# Patient Record
Sex: FEMALE | Race: WHITE | NOT HISPANIC OR LATINO | Employment: OTHER | URBAN - METROPOLITAN AREA
[De-identification: names, ages, dates, MRNs, and addresses within clinical notes are randomized per-mention and may not be internally consistent; named-entity substitution may affect disease eponyms.]

---

## 2017-01-03 RX ORDER — LISINOPRIL 10 MG/1
TABLET ORAL
Qty: 90 TAB | Refills: 0 | Status: SHIPPED | OUTPATIENT
Start: 2017-01-03 | End: 2017-04-03 | Stop reason: SDUPTHER

## 2017-01-03 NOTE — TELEPHONE ENCOUNTER
Was the patient seen in the last year in this department? Yes     Does patient have an active prescription for medications requested? No     Received Request Via: Pharmacy     Last visit: 06/13/2016  Last labs:05/15/2015

## 2017-01-10 DIAGNOSIS — F41.1 GAD (GENERALIZED ANXIETY DISORDER): ICD-10-CM

## 2017-01-10 RX ORDER — ALPRAZOLAM 0.5 MG/1
0.5 TABLET ORAL 2 TIMES DAILY
Qty: 50 TAB | Refills: 0 | OUTPATIENT
Start: 2017-01-10

## 2017-01-10 NOTE — TELEPHONE ENCOUNTER
Was the patient seen in the last year in this department? Yes     Does patient have an active prescription for medications requested? No     Received Request Via: Pharmacy    Last visit: 06/13/2016  Last labs:CMP 01/27/2016

## 2017-01-11 NOTE — TELEPHONE ENCOUNTER
Phone Number Called: [phone] (home)       Message: Spoke with patient and let them know Zahra Rodríguez M.D.'s message.  Pt scheduled O/V on 01/12/2017.    Left Message for patient to call back: N\A

## 2017-02-06 ENCOUNTER — OFFICE VISIT (OUTPATIENT)
Dept: MEDICAL GROUP | Facility: PHYSICIAN GROUP | Age: 62
End: 2017-02-06

## 2017-02-06 VITALS
WEIGHT: 129 LBS | DIASTOLIC BLOOD PRESSURE: 72 MMHG | HEART RATE: 78 BPM | RESPIRATION RATE: 16 BRPM | OXYGEN SATURATION: 97 % | SYSTOLIC BLOOD PRESSURE: 118 MMHG | BODY MASS INDEX: 21.49 KG/M2 | TEMPERATURE: 97.9 F | HEIGHT: 65 IN

## 2017-02-06 DIAGNOSIS — N95.1 HOT FLASH, MENOPAUSAL: ICD-10-CM

## 2017-02-06 DIAGNOSIS — E78.5 DYSLIPIDEMIA: ICD-10-CM

## 2017-02-06 DIAGNOSIS — R94.4 DECREASED GLOMERULAR FILTRATION RATE (GFR): ICD-10-CM

## 2017-02-06 DIAGNOSIS — I10 ESSENTIAL HYPERTENSION: ICD-10-CM

## 2017-02-06 DIAGNOSIS — Z13.1 SCREENING FOR DIABETES MELLITUS: ICD-10-CM

## 2017-02-06 DIAGNOSIS — Z85.3 HISTORY OF BREAST CANCER: ICD-10-CM

## 2017-02-06 DIAGNOSIS — F41.8 OTHER SPECIFIED ANXIETY DISORDERS: ICD-10-CM

## 2017-02-06 PROCEDURE — 99214 OFFICE O/P EST MOD 30 MIN: CPT | Performed by: FAMILY MEDICINE

## 2017-02-06 RX ORDER — PRAVASTATIN SODIUM 40 MG
40 TABLET ORAL DAILY
Qty: 90 TAB | Refills: 3 | Status: SHIPPED | OUTPATIENT
Start: 2017-02-06 | End: 2018-02-07 | Stop reason: SDUPTHER

## 2017-02-06 NOTE — MR AVS SNAPSHOT
"        Sylvie Lipscomblivan   2017 1:00 PM   Office Visit   MRN: [id number]    Department:  [facility]   Dept Phone:  [phone]    Description:  Female : 1955   Provider:  Cecilia Florian M.D.           Reason for Visit     Establish Care refills      Allergies as of 2017     Allergen Noted Reactions    Codeine 2016   Unspecified    Memory issues      You were diagnosed with     Dyslipidemia   [496278]       Essential hypertension   [0257845]       Hot flash, menopausal   [636307]       Other specified anxiety disorders   [8348590]       History of breast cancer   [644647]       Decreased glomerular filtration rate (GFR)   [5349468]       Screening for diabetes mellitus   [V77.1.ICD-9-CM]         Vital Signs     Blood Pressure Pulse Temperature Respirations Height Weight    118/72 mmHg 78 36.6 °C (97.9 °F) 16 1.638 m (5' 4.5\") 58.514 kg (129 lb)    Body Mass Index Oxygen Saturation Smoking Status             21.81 kg/m2 97% Current Every Day Smoker         Basic Information     Date Of Birth Sex Race Ethnicity Preferred Language    1955 Female White Non- English      Your appointments     Aug 07, 2017  1:20 PM   Established Patient with Cecilia Florian M.D.   [facility] (--)    [address]  [address]  [facility] [address] [phone]           You will be receiving a confirmation call a few days before your appointment from our automated call confirmation system.              Problem List              ICD-10-CM Priority Class Noted - Resolved    Dyslipidemia E78.5 Medium  2013 - Present    Anxiety disorder F41.9 Low Chronic 2014 - Present    Status post mastectomy Z90.10 Medium  2014 - Present    Hot flash, menopausal N95.1   2015 - Present    Essential hypertension I10   2015 - Present    History of breast cancer Z85.3   2017 - Present    Decreased glomerular filtration rate (GFR) R94.4   2017 - Present      Health Maintenance       " Date Due Completion Dates    IMM INFLUENZA (1) 10/1/2017 (Originally 9/1/2016) ---    COLONOSCOPY 2/6/2018 (Originally 9/1/2016) 9/1/2006 (Prv Comp)    Override on 9/1/2006: Previously completed (every 10 years)    IMM ZOSTER VACCINE 2/6/2018 (Originally 8/1/2015) ---    MAMMOGRAM 4/27/2017 4/27/2016, 5/18/2015, 4/28/2014, 5/7/2012, 5/4/2011, 3/29/2010, 2/24/2010, 2/24/2010, 8/7/2008, 8/7/2008, 10/31/2006, 9/30/2005, 9/29/2004    PAP SMEAR 7/1/2017 7/1/2014 (Done)    Override on 7/1/2014: Done    IMM DTaP/Tdap/Td Vaccine (2 - Td) 4/29/2024 4/29/2014            Current Immunizations     Tdap Vaccine 4/29/2014  2:30 PM      Below and/or attached are the medications your provider expects you to take. Review all of your home medications and newly ordered medications with your provider and/or pharmacist. Follow medication instructions as directed by your provider and/or pharmacist. Please keep your medication list with you and share with your provider. Update the information when medications are discontinued, doses are changed, or new medications (including over-the-counter products) are added; and carry medication information at all times in the event of emergency situations     Allergies:  CODEINE - Unspecified               Medications  Valid as of: February 06, 2017 -  1:07 PM    Generic Name Brand Name Tablet Size Instructions for use    Fenofibrate Micronized (Cap) LOFIBRA 134 MG TAKE ONE CAPSULE BY MOUTH ONE TIME DAILY        Lisinopril (Tab) PRINIVIL 10 MG TAKE ONE TABLET BY MOUTH ONE TIME DAILY        Metoprolol Succinate (TABLET SR 24 HR) TOPROL XL 25 MG TAKE ONE TABLET BY MOUTH ONE TIME DAILY        Pravastatin Sodium (Tab) PRAVACHOL 40 MG Take 1 Tab by mouth every day.        Venlafaxine HCl (Tab) EFFEXOR 75 MG Take 1 Tab by mouth every day.        .                 Medicines prescribed today were sent to:     [facility] - [place], [place] - [address] [address]    Phone: [phone]  Fax: [phone]    Open 24 Hours?: No      Medication refill instructions:       If your prescription bottle indicates you have medication refills left, it is not necessary to call your provider’s office. Please contact your pharmacy and they will refill your medication.    If your prescription bottle indicates you do not have any refills left, you may request refills at any time through one of the following ways: The online MTPV system (except Urgent Care), by calling your provider’s office, or by asking your pharmacy to contact your provider’s office with a refill request. Medication refills are processed only during regular business hours and may not be available until the next business day. Your provider may request additional information or to have a follow-up visit with you prior to refilling your medication.   *Please Note: Medication refills are assigned a new Rx number when refilled electronically. Your pharmacy may indicate that no refills were authorized even though a new prescription for the same medication is available at the pharmacy. Please request the medicine by name with the pharmacy before contacting your provider for a refill.        Your To Do List     Future Labs/Procedures Complete By Expires    BASIC METABOLIC PANEL  As directed 2/7/2018         MTPV Access Code: Activation code not generated  Current MTPV Status: Active

## 2017-02-06 NOTE — ASSESSMENT & PLAN NOTE
Improved since her last office visit. She has weaned off alprazolam. She denies any suicidal or homicidal ideation.

## 2017-02-06 NOTE — ASSESSMENT & PLAN NOTE
Diagnosed with left breast cancer in 1995. She had a mastectomy, chemotherapy and radiation. She has annual mammograms on her right breast.

## 2017-02-06 NOTE — PROGRESS NOTES
Subjective:   Sylvie Andersen is a 61 y.o. female here today to establish and discuss hyperlipidemia.    Dyslipidemia  Patient has a long history of high cholesterol. She requests a refill of pravastatin. Her cholesterol was within normal limits in 2015.    History of breast cancer  Diagnosed with left breast cancer in 1995. She had a mastectomy, chemotherapy and radiation. She has annual mammograms on her right breast.    Essential hypertension  Stable. Monitoring BP at home. Currently taking lisinopril and metoprolol as directed. Also taking baby aspirin. Denies lightheadedness, vision changes, headache, palpitations or leg swelling.    Hot flash, menopausal  Hot flashes are well controlled with venlafaxine. Patient wishes to continue.    Anxiety disorder  Improved since her last office visit. She has weaned off alprazolam. She denies any suicidal or homicidal ideation.    Decreased glomerular filtration rate (GFR)  GFR noted to be 48 on lab work in 2015. Patient denies any difficulties with urination or back pain. She does have a history of high blood pressure. No history of diabetes.     Current medicines (including changes today)  Current Outpatient Prescriptions   Medication Sig Dispense Refill   • pravastatin (PRAVACHOL) 40 MG tablet Take 1 Tab by mouth every day. 90 Tab 3   • lisinopril (PRINIVIL) 10 MG Tab TAKE ONE TABLET BY MOUTH ONE TIME DAILY 90 Tab 0   • venlafaxine (EFFEXOR) 75 MG Tab Take 1 Tab by mouth every day. 90 Tab 3   • metoprolol SR (TOPROL XL) 25 MG TABLET SR 24 HR TAKE ONE TABLET BY MOUTH ONE TIME DAILY 90 Tab 1   • fenofibrate micronized (LOFIBRA) 134 MG capsule TAKE ONE CAPSULE BY MOUTH ONE TIME DAILY 90 Cap 3     No current facility-administered medications for this visit.     She  has a past medical history of Hyperlipidemia; Breast cancer (CMS-HCC) (1995); Allergy, unspecified not elsewhere classified; and Hypertension.    ROS   See above. No chest pain, no shortness of breath, no  "abdominal pain.     Objective:     Physical Exam:  Blood pressure 118/72, pulse 78, temperature 36.6 °C (97.9 °F), resp. rate 16, height 1.638 m (5' 4.5\"), weight 58.514 kg (129 lb), SpO2 97 %. Body mass index is 21.81 kg/(m^2).   Constitutional: Alert, no distress.  Skin: Warm, dry, good turgor, no rashes in visible areas.  Eye: Equal, round and reactive, conjunctiva clear, lids normal.  ENMT: TM's clear bilaterally, lips without lesions, good dentition, oropharynx clear.  Neck: Trachea midline, no masses, no thyromegaly. No cervical or supraclavicular lymphadenopathy.  Respiratory: Unlabored respiratory effort, lungs clear to auscultation, no wheezes, no ronchi.  Cardiovascular: Normal S1, S2, no murmur, no edema.  Abdomen: Soft, non-tender, no masses, no hepatosplenomegaly.  Psych: Alert and oriented x3, normal affect and mood.    Assessment and Plan:     1. Dyslipidemia  Refilled statin. Lipid panel and liver enzymes normal in 2015. Continue current treatment.  - pravastatin (PRAVACHOL) 40 MG tablet; Take 1 Tab by mouth every day.  Dispense: 90 Tab; Refill: 3    2. Essential hypertension  Well-controlled. Labs as indicated. Continue antihypertensive medications. Discussed decreasing salt intake. Emphasized benefits of exercise and diet. Continue to monitor.    3. Hot flash, menopausal  This chronic and stable problem. Continue venlafaxine for vasomotor symptoms.     4. Other specified anxiety disorders  Improved. Discontinue benzodiazepine.    5. History of breast cancer  Per patient history. Continue annual mammogram of right breast.    6. Decreased glomerular filtration rate (GFR)  Possible CKD secondary to hypertension. Recheck BMP.  - BASIC METABOLIC PANEL; Future    7. Screening for diabetes mellitus  - BASIC METABOLIC PANEL; Future    Followup: Return in about 6 months (around 8/6/2017) for f/u kidneys and possible ckd, short.         PLEASE NOTE: This dictation was created using voice recognition " software. I have made every reasonable attempt to correct obvious errors, but I expect that there are errors of grammar and possibly content that I did not discover before finalizing the note.

## 2017-02-06 NOTE — ASSESSMENT & PLAN NOTE
Stable. Monitoring BP at home. Currently taking lisinopril and metoprolol as directed. Also taking baby aspirin. Denies lightheadedness, vision changes, headache, palpitations or leg swelling.

## 2017-02-06 NOTE — ASSESSMENT & PLAN NOTE
GFR noted to be 48 on lab work in 2015. Patient denies any difficulties with urination or back pain. She does have a history of high blood pressure. No history of diabetes.

## 2017-02-06 NOTE — ASSESSMENT & PLAN NOTE
Patient has a long history of high cholesterol. She requests a refill of pravastatin. Her cholesterol was within normal limits in 2015.

## 2017-02-10 ENCOUNTER — APPOINTMENT (OUTPATIENT)
Dept: MEDICAL GROUP | Facility: PHYSICIAN GROUP | Age: 62
End: 2017-02-10

## 2017-02-21 DIAGNOSIS — Z76.0 MEDICATION REFILL: ICD-10-CM

## 2017-02-21 DIAGNOSIS — E78.5 DYSLIPIDEMIA: ICD-10-CM

## 2017-02-21 RX ORDER — FENOFIBRATE 134 MG/1
134 CAPSULE ORAL
Qty: 90 CAP | Refills: 3 | Status: SHIPPED | OUTPATIENT
Start: 2017-02-21 | End: 2018-02-22 | Stop reason: SDUPTHER

## 2017-02-21 RX ORDER — METOPROLOL SUCCINATE 25 MG/1
25 TABLET, EXTENDED RELEASE ORAL DAILY
Qty: 90 TAB | Refills: 3 | Status: SHIPPED | OUTPATIENT
Start: 2017-02-21 | End: 2018-02-22 | Stop reason: SDUPTHER

## 2017-02-21 NOTE — TELEPHONE ENCOUNTER
----- Message from Your Healthcare Team sent at 2/20/2017  4:25 PM PST -----  Regarding: Prescription Question  Contact: [phone]  Hi Dr. Florian ~  It's me, Sylvie Andersen :)  I was wondering if you could refill these two RX's because I have no refills.  My pharmacy is [facility] [address] [facility] [phone]  The RX's I need refilled are:  1.  Fenofibrate Micronized 134 Mg Cap       impa    2.  Metoprolol Succinate Er 24hr 254 Mg       Tab    Thanks Bunches!!

## 2017-04-03 RX ORDER — LISINOPRIL 10 MG/1
TABLET ORAL
Qty: 90 TAB | Refills: 3 | Status: SHIPPED | OUTPATIENT
Start: 2017-04-03 | End: 2018-03-27 | Stop reason: SDUPTHER

## 2017-04-19 RX ORDER — VENLAFAXINE 75 MG/1
75 TABLET ORAL DAILY
Qty: 90 TAB | Refills: 3 | Status: SHIPPED | OUTPATIENT
Start: 2017-04-19 | End: 2018-03-08 | Stop reason: SDUPTHER

## 2017-04-19 NOTE — TELEPHONE ENCOUNTER
----- Message from Your Healthcare Team sent at 4/18/2017  7:46 PM PDT -----  Regarding: Prescription Question  Contact: [phone]  Hi Dr. Florian ~  It's Sylvie Andersen :)  I was wondering if you could please refill this RX for me ~ ~    Venlafaxine Hcl 75 Mg Tab [facility]  [phone]    Thanks Bunches :) :)

## 2017-08-07 ENCOUNTER — OFFICE VISIT (OUTPATIENT)
Dept: MEDICAL GROUP | Facility: PHYSICIAN GROUP | Age: 62
End: 2017-08-07

## 2017-08-07 VITALS
DIASTOLIC BLOOD PRESSURE: 64 MMHG | HEART RATE: 80 BPM | HEIGHT: 65 IN | RESPIRATION RATE: 16 BRPM | OXYGEN SATURATION: 96 % | WEIGHT: 127.87 LBS | SYSTOLIC BLOOD PRESSURE: 104 MMHG | BODY MASS INDEX: 21.3 KG/M2

## 2017-08-07 DIAGNOSIS — R94.4 DECREASED GLOMERULAR FILTRATION RATE (GFR): ICD-10-CM

## 2017-08-07 DIAGNOSIS — R68.89 HEAT INTOLERANCE: ICD-10-CM

## 2017-08-07 PROCEDURE — 99213 OFFICE O/P EST LOW 20 MIN: CPT | Performed by: FAMILY MEDICINE

## 2017-08-07 ASSESSMENT — PATIENT HEALTH QUESTIONNAIRE - PHQ9: CLINICAL INTERPRETATION OF PHQ2 SCORE: 0

## 2017-08-07 NOTE — ASSESSMENT & PLAN NOTE
Reviewed patient's most recent blood work that showed improvement in her kidney function. As she does not currently have insurance, we had ordered a basic metabolic panel to recheck her kidney function. She denies any difficulties with urination. No flank pain.

## 2017-08-07 NOTE — ASSESSMENT & PLAN NOTE
Patient has known hot flashes that are well-controlled with venlafaxine. She tells me that in the hot weather, she has been sweating more. She also sweats and she feels nervous. She wonders if maybe she has a thyroid problem.

## 2017-08-07 NOTE — PROGRESS NOTES
"Subjective:   Sylvie Andersen is a 62 y.o. female here today for follow-up lab work and sweating.    Decreased glomerular filtration rate (GFR)  Reviewed patient's most recent blood work that showed improvement in her kidney function. As she does not currently have insurance, we had ordered a basic metabolic panel to recheck her kidney function. She denies any difficulties with urination. No flank pain.    Heat intolerance  Patient has known hot flashes that are well-controlled with venlafaxine. She tells me that in the hot weather, she has been sweating more. She also sweats and she feels nervous. She wonders if maybe she has a thyroid problem.     Current medicines (including changes today)  Current Outpatient Prescriptions   Medication Sig Dispense Refill   • venlafaxine (EFFEXOR) 75 MG Tab Take 1 Tab by mouth every day. (Patient taking differently: Take 75 mg by mouth. 1 1/2 tabs QD) 90 Tab 3   • lisinopril (PRINIVIL) 10 MG Tab TAKE ONE TABLET BY MOUTH ONE TIME DAILY 90 Tab 3   • fenofibrate micronized (LOFIBRA) 134 MG capsule Take 1 Cap by mouth every morning before breakfast. 90 Cap 3   • metoprolol SR (TOPROL XL) 25 MG TABLET SR 24 HR Take 1 Tab by mouth every day. 90 Tab 3   • pravastatin (PRAVACHOL) 40 MG tablet Take 1 Tab by mouth every day. 90 Tab 3     No current facility-administered medications for this visit.     She  has a past medical history of Hyperlipidemia; Breast cancer (CMS-[facility]) (1995); Allergy, unspecified not elsewhere classified; and Hypertension.    ROS   See HPI. No chest pain, no shortness of breath, no abdominal pain.     Objective:     Physical Exam:  Blood pressure 104/64, pulse 80, resp. rate 16, height 1.638 m (5' 4.5\"), weight 58 kg (127 lb 13.9 oz), SpO2 96 %, not currently breastfeeding. Body mass index is 21.62 kg/(m^2).   Constitutional: Alert, no distress.  Skin: Warm, dry, good turgor, no rashes in visible areas. Hair is damp.  Eye: Equal, round and reactive, conjunctiva " clear, lids normal.  ENMT: Lips without lesions, good dentition, oropharynx clear.  Neck: Trachea midline, no masses, no thyromegaly. No cervical or supraclavicular lymphadenopathy.  Respiratory: Unlabored respiratory effort, lungs clear to auscultation, no wheezes, no ronchi.  Cardiovascular: Normal S1, S2, no murmur, no edema.  Abdomen: Soft, non-tender, no masses, no hepatosplenomegaly.  Psych: Alert and oriented x3, normal affect and mood.    Assessment and Plan:     1. Decreased glomerular filtration rate (GFR)  Unclear cause. Workup is limited by lack of insurance and financial constraints. We'll recheck basic metabolic panel to ensure that patient's kidney function is continuing to improve.  - BASIC METABOLIC PANEL; Future    2. Heat intolerance  Most likely secondary to hot flashes. Patient's previous history of breast cancer status post chemotherapy may also be a cause. We'll check TSH level.  - TSH; Future    Patient also brought in a form from [employer] for us to fill out so she can get the shingles vaccine at a lower cost. We informed patient that we will be sending this form out and we'll notify her when we get a decision from the pharmaceutical company.    Followup: Return in about 6 months (around 2/7/2018) for f/u lab results, heat intolerance, kidneys, short.         PLEASE NOTE: This dictation was created using voice recognition software. I have made every reasonable attempt to correct obvious errors, but I expect that there are errors of grammar and possibly content that I did not discover before finalizing the note.

## 2017-08-07 NOTE — MR AVS SNAPSHOT
"        Sylvie Ganesh   2017 1:20 PM   Office Visit   MRN: [id number]    Department:  [facility]   Dept Phone:  [phone]    Description:  Female : 1955   Provider:  Cecilia Florian M.D.           Reason for Visit     Results labs       Allergies as of 2017     Allergen Noted Reactions    Codeine 2016   Unspecified    Memory issues      You were diagnosed with     Decreased glomerular filtration rate (GFR)   [6047637]       Heat intolerance   [515140]         Vital Signs     Blood Pressure Pulse Respirations Height Weight Body Mass Index    104/64 mmHg 80 16 1.638 m (5' 4.5\") 58 kg (127 lb 13.9 oz) 21.62 kg/m2    Oxygen Saturation Breastfeeding? Smoking Status             96% No Current Every Day Smoker         Basic Information     Date Of Birth Sex Race Ethnicity Preferred Language    1955 Female White Non- English      Your appointments     2018  2:00 PM   Established Patient with Cecilia Florian M.D.   [facility] (--)    [address]  [address]  [facility] [address] [phone]           You will be receiving a confirmation call a few days before your appointment from our automated call confirmation system.              Problem List              ICD-10-CM Priority Class Noted - Resolved    Dyslipidemia E78.5 Medium  2013 - Present    Anxiety disorder F41.9 Low Chronic 2014 - Present    Status post mastectomy Z90.10 Medium  2014 - Present    Heat intolerance R68.89   2015 - Present    Essential hypertension I10   2015 - Present    History of breast cancer Z85.3   2017 - Present    Decreased glomerular filtration rate (GFR) R94.4   2017 - Present      Health Maintenance        Date Due Completion Dates    MAMMOGRAM 2017, 2015, 2014, 2012, 2011, 3/29/2010, 2010, 2010, 2008, 2008, 10/31/2006, 2005, 2004    PAP SMEAR 2017 (Done)    Override on " 7/1/2014: Done    IMM INFLUENZA (1) 10/1/2017 (Originally 9/1/2017) ---    COLONOSCOPY 2/6/2018 (Originally 9/1/2016) 9/1/2006 (Prv Comp)    Override on 9/1/2006: Previously completed (every 10 years)    IMM ZOSTER VACCINE 2/6/2018 (Originally 8/1/2015) ---    IMM DTaP/Tdap/Td Vaccine (2 - Td) 4/29/2024 4/29/2014            Current Immunizations     Tdap Vaccine 4/29/2014  2:30 PM      Below and/or attached are the medications your provider expects you to take. Review all of your home medications and newly ordered medications with your provider and/or pharmacist. Follow medication instructions as directed by your provider and/or pharmacist. Please keep your medication list with you and share with your provider. Update the information when medications are discontinued, doses are changed, or new medications (including over-the-counter products) are added; and carry medication information at all times in the event of emergency situations     Allergies:  CODEINE - Unspecified               Medications  Valid as of: August 07, 2017 -  1:38 PM    Generic Name Brand Name Tablet Size Instructions for use    Fenofibrate Micronized (Cap) LOFIBRA 134 MG Take 1 Cap by mouth every morning before breakfast.        Lisinopril (Tab) PRINIVIL 10 MG TAKE ONE TABLET BY MOUTH ONE TIME DAILY        Metoprolol Succinate (TABLET SR 24 HR) TOPROL XL 25 MG Take 1 Tab by mouth every day.        Pravastatin Sodium (Tab) PRAVACHOL 40 MG Take 1 Tab by mouth every day.        Venlafaxine HCl (Tab) EFFEXOR 75 MG Take 1 Tab by mouth every day.        .                 Medicines prescribed today were sent to:     [facility] - [place] - [address] [address]    Phone: [phone] Fax: [phone]    Open 24 Hours?: No      Medication refill instructions:       If your prescription bottle indicates you have medication refills left, it is not necessary to call your provider’s office. Please contact your pharmacy and  they will refill your medication.    If your prescription bottle indicates you do not have any refills left, you may request refills at any time through one of the following ways: The online D[facility]Approved system (except Urgent Care), by calling your provider’s office, or by asking your pharmacy to contact your provider’s office with a refill request. Medication refills are processed only during regular business hours and may not be available until the next business day. Your provider may request additional information or to have a follow-up visit with you prior to refilling your medication.   *Please Note: Medication refills are assigned a new Rx number when refilled electronically. Your pharmacy may indicate that no refills were authorized even though a new prescription for the same medication is available at the pharmacy. Please request the medicine by name with the pharmacy before contacting your provider for a refill.        Your To Do List     Future Labs/Procedures Complete By [email or web address]    BASIC METABOLIC PANEL  As directed 8/8/2018    TSH  As directed 8/8/2018         DermApproved Access Code: Activation code not generated  Current DermApproved Status: Active          Quit Tobacco Information     Do you want to quit using tobacco?    Quitting tobacco decreases risks of cancer, heart and lung disease, increases life expectancy, improves sense of taste and smell, and increases spending money, among other benefits.    If you are thinking about quitting, we can help.  • [facility] Quit Tobacco Program: 616.153.2416  o Program occurs weekly for four weeks and includes pharmacist consultation on products to support quitting smoking or chewing tobacco. A provider referral is needed for pharmacist consultation.  • Tobacco Users Help Hotline: 4-972-QUIT-NOW (729-5126) or https://[place].quitlogix.org/  o Free, confidential telephone and online coaching for [place] residents. Sessions are designed on a schedule that is convenient for you.  Eligible clients receive free nicotine replacement therapy.  • Nationally: www.smokefree.gov  o Information and professional assistance to support both immediate and long-term needs as you become, and remain, a non-smoker. Smokefree.gov allows you to choose the help that best fits your needs.

## 2017-08-21 ENCOUNTER — TELEPHONE (OUTPATIENT)
Dept: MEDICAL GROUP | Facility: PHYSICIAN GROUP | Age: 62
End: 2017-08-21

## 2017-08-21 NOTE — TELEPHONE ENCOUNTER
Patient dropped of an application for the [employer] Vaccine Assistance Program.  We completed and faxed this form on 8/7/17 however we have not heard back.  I spoke to the Assistance Program and they informed me we are to administer vaccine here in our office and then they will send us a new vaccine within a month.  I confirmed with our supervisor that this can not be done as we do not accept vaccines from outside companies.  Patient informed.  She will contact the company and see if they can recommend another route.

## 2018-02-07 DIAGNOSIS — E78.5 DYSLIPIDEMIA: ICD-10-CM

## 2018-02-07 RX ORDER — PRAVASTATIN SODIUM 40 MG
40 TABLET ORAL DAILY
Qty: 90 TAB | Refills: 1 | Status: SHIPPED | OUTPATIENT
Start: 2018-02-07 | End: 2018-07-31 | Stop reason: SDUPTHER

## 2018-02-22 DIAGNOSIS — Z76.0 MEDICATION REFILL: ICD-10-CM

## 2018-02-22 DIAGNOSIS — E78.5 DYSLIPIDEMIA: ICD-10-CM

## 2018-02-22 RX ORDER — FENOFIBRATE 134 MG/1
134 CAPSULE ORAL
Qty: 90 CAP | Refills: 0 | Status: SHIPPED | OUTPATIENT
Start: 2018-02-22 | End: 2018-05-26 | Stop reason: SDUPTHER

## 2018-02-22 RX ORDER — METOPROLOL SUCCINATE 25 MG/1
25 TABLET, EXTENDED RELEASE ORAL DAILY
Qty: 90 TAB | Refills: 0 | Status: SHIPPED | OUTPATIENT
Start: 2018-02-22 | End: 2018-05-25 | Stop reason: SDUPTHER

## 2018-03-08 RX ORDER — VENLAFAXINE 75 MG/1
TABLET ORAL
Qty: 135 TAB | Refills: 3 | Status: SHIPPED | OUTPATIENT
Start: 2018-03-08 | End: 2019-04-18 | Stop reason: SDUPTHER

## 2018-03-08 RX ORDER — VENLAFAXINE 75 MG/1
TABLET ORAL
Qty: 90 TAB | Refills: 0 | OUTPATIENT
Start: 2018-03-08

## 2018-03-08 NOTE — TELEPHONE ENCOUNTER
----- Message from Sylvie Andersen sent at 3/8/2018  9:39 AM PST -----  Regarding: Non-Urgent Medical Question  Contact: [phone]  Hi ~  This is Sylvie Andersen :)  I was wondering if you could please call in a refill for me?  Venlafaxine Hcl 75 Mg Tab [facility]  [address] [place]  [id number]  [phone]  Thanks Bunches !! :)

## 2018-03-27 RX ORDER — LISINOPRIL 10 MG/1
10 TABLET ORAL DAILY
Qty: 90 TAB | Refills: 3 | Status: SHIPPED | OUTPATIENT
Start: 2018-03-27 | End: 2019-03-28 | Stop reason: SDUPTHER

## 2018-03-30 ENCOUNTER — OFFICE VISIT (OUTPATIENT)
Dept: MEDICAL GROUP | Facility: PHYSICIAN GROUP | Age: 63
End: 2018-03-30

## 2018-03-30 VITALS
TEMPERATURE: 98.6 F | SYSTOLIC BLOOD PRESSURE: 118 MMHG | WEIGHT: 134 LBS | HEART RATE: 73 BPM | RESPIRATION RATE: 18 BRPM | BODY MASS INDEX: 22.33 KG/M2 | OXYGEN SATURATION: 96 % | HEIGHT: 65 IN | DIASTOLIC BLOOD PRESSURE: 62 MMHG

## 2018-03-30 DIAGNOSIS — I10 ESSENTIAL HYPERTENSION: ICD-10-CM

## 2018-03-30 DIAGNOSIS — R68.89 HEAT INTOLERANCE: ICD-10-CM

## 2018-03-30 DIAGNOSIS — Z90.12 STATUS POST LEFT MASTECTOMY: ICD-10-CM

## 2018-03-30 DIAGNOSIS — R94.4 DECREASED GLOMERULAR FILTRATION RATE (GFR): ICD-10-CM

## 2018-03-30 DIAGNOSIS — F41.8 OTHER SPECIFIED ANXIETY DISORDERS: ICD-10-CM

## 2018-03-30 DIAGNOSIS — Z85.3 HISTORY OF BREAST CANCER: ICD-10-CM

## 2018-03-30 DIAGNOSIS — E78.5 DYSLIPIDEMIA: ICD-10-CM

## 2018-03-30 PROCEDURE — 99214 OFFICE O/P EST MOD 30 MIN: CPT | Performed by: FAMILY MEDICINE

## 2018-03-30 ASSESSMENT — PAIN SCALES - GENERAL: PAINLEVEL: NO PAIN

## 2018-03-30 NOTE — ASSESSMENT & PLAN NOTE
Mood improved with current medication and therapy. Taking medications as prescribed without side effects. Patient denies SI/HI.    She was previously on alprazolam, but weaned herself off last year.

## 2018-03-30 NOTE — ASSESSMENT & PLAN NOTE
Doing well. No recent labs due to patient not having insurance. Taking medications as prescribed. No myalgias.

## 2018-03-30 NOTE — ASSESSMENT & PLAN NOTE
Stable. Patient had bloodwork at [facility], but we have not yet received it. Her most recent blood work showed improvement in her kidney function. As she does not currently have insurance, we had ordered a basic metabolic panel to recheck her kidney function. She denies any difficulties with urination. No flank pain.

## 2018-03-30 NOTE — ASSESSMENT & PLAN NOTE
No change. Diagnosed with left breast cancer in 1995. She had a mastectomy, chemotherapy and radiation. She has not had a mammogram of her right breast in a couple of years. Denies any new lumps or bumps.

## 2018-03-30 NOTE — PROGRESS NOTES
Subjective:   Sylvie Andersen is a 62 y.o. female here today for follow-up kidney function, heat intolerance and lab results.    Decreased glomerular filtration rate (GFR)  Stable. Patient had bloodwork at [facility], but we have not yet received it. Her most recent blood work showed improvement in her kidney function. As she does not currently have insurance, we had ordered a basic metabolic panel to recheck her kidney function. She denies any difficulties with urination. No flank pain.    Heat intolerance  Patient reports that her hot flashes have improved. She is on venlafaxine.    History of breast cancer  No change. Diagnosed with left breast cancer in 1995. She had a mastectomy, chemotherapy and radiation. She has not had a mammogram of her right breast in a couple of years. Denies any new lumps or bumps.    Essential hypertension  Stable. Monitoring BP at home. Currently taking lisinopril and metoprolol as directed. Also taking baby aspirin. Denies lightheadedness, vision changes, headache, palpitations or leg swelling.    Dyslipidemia  Doing well. No recent labs due to patient not having insurance. Taking medications as prescribed. No myalgias.    Anxiety disorder  Mood improved with current medication and therapy. Taking medications as prescribed without side effects. Patient denies SI/HI.    She was previously on alprazolam, but weaned herself off last year.     Current medicines (including changes today)  Current Outpatient Prescriptions   Medication Sig Dispense Refill   • lisinopril (PRINIVIL) 10 MG Tab Take 1 Tab by mouth every day. 90 Tab 3   • venlafaxine (EFFEXOR) 75 MG Tab Take 1.5 tablets PO daily 135 Tab 3   • metoprolol SR (TOPROL XL) 25 MG TABLET SR 24 HR Take 1 Tab by mouth every day. 90 Tab 0   • fenofibrate micronized (LOFIBRA) 134 MG capsule Take 1 Cap by mouth every morning before breakfast. 90 Cap 0   • pravastatin (PRAVACHOL) 40 MG tablet Take 1 Tab by mouth every day. 90 Tab 1     No  "current facility-administered medications for this visit.      She  has a past medical history of Allergy, unspecified not elsewhere classified; Breast cancer (CMS-HCC) (1995); Hyperlipidemia; and Hypertension.    ROS   No chest pain, no shortness of breath, no abdominal pain.     Objective:     Physical Exam:  Blood pressure 118/62, pulse 73, temperature 37 °C (98.6 °F), resp. rate 18, height 1.638 m (5' 4.5\"), weight 60.8 kg (134 lb), SpO2 96 %, not currently breastfeeding. Body mass index is 22.65 kg/m².   Constitutional: Alert, no distress.  Skin: Warm, dry, good turgor, no rashes in visible areas.  Eye: Equal, round and reactive, conjunctiva clear, lids normal.  ENMT: Lips without lesions, good dentition, oropharynx clear.  Neck: Trachea midline, no masses, no thyromegaly.  Respiratory: Unlabored respiratory effort, lungs clear to auscultation, no wheezes, no ronchi.  Cardiovascular: Normal S1, S2, no murmur, no edema.  Abdomen: Soft, non-tender, no masses, no hepatosplenomegaly.  Psych: Alert and oriented x3, normal affect and mood.    Assessment and Plan:     1. Decreased glomerular filtration rate (GFR)  Stable. It was improved on previous bloodwork. Will continue to monitor and recheck in 3 months.  - BASIC METABOLIC PANEL; Future    2. Heat intolerance  Resolved. TSH normal on labwork.    3. History of breast cancer  4. Status post left mastectomy  Patient is overdue for right breast screening mammogram. She has declined in the past due to cost concerns as she does not have insurance. I provided her with information for the [facility]. She tells me she will go to their next Women's Clinic.    5. Essential hypertension  Well-controlled. Continue antihypertensive medications. Discussed decreasing salt intake. Emphasized benefits of exercise and diet. Continue to monitor.    6. Dyslipidemia  Well controlled. Continue statin medication. Continue to monitor.    7. Other " specified anxiety disorders  Patient is feeling well on current medications. Will continue. Denies any suicidal or homicidal ideation. Emphasized importance of healthy diet and exercise. Discussed that should the patient have any symptoms they should call suicide prevention hotline or report to the emergency room immediately.    Followup: 1 year or sooner if needed.         PLEASE NOTE: This dictation was created using voice recognition software. I have made every reasonable attempt to correct obvious errors, but I expect that there are errors of grammar and possibly content that I did not discover before finalizing the note.

## 2018-04-09 DIAGNOSIS — R94.4 DECREASED GLOMERULAR FILTRATION RATE (GFR): ICD-10-CM

## 2018-04-09 DIAGNOSIS — R68.89 HEAT INTOLERANCE: ICD-10-CM

## 2018-05-25 DIAGNOSIS — Z76.0 MEDICATION REFILL: ICD-10-CM

## 2018-05-26 DIAGNOSIS — E78.5 DYSLIPIDEMIA: ICD-10-CM

## 2018-05-29 RX ORDER — FENOFIBRATE 134 MG/1
CAPSULE ORAL
Qty: 90 CAP | Refills: 3 | Status: SHIPPED | OUTPATIENT
Start: 2018-05-29 | End: 2019-05-23 | Stop reason: SDUPTHER

## 2018-05-29 RX ORDER — METOPROLOL SUCCINATE 25 MG/1
TABLET, EXTENDED RELEASE ORAL
Qty: 90 TAB | Refills: 3 | Status: SHIPPED | OUTPATIENT
Start: 2018-05-29 | End: 2019-05-23 | Stop reason: SDUPTHER

## 2018-07-31 DIAGNOSIS — E78.5 DYSLIPIDEMIA: ICD-10-CM

## 2018-07-31 RX ORDER — PRAVASTATIN SODIUM 40 MG
TABLET ORAL
Qty: 90 TAB | Refills: 3 | Status: SHIPPED | OUTPATIENT
Start: 2018-07-31 | End: 2019-07-31 | Stop reason: SDUPTHER

## 2019-03-28 RX ORDER — LISINOPRIL 10 MG/1
TABLET ORAL
Qty: 90 TAB | Refills: 0 | Status: SHIPPED | OUTPATIENT
Start: 2019-03-28 | End: 2019-06-24 | Stop reason: SDUPTHER

## 2019-04-18 ENCOUNTER — PATIENT MESSAGE (OUTPATIENT)
Dept: MEDICAL GROUP | Facility: PHYSICIAN GROUP | Age: 64
End: 2019-04-18

## 2019-04-18 RX ORDER — VENLAFAXINE 75 MG/1
TABLET ORAL
Qty: 135 TAB | Refills: 0 | Status: SHIPPED | OUTPATIENT
Start: 2019-04-18 | End: 2019-12-06 | Stop reason: SDUPTHER

## 2019-04-18 RX ORDER — VENLAFAXINE 75 MG/1
TABLET ORAL
Qty: 135 TAB | Refills: 0 | Status: SHIPPED | OUTPATIENT
Start: 2019-04-18 | End: 2019-04-18 | Stop reason: SDUPTHER

## 2019-05-23 DIAGNOSIS — Z76.0 MEDICATION REFILL: ICD-10-CM

## 2019-05-23 DIAGNOSIS — E78.5 DYSLIPIDEMIA: ICD-10-CM

## 2019-05-23 RX ORDER — METOPROLOL SUCCINATE 25 MG/1
TABLET, EXTENDED RELEASE ORAL
Qty: 90 TAB | Refills: 0 | Status: SHIPPED | OUTPATIENT
Start: 2019-05-23 | End: 2019-08-19 | Stop reason: SDUPTHER

## 2019-05-23 RX ORDER — FENOFIBRATE 134 MG/1
CAPSULE ORAL
Qty: 90 EACH | Refills: 0 | Status: SHIPPED | OUTPATIENT
Start: 2019-05-23 | End: 2019-08-29 | Stop reason: SDUPTHER

## 2019-05-23 NOTE — TELEPHONE ENCOUNTER
90-day supply refilled.  Patient will need an appointment for future refills.  Please call her and schedule an appointment.  -Cecilia Florian M.D.

## 2019-05-24 DIAGNOSIS — Z76.0 MEDICATION REFILL: ICD-10-CM

## 2019-05-24 RX ORDER — METOPROLOL SUCCINATE 25 MG/1
TABLET, EXTENDED RELEASE ORAL
Qty: 90 TAB | Refills: 0 | OUTPATIENT
Start: 2019-05-24

## 2019-05-24 NOTE — TELEPHONE ENCOUNTER
Was the patient seen in the last year in this department? NO 03/30/2018    Does patient have an active prescription for medications requested? No     Received Request Via: Pharmacy

## 2019-06-24 RX ORDER — LISINOPRIL 10 MG/1
TABLET ORAL
Qty: 90 TAB | Refills: 3 | Status: SHIPPED | OUTPATIENT
Start: 2019-06-24 | End: 2020-06-22

## 2019-06-25 ENCOUNTER — APPOINTMENT (OUTPATIENT)
Dept: MEDICAL GROUP | Facility: PHYSICIAN GROUP | Age: 64
End: 2019-06-25

## 2019-07-31 DIAGNOSIS — E78.5 DYSLIPIDEMIA: ICD-10-CM

## 2019-07-31 RX ORDER — PRAVASTATIN SODIUM 40 MG
TABLET ORAL
Qty: 90 TAB | Refills: 0 | Status: SHIPPED | OUTPATIENT
Start: 2019-07-31 | End: 2019-11-01 | Stop reason: SDUPTHER

## 2019-07-31 NOTE — TELEPHONE ENCOUNTER
90 day refill sent to pharmacy, no additional refills will be authorized without OV with pcp. Please contact the patient. Thank you!

## 2019-08-19 DIAGNOSIS — Z76.0 MEDICATION REFILL: ICD-10-CM

## 2019-08-19 RX ORDER — METOPROLOL SUCCINATE 25 MG/1
TABLET, EXTENDED RELEASE ORAL
Qty: 90 TAB | Refills: 0 | Status: SHIPPED | OUTPATIENT
Start: 2019-08-19 | End: 2019-11-19 | Stop reason: SDUPTHER

## 2019-08-29 DIAGNOSIS — E78.5 DYSLIPIDEMIA: ICD-10-CM

## 2019-08-29 RX ORDER — FENOFIBRATE 134 MG/1
CAPSULE ORAL
Qty: 90 EACH | Refills: 0 | Status: SHIPPED | OUTPATIENT
Start: 2019-08-29 | End: 2019-11-19 | Stop reason: SDUPTHER

## 2019-08-29 NOTE — TELEPHONE ENCOUNTER
Was the patient seen in the last year in this department? No     Does patient have an active prescription for medications requested? No     Received Request Via: Patient     Pt states she has an appt 9/13 and is completely out of medication and would like to know if she could get a refill before her appt.

## 2019-09-13 ENCOUNTER — OFFICE VISIT (OUTPATIENT)
Dept: MEDICAL GROUP | Facility: PHYSICIAN GROUP | Age: 64
End: 2019-09-13
Payer: COMMERCIAL

## 2019-09-13 ENCOUNTER — HOSPITAL ENCOUNTER (OUTPATIENT)
Facility: MEDICAL CENTER | Age: 64
End: 2019-09-13
Attending: FAMILY MEDICINE
Payer: COMMERCIAL

## 2019-09-13 VITALS
DIASTOLIC BLOOD PRESSURE: 60 MMHG | TEMPERATURE: 97.1 F | SYSTOLIC BLOOD PRESSURE: 120 MMHG | WEIGHT: 130.4 LBS | OXYGEN SATURATION: 97 % | BODY MASS INDEX: 22.04 KG/M2 | HEART RATE: 87 BPM | RESPIRATION RATE: 18 BRPM

## 2019-09-13 DIAGNOSIS — Z11.51 SCREENING FOR HPV (HUMAN PAPILLOMAVIRUS): ICD-10-CM

## 2019-09-13 DIAGNOSIS — E78.5 DYSLIPIDEMIA: ICD-10-CM

## 2019-09-13 DIAGNOSIS — I10 ESSENTIAL HYPERTENSION: ICD-10-CM

## 2019-09-13 DIAGNOSIS — Z01.419 WELL WOMAN EXAM WITH ROUTINE GYNECOLOGICAL EXAM: ICD-10-CM

## 2019-09-13 DIAGNOSIS — Z11.59 ENCOUNTER FOR HEPATITIS C SCREENING TEST FOR LOW RISK PATIENT: ICD-10-CM

## 2019-09-13 DIAGNOSIS — Z12.4 CERVICAL CANCER SCREENING: ICD-10-CM

## 2019-09-13 DIAGNOSIS — L82.0 SEBORRHEIC KERATOSES, INFLAMED: ICD-10-CM

## 2019-09-13 DIAGNOSIS — Z12.11 SCREENING FOR COLON CANCER: ICD-10-CM

## 2019-09-13 PROBLEM — R94.4 DECREASED GLOMERULAR FILTRATION RATE (GFR): Status: RESOLVED | Noted: 2017-02-06 | Resolved: 2019-09-13

## 2019-09-13 PROCEDURE — 99396 PREV VISIT EST AGE 40-64: CPT | Performed by: FAMILY MEDICINE

## 2019-09-13 PROCEDURE — 87624 HPV HI-RISK TYP POOLED RSLT: CPT

## 2019-09-13 PROCEDURE — 88175 CYTOPATH C/V AUTO FLUID REDO: CPT

## 2019-09-13 NOTE — PROGRESS NOTES
"Subjective:     CC:   Chief Complaint   Patient presents with   • Gynecologic Exam     Pap smear - burn off skin tags if possible      HPI:   Sylvie Andersen is a 64 y.o. female who presents for annual exam. She is feeling well and denies any complaints. She does have some \"barnacles\" that she asks I freeze off today.    Patient has GYN provider: No  Last pap: Unsure, overdue  Last mammo: 6/2019, normal per patient  Last colonoscopy: Overdue  Last bone density test: N/A  Last Tdap: Patient declines  Gardiasil: Aged out  Hx. STD's: No  Birth control: Postmenopausal    No significant bloating/fluid retention, pelvic pain, or dyspareunia. No vaginal discharge or bleeding.  No breast tenderness, mass, nipple discharge, changes in size or contour, or abnormal cyclic discomfort.  She does perform regular self breast exams.  Regular exercise: Yes   Diet: Healthy    She  has a past medical history of Allergy, unspecified not elsewhere classified, Breast cancer (HCC) (1995), Hyperlipidemia, and Hypertension.  She  has a past surgical history that includes mastectomy; pr radiation therapy plan simple; and pr chemotherapy, unspecified procedure.    Family History   Problem Relation Age of Onset   • Cancer Mother         breast cancer/Breast   • Hypertension Maternal Uncle    • Hypertension Maternal Grandmother    • Hyperlipidemia Maternal Grandmother    • Heart Disease Maternal Grandmother    • Cancer Paternal Grandfather         Lung ca, smoker   • Diabetes Neg Hx      Social History     Socioeconomic History   • Marital status:      Spouse name: Not on file   • Number of children: Not on file   • Years of education: Not on file   • Highest education level: Not on file   Occupational History   • Not on file   Social Needs   • Financial resource strain: Not on file   • Food insecurity:     Worry: Not on file     Inability: Not on file   • Transportation needs:     Medical: Not on file     Non-medical: Not on file "   Tobacco Use   • Smoking status: Current Every Day Smoker     Packs/day: 0.25     Years: 45.00     Pack years: 11.25     Types: Cigarettes   • Smokeless tobacco: Never Used   Substance and Sexual Activity   • Alcohol use: No     Alcohol/week: 0.0 oz   • Drug use: No   • Sexual activity: Not on file   Lifestyle   • Physical activity:     Days per week: Not on file     Minutes per session: Not on file   • Stress: Not on file   Relationships   • Social connections:     Talks on phone: Not on file     Gets together: Not on file     Attends Rastafari service: Not on file     Active member of club or organization: Not on file     Attends meetings of clubs or organizations: Not on file     Relationship status: Not on file   • Intimate partner violence:     Fear of current or ex partner: Not on file     Emotionally abused: Not on file     Physically abused: Not on file     Forced sexual activity: Not on file   Other Topics Concern   • Not on file   Social History Narrative   • Not on file     Patient Active Problem List    Diagnosis Date Noted   • Status post mastectomy 07/01/2014     Priority: Medium   • Dyslipidemia 06/07/2013     Priority: Medium   • Anxiety disorder 04/29/2014     Priority: Low     Class: Chronic   • History of breast cancer 02/06/2017   • Essential hypertension 09/02/2015     Current Outpatient Medications   Medication Sig Dispense Refill   • Fenofibrate 134 MG Cap TAKE 1 CAPSULE BY MOUTH ONCE DAILY IN THE MORNING BEFORE  BREAKFAST 90 Each 0   • metoprolol SR (TOPROL XL) 25 MG TABLET SR 24 HR TAKE ONE TABLET BY MOUTH ONE TIME DAILY  90 Tab 0   • pravastatin (PRAVACHOL) 40 MG tablet TAKE ONE TABLET BY MOUTH ONE TIME DAILY  90 Tab 0   • lisinopril (PRINIVIL) 10 MG Tab TAKE ONE TABLET BY MOUTH ONE TIME DAILY 90 Tab 3   • venlafaxine (EFFEXOR) 75 MG Tab Take 1.5 tablets PO daily. 135 Tab 0     No current facility-administered medications for this visit.      Allergies   Allergen Reactions   • Codeine  "Unspecified     Memory issues     Review of Systems   Constitutional: Negative for fever, chills and malaise/fatigue.   HENT: Negative for congestion.    Eyes: Negative for pain.   Respiratory: Negative for cough and shortness of breath.    Cardiovascular: Negative for leg swelling.   Gastrointestinal: Negative for nausea, vomiting, abdominal pain and diarrhea.   Genitourinary: Negative for dysuria and hematuria.   Skin: Negative for rash.   Neurological: Negative for dizziness, focal weakness and headaches.   Endo/Heme/Allergies: Does not bruise/bleed easily.   Psychiatric/Behavioral: Negative for depression.  The patient is not nervous/anxious.      Objective:     /60 (BP Location: Left arm, Patient Position: Sitting, BP Cuff Size: Adult)   Pulse 87   Temp 36.2 °C (97.1 °F) (Temporal)   Resp 18   Wt 59.1 kg (130 lb 6.4 oz)   SpO2 97%   BMI 22.04 kg/m²   Body mass index is 22.04 kg/m².  Wt Readings from Last 4 Encounters:   09/13/19 59.1 kg (130 lb 6.4 oz)   03/30/18 60.8 kg (134 lb)   08/07/17 58 kg (127 lb 13.9 oz)   02/06/17 58.5 kg (129 lb)     Physical Exam:  Constitutional: Well-developed and well-nourished. Not diaphoretic. No distress.   Skin: Skin is warm and dry. No rash noted. On chest, right upper extremity and posterior aspect of left knee there are several hyperpigmented, hyperkeratotic papules with a \"stuck on\" appearance.  Head: Atraumatic without lesions.  Eyes: Conjunctivae and extraocular motions are normal. Pupils are equal, round, and reactive to light. No scleral icterus.   Ears:  External ears unremarkable. Tympanic membranes clear and intact.  Nose: Nares patent. Septum midline. Turbinates without erythema nor edema. No discharge.   Mouth/Throat: Dentition is fair. Tongue normal. Oropharynx is clear and moist. Posterior pharynx without erythema or exudates.  Neck: Supple, trachea midline. Normal range of motion. No thyromegaly present. No lymphadenopathy--cervical or " supraclavicular.  Cardiovascular: Regular rate and rhythm, S1 and S2 without murmur, rubs, or gallops.    Breast: Breasts examined seated and supine. S/P left mastectomy. No skin changes, peau d'orange or nipple retraction. No discharge. No axillary or supraclavicular adenopathy. No masses or nodularity palpable.  Abdomen: Soft, non tender, and without distention. Active bowel sounds in all four quadrants. No rebound, guarding, masses or HSM.  : Perineum and external genitalia normal without rash. Vagina with normal and physiologic discharge. Cervix without visible lesions or discharge. Cervix was slightly friable during exam.  Extremities: No cyanosis, clubbing, erythema, nor edema. Distal pulses intact and symmetric.   Musculoskeletal: All major joints AROM full in all directions without pain.  Neurological: Alert and oriented x 3. DTRs 2+/3 and symmetric. No cranial nerve deficit. 5/5 myotomes. Sensation intact. Negative Rhomberg.  Psychiatric:  Behavior, mood, and affect are appropriate.    A chaperone was offered to the patient during today's exam. Patient declined chaperone.    CRYOTHERAPY:  Discussed risks and benefits of cryotherapy. Patient verbally agreed. 3 applications of cryotherapy were applied to approximately 7 lesions. Patient tolerated procedure well. Aftercare instructions given.     Assessment and Plan:     1. Well woman exam with routine gynecological exam  This is a healthy 64-year-old female here today for a preventative exam.  Previous medical history, healthcare maintenance and immunization status reviewed.  Patient is not up to date.  Annual labwork ordered.  See discussion of anticipatory guidance below.  Patient will return annually for preventative exams.  THINPREP PAP WITH HPV    Comp Metabolic Panel    Lipid Profile    HEP C VIRUS ANTIBODY   2. Cervical cancer screening  THINPREP PAP WITH HPV   3. Screening for HPV (human papillomavirus)  THINPREP PAP WITH HPV   4. Essential  hypertension  Well-controlled on current regimen.  Labs as indicated.  Continue antihypertensive medications.  Discussed decreasing salt intake.  Emphasized benefits of exercise and diet. Continue to monitor.  Comp Metabolic Panel   5. Dyslipidemia  Well controlled.  Labs as indicated.  Continue statin medication and lifestyle modifications.  Continue to monitor.  Lipid Profile   6. Encounter for hepatitis C screening test for low risk patient  HEP C VIRUS ANTIBODY   7. Screening for colon cancer  REFERRAL TO GI FOR COLONOSCOPY   8. Seborrheic keratoses, inflamed  Noted on exam. Cryotherapy performed today.     HCM:  Overdue for colon cancer screening and immunizations   Labs per orders  Immunizations per orders  Patient counseled about skin care, diet, supplements and exercise.    Follow-up: Return in about 1 year (around 9/13/2020) for Annual, Short.     Miko SANFORD (Scribe), am scribing for, and in the presence of, Cecilia Florian MD    Electronically signed by: Miko Garza (Scribe), 9/13/2019    Cecilia SANFORD MD personally performed the services described in this documentation, as scribed by Miko Garza in my presence, and it is both accurate and complete.

## 2019-09-14 DIAGNOSIS — Z12.4 CERVICAL CANCER SCREENING: ICD-10-CM

## 2019-09-14 DIAGNOSIS — Z11.51 SCREENING FOR HPV (HUMAN PAPILLOMAVIRUS): ICD-10-CM

## 2019-09-14 DIAGNOSIS — Z01.419 WELL WOMAN EXAM WITH ROUTINE GYNECOLOGICAL EXAM: ICD-10-CM

## 2019-09-16 LAB
CYTOLOGY REG CYTOL: NORMAL
HPV HR 12 DNA CVX QL NAA+PROBE: NEGATIVE
HPV16 DNA SPEC QL NAA+PROBE: NEGATIVE
HPV18 DNA SPEC QL NAA+PROBE: NEGATIVE
SPECIMEN SOURCE: NORMAL

## 2019-11-01 DIAGNOSIS — E78.5 DYSLIPIDEMIA: ICD-10-CM

## 2019-11-04 RX ORDER — PRAVASTATIN SODIUM 40 MG
TABLET ORAL
Qty: 90 TAB | Refills: 3 | Status: SHIPPED | OUTPATIENT
Start: 2019-11-04 | End: 2020-10-27

## 2019-11-19 DIAGNOSIS — E78.5 DYSLIPIDEMIA: ICD-10-CM

## 2019-11-19 DIAGNOSIS — Z76.0 MEDICATION REFILL: ICD-10-CM

## 2019-11-19 RX ORDER — FENOFIBRATE 134 MG/1
CAPSULE ORAL
Qty: 90 CAP | Refills: 3 | Status: SHIPPED | OUTPATIENT
Start: 2019-11-19 | End: 2020-11-20

## 2019-11-19 RX ORDER — METOPROLOL SUCCINATE 25 MG/1
TABLET, EXTENDED RELEASE ORAL
Qty: 90 TAB | Refills: 3 | Status: SHIPPED | OUTPATIENT
Start: 2019-11-19 | End: 2020-11-20

## 2019-11-20 ENCOUNTER — HOSPITAL ENCOUNTER (OUTPATIENT)
Dept: LAB | Facility: MEDICAL CENTER | Age: 64
End: 2019-11-20
Attending: FAMILY MEDICINE
Payer: COMMERCIAL

## 2019-11-20 ENCOUNTER — NON-PROVIDER VISIT (OUTPATIENT)
Dept: MEDICAL GROUP | Facility: PHYSICIAN GROUP | Age: 64
End: 2019-11-20
Payer: COMMERCIAL

## 2019-11-20 ENCOUNTER — TELEPHONE (OUTPATIENT)
Dept: MEDICAL GROUP | Facility: PHYSICIAN GROUP | Age: 64
End: 2019-11-20

## 2019-11-20 ENCOUNTER — APPOINTMENT (OUTPATIENT)
Dept: MEDICAL GROUP | Facility: PHYSICIAN GROUP | Age: 64
End: 2019-11-20
Payer: COMMERCIAL

## 2019-11-20 DIAGNOSIS — E78.5 DYSLIPIDEMIA: ICD-10-CM

## 2019-11-20 DIAGNOSIS — I10 ESSENTIAL HYPERTENSION: ICD-10-CM

## 2019-11-20 DIAGNOSIS — Z23 NEED FOR VACCINATION: ICD-10-CM

## 2019-11-20 DIAGNOSIS — Z01.419 WELL WOMAN EXAM WITH ROUTINE GYNECOLOGICAL EXAM: ICD-10-CM

## 2019-11-20 DIAGNOSIS — R79.89 ELEVATED SERUM CREATININE: ICD-10-CM

## 2019-11-20 DIAGNOSIS — Z11.59 ENCOUNTER FOR HEPATITIS C SCREENING TEST FOR LOW RISK PATIENT: ICD-10-CM

## 2019-11-20 LAB
ALBUMIN SERPL BCP-MCNC: 3.8 G/DL (ref 3.2–4.9)
ALBUMIN/GLOB SERPL: 1.5 G/DL
ALP SERPL-CCNC: 32 U/L (ref 30–99)
ALT SERPL-CCNC: 18 U/L (ref 2–50)
ANION GAP SERPL CALC-SCNC: 4 MMOL/L (ref 0–11.9)
AST SERPL-CCNC: 21 U/L (ref 12–45)
BILIRUB SERPL-MCNC: 0.3 MG/DL (ref 0.1–1.5)
BUN SERPL-MCNC: 31 MG/DL (ref 8–22)
CALCIUM SERPL-MCNC: 9.1 MG/DL (ref 8.5–10.5)
CHLORIDE SERPL-SCNC: 109 MMOL/L (ref 96–112)
CHOLEST SERPL-MCNC: 127 MG/DL (ref 100–199)
CO2 SERPL-SCNC: 27 MMOL/L (ref 20–33)
CREAT SERPL-MCNC: 1.51 MG/DL (ref 0.5–1.4)
FASTING STATUS PATIENT QL REPORTED: NORMAL
GLOBULIN SER CALC-MCNC: 2.6 G/DL (ref 1.9–3.5)
GLUCOSE SERPL-MCNC: 79 MG/DL (ref 65–99)
HCV AB SER QL: NEGATIVE
HDLC SERPL-MCNC: 35 MG/DL
LDLC SERPL CALC-MCNC: 73 MG/DL
POTASSIUM SERPL-SCNC: 4.3 MMOL/L (ref 3.6–5.5)
PROT SERPL-MCNC: 6.4 G/DL (ref 6–8.2)
SODIUM SERPL-SCNC: 140 MMOL/L (ref 135–145)
TRIGL SERPL-MCNC: 94 MG/DL (ref 0–149)

## 2019-11-20 PROCEDURE — 36415 COLL VENOUS BLD VENIPUNCTURE: CPT

## 2019-11-20 PROCEDURE — 90471 IMMUNIZATION ADMIN: CPT | Performed by: FAMILY MEDICINE

## 2019-11-20 PROCEDURE — 80061 LIPID PANEL: CPT

## 2019-11-20 PROCEDURE — 80053 COMPREHEN METABOLIC PANEL: CPT

## 2019-11-20 PROCEDURE — 86803 HEPATITIS C AB TEST: CPT

## 2019-11-20 PROCEDURE — 90715 TDAP VACCINE 7 YRS/> IM: CPT | Performed by: FAMILY MEDICINE

## 2019-11-20 NOTE — PROGRESS NOTES
"Sylvie Andersen is a 64 y.o. female here for a non-provider visit for:   TDAP    Reason for immunization: Overdue/Provider Recommended  Immunization records indicate need for vaccine: Yes, confirmed with Epic  Minimum interval has been met for this vaccine: Yes  ABN completed: No    Order and dose verified by: Cecilia Florian MD   VIS Dated  2/24/15 was given to patient: Yes  All IAC Questionnaire questions were answered \"No.\"    Patient tolerated injection and no adverse effects were observed or reported: No    Pt scheduled for next dose in series: No    Per  ok for patient to receive another TDAP before 10 years as the whooping cough is only good for 5 years and patient has a new grandchild so it is recommended.    "

## 2019-11-21 NOTE — TELEPHONE ENCOUNTER
Phone Number Called: [phone] (home)       Call outcome: left message for patient to call back regarding message below    Message: Lab results. LM

## 2019-11-21 NOTE — TELEPHONE ENCOUNTER
Phone Number Called: [phone] (home)       Call outcome: left message for patient to call back regarding message below    Message: TIP Shah

## 2019-11-21 NOTE — TELEPHONE ENCOUNTER
Please let patient know that her labs look great overall, but her kidney function is in the abnormal range.  This is new for her and I would like to recheck it in 1 month.  I will order a non-fasting blood test for her to do in mid-December.  -Cecilia Florian M.D.

## 2019-11-22 NOTE — TELEPHONE ENCOUNTER
Phone Number Called: [phone] (home)     Call outcome: spoke to patient regarding message below    Message: Spoke with patient and let them know Cecilia Florian M.D.'s message.

## 2019-12-06 RX ORDER — VENLAFAXINE 75 MG/1
TABLET ORAL
Qty: 135 TAB | Refills: 3 | Status: SHIPPED | OUTPATIENT
Start: 2019-12-06 | End: 2021-01-11

## 2020-06-22 RX ORDER — LISINOPRIL 10 MG/1
TABLET ORAL
Qty: 90 TAB | Refills: 3 | Status: SHIPPED | OUTPATIENT
Start: 2020-06-22 | End: 2021-04-21 | Stop reason: SDUPTHER

## 2020-10-27 DIAGNOSIS — E78.5 DYSLIPIDEMIA: ICD-10-CM

## 2020-10-27 RX ORDER — PRAVASTATIN SODIUM 40 MG
TABLET ORAL
Qty: 90 TAB | Refills: 3 | Status: SHIPPED | OUTPATIENT
Start: 2020-10-27 | End: 2021-04-21 | Stop reason: SDUPTHER

## 2020-11-20 DIAGNOSIS — E78.5 DYSLIPIDEMIA: ICD-10-CM

## 2020-11-20 DIAGNOSIS — Z76.0 MEDICATION REFILL: ICD-10-CM

## 2020-11-20 RX ORDER — FENOFIBRATE 134 MG/1
CAPSULE ORAL
Qty: 90 CAP | Refills: 3 | Status: SHIPPED | OUTPATIENT
Start: 2020-11-20 | End: 2021-04-21 | Stop reason: SDUPTHER

## 2020-11-20 RX ORDER — METOPROLOL SUCCINATE 25 MG/1
TABLET, EXTENDED RELEASE ORAL
Qty: 90 TAB | Refills: 3 | Status: SHIPPED | OUTPATIENT
Start: 2020-11-20 | End: 2021-04-21 | Stop reason: SDUPTHER

## 2020-12-09 ENCOUNTER — NON-PROVIDER VISIT (OUTPATIENT)
Dept: MEDICAL GROUP | Facility: PHYSICIAN GROUP | Age: 65
End: 2020-12-09
Payer: MEDICARE

## 2020-12-09 DIAGNOSIS — Z23 NEED FOR IMMUNIZATION AGAINST INFLUENZA: ICD-10-CM

## 2020-12-09 DIAGNOSIS — Z23 IMMUNIZATION DUE: ICD-10-CM

## 2020-12-09 PROCEDURE — 90732 PPSV23 VACC 2 YRS+ SUBQ/IM: CPT | Performed by: FAMILY MEDICINE

## 2020-12-09 PROCEDURE — 90750 HZV VACC RECOMBINANT IM: CPT | Performed by: FAMILY MEDICINE

## 2020-12-09 PROCEDURE — G0008 ADMIN INFLUENZA VIRUS VAC: HCPCS | Performed by: FAMILY MEDICINE

## 2020-12-09 PROCEDURE — G0009 ADMIN PNEUMOCOCCAL VACCINE: HCPCS | Performed by: FAMILY MEDICINE

## 2020-12-09 PROCEDURE — 90662 IIV NO PRSV INCREASED AG IM: CPT | Performed by: FAMILY MEDICINE

## 2020-12-09 PROCEDURE — 90472 IMMUNIZATION ADMIN EACH ADD: CPT | Performed by: FAMILY MEDICINE

## 2020-12-10 ENCOUNTER — TELEPHONE (OUTPATIENT)
Dept: MEDICAL GROUP | Facility: PHYSICIAN GROUP | Age: 65
End: 2020-12-10

## 2020-12-10 NOTE — TELEPHONE ENCOUNTER
Phone Number Called: [phone] (home)       Call outcome: Spoke to patient regarding message below.    Message: Pt notified the symptoms she is having are normal reactions to have after receiving vaccinations. Pt advised to continue monitoring symptoms and to take over the counter medication to help with symptom relief. Pt verbalized understanding.

## 2020-12-10 NOTE — TELEPHONE ENCOUNTER
Pt called and stated that she received her flu, shingles and pnemonia vaccines yesterday 12/09/20    Pt states she has a fever, tops of her feet hurt along with her legs, and she can't raise her arms above her head.     Routing to RN to triage.

## 2021-01-11 RX ORDER — VENLAFAXINE 75 MG/1
TABLET ORAL
Qty: 135 TAB | Refills: 0 | Status: SHIPPED | OUTPATIENT
Start: 2021-01-11 | End: 2021-04-14 | Stop reason: SDUPTHER

## 2021-02-16 ENCOUNTER — TELEPHONE (OUTPATIENT)
Dept: MEDICAL GROUP | Facility: PHYSICIAN GROUP | Age: 66
End: 2021-02-16

## 2021-02-16 NOTE — TELEPHONE ENCOUNTER
Jose D called and wanted to know if she needed to get another shingles shot.  She is due for it and left her a voice message thayt she can schedule and MA visit to get it.

## 2021-02-19 ENCOUNTER — NON-PROVIDER VISIT (OUTPATIENT)
Dept: MEDICAL GROUP | Facility: PHYSICIAN GROUP | Age: 66
End: 2021-02-19
Payer: MEDICARE

## 2021-02-19 DIAGNOSIS — Z23 NEED FOR VACCINATION: ICD-10-CM

## 2021-02-19 PROCEDURE — 90471 IMMUNIZATION ADMIN: CPT | Performed by: FAMILY MEDICINE

## 2021-02-19 PROCEDURE — 90750 HZV VACC RECOMBINANT IM: CPT | Performed by: FAMILY MEDICINE

## 2021-02-19 NOTE — PROGRESS NOTES
"Sylvie Andersen is a 65 y.o. female here for a non-provider visit for:   SHINGRIX (Shingles)    Reason for immunization: continue or complete series started at the office  Immunization records indicate need for vaccine: Yes, confirmed with Epic  Minimum interval has been met for this vaccine: Yes  ABN completed: Yes    Order and dose verified by: Karthik Nino  VIS Dated  10/30/2019 was given to patient: Yes  All IAC Questionnaire questions were answered \"No.\"    Patient tolerated injection and no adverse effects were observed or reported: Yes    Pt scheduled for next dose in series: Not Indicated'  "

## 2021-03-02 ENCOUNTER — HOSPITAL ENCOUNTER (OUTPATIENT)
Dept: RADIOLOGY | Facility: MEDICAL CENTER | Age: 66
End: 2021-03-02
Payer: MEDICARE

## 2021-03-05 ENCOUNTER — HOSPITAL ENCOUNTER (OUTPATIENT)
Dept: RADIOLOGY | Facility: MEDICAL CENTER | Age: 66
End: 2021-03-05
Attending: FAMILY MEDICINE
Payer: MEDICARE

## 2021-03-05 DIAGNOSIS — Z12.31 VISIT FOR SCREENING MAMMOGRAM: ICD-10-CM

## 2021-03-05 PROCEDURE — 77063 BREAST TOMOSYNTHESIS BI: CPT | Mod: RT

## 2021-04-14 RX ORDER — VENLAFAXINE 75 MG/1
TABLET ORAL
Qty: 135 TABLET | Refills: 1 | Status: SHIPPED | OUTPATIENT
Start: 2021-04-14 | End: 2021-04-21 | Stop reason: SDUPTHER

## 2021-04-21 ENCOUNTER — HOSPITAL ENCOUNTER (OUTPATIENT)
Dept: LAB | Facility: MEDICAL CENTER | Age: 66
End: 2021-04-21
Attending: FAMILY MEDICINE
Payer: MEDICARE

## 2021-04-21 ENCOUNTER — OFFICE VISIT (OUTPATIENT)
Dept: MEDICAL GROUP | Facility: PHYSICIAN GROUP | Age: 66
End: 2021-04-21
Payer: MEDICARE

## 2021-04-21 VITALS
TEMPERATURE: 97.8 F | WEIGHT: 122 LBS | SYSTOLIC BLOOD PRESSURE: 110 MMHG | HEART RATE: 85 BPM | OXYGEN SATURATION: 96 % | HEIGHT: 65 IN | RESPIRATION RATE: 16 BRPM | DIASTOLIC BLOOD PRESSURE: 60 MMHG | BODY MASS INDEX: 20.33 KG/M2

## 2021-04-21 DIAGNOSIS — E78.5 DYSLIPIDEMIA: ICD-10-CM

## 2021-04-21 DIAGNOSIS — N28.9 ABNORMAL KIDNEY FUNCTION: ICD-10-CM

## 2021-04-21 DIAGNOSIS — I10 ESSENTIAL HYPERTENSION: ICD-10-CM

## 2021-04-21 DIAGNOSIS — Z78.0 POSTMENOPAUSAL: ICD-10-CM

## 2021-04-21 DIAGNOSIS — F41.8 OTHER SPECIFIED ANXIETY DISORDERS: ICD-10-CM

## 2021-04-21 LAB
ALBUMIN SERPL BCP-MCNC: 4.2 G/DL (ref 3.2–4.9)
ALBUMIN/GLOB SERPL: 1.5 G/DL
ALP SERPL-CCNC: 44 U/L (ref 30–99)
ALT SERPL-CCNC: 25 U/L (ref 2–50)
ANION GAP SERPL CALC-SCNC: 6 MMOL/L (ref 7–16)
AST SERPL-CCNC: 41 U/L (ref 12–45)
BILIRUB SERPL-MCNC: 0.2 MG/DL (ref 0.1–1.5)
BUN SERPL-MCNC: 25 MG/DL (ref 8–22)
CALCIUM SERPL-MCNC: 10.3 MG/DL (ref 8.5–10.5)
CHLORIDE SERPL-SCNC: 108 MMOL/L (ref 96–112)
CHOLEST SERPL-MCNC: 127 MG/DL (ref 100–199)
CO2 SERPL-SCNC: 25 MMOL/L (ref 20–33)
CREAT SERPL-MCNC: 1.14 MG/DL (ref 0.5–1.4)
FASTING STATUS PATIENT QL REPORTED: NORMAL
GLOBULIN SER CALC-MCNC: 2.8 G/DL (ref 1.9–3.5)
GLUCOSE SERPL-MCNC: 69 MG/DL (ref 65–99)
HDLC SERPL-MCNC: 39 MG/DL
LDLC SERPL CALC-MCNC: 71 MG/DL
POTASSIUM SERPL-SCNC: 4.4 MMOL/L (ref 3.6–5.5)
PROT SERPL-MCNC: 7 G/DL (ref 6–8.2)
SODIUM SERPL-SCNC: 139 MMOL/L (ref 135–145)
TRIGL SERPL-MCNC: 85 MG/DL (ref 0–149)

## 2021-04-21 PROCEDURE — 80061 LIPID PANEL: CPT

## 2021-04-21 PROCEDURE — 99214 OFFICE O/P EST MOD 30 MIN: CPT | Performed by: FAMILY MEDICINE

## 2021-04-21 PROCEDURE — 36415 COLL VENOUS BLD VENIPUNCTURE: CPT

## 2021-04-21 PROCEDURE — 80053 COMPREHEN METABOLIC PANEL: CPT

## 2021-04-21 RX ORDER — LISINOPRIL 10 MG/1
10 TABLET ORAL DAILY
Qty: 90 TABLET | Refills: 1 | Status: SHIPPED | OUTPATIENT
Start: 2021-04-21 | End: 2021-12-13 | Stop reason: SDUPTHER

## 2021-04-21 RX ORDER — FENOFIBRATE 134 MG/1
134 CAPSULE ORAL DAILY
Qty: 90 CAPSULE | Refills: 1 | Status: SHIPPED | OUTPATIENT
Start: 2021-04-21 | End: 2021-10-18 | Stop reason: SDUPTHER

## 2021-04-21 RX ORDER — METOPROLOL SUCCINATE 25 MG/1
25 TABLET, EXTENDED RELEASE ORAL DAILY
Qty: 90 TABLET | Refills: 1 | Status: SHIPPED | OUTPATIENT
Start: 2021-04-21 | End: 2021-10-18 | Stop reason: SDUPTHER

## 2021-04-21 RX ORDER — VENLAFAXINE 75 MG/1
75 TABLET ORAL DAILY
Qty: 90 TABLET | Refills: 1 | Status: SHIPPED | OUTPATIENT
Start: 2021-04-21 | End: 2022-07-19 | Stop reason: SDUPTHER

## 2021-04-21 RX ORDER — PRAVASTATIN SODIUM 40 MG
40 TABLET ORAL DAILY
Qty: 90 TABLET | Refills: 1 | Status: SHIPPED | OUTPATIENT
Start: 2021-04-21 | End: 2021-10-18 | Stop reason: SDUPTHER

## 2021-04-21 ASSESSMENT — PATIENT HEALTH QUESTIONNAIRE - PHQ9: CLINICAL INTERPRETATION OF PHQ2 SCORE: 0

## 2021-04-21 NOTE — PROGRESS NOTES
"Subjective:   Sylvie Andersen is a 65 y.o. female here today for follow-up hypertension.    My last visit with Sylvie was in September 2019.  She is here today to follow-up on her chronic conditions and get medications refilled.  In regards to her blood pressure, she is in goal range.  She is taking her medications as prescribed without side effects.  She has dyslipidemia and continues to take her pravastatin without side effects.  In regards to her anxiety, she tells me that this has improved slightly.  She is taking care of her granddaughter full-time.  She is happy to do this and says that she is a great little girl.  She did lower her venlafaxine dose down to 1 tablet.    On review of her chart, she had abnormal kidney labs in November 2019.  It appears that we had called her and ordered a follow-up test.  I do not have record of this test.     Objective:     Physical Exam:  /60 (BP Location: Right arm, Patient Position: Sitting, BP Cuff Size: Adult)   Pulse 85   Temp 36.6 °C (97.8 °F) (Temporal)   Resp 16   Ht 1.638 m (5' 4.5\")   Wt 55.3 kg (122 lb)   SpO2 96%  Body mass index is 20.62 kg/m².     Gen:    Alert and oriented, No apparent distress.  Neck:   Neck is supple without lymphadenopathy. No bruits.  Lungs: Normal effort, CTA bilaterally, no wheezes, rhonchi, or rales  CV:      Regular rate and rhythm. No murmurs, rubs, or gallops.  Abd: Soft, non-tender, no masses.  Ext:      No clubbing, cyanosis, edema.    Assessment and Plan:     1. Essential hypertension  Patient's blood pressure is in goal range.  This issue is chronic and stable.  We will continue with her current medications.  Lab work has been ordered.  - Comp Metabolic Panel; Future  - lisinopril (PRINIVIL) 10 MG Tab; Take 1 tablet by mouth every day.  Dispense: 90 tablet; Refill: 1  - metoprolol SR (TOPROL XL) 25 MG TABLET SR 24 HR; Take 1 tablet by mouth every day.  Dispense: 90 tablet; Refill: 1    2. Abnormal kidney " function  After patient's last appointment with me in September 2019, she had lab work which showed abnormal kidney function.  Her creatinine was elevated at 1.5.  In 2013, her creatinine was at the 1.1 level.  I have ordered lab work for her to complete today.  She is agreeable with going to the lab now.  She denies any issues with urination or blood in the urine.  - Comp Metabolic Panel; Future    3. Dyslipidemia  Well controlled.  Labs as indicated.  Continue statin medication and lifestyle modifications.  Continue to monitor.  - Lipid Profile; Future  - fenofibrate micronized (LOFIBRA) 134 MG capsule; Take 1 capsule by mouth every day.  Dispense: 90 capsule; Refill: 1  - pravastatin (PRAVACHOL) 40 MG tablet; Take 1 tablet by mouth every day.  Dispense: 90 tablet; Refill: 1    4. Other specified anxiety disorders  Patient is feeling well on current medications.  Will continue.  Denies any suicidal or homicidal ideation.  Emphasized importance of healthy diet and exercise.  Discussed that should the patient have any symptoms they should call suicide prevention hotline or report to the emergency room immediately.  - venlafaxine (EFFEXOR) 75 MG Tab; Take 1 tablet by mouth every day.  Dispense: 90 tablet; Refill: 1    5. Postmenopausal  Due for bone density test.  - DS-BONE DENSITY STUDY (DEXA); Future    Followup: 3 months to establish with new PCP         PLEASE NOTE: This dictation was created using voice recognition software. I have made every reasonable attempt to correct obvious errors, but I expect that there are errors of grammar and possibly content that I did not discover before finalizing the note.

## 2021-04-22 ENCOUNTER — TELEPHONE (OUTPATIENT)
Dept: MEDICAL GROUP | Facility: PHYSICIAN GROUP | Age: 66
End: 2021-04-22

## 2021-04-22 NOTE — TELEPHONE ENCOUNTER
----- Message from Cecilia Florain M.D. sent at 4/22/2021  1:44 PM PDT -----  Please call patient and let her know that her kidney levels look better, but do show some chronic kidney disease.  It will be important for her to establish with a new PCP so they may follow-up on this.  -Cecilia Florian M.D.

## 2021-04-26 NOTE — TELEPHONE ENCOUNTER
Pt notified of results, offered to get her to scheduling to get an appt set up to establish care with a new PCP, pt denied offer and stated that she will try calling in a few days.

## 2021-05-14 ENCOUNTER — HOSPITAL ENCOUNTER (OUTPATIENT)
Dept: RADIOLOGY | Facility: MEDICAL CENTER | Age: 66
End: 2021-05-14
Attending: FAMILY MEDICINE
Payer: MEDICARE

## 2021-05-14 DIAGNOSIS — Z78.0 POSTMENOPAUSAL: ICD-10-CM

## 2021-05-14 PROCEDURE — 77080 DXA BONE DENSITY AXIAL: CPT

## 2021-07-02 ENCOUNTER — OFFICE VISIT (OUTPATIENT)
Dept: MEDICAL GROUP | Facility: PHYSICIAN GROUP | Age: 66
End: 2021-07-02
Payer: MEDICARE

## 2021-07-02 VITALS
BODY MASS INDEX: 21.09 KG/M2 | OXYGEN SATURATION: 94 % | SYSTOLIC BLOOD PRESSURE: 105 MMHG | WEIGHT: 124.8 LBS | RESPIRATION RATE: 15 BRPM | DIASTOLIC BLOOD PRESSURE: 60 MMHG | TEMPERATURE: 97.2 F | HEART RATE: 90 BPM

## 2021-07-02 DIAGNOSIS — I10 ESSENTIAL HYPERTENSION: ICD-10-CM

## 2021-07-02 DIAGNOSIS — N18.31 STAGE 3A CHRONIC KIDNEY DISEASE: ICD-10-CM

## 2021-07-02 PROCEDURE — 99214 OFFICE O/P EST MOD 30 MIN: CPT | Performed by: FAMILY MEDICINE

## 2021-07-02 NOTE — ASSESSMENT & PLAN NOTE
This is a chronic condition.  The patient has CKD stage III.  Last GFR checked at 45 April 2021.  Patient avoiding nephrotoxins.  Gets blood work every 6 mo  Has hx of HTN

## 2021-07-02 NOTE — PROGRESS NOTES
Subjective:     Chief Complaint   Patient presents with   • Establish Care   • Chronic Kidney Disease     questions        HPI:   Sylvie presents today to discuss the following.  Prior PCP: Dr Florian    Stage 3a chronic kidney disease (HCC)  This is a chronic condition.  The patient has CKD stage III.  Last GFR checked at 45 April 2021.  Patient avoiding nephrotoxins.  Gets blood work every 6 mo  Has hx of HTN    Essential hypertension  Chronic issue  On lisinopril 10mg daily and metoprolol 25mg daily  Good control with regimen      Past Medical History:   Diagnosis Date   • Allergy, unspecified not elsewhere classified    • Breast cancer (HCC) 1995   • Hyperlipidemia    • Hypertension        Current Outpatient Medications Ordered in Epic   Medication Sig Dispense Refill   • fenofibrate micronized (LOFIBRA) 134 MG capsule Take 1 capsule by mouth every day. 90 capsule 1   • lisinopril (PRINIVIL) 10 MG Tab Take 1 tablet by mouth every day. 90 tablet 1   • metoprolol SR (TOPROL XL) 25 MG TABLET SR 24 HR Take 1 tablet by mouth every day. 90 tablet 1   • pravastatin (PRAVACHOL) 40 MG tablet Take 1 tablet by mouth every day. 90 tablet 1   • venlafaxine (EFFEXOR) 75 MG Tab Take 1 tablet by mouth every day. 90 tablet 1     No current Epic-ordered facility-administered medications on file.       Allergies:  Codeine    Health Maintenance: Completed    ROS:  Gen: no fevers/chills, no changes in weight  Eyes: no changes in vision  Pulm: no sob, no cough  CV: no chest pain, no palpitations  GI: no nausea/vomiting, no diarrhea      Objective:     Exam:  /60 (BP Location: Right arm, Patient Position: Sitting, BP Cuff Size: Adult)   Pulse 90   Temp 36.2 °C (97.2 °F) (Temporal)   Resp 15   Wt 56.6 kg (124 lb 12.8 oz)   SpO2 94%   BMI 21.09 kg/m²  Body mass index is 21.09 kg/m².      Constitutional: Alert, no distress, well-groomed.  Skin: Warm, dry, good turgor, no rashes in visible areas.  Eye: Equal, round and reactive,  conjunctiva clear, lids normal.  ENMT: Lips without lesions, good dentition, moist mucous membranes.  Neck: Trachea midline, no masses, no thyromegaly.  Respiratory: Unlabored respiratory effort, no cough.  MSK: Normal gait, moves all extremities.  Neuro: Grossly non-focal.   Psych: Alert and oriented x3, normal affect and mood.        Assessment & Plan:     65 y.o. female with the following -     1. Stage 3a chronic kidney disease (HCC)  Chronic, stable condition.  Most recent GFR checked 2 months ago at 45.  The etiology and the nature of CKD was explained to the patient.  She would like to be referred to a nephrologist for further surveillance.  At this time I recommend tight blood pressure control and the avoidance of nephrotoxins.  She is agreeable to plan.  - REFERRAL TO NEPHROLOGY    2. Essential hypertension  Chronic, stable condition.  She is on ACE and beta-blocker.  Blood pressure is within goal.  Continue with current regimen.      Return in about 6 months (around 1/2/2022).    Please note that this dictation was created using voice recognition software. I have made every reasonable attempt to correct obvious errors, but I expect that there are errors of grammar and possibly content that I did not discover before finalizing the note.

## 2021-08-24 ENCOUNTER — TELEPHONE (OUTPATIENT)
Dept: NEPHROLOGY | Facility: MEDICAL CENTER | Age: 66
End: 2021-08-24

## 2021-08-24 ENCOUNTER — TELEPHONE (OUTPATIENT)
Dept: MEDICAL GROUP | Facility: PHYSICIAN GROUP | Age: 66
End: 2021-08-24

## 2021-08-24 DIAGNOSIS — N18.31 STAGE 3A CHRONIC KIDNEY DISEASE: ICD-10-CM

## 2021-08-24 NOTE — TELEPHONE ENCOUNTER
Horace Bergman.    If you could please repeat labs for patient...     BMP  CBC  Mircroalb/creat urine efrain

## 2021-08-25 NOTE — TELEPHONE ENCOUNTER
VOICEMAIL  1. Caller Name: Neisha with [facility]               Call Back Number:[phone]    2. Message: Neisha with [facility] called and left VM to please place blood work orders for patient orders requested are   CBC  BMT   MICRO ALBUMIN CREATINE RATIO WITH RANDOM URINE  This needs to be done before appt 08/31/2021     please advise.    3. Patient approves office to leave a detailed voicemail/MyChart message: yes

## 2021-08-30 ENCOUNTER — HOSPITAL ENCOUNTER (OUTPATIENT)
Dept: LAB | Facility: MEDICAL CENTER | Age: 66
End: 2021-08-30
Attending: FAMILY MEDICINE
Payer: MEDICARE

## 2021-08-30 DIAGNOSIS — N18.31 STAGE 3A CHRONIC KIDNEY DISEASE: ICD-10-CM

## 2021-08-30 LAB
ANION GAP SERPL CALC-SCNC: 11 MMOL/L (ref 7–16)
BUN SERPL-MCNC: 34 MG/DL (ref 8–22)
CALCIUM SERPL-MCNC: 11.2 MG/DL (ref 8.5–10.5)
CHLORIDE SERPL-SCNC: 106 MMOL/L (ref 96–112)
CO2 SERPL-SCNC: 23 MMOL/L (ref 20–33)
CREAT SERPL-MCNC: 1.21 MG/DL (ref 0.5–1.4)
ERYTHROCYTE [DISTWIDTH] IN BLOOD BY AUTOMATED COUNT: 47.8 FL (ref 35.9–50)
GLUCOSE SERPL-MCNC: 87 MG/DL (ref 65–99)
HCT VFR BLD AUTO: 41.1 % (ref 37–47)
HGB BLD-MCNC: 13 G/DL (ref 12–16)
MCH RBC QN AUTO: 28.7 PG (ref 27–33)
MCHC RBC AUTO-ENTMCNC: 31.6 G/DL (ref 33.6–35)
MCV RBC AUTO: 90.7 FL (ref 81.4–97.8)
PLATELET # BLD AUTO: 389 K/UL (ref 164–446)
PMV BLD AUTO: 9.8 FL (ref 9–12.9)
POTASSIUM SERPL-SCNC: 4.8 MMOL/L (ref 3.6–5.5)
RBC # BLD AUTO: 4.53 M/UL (ref 4.2–5.4)
SODIUM SERPL-SCNC: 140 MMOL/L (ref 135–145)
WBC # BLD AUTO: 8 K/UL (ref 4.8–10.8)

## 2021-08-30 PROCEDURE — 82043 UR ALBUMIN QUANTITATIVE: CPT

## 2021-08-30 PROCEDURE — 80048 BASIC METABOLIC PNL TOTAL CA: CPT

## 2021-08-30 PROCEDURE — 85027 COMPLETE CBC AUTOMATED: CPT

## 2021-08-30 PROCEDURE — 36415 COLL VENOUS BLD VENIPUNCTURE: CPT

## 2021-08-30 PROCEDURE — 82570 ASSAY OF URINE CREATININE: CPT

## 2021-08-31 ENCOUNTER — OFFICE VISIT (OUTPATIENT)
Dept: NEPHROLOGY | Facility: MEDICAL CENTER | Age: 66
End: 2021-08-31
Payer: MEDICARE

## 2021-08-31 VITALS
SYSTOLIC BLOOD PRESSURE: 110 MMHG | RESPIRATION RATE: 16 BRPM | BODY MASS INDEX: 20.49 KG/M2 | HEIGHT: 65 IN | OXYGEN SATURATION: 99 % | DIASTOLIC BLOOD PRESSURE: 60 MMHG | TEMPERATURE: 97.4 F | HEART RATE: 89 BPM | WEIGHT: 123 LBS

## 2021-08-31 DIAGNOSIS — Z71.6 TOBACCO ABUSE COUNSELING: ICD-10-CM

## 2021-08-31 DIAGNOSIS — Z72.0 TOBACCO ABUSE: ICD-10-CM

## 2021-08-31 DIAGNOSIS — I10 ESSENTIAL HYPERTENSION: ICD-10-CM

## 2021-08-31 DIAGNOSIS — N18.31 STAGE 3A CHRONIC KIDNEY DISEASE: ICD-10-CM

## 2021-08-31 LAB
CREAT UR-MCNC: 93.41 MG/DL
MICROALBUMIN UR-MCNC: <1.2 MG/DL
MICROALBUMIN/CREAT UR: NORMAL MG/G (ref 0–30)

## 2021-08-31 PROCEDURE — 99204 OFFICE O/P NEW MOD 45 MIN: CPT | Performed by: INTERNAL MEDICINE

## 2021-08-31 RX ORDER — IBUPROFEN 200 MG
950 CAPSULE ORAL DAILY
COMMUNITY
End: 2022-11-08

## 2021-08-31 RX ORDER — M-VIT,TX,IRON,MINS/CALC/FOLIC 27MG-0.4MG
1 TABLET ORAL DAILY
COMMUNITY
End: 2023-09-11

## 2021-08-31 ASSESSMENT — ENCOUNTER SYMPTOMS
FEVER: 0
VOMITING: 0
SHORTNESS OF BREATH: 0
CHILLS: 0
NERVOUS/ANXIOUS: 1
HYPERTENSION: 1
NAUSEA: 0
COUGH: 0

## 2021-08-31 ASSESSMENT — FIBROSIS 4 INDEX: FIB4 SCORE: 1.39

## 2021-08-31 NOTE — PROGRESS NOTES
"Gordo Andersen is a 66 y.o. female who presents with Hypertension and Chronic Kidney Disease            Hypertension  This is a chronic problem. The current episode started more than 1 year ago. The problem is unchanged. The problem is controlled. Pertinent negatives include no chest pain, malaise/fatigue, peripheral edema or shortness of breath. Risk factors for coronary artery disease include post-menopausal state and smoking/tobacco exposure. Past treatments include ACE inhibitors. The current treatment provides significant improvement. There are no compliance problems.  Hypertensive end-organ damage includes kidney disease. Identifiable causes of hypertension include chronic renal disease.   Chronic Kidney Disease  This is a chronic problem. The current episode started more than 1 year ago. The problem occurs constantly. The problem has been unchanged. Pertinent negatives include no chest pain, chills, coughing, fever, nausea, urinary symptoms or vomiting.       Review of Systems   Constitutional: Negative for chills, fever and malaise/fatigue.   Respiratory: Negative for cough and shortness of breath.    Cardiovascular: Negative for chest pain and leg swelling.   Gastrointestinal: Negative for nausea and vomiting.   Genitourinary: Negative for dysuria, frequency and urgency.   Psychiatric/Behavioral: The patient is nervous/anxious.    All other systems reviewed and are negative.             Objective     /60 (BP Location: Right arm, Patient Position: Sitting)   Pulse 89   Temp 36.3 °C (97.4 °F) (Temporal)   Resp 16   Ht 1.638 m (5' 4.5\")   Wt 55.8 kg (123 lb)   SpO2 99%   BMI 20.79 kg/m²      Physical Exam  Vitals and nursing note reviewed.   Constitutional:       General: She is awake. She is not in acute distress.     Appearance: She is well-developed. She is not ill-appearing or diaphoretic.   HENT:      Head: Normocephalic and atraumatic.      Right Ear: External ear normal.     "  Left Ear: External ear normal.      Nose: Nose normal. No rhinorrhea.      Mouth/Throat:      Pharynx: No oropharyngeal exudate or posterior oropharyngeal erythema.   Eyes:      General: No scleral icterus.        Right eye: No discharge.         Left eye: No discharge.      Conjunctiva/sclera: Conjunctivae normal.   Neck:      Vascular: No carotid bruit.   Cardiovascular:      Rate and Rhythm: Normal rate and regular rhythm.      Heart sounds: No murmur heard.     Pulmonary:      Effort: Pulmonary effort is normal. No respiratory distress.      Breath sounds: Normal breath sounds.   Abdominal:      General: Abdomen is flat. There is no distension.      Palpations: Abdomen is soft. There is no mass.   Musculoskeletal:         General: No tenderness.      Cervical back: No rigidity. No muscular tenderness.      Right lower leg: No edema.      Left lower leg: No edema.   Skin:     General: Skin is warm and dry.      Coloration: Skin is not jaundiced.   Neurological:      General: No focal deficit present.      Mental Status: She is alert and oriented to person, place, and time. Mental status is at baseline.   Psychiatric:         Mood and Affect: Mood normal.         Behavior: Behavior normal.         Thought Content: Thought content normal.       Past Medical History:   Diagnosis Date   • Allergy, unspecified not elsewhere classified    • Breast cancer (HCC) 1995   • Hyperlipidemia    • Hypertension      Social History     Socioeconomic History   • Marital status:      Spouse name: Not on file   • Number of children: Not on file   • Years of education: Not on file   • Highest education level: Not on file   Occupational History   • Not on file   Tobacco Use   • Smoking status: Current Every Day Smoker     Packs/day: 0.25     Years: 45.00     Pack years: 11.25     Types: Cigarettes   • Smokeless tobacco: Never Used   • Tobacco comment: 5 daily    Vaping Use   • Vaping Use: Never used   Substance and Sexual  Activity   • Alcohol use: No     Alcohol/week: 0.0 oz   • Drug use: No   • Sexual activity: Not Currently   Other Topics Concern   • Not on file   Social History Narrative   • Not on file     Social Determinants of Health     Financial Resource Strain:    • Difficulty of Paying Living Expenses:    Food Insecurity:    • Worried About Running Out of Food in the Last Year:    • Ran Out of Food in the Last Year:    Transportation Needs:    • Lack of Transportation (Medical):    • Lack of Transportation (Non-Medical):    Physical Activity:    • Days of Exercise per Week:    • Minutes of Exercise per Session:    Stress:    • Feeling of Stress :    Social Connections:    • Frequency of Communication with Friends and Family:    • Frequency of Social Gatherings with Friends and Family:    • Attends [employer] Services:    • Active Member of Clubs or Organizations:    • Attends Club or Organization Meetings:    • Marital Status:    Intimate Partner Violence:    • Fear of Current or Ex-Partner:    • Emotionally Abused:    • Physically Abused:    • Sexually Abused:      Family History   Problem Relation Age of Onset   • Cancer Mother         breast cancer/Breast   • Hypertension Maternal Uncle    • Hypertension Maternal Grandmother    • Hyperlipidemia Maternal Grandmother    • Heart Disease Maternal Grandmother    • Cancer Paternal Grandfather         Lung ca, smoker   • Diabetes Neg Hx      Recent Labs     04/21/21  1625 08/30/21  1313   ALBUMIN 4.2  --    HDL 39*  --    TRIGLYCERIDE 85  --    SODIUM 139 140   POTASSIUM 4.4 4.8   CHLORIDE 108 106   CO2 25 23   BUN 25* 34*   CREATININE 1.14 1.21                               Assessment & Plan        1. Essential hypertension  Controlled  Continue same medication regimen  Continue low-sodium diet      2. Stage 3a chronic kidney disease (HCC)  Etiology is most likely hypertensive nephrosclerosis with age-related changes   stable for the last 7 years  No uremic symptoms  Renal dose  of medication  Avoid nephrotoxins  Continue same medication regimen  Recheck labs annually    3. Tobacco abuse    4. Tobacco abuse counseling  I spent 3 minutes discussing the need for smoking/tobacco cessation. We discussed measures for quitting including replacements such as nicotine gum/nicotine patch

## 2021-09-12 ENCOUNTER — HOSPITAL ENCOUNTER (OUTPATIENT)
Facility: MEDICAL CENTER | Age: 66
End: 2021-09-12
Attending: FAMILY MEDICINE
Payer: MEDICARE

## 2021-09-12 ENCOUNTER — OFFICE VISIT (OUTPATIENT)
Dept: URGENT CARE | Facility: CLINIC | Age: 66
End: 2021-09-12
Payer: MEDICARE

## 2021-09-12 VITALS
BODY MASS INDEX: 20.89 KG/M2 | SYSTOLIC BLOOD PRESSURE: 124 MMHG | HEIGHT: 65 IN | WEIGHT: 125.4 LBS | RESPIRATION RATE: 12 BRPM | DIASTOLIC BLOOD PRESSURE: 66 MMHG | TEMPERATURE: 97 F | HEART RATE: 76 BPM | OXYGEN SATURATION: 92 %

## 2021-09-12 DIAGNOSIS — L03.116 CELLULITIS OF LEFT LOWER EXTREMITY: ICD-10-CM

## 2021-09-12 PROCEDURE — 87075 CULTR BACTERIA EXCEPT BLOOD: CPT

## 2021-09-12 PROCEDURE — 99213 OFFICE O/P EST LOW 20 MIN: CPT | Performed by: FAMILY MEDICINE

## 2021-09-12 PROCEDURE — 87070 CULTURE OTHR SPECIMN AEROBIC: CPT

## 2021-09-12 PROCEDURE — 87077 CULTURE AEROBIC IDENTIFY: CPT

## 2021-09-12 PROCEDURE — 87205 SMEAR GRAM STAIN: CPT

## 2021-09-12 RX ORDER — DOXYCYCLINE HYCLATE 100 MG
100 TABLET ORAL 2 TIMES DAILY
Qty: 14 TABLET | Refills: 0 | Status: SHIPPED | OUTPATIENT
Start: 2021-09-12 | End: 2021-09-19

## 2021-09-12 ASSESSMENT — FIBROSIS 4 INDEX: FIB4 SCORE: 1.39

## 2021-09-12 NOTE — PROGRESS NOTES
"SUBJECTIVE      Chief Complaint   Patient presents with   • Wound Infection     1x week, left leg is swollen, red, and hard from a scrape               Rash  This is a new problem.   She scraped her left leg against a rock in her garden 5 d ag.     The problem has been gradually worsening since onset. Pain location: left leg The rash is characterized by redness, swelling and pain.      . Pertinent negatives include no congestion, cough, fatigue, fever or shortness of breath. Past treatments include nothing.     History   Substance Use Topics   • Smoking status: Never Smoker    • Smokeless tobacco: No    • Alcohol Use: No      Past medical history was unremarkable and not pertinent to current issue      Family History   Problem Relation Age of Onset   • Cancer Mother         breast cancer/Breast   • Hypertension Maternal Uncle    • Hypertension Maternal Grandmother    • Hyperlipidemia Maternal Grandmother    • Heart Disease Maternal Grandmother    • Cancer Paternal Grandfather         Lung ca, smoker   • Diabetes Neg Hx          Review of Systems   Constitutional: Negative for fever and fatigue.   HENT: Negative for congestion.    Respiratory: Negative for cough and shortness of breath.    Cardiovascular: Negative for chest pain.   Gastrointestinal: Negative for abdominal pain.   Skin: Positive for rash.   Neurological: Negative for dizziness.   All other systems reviewed and are negative.         Objective:     /66 (BP Location: Left arm, Patient Position: Sitting, BP Cuff Size: Adult)   Pulse 76   Temp 36.1 °C (97 °F) (Temporal)   Resp 12   Ht 1.638 m (5' 4.5\")   Wt 56.9 kg (125 lb 6.4 oz)   SpO2 92%       Physical Exam   Constitutional: pt is oriented to person, place, and time. Pt appears well-developed and well-nourished. No distress.   HENT:   Head: Normocephalic and atraumatic.   Eyes: Conjunctivae are normal. No scleral icterus.   Cardiovascular: Normal rate and regular rhythm.    Pulmonary/Chest: " Effort normal and breath sounds normal. No respiratory distress.        Neurological: pt is alert and oriented to person, place, and time. No cranial nerve deficit.   Skin: Skin is warm. Pt is not diaphoretic.      Area of erythema, warmth, tender to touch over LEFT lateral calf area.  + discharge.    No crepitus, bullae or purpura.       Nursing note and vitals reviewed.              Assessment/Plan:     1. Cellulitis of left lower extremity     - doxycycline (VIBRAMYCIN) 100 MG Tab; Take 1 Tablet by mouth 2 times a day for 7 days.  Dispense: 14 Tablet; Refill: 0  - ANAEROBIC/AEROBIC/GRAM STAIN      Follow up in one week if no improvement, sooner if symptoms worsen.

## 2021-09-14 LAB
GRAM STN SPEC: NORMAL
SIGNIFICANT IND 70042: NORMAL
SITE SITE: NORMAL
SOURCE SOURCE: NORMAL

## 2021-09-15 ENCOUNTER — TELEPHONE (OUTPATIENT)
Dept: MEDICAL GROUP | Facility: CLINIC | Age: 66
End: 2021-09-15

## 2021-09-15 LAB
BACTERIA WND AEROBE CULT: ABNORMAL
BACTERIA WND AEROBE CULT: ABNORMAL
GRAM STN SPEC: ABNORMAL
SIGNIFICANT IND 70042: ABNORMAL
SITE SITE: ABNORMAL
SOURCE SOURCE: ABNORMAL

## 2021-09-16 LAB
BACTERIA SPEC ANAEROBE CULT: NORMAL
SIGNIFICANT IND 70042: NORMAL
SITE SITE: NORMAL
SOURCE SOURCE: NORMAL

## 2021-09-19 ENCOUNTER — TELEPHONE (OUTPATIENT)
Dept: URGENT CARE | Facility: PHYSICIAN GROUP | Age: 66
End: 2021-09-19

## 2021-09-28 ENCOUNTER — TELEPHONE (OUTPATIENT)
Dept: URGENT CARE | Facility: CLINIC | Age: 66
End: 2021-09-28

## 2021-10-06 ENCOUNTER — OFFICE VISIT (OUTPATIENT)
Dept: URGENT CARE | Facility: CLINIC | Age: 66
End: 2021-10-06
Payer: MEDICARE

## 2021-10-06 VITALS
BODY MASS INDEX: 20.96 KG/M2 | WEIGHT: 125.8 LBS | RESPIRATION RATE: 16 BRPM | TEMPERATURE: 98.5 F | HEART RATE: 94 BPM | HEIGHT: 65 IN | OXYGEN SATURATION: 99 % | DIASTOLIC BLOOD PRESSURE: 70 MMHG | SYSTOLIC BLOOD PRESSURE: 116 MMHG

## 2021-10-06 DIAGNOSIS — L03.116 CELLULITIS OF LEFT LOWER EXTREMITY: ICD-10-CM

## 2021-10-06 PROCEDURE — 99213 OFFICE O/P EST LOW 20 MIN: CPT | Performed by: PHYSICIAN ASSISTANT

## 2021-10-06 RX ORDER — CLINDAMYCIN HYDROCHLORIDE 300 MG/1
300 CAPSULE ORAL 3 TIMES DAILY
Qty: 21 CAPSULE | Refills: 0 | Status: SHIPPED | OUTPATIENT
Start: 2021-10-06 | End: 2021-10-13

## 2021-10-06 ASSESSMENT — FIBROSIS 4 INDEX: FIB4 SCORE: 1.39

## 2021-10-12 ASSESSMENT — ENCOUNTER SYMPTOMS: FEVER: 0

## 2021-10-12 NOTE — PROGRESS NOTES
"Subjective     Sylvie Andersen is a 66 y.o. female who presents with Wound Infection (x over 1 month, scrape leg on a rock, redness, swelling, chills and pain.  Patient was seen on 9/12/21 for same problem.)            Patient presents with:  Wound Infection: x over 1 month, scrape leg on a rock, redness, swelling, chills and pain.  Patient was seen on 9/12/21 for same problem.  Antibiotics were helpful but infection is not completely gone.          Wound Infection  This is a new problem. The current episode started 1 to 4 weeks ago. The problem occurs constantly. The problem has been gradually improving. Pertinent negatives include no fever. The symptoms are aggravated by exertion (palpatin of wound). Treatments tried: abx. The treatment provided moderate relief.       Review of Systems   Constitutional: Negative for fever.   All other systems reviewed and are negative.             Objective     /70 (BP Location: Left arm, Patient Position: Sitting, BP Cuff Size: Adult)   Pulse 94   Temp 36.9 °C (98.5 °F) (Temporal)   Resp 16   Ht 1.638 m (5' 4.5\")   Wt 57.1 kg (125 lb 12.8 oz)   SpO2 99%   BMI 21.26 kg/m²      Physical Exam  Vitals and nursing note reviewed.   Constitutional:       General: She is not in acute distress.     Appearance: Normal appearance. She is well-developed and normal weight. She is not diaphoretic.   HENT:      Head: Normocephalic and atraumatic.      Nose: Nose normal.      Mouth/Throat:      Lips: Pink.      Mouth: Mucous membranes are moist.   Eyes:      Extraocular Movements: Extraocular movements intact.      Conjunctiva/sclera: Conjunctivae normal.      Pupils: Pupils are equal, round, and reactive to light.   Cardiovascular:      Rate and Rhythm: Normal rate and regular rhythm.      Pulses: Normal pulses.      Heart sounds: Normal heart sounds.   Pulmonary:      Effort: Pulmonary effort is normal.   Abdominal:      General: Abdomen is flat.   Musculoskeletal:      Cervical " back: Normal range of motion and neck supple.        Legs:    Skin:     General: Skin is warm and dry.      Capillary Refill: Capillary refill takes less than 2 seconds.   Neurological:      General: No focal deficit present.      Mental Status: She is alert and oriented to person, place, and time.   Psychiatric:         Mood and Affect: Mood normal.                             Assessment & Plan        1. Cellulitis of left lower extremity    - clindamycin (CLEOCIN) 300 MG Cap; Take 1 Capsule by mouth 3 times a day for 7 days.  Dispense: 21 Capsule; Refill: 0            Patient was evaluated in clinic today while wearing appropriate personal protective equipment.      PT to begin prescription medications today as discussed for cellulitis.     PT should follow up with PCP in 1-2 days for re-evaluation if symptoms have not improved.      Discussed red flags and reasons to return to UC or ED.      Pt and/or family verbalized understanding of diagnosis and follow up instructions and was offered informational handout on diagnosis.  PT discharged.

## 2021-10-18 DIAGNOSIS — E78.5 DYSLIPIDEMIA: ICD-10-CM

## 2021-10-18 DIAGNOSIS — I10 ESSENTIAL HYPERTENSION: ICD-10-CM

## 2021-10-18 RX ORDER — FENOFIBRATE 134 MG/1
134 CAPSULE ORAL DAILY
Qty: 90 CAPSULE | Refills: 3 | Status: SHIPPED | OUTPATIENT
Start: 2021-10-18 | End: 2022-11-21

## 2021-10-18 RX ORDER — PRAVASTATIN SODIUM 40 MG
40 TABLET ORAL DAILY
Qty: 90 TABLET | Refills: 3 | Status: SHIPPED | OUTPATIENT
Start: 2021-10-18 | End: 2022-08-07 | Stop reason: SDUPTHER

## 2021-10-18 RX ORDER — METOPROLOL SUCCINATE 25 MG/1
25 TABLET, EXTENDED RELEASE ORAL DAILY
Qty: 90 TABLET | Refills: 3 | Status: SHIPPED | OUTPATIENT
Start: 2021-10-18 | End: 2023-04-14

## 2021-12-07 ENCOUNTER — TELEPHONE (OUTPATIENT)
Dept: HEALTH INFORMATION MANAGEMENT | Facility: OTHER | Age: 66
End: 2021-12-07

## 2021-12-13 DIAGNOSIS — I10 ESSENTIAL HYPERTENSION: ICD-10-CM

## 2021-12-13 RX ORDER — LISINOPRIL 10 MG/1
10 TABLET ORAL DAILY
Qty: 90 TABLET | Refills: 3 | Status: SHIPPED | OUTPATIENT
Start: 2021-12-13 | End: 2022-09-26 | Stop reason: SDUPTHER

## 2022-01-03 ENCOUNTER — OFFICE VISIT (OUTPATIENT)
Dept: MEDICAL GROUP | Facility: PHYSICIAN GROUP | Age: 67
End: 2022-01-03
Payer: MEDICARE

## 2022-01-03 VITALS
WEIGHT: 123.4 LBS | DIASTOLIC BLOOD PRESSURE: 62 MMHG | BODY MASS INDEX: 20.56 KG/M2 | HEIGHT: 65 IN | SYSTOLIC BLOOD PRESSURE: 120 MMHG | OXYGEN SATURATION: 95 % | TEMPERATURE: 96.8 F | HEART RATE: 88 BPM

## 2022-01-03 DIAGNOSIS — I10 ESSENTIAL HYPERTENSION: ICD-10-CM

## 2022-01-03 DIAGNOSIS — E78.5 DYSLIPIDEMIA: ICD-10-CM

## 2022-01-03 DIAGNOSIS — N18.31 STAGE 3A CHRONIC KIDNEY DISEASE: ICD-10-CM

## 2022-01-03 DIAGNOSIS — Z23 NEED FOR VACCINATION: ICD-10-CM

## 2022-01-03 PROCEDURE — G0008 ADMIN INFLUENZA VIRUS VAC: HCPCS | Performed by: FAMILY MEDICINE

## 2022-01-03 PROCEDURE — 99214 OFFICE O/P EST MOD 30 MIN: CPT | Mod: 25 | Performed by: FAMILY MEDICINE

## 2022-01-03 PROCEDURE — 90662 IIV NO PRSV INCREASED AG IM: CPT | Performed by: FAMILY MEDICINE

## 2022-01-03 ASSESSMENT — PATIENT HEALTH QUESTIONNAIRE - PHQ9: CLINICAL INTERPRETATION OF PHQ2 SCORE: 0

## 2022-01-03 ASSESSMENT — FIBROSIS 4 INDEX: FIB4 SCORE: 1.39

## 2022-01-03 NOTE — PROGRESS NOTES
"Subjective:     Chief Complaint   Patient presents with   • Follow-Up     Routine       HPI:   Sylvie presents today to discuss the following.    Stage 3a chronic kidney disease (HCC)  Chronic issue  Stable renal function  Now following up with nephrology     Essential hypertension  Chronic issue  Metoprolol, lisinopril and BP is stable    Dyslipidemia  Chronic issue  On statin      Past Medical History:   Diagnosis Date   • Allergy, unspecified not elsewhere classified    • Breast cancer (HCC) 1995   • Hyperlipidemia    • Hypertension        Current Outpatient Medications Ordered in Epic   Medication Sig Dispense Refill   • lisinopril (PRINIVIL) 10 MG Tab Take 1 Tablet by mouth every day. 90 Tablet 3   • pravastatin (PRAVACHOL) 40 MG tablet Take 1 Tablet by mouth every day. 90 Tablet 3   • fenofibrate micronized (LOFIBRA) 134 MG capsule Take 1 Capsule by mouth every day. 90 Capsule 3   • metoprolol SR (TOPROL XL) 25 MG TABLET SR 24 HR Take 1 Tablet by mouth every day. 90 Tablet 3   • calcium citrate (CALCITRATE) 950 (200 Ca) MG Tab Take 950 mg by mouth every day.     • therapeutic multivitamin-minerals (THERAGRAN-M) Tab Take 1 Tablet by mouth every day.     • venlafaxine (EFFEXOR) 75 MG Tab Take 1 tablet by mouth every day. 90 tablet 1     No current Epic-ordered facility-administered medications on file.       Allergies:  Codeine    Health Maintenance: Completed    ROS:  Gen: no fevers/chills, no changes in weight  Eyes: no changes in vision  Pulm: no sob, no cough  CV: no chest pain, no palpitations  GI: no nausea/vomiting, no diarrhea      Objective:     Exam:  /62 (BP Location: Right arm, Patient Position: Sitting, BP Cuff Size: Adult)   Pulse 88   Temp 36 °C (96.8 °F) (Temporal)   Ht 1.638 m (5' 4.5\")   Wt 56 kg (123 lb 6.4 oz)   SpO2 95%   BMI 20.85 kg/m²  Body mass index is 20.85 kg/m².        Constitutional: Alert, no distress, well-groomed.  Skin: Warm, dry, good turgor, no rashes in visible " areas.  Eye: Equal, round and reactive, conjunctiva clear, lids normal.  ENMT: Lips without lesions, good dentition, moist mucous membranes.  Neck: Trachea midline, no masses, no thyromegaly.  Respiratory: Unlabored respiratory effort, no cough.  MSK: Normal gait, moves all extremities.  Neuro: Grossly non-focal.   Psych: Alert and oriented x3, normal affect and mood.        Assessment & Plan:     66 y.o. female with the following -     1. Stage 3a chronic kidney disease (HCC)  Chronic, stable condition.  Avoiding nephrotoxins and also achieving good blood pressure control.  Continue to follow-up with nephrology.  Kindly appreciate recommendations.  - Lipid Profile; Future  - Comp Metabolic Panel; Future    2. Essential hypertension  Chronic, stable condition.  Continue with metoprolol and lisinopril.    3. Dyslipidemia  Chronic, stable condition.  Continue with fenofibrate and pravastatin.  Due for repeat labs April 2022.    4. Need for vaccination  - INFLUENZA VACCINE, HIGH DOSE (65+ ONLY)      Return in about 6 months (around 7/3/2022).    Please note that this dictation was created using voice recognition software. I have made every reasonable attempt to correct obvious errors, but I expect that there are errors of grammar and possibly content that I did not discover before finalizing the note.

## 2022-04-27 ENCOUNTER — HOSPITAL ENCOUNTER (OUTPATIENT)
Dept: RADIOLOGY | Facility: MEDICAL CENTER | Age: 67
End: 2022-04-27
Attending: FAMILY MEDICINE
Payer: MEDICARE

## 2022-04-27 DIAGNOSIS — Z12.31 VISIT FOR SCREENING MAMMOGRAM: ICD-10-CM

## 2022-04-27 PROCEDURE — 77063 BREAST TOMOSYNTHESIS BI: CPT

## 2022-07-05 ENCOUNTER — OFFICE VISIT (OUTPATIENT)
Dept: MEDICAL GROUP | Facility: PHYSICIAN GROUP | Age: 67
End: 2022-07-05
Payer: MEDICARE

## 2022-07-05 ENCOUNTER — NURSE TRIAGE (OUTPATIENT)
Dept: MEDICAL GROUP | Facility: PHYSICIAN GROUP | Age: 67
End: 2022-07-05

## 2022-07-05 ENCOUNTER — HOSPITAL ENCOUNTER (EMERGENCY)
Facility: MEDICAL CENTER | Age: 67
End: 2022-07-05
Attending: EMERGENCY MEDICINE
Payer: MEDICARE

## 2022-07-05 ENCOUNTER — HOSPITAL ENCOUNTER (OUTPATIENT)
Facility: MEDICAL CENTER | Age: 67
End: 2022-07-05
Attending: NURSE PRACTITIONER
Payer: MEDICARE

## 2022-07-05 VITALS
BODY MASS INDEX: 18.99 KG/M2 | DIASTOLIC BLOOD PRESSURE: 60 MMHG | SYSTOLIC BLOOD PRESSURE: 100 MMHG | HEIGHT: 65 IN | HEART RATE: 115 BPM | RESPIRATION RATE: 22 BRPM | OXYGEN SATURATION: 96 % | WEIGHT: 114 LBS | TEMPERATURE: 97.1 F

## 2022-07-05 VITALS
RESPIRATION RATE: 18 BRPM | OXYGEN SATURATION: 100 % | SYSTOLIC BLOOD PRESSURE: 102 MMHG | TEMPERATURE: 97.5 F | HEART RATE: 79 BPM | WEIGHT: 113.98 LBS | BODY MASS INDEX: 19.46 KG/M2 | DIASTOLIC BLOOD PRESSURE: 50 MMHG | HEIGHT: 64 IN

## 2022-07-05 DIAGNOSIS — R30.0 DYSURIA: ICD-10-CM

## 2022-07-05 DIAGNOSIS — R53.81 MALAISE: ICD-10-CM

## 2022-07-05 DIAGNOSIS — N39.0 ACUTE UTI: ICD-10-CM

## 2022-07-05 PROBLEM — R53.83 OTHER FATIGUE: Status: ACTIVE | Noted: 2022-07-05

## 2022-07-05 LAB
ALBUMIN SERPL BCP-MCNC: 3.2 G/DL (ref 3.2–4.9)
ALBUMIN/GLOB SERPL: 0.7 G/DL
ALP SERPL-CCNC: 205 U/L (ref 30–99)
ALT SERPL-CCNC: 41 U/L (ref 2–50)
AMORPH CRY #/AREA URNS HPF: PRESENT /HPF
ANION GAP SERPL CALC-SCNC: 14 MMOL/L (ref 7–16)
APPEARANCE UR: ABNORMAL
AST SERPL-CCNC: 32 U/L (ref 12–45)
BACTERIA #/AREA URNS HPF: ABNORMAL /HPF
BASOPHILS # BLD AUTO: 0.5 % (ref 0–1.8)
BASOPHILS # BLD: 0.07 K/UL (ref 0–0.12)
BILIRUB SERPL-MCNC: 0.4 MG/DL (ref 0.1–1.5)
BILIRUB UR QL STRIP.AUTO: NEGATIVE
BUN SERPL-MCNC: 27 MG/DL (ref 8–22)
CALCIUM SERPL-MCNC: 9.9 MG/DL (ref 8.5–10.5)
CHLORIDE SERPL-SCNC: 97 MMOL/L (ref 96–112)
CO2 SERPL-SCNC: 22 MMOL/L (ref 20–33)
COLOR UR: ABNORMAL
CREAT SERPL-MCNC: 1.28 MG/DL (ref 0.5–1.4)
EOSINOPHIL # BLD AUTO: 0.05 K/UL (ref 0–0.51)
EOSINOPHIL NFR BLD: 0.3 % (ref 0–6.9)
EPI CELLS #/AREA URNS HPF: ABNORMAL /HPF
ERYTHROCYTE [DISTWIDTH] IN BLOOD BY AUTOMATED COUNT: 43.2 FL (ref 35.9–50)
FLUAV RNA SPEC QL NAA+PROBE: NEGATIVE
FLUBV RNA SPEC QL NAA+PROBE: NEGATIVE
GFR SERPLBLD CREATININE-BSD FMLA CKD-EPI: 46 ML/MIN/1.73 M 2
GLOBULIN SER CALC-MCNC: 4.6 G/DL (ref 1.9–3.5)
GLUCOSE SERPL-MCNC: 107 MG/DL (ref 65–99)
GLUCOSE UR STRIP.AUTO-MCNC: NEGATIVE MG/DL
HCT VFR BLD AUTO: 31 % (ref 37–47)
HGB BLD-MCNC: 10 G/DL (ref 12–16)
HYALINE CASTS #/AREA URNS LPF: ABNORMAL /LPF
IMM GRANULOCYTES # BLD AUTO: 0.15 K/UL (ref 0–0.11)
IMM GRANULOCYTES NFR BLD AUTO: 1 % (ref 0–0.9)
KETONES UR STRIP.AUTO-MCNC: NEGATIVE MG/DL
LACTATE SERPL-SCNC: 1.5 MMOL/L (ref 0.5–2)
LEUKOCYTE ESTERASE UR QL STRIP.AUTO: ABNORMAL
LYMPHOCYTES # BLD AUTO: 2.18 K/UL (ref 1–4.8)
LYMPHOCYTES NFR BLD: 14.3 % (ref 22–41)
MCH RBC QN AUTO: 27.7 PG (ref 27–33)
MCHC RBC AUTO-ENTMCNC: 32.3 G/DL (ref 33.6–35)
MCV RBC AUTO: 85.9 FL (ref 81.4–97.8)
MICRO URNS: ABNORMAL
MONOCYTES # BLD AUTO: 1.41 K/UL (ref 0–0.85)
MONOCYTES NFR BLD AUTO: 9.3 % (ref 0–13.4)
NEUTROPHILS # BLD AUTO: 11.35 K/UL (ref 2–7.15)
NEUTROPHILS NFR BLD: 74.6 % (ref 44–72)
NITRITE UR QL STRIP.AUTO: NEGATIVE
NRBC # BLD AUTO: 0 K/UL
NRBC BLD-RTO: 0 /100 WBC
PH UR STRIP.AUTO: 5.5 [PH] (ref 5–8)
PLATELET # BLD AUTO: 509 K/UL (ref 164–446)
PMV BLD AUTO: 9 FL (ref 9–12.9)
POTASSIUM SERPL-SCNC: 4.6 MMOL/L (ref 3.6–5.5)
PROT SERPL-MCNC: 7.8 G/DL (ref 6–8.2)
PROT UR QL STRIP: 30 MG/DL
RBC # BLD AUTO: 3.61 M/UL (ref 4.2–5.4)
RBC # URNS HPF: ABNORMAL /HPF
RBC UR QL AUTO: ABNORMAL
RENAL EPI CELLS #/AREA URNS HPF: ABNORMAL /HPF
RSV RNA SPEC QL NAA+PROBE: NEGATIVE
SARS-COV-2 RNA RESP QL NAA+PROBE: NOTDETECTED
SODIUM SERPL-SCNC: 133 MMOL/L (ref 135–145)
SP GR UR STRIP.AUTO: 1.02
SPECIMEN SOURCE: NORMAL
UROBILINOGEN UR STRIP.AUTO-MCNC: 0.2 MG/DL
WBC # BLD AUTO: 15.2 K/UL (ref 4.8–10.8)
WBC #/AREA URNS HPF: ABNORMAL /HPF

## 2022-07-05 PROCEDURE — 85025 COMPLETE CBC W/AUTO DIFF WBC: CPT

## 2022-07-05 PROCEDURE — 87086 URINE CULTURE/COLONY COUNT: CPT | Mod: 91

## 2022-07-05 PROCEDURE — 87086 URINE CULTURE/COLONY COUNT: CPT

## 2022-07-05 PROCEDURE — 36415 COLL VENOUS BLD VENIPUNCTURE: CPT

## 2022-07-05 PROCEDURE — 96375 TX/PRO/DX INJ NEW DRUG ADDON: CPT

## 2022-07-05 PROCEDURE — 96374 THER/PROPH/DIAG INJ IV PUSH: CPT

## 2022-07-05 PROCEDURE — 87077 CULTURE AEROBIC IDENTIFY: CPT | Mod: 91

## 2022-07-05 PROCEDURE — 99284 EMERGENCY DEPT VISIT MOD MDM: CPT

## 2022-07-05 PROCEDURE — 81001 URINALYSIS AUTO W/SCOPE: CPT

## 2022-07-05 PROCEDURE — 99213 OFFICE O/P EST LOW 20 MIN: CPT | Performed by: NURSE PRACTITIONER

## 2022-07-05 PROCEDURE — 87077 CULTURE AEROBIC IDENTIFY: CPT

## 2022-07-05 PROCEDURE — 80053 COMPREHEN METABOLIC PANEL: CPT

## 2022-07-05 PROCEDURE — C9803 HOPD COVID-19 SPEC COLLECT: HCPCS | Performed by: EMERGENCY MEDICINE

## 2022-07-05 PROCEDURE — 87186 SC STD MICRODIL/AGAR DIL: CPT | Mod: 91

## 2022-07-05 PROCEDURE — 0241U HCHG SARS-COV-2 COVID-19 NFCT DS RESP RNA 4 TRGT MIC: CPT

## 2022-07-05 PROCEDURE — 83605 ASSAY OF LACTIC ACID: CPT

## 2022-07-05 PROCEDURE — 700111 HCHG RX REV CODE 636 W/ 250 OVERRIDE (IP): Performed by: EMERGENCY MEDICINE

## 2022-07-05 PROCEDURE — 700105 HCHG RX REV CODE 258: Performed by: EMERGENCY MEDICINE

## 2022-07-05 PROCEDURE — 87186 SC STD MICRODIL/AGAR DIL: CPT

## 2022-07-05 RX ORDER — CEFDINIR 300 MG/1
300 CAPSULE ORAL 2 TIMES DAILY
Qty: 14 CAPSULE | Refills: 0 | Status: SHIPPED | OUTPATIENT
Start: 2022-07-05 | End: 2022-07-12

## 2022-07-05 RX ORDER — ONDANSETRON 2 MG/ML
4 INJECTION INTRAMUSCULAR; INTRAVENOUS ONCE
Status: COMPLETED | OUTPATIENT
Start: 2022-07-05 | End: 2022-07-05

## 2022-07-05 RX ORDER — CEFTRIAXONE 1 G/1
1 INJECTION, POWDER, FOR SOLUTION INTRAMUSCULAR; INTRAVENOUS ONCE
Status: COMPLETED | OUTPATIENT
Start: 2022-07-05 | End: 2022-07-05

## 2022-07-05 RX ORDER — SODIUM CHLORIDE 9 MG/ML
1000 INJECTION, SOLUTION INTRAVENOUS ONCE
Status: COMPLETED | OUTPATIENT
Start: 2022-07-05 | End: 2022-07-05

## 2022-07-05 RX ADMIN — CEFTRIAXONE SODIUM 1 G: 1 INJECTION, POWDER, FOR SOLUTION INTRAMUSCULAR; INTRAVENOUS at 17:11

## 2022-07-05 RX ADMIN — SODIUM CHLORIDE 1000 ML: 9 INJECTION, SOLUTION INTRAVENOUS at 17:11

## 2022-07-05 RX ADMIN — ONDANSETRON 4 MG: 2 INJECTION INTRAMUSCULAR; INTRAVENOUS at 17:11

## 2022-07-05 ASSESSMENT — FIBROSIS 4 INDEX
FIB4 SCORE: 1.39
FIB4 SCORE: 1.39

## 2022-07-05 NOTE — TELEPHONE ENCOUNTER
Can we please call the patient and get more information about her symptoms?  I am concerned with her report of feeling weak and being unable to eat or drink that she may need to be seen in the emergency room.

## 2022-07-05 NOTE — LETTER
7/7/2022               Sylvie Andersen  [address] [address]        Dear Sylvie (MR#[id number])    This letter is sent in regards to your recent visit to the [facility] Emergency Department on 7/5/2022. During the visit, tests were performed to assist the physician in your medical diagnosis. A review of your tests requires that we notify you of the following:    Your urine culture was POSITIVE for a bacteria called Escherichia coli. The antibiotic prescribed for you (cefdinir) should be active to treat this bacteria. It is important that you continue taking your antibiotic until it is finished.     Please feel free to contact me at the number below if you have any questions or concerns. Thank you for your cooperation in the matter.    Sincerely,  ED Culture Follow-Up Staff  Shaylee Brunson, PharmD    Formerly [facility], Emergency Department  [address] [place] [id number] [phone] (ED Culture Line)

## 2022-07-05 NOTE — ED TRIAGE NOTES
"Chief Complaint   Patient presents with   • Sent by MD     Sent from primary care providers office for UTI and dehydration.    • UTI     X2 weeks, sent from primary care for abx. +dysuria     Pt ambulatory to triage for above complaint.  Protocols ordered. Pt educated on triage process and informed to alert staff of any changes or concerns.     /64   Pulse (!) 106   Temp 36.5 °C (97.7 °F) (Temporal)   Resp 16   Ht 1.626 m (5' 4\")   Wt 51.7 kg (113 lb 15.7 oz)   SpO2 96%   BMI 19.56 kg/m²     "

## 2022-07-06 NOTE — ED PROVIDER NOTES
ED Provider Note    CHIEF COMPLAINT  Chief Complaint   Patient presents with   • Sent by MD     Sent from primary care providers office for UTI and dehydration.    • UTI     X2 weeks, sent from primary care for abx. +dysuria       HPI  Sylvie Andersen is a 66 y.o. female who presents to the emergency department through triage with significant other after being referred by primary care for further work-up.  Patient describes 2 weeks of fatigue, decreased appetite.  Urinary frequency, dysuria without hematuria.  No abdominal pain or cramping, no flank pain.  No nausea or vomiting.  No diarrhea.  Tactile fever and chills.  No cough or congestion.  No shortness of breath, chest pain, palpitations or syncope.  No rash.  No extremity pain or swelling.    Patient states symptoms began a few days after a COVID booster.   denies  travel or sick contacts.    REVIEW OF SYSTEMS  See HPI for further details. All other systems are negative.     PAST MEDICAL HISTORY   has a past medical history of Allergy, unspecified not elsewhere classified, Breast cancer (HCC) (), Hyperlipidemia, and Hypertension.    SOCIAL HISTORY  Social History     Tobacco Use   • Smoking status: Former Smoker     Packs/day: 0.25     Years: 45.00     Pack years: 11.25     Types: Cigarettes     Quit date: 2022     Years since quittin.0   • Smokeless tobacco: Former User     Quit date: 2022   • Tobacco comment: 5 daily    Vaping Use   • Vaping Use: Never used   Substance and Sexual Activity   • Alcohol use: No     Alcohol/week: 0.0 oz   • Drug use: No   • Sexual activity: Not Currently       SURGICAL HISTORY   has a past surgical history that includes mastectomy; radiation therapy plan simple; and chemotherapy, unspecified procedure.    CURRENT MEDICATIONS  Home Medications     Reviewed by Jenn Hamilton R.N. (Registered Nurse) on 22 at 1518  Med List Status: Not Addressed   Medication Last Dose Status   calcium citrate (CALCITRATE)  "950 (200 Ca) MG Tab  Active   fenofibrate micronized (LOFIBRA) 134 MG capsule  Active   lisinopril (PRINIVIL) 10 MG Tab  Active   metoprolol SR (TOPROL XL) 25 MG TABLET SR 24 HR  Active   pravastatin (PRAVACHOL) 40 MG tablet  Active   therapeutic multivitamin-minerals (THERAGRAN-M) Tab  Active   venlafaxine (EFFEXOR) 75 MG Tab  Active                ALLERGIES  Allergies   Allergen Reactions   • Codeine Unspecified     Memory issues       PHYSICAL EXAM  VITAL SIGNS: /50   Pulse 79   Temp 36.4 °C (97.5 °F) (Temporal)   Resp 16   Ht 1.626 m (5' 4\")   Wt 51.7 kg (113 lb 15.7 oz)   SpO2 100%   BMI 19.56 kg/m²   Pulse ox interpretation: I interpret this pulse ox as normal.  Constitutional: Alert in no apparent distress.  HENT: Normocephalic, atraumatic. Bilateral external ears normal, Nose normal.  Dry mucous membranes.    Eyes: Pupils are equal and reactive, Conjunctiva normal.   Neck: Normal range of motion, Supple.  No meningeal irritation.   Lymphatic: No lymphadenopathy noted.   Cardiovascular: Regular rate and rhythm, no murmurs. Distal pulses intact.  No peripheral edema.  Thorax & Lungs: Normal breath sounds.  No wheezing/rales/ronchi. No increased work of breathing, clipped speech or retractions.   Abdomen: Soft, non-distended, non-tender to palpation. No palpable or pulsatile masses. No peritoneal signs. No CVA tenderness.  Skin: Warm, Dry, No erythema, No rash.   Musculoskeletal: Good range of motion in all major joints.   Neurologic: Alert and orient x4.  Speech clear and cohesive.  Moves 4 extremity spontaneously.  Psychiatric: Flat affect otherwise judgment normal, Mood normal.       DIAGNOSTIC STUDIES / PROCEDURES    LABS  Results for orders placed or performed during the hospital encounter of 07/05/22   URINALYSIS CULTURE, IF INDICATED    Specimen: Urine   Result Value Ref Range    Color DK Yellow     Character Cloudy (A)     Specific Gravity 1.016 <1.035    Ph 5.5 5.0 - 8.0    Glucose " Negative Negative mg/dL    Ketones Negative Negative mg/dL    Protein 30 (A) Negative mg/dL    Bilirubin Negative Negative    Urobilinogen, Urine 0.2 Negative    Nitrite Negative Negative    Leukocyte Esterase Large (A) Negative    Occult Blood Moderate (A) Negative    Micro Urine Req Microscopic    URINE MICROSCOPIC (W/UA)   Result Value Ref Range    WBC  (A) /hpf    RBC 0-2 /hpf    Bacteria Moderate (A) None /hpf    Epithelial Cells Few /hpf    Epithelial Cells Renal Rare /hpf    Amorphous Crystal Present /hpf    Hyaline Cast 0-2 /lpf   URINE CULTURE(NEW)    Specimen: Urine   Result Value Ref Range    Significant Indicator NEG     Source UR     Site -     Culture Result -    CBC With Differential   Result Value Ref Range    WBC 15.2 (H) 4.8 - 10.8 K/uL    RBC 3.61 (L) 4.20 - 5.40 M/uL    Hemoglobin 10.0 (L) 12.0 - 16.0 g/dL    Hematocrit 31.0 (L) 37.0 - 47.0 %    MCV 85.9 81.4 - 97.8 fL    MCH 27.7 27.0 - 33.0 pg    MCHC 32.3 (L) 33.6 - 35.0 g/dL    RDW 43.2 35.9 - 50.0 fL    Platelet Count 509 (H) 164 - 446 K/uL    MPV 9.0 9.0 - 12.9 fL    Neutrophils-Polys 74.60 (H) 44.00 - 72.00 %    Lymphocytes 14.30 (L) 22.00 - 41.00 %    Monocytes 9.30 0.00 - 13.40 %    Eosinophils 0.30 0.00 - 6.90 %    Basophils 0.50 0.00 - 1.80 %    Immature Granulocytes 1.00 (H) 0.00 - 0.90 %    Nucleated RBC 0.00 /100 WBC    Neutrophils (Absolute) 11.35 (H) 2.00 - 7.15 K/uL    Lymphs (Absolute) 2.18 1.00 - 4.80 K/uL    Monos (Absolute) 1.41 (H) 0.00 - 0.85 K/uL    Eos (Absolute) 0.05 0.00 - 0.51 K/uL    Baso (Absolute) 0.07 0.00 - 0.12 K/uL    Immature Granulocytes (abs) 0.15 (H) 0.00 - 0.11 K/uL    NRBC (Absolute) 0.00 K/uL   Comp Metabolic Panel   Result Value Ref Range    Sodium 133 (L) 135 - 145 mmol/L    Potassium 4.6 3.6 - 5.5 mmol/L    Chloride 97 96 - 112 mmol/L    Co2 22 20 - 33 mmol/L    Anion Gap 14.0 7.0 - 16.0    Glucose 107 (H) 65 - 99 mg/dL    Bun 27 (H) 8 - 22 mg/dL    Creatinine 1.28 0.50 - 1.40 mg/dL    Calcium  9.9 8.5 - 10.5 mg/dL    AST(SGOT) 32 12 - 45 U/L    ALT(SGPT) 41 2 - 50 U/L    Alkaline Phosphatase 205 (H) 30 - 99 U/L    Total Bilirubin 0.4 0.1 - 1.5 mg/dL    Albumin 3.2 3.2 - 4.9 g/dL    Total Protein 7.8 6.0 - 8.2 g/dL    Globulin 4.6 (H) 1.9 - 3.5 g/dL    A-G Ratio 0.7 g/dL   Lactic Acid   Result Value Ref Range    Lactic Acid 1.5 0.5 - 2.0 mmol/L   CoV-2, FLU A/B, and RSV by PCR (2-4 Hours CEPHEID) : Collect NP swab in VTM    Specimen: Respirate   Result Value Ref Range    SARS-CoV-2 Source NP Swab    ESTIMATED GFR   Result Value Ref Range    GFR (CKD-EPI) 46 (A) >60 mL/min/1.73 m 2     RADIOLOGY  No orders to display     COURSE & MEDICAL DECISION MAKING  Nursing notes and vital signs were reviewed. (See chart for details)  The patients records were reviewed, history was obtained from the patient;    ED evaluation most consistent with UTI.  No clinical evidence for pyelonephritis or sepsis.  Leukocytosis, WBC 15.2, leftward shift and mild bandemia with normal lactate.  Clinical dehydration with normal chemistry, no electrolyte derangement.  Urinalysis with moderate blood, large leukocyte Estrace,  WBC and moderate bacteria.  Culture pending.  She received Rocephin in the emergency department.  IV fluid bolus for clinical dehydration.  Tolerated oral fluids before discharge.  Nontoxic in appearance.  We will continue Omnicef for 7 days.  Viral studies are pending, given duration, no indication for treatment otherwise, patient will follow through MyChart and review with primary care.    Patient is stable for discharge at this time, anticipatory guidance provided, Omnicef for 7 days, close follow-up is encouraged, and strict ED return instructions have been detailed. Patient is agreeable to the disposition and plan.    FINAL IMPRESSION  (N39.0) Acute UTI      Electronically signed by: Yu Lyles D.O., 7/5/2022 6:09 PM      This dictation was created using voice recognition software. The accuracy of  the dictation is limited to the abilities of the software. I expect there may be some errors of grammar and possibly content. The nursing notes were reviewed and certain aspects of this information were incorporated into this note.

## 2022-07-06 NOTE — ED NOTES
Patient discharged home with SO. IV removed. AVS reviewed and understood, all questions answered.

## 2022-07-06 NOTE — DISCHARGE INSTRUCTIONS
Follow-up with primary care 1 to 2 days for reevaluation, medication management.    Omnicef twice daily for 7 days for urinary tract infection.  Tylenol or ibuprofen as needed for any fever or discomfort.    Encourage oral fluid hydration.  Advance diet as tolerated.    Return to the emergency department for altered mental status, worsening weakness, abdominal pain or flank pain, vomiting, fever or other new concerns.

## 2022-07-06 NOTE — PROGRESS NOTES
Chief Complaint   Patient presents with   • Fatigue   • Dizziness   • Dysuria       HPI:  Symptom onset: 14 days ago   Current symptoms: Painful, urgent, frequent voids. blood noted in urine.  Patient is noting inability to eat and drink, feeling of weakness, chills and inability to warm up.  Concern for dehydration.  Since onset symptoms are: Worsened  Treatments tried: None  Associated symptoms: Negative for fever, flank pain, nausea and vomiting, vaginal discharge, pelvic pain.  History is negative for frequent UTI.     Problem   Other Fatigue    This is a new issue. States that she got her COVID-19 booster 6/21.  States that since she got the vaccination she has been feeling very unwell.  Initially her O2 saturation was low in the 70s but with slow deep breaths it did come up to 96%.  Dates that after the vaccination she had severe arm pain and started feeling very sick.  States that she has been dizzy when she stands up states that she describes this as spinning but has felt like she might pass out.  States that she has felt weak, tired all the time and states that she could sleep all day.  States that she has been unable to eat or drink recently and states that she has been taking small sips of water intermittently.  Denies vomiting, back pain.  States that she has been having chills, denies any cough, congestion or other upper respiratory symptoms.  States that she has lost 10 pounds because she cannot eat or drink related to the food tasting bad.   Feels hungry, is tearful during our discussion.  Mentions that she has been having burning with urination, a foul odor to her urine which has been present for approximately 2 weeks.  Denies any episodes of diarrhea.  Reports an odor to her urine.    Point-of-care urinalysis indicates negative glucose negative bilirubin negative ketones specific gravity 1.020, large blood pH 5.5 protein 30 mg/dL, urobilinogen 0.2, nitrates are negative, leukocytes are small.  Urine  "is dark, turbid, amount is decreased.      Dysuria       ROS:  Denies fever, chills, vomiting or abdominal pain.     OBJECTIVE:  /60 (BP Location: Left arm, Patient Position: Sitting, BP Cuff Size: Adult)   Pulse (!) 115   Temp 36.2 °C (97.1 °F) (Temporal)   Resp (!) 22   Ht 1.638 m (5' 4.5\")   Wt 51.7 kg (114 lb)   SpO2 96%   Gen: Alert, NAD.  Mucous membranes are noted to be dry, patient is pale  Chest: Breathing labored. Lungs clear to auscultation, CV RRR.  Abdomen: Soft, tender in suprapubic region. No CVAT. Normal bowel sounds.     No results found for: POCCOLOR, POCAPPEAR, POCLEUKEST, POCNITRITE, POCUROBILIGE, POCPROTEIN, POCURPH, POCBLOOD, POCSPGRV, POCKETONES, POCBILIRUBIN, POCGLUCUA       ASSESSMENT/PLAN:    Problem List Items Addressed This Visit     Dysuria    Relevant Orders    POCT Urinalysis    URINE CULTURE    Other fatigue        1. Abnormal urine dipstick in office. Urine sent for culture.  I recommend that she start antibiotics, but considering dehydration I recommended patient be seen in the emergency room for further evaluation.   2. Provided education to drink plenty of fluids, wipe front to back every void and bowel movement.   3.  Patient will go to the emergency room for further evaluation and fluids.  4. Return to clinic if symptoms not improving within 3-4 days or in case of vomiting, fever, increasing pain.  "

## 2022-07-07 LAB
BACTERIA UR CULT: ABNORMAL
BACTERIA UR CULT: ABNORMAL
SIGNIFICANT IND 70042: ABNORMAL
SITE SITE: ABNORMAL
SOURCE SOURCE: ABNORMAL

## 2022-07-08 ENCOUNTER — TELEPHONE (OUTPATIENT)
Dept: MEDICAL GROUP | Facility: PHYSICIAN GROUP | Age: 67
End: 2022-07-08

## 2022-07-08 ENCOUNTER — OFFICE VISIT (OUTPATIENT)
Dept: MEDICAL GROUP | Facility: PHYSICIAN GROUP | Age: 67
End: 2022-07-08
Payer: MEDICARE

## 2022-07-08 VITALS
OXYGEN SATURATION: 98 % | BODY MASS INDEX: 19.33 KG/M2 | HEIGHT: 65 IN | SYSTOLIC BLOOD PRESSURE: 102 MMHG | HEART RATE: 100 BPM | WEIGHT: 116 LBS | TEMPERATURE: 97 F | DIASTOLIC BLOOD PRESSURE: 50 MMHG

## 2022-07-08 DIAGNOSIS — I10 ESSENTIAL HYPERTENSION: ICD-10-CM

## 2022-07-08 DIAGNOSIS — D50.9 IRON DEFICIENCY ANEMIA, UNSPECIFIED IRON DEFICIENCY ANEMIA TYPE: ICD-10-CM

## 2022-07-08 LAB
BACTERIA UR CULT: ABNORMAL
SIGNIFICANT IND 70042: ABNORMAL
SITE SITE: ABNORMAL
SOURCE SOURCE: ABNORMAL

## 2022-07-08 PROCEDURE — 99214 OFFICE O/P EST MOD 30 MIN: CPT | Performed by: FAMILY MEDICINE

## 2022-07-08 ASSESSMENT — FIBROSIS 4 INDEX: FIB4 SCORE: 0.65

## 2022-07-08 NOTE — TELEPHONE ENCOUNTER
Phone Number Called: [phone]    Call outcome: Left detailed message for patient. Informed to call back with any additional questions.    Message: Left vm for pt to call back

## 2022-07-08 NOTE — TELEPHONE ENCOUNTER
----- Message from ISIAH Santana sent at 7/7/2022  5:10 PM PDT -----  Please call pt and give lab results: Culture was positive for E. coli and Streptococcus agalactiae.  I am waiting for the sensitivity.  I will let her know once these return we can make sure that she is on an antibiotic that will cover both bacteria. please ask patient if she is feeling better since starting antibiotics.

## 2022-07-08 NOTE — PROGRESS NOTES
"Subjective:     Chief Complaint   Patient presents with   • Follow-Up       HPI:   Sylvie presents today to discuss the following.    Essential hypertension  Chronic issue  BP today is low  Taking metoprolol 25mg and lisinopril 10mg daily       Past Medical History:   Diagnosis Date   • Allergy, unspecified not elsewhere classified    • Breast cancer (HCC) 1995   • Hyperlipidemia    • Hypertension        Current Outpatient Medications Ordered in Epic   Medication Sig Dispense Refill   • cefdinir (OMNICEF) 300 MG Cap Take 1 Capsule by mouth 2 times a day for 7 days. 14 Capsule 0   • lisinopril (PRINIVIL) 10 MG Tab Take 1 Tablet by mouth every day. 90 Tablet 3   • pravastatin (PRAVACHOL) 40 MG tablet Take 1 Tablet by mouth every day. 90 Tablet 3   • fenofibrate micronized (LOFIBRA) 134 MG capsule Take 1 Capsule by mouth every day. 90 Capsule 3   • metoprolol SR (TOPROL XL) 25 MG TABLET SR 24 HR Take 1 Tablet by mouth every day. 90 Tablet 3   • calcium citrate (CALCITRATE) 950 (200 Ca) MG Tab Take 950 mg by mouth every day.     • therapeutic multivitamin-minerals (THERAGRAN-M) Tab Take 1 Tablet by mouth every day.     • venlafaxine (EFFEXOR) 75 MG Tab Take 1 tablet by mouth every day. 90 tablet 1     No current Epic-ordered facility-administered medications on file.       Allergies:  Codeine    Health Maintenance: Completed    ROS:  Gen: no fevers/chills, no changes in weight  Eyes: no changes in vision  Pulm: no sob, no cough  CV: no chest pain, no palpitations  GI: no nausea/vomiting, no diarrhea      Objective:     Exam:  /50 (BP Location: Left arm, Patient Position: Sitting, BP Cuff Size: Small adult)   Pulse 100 Comment: manual  Temp 36.1 °C (97 °F) (Temporal)   Ht 1.638 m (5' 4.5\")   Wt 52.6 kg (116 lb)   SpO2 98%   BMI 19.60 kg/m²  Body mass index is 19.6 kg/m².      Constitutional: Alert, no distress, well-groomed.  Skin: Warm, dry, good turgor, no rashes in visible areas.  Eye: Equal, round and " reactive, conjunctiva clear, lids normal.  ENMT: Lips without lesions, good dentition, moist mucous membranes.  Neck: Trachea midline, no masses, no thyromegaly.  Respiratory: Unlabored respiratory effort, no cough.  MSK: Normal gait, moves all extremities.  Neuro: Grossly non-focal.   Psych: Alert and oriented x3, normal affect and mood.        Assessment & Plan:     66 y.o. female with the following -     1. Essential hypertension  Chronic issue.   Tightly controlled.  We will reduce dosage of metoprolol and continue lisinopril.  Log blood pressure at home.  Return in 4 weeks.    2. Iron deficiency anemia, unspecified iron deficiency anemia type  New problem.  Unknown etiology prognosis.  We will repeat labs and return in 4 weeks.  - CBC WITHOUT DIFFERENTIAL; Future      Return in about 4 weeks (around 8/5/2022).    Please note that this dictation was created using voice recognition software. I have made every reasonable attempt to correct obvious errors, but I expect that there are errors of grammar and possibly content that I did not discover before finalizing the note.

## 2022-07-08 NOTE — ED NOTES
"ED Positive Culture Follow-up/Notification Note:    Date: 7/7/2022     Patient seen in the ED on 7/5/2022 for 2 weeks of fatigue, urinary frequency, and dysuria. Patient received ceftriaxone 1 g IV x1 in the ED.  1. Acute UTI       Discharge Medication List as of 7/5/2022  6:11 PM      START taking these medications    Details   cefdinir (OMNICEF) 300 MG Cap Take 1 Capsule by mouth 2 times a day for 7 days., Disp-14 Capsule, R-0, Normal             Allergies: Codeine     Vitals:    07/05/22 1448 07/05/22 1514 07/05/22 1603 07/05/22 1802   BP: 106/64  106/57 102/50   Pulse: (!) 106  89 79   Resp: 16   18   Temp: 36.5 °C (97.7 °F)   36.4 °C (97.5 °F)   TempSrc: Temporal   Temporal   SpO2: 96%  98% 100%   Weight:  51.7 kg (113 lb 15.7 oz)     Height: 1.626 m (5' 4\") 1.626 m (5' 4\")         Final cultures:   Results     Procedure Component Value Units Date/Time    URINE CULTURE(NEW) [[id number]]  (Abnormal)  (Susceptibility) Collected: 07/05/22 1536    Order Status: Completed Specimen: Urine Updated: 07/07/22 0907     Significant Indicator POS     Source UR     Site -     Culture Result Usual urogenital ulises 10-50,000 cfu/mL      Escherichia coli  ,000 cfu/mL      Narrative:      Indication for culture:->Patient WITHOUT an indwelling Sherman  catheter in place with new onset of Dysuria, Frequency,  Urgency, and/or Suprapubic pain    Susceptibility     Escherichia coli (1)     Antibiotic Interpretation Microscan   Method Status    Amikacin  [*]  Sensitive <=16 mcg/mL ROSAMARIA Final    Ampicillin Resistant >16 mcg/mL ROSAMARIA Final    Amoxicillin/CA  [*]  Sensitive <=8/4 mcg/mL ROSAMARIA Final    Aztreonam  [*]  Sensitive <=4 mcg/mL ROSAMARIA Final    Ceftolozane+Tazobactam  [*]  Sensitive <=2 mcg/mL ROSAMARIA Final    Ceftriaxone Sensitive <=1 mcg/mL ROSAMARIA Final    Ceftazidime  [*]  Sensitive <=1 mcg/mL ROSAMARIA Final    Cefazolin Sensitive <=2 mcg/mL ROSAMARIA Final     Breakpoints when Cefazolin is used for therapy of infections  other than uncomplicated " "UTIs due to Enterobacterales are as  follows:  ROSAMARIA and Interpretation:  <=2 S  4 I  >=8 R         Ciprofloxacin Sensitive <=0.25 mcg/mL ROSAMARIA Final    Cefepime Sensitive <=2 mcg/mL ROSAMARIA Final    Cefuroxime Sensitive <=4 mcg/mL ROSAMARIA Final    Ceftazidime+Avibactam  [*]  Sensitive <=4 mcg/mL ROSAMARIA Final    Ampicillin/sulbactam Intermediate 16/8 mcg/mL ROSAMARIA Final    Ertapenem  [*]  Sensitive <=0.5 mcg/mL ROSAMARIA Final    Tobramycin Sensitive <=2 mcg/mL ROSAMARIA Final    Nitrofurantoin Sensitive <=32 mcg/mL ROSAMARIA Final    Gentamicin Sensitive <=2 mcg/mL ROSAMARIA Final    Imipenem  [*]  Sensitive <=1 mcg/mL ROSAMARIA Final    Levofloxacin Sensitive <=0.5 mcg/mL ROSAMARIA Final    Meropenem  [*]  Sensitive <=1 mcg/mL ROSAMARIA Final    Meropenem/Vaborbactam  [*]  Sensitive <=2 mcg/mL ROSAMARIA Final    Minocycline Sensitive <=4 mcg/mL ROSAMARIA Final    Pip/Tazobactam Sensitive <=8 mcg/mL ROSAMARIA Final    Trimeth/Sulfa Resistant >2/38 mcg/mL ROSAMARIA Final    Tetracycline  [*]  Sensitive <=4 mcg/mL ROSAMARIA Final    Tigecycline Sensitive <=2 mcg/mL ROSAMARIA Final           [*]  Suppressed Antibiotic                 CoV-2, FLU A/B, and RSV by PCR (2-4 Hours CEPHEID) : Collect NP swab in VTM [[id number]] Collected: 07/05/22 1714    Order Status: Completed Specimen: Respirate Updated: 07/05/22 1826     Influenza virus A RNA Negative     Influenza virus B, PCR Negative     RSV, PCR Negative     SARS-CoV-2 by PCR NotDetected     Comment: PATIENTS: Important information regarding your results and instructions can  be found at [email or web address]   \"After your  Covid-19 Test\"    [facility] providers: PLEASE REFER TO DE-ESCALATION AND RETESTING PROTOCOL  on [email or web address]    **The [employer] GeneXpert Xpress SARS-CoV-2 RT-PCR Test has been made  available for use under the Emergency Use Authorization (EUA) only.          SARS-CoV-2 Source NP Swab    URINALYSIS CULTURE, IF INDICATED [[id number]]  (Abnormal) Collected: 07/05/22 1536    Order Status: Completed Specimen: Urine " Updated: 07/05/22 1639     Color DK Yellow     Character Cloudy     Specific Gravity 1.016     Ph 5.5     Glucose Negative mg/dL      Ketones Negative mg/dL      Protein 30 mg/dL      Bilirubin Negative     Urobilinogen, Urine 0.2     Nitrite Negative     Leukocyte Esterase Large     Occult Blood Moderate     Micro Urine Req Microscopic    Narrative:      Indication for culture:->Patient WITHOUT an indwelling Sherman  catheter in place with new onset of Dysuria, Frequency,  Urgency, and/or Suprapubic pain          Plan:   Appropriate antibiotic therapy prescribed. No changes required based upon culture result. GBS in other urine culture from same date reflective of normal vaginal ulises, no specific treatment needed for it. Sent letter to patient to notify of positive culture result and encourage compliance with prescribed antibiotics.     Shaylee Brunson, PharmD  PGY2 Infectious Diseases Pharmacy Resident

## 2022-07-09 NOTE — TELEPHONE ENCOUNTER
Phone Number Called: [phone]    Call outcome: Spoke to patient regarding message below.    Message: Spoke to pt and gave results. Pt states she does report she feels much better since starting the anti-biotics.

## 2022-07-12 ENCOUNTER — PATIENT MESSAGE (OUTPATIENT)
Dept: HEALTH INFORMATION MANAGEMENT | Facility: OTHER | Age: 67
End: 2022-07-12

## 2022-07-14 ENCOUNTER — PATIENT MESSAGE (OUTPATIENT)
Dept: MEDICAL GROUP | Facility: PHYSICIAN GROUP | Age: 67
End: 2022-07-14
Payer: MEDICARE

## 2022-07-14 DIAGNOSIS — F41.8 OTHER SPECIFIED ANXIETY DISORDERS: ICD-10-CM

## 2022-07-19 RX ORDER — VENLAFAXINE 75 MG/1
75 TABLET ORAL DAILY
Qty: 90 TABLET | Refills: 1 | Status: SHIPPED | OUTPATIENT
Start: 2022-07-19 | End: 2022-08-07 | Stop reason: SDUPTHER

## 2022-08-01 ENCOUNTER — HOSPITAL ENCOUNTER (OUTPATIENT)
Dept: LAB | Facility: MEDICAL CENTER | Age: 67
End: 2022-08-01
Attending: FAMILY MEDICINE
Payer: MEDICARE

## 2022-08-01 DIAGNOSIS — D50.9 IRON DEFICIENCY ANEMIA, UNSPECIFIED IRON DEFICIENCY ANEMIA TYPE: ICD-10-CM

## 2022-08-01 LAB
ERYTHROCYTE [DISTWIDTH] IN BLOOD BY AUTOMATED COUNT: 59.5 FL (ref 35.9–50)
HCT VFR BLD AUTO: 33.7 % (ref 37–47)
HGB BLD-MCNC: 10.2 G/DL (ref 12–16)
MCH RBC QN AUTO: 27.9 PG (ref 27–33)
MCHC RBC AUTO-ENTMCNC: 30.3 G/DL (ref 33.6–35)
MCV RBC AUTO: 92.3 FL (ref 81.4–97.8)
PLATELET # BLD AUTO: 471 K/UL (ref 164–446)
PMV BLD AUTO: 8.6 FL (ref 9–12.9)
RBC # BLD AUTO: 3.65 M/UL (ref 4.2–5.4)
WBC # BLD AUTO: 7.4 K/UL (ref 4.8–10.8)

## 2022-08-01 PROCEDURE — 36415 COLL VENOUS BLD VENIPUNCTURE: CPT

## 2022-08-01 PROCEDURE — 85027 COMPLETE CBC AUTOMATED: CPT

## 2022-08-05 ENCOUNTER — OFFICE VISIT (OUTPATIENT)
Dept: MEDICAL GROUP | Facility: PHYSICIAN GROUP | Age: 67
End: 2022-08-05
Payer: MEDICARE

## 2022-08-05 VITALS
HEIGHT: 65 IN | WEIGHT: 119 LBS | HEART RATE: 87 BPM | BODY MASS INDEX: 19.83 KG/M2 | SYSTOLIC BLOOD PRESSURE: 100 MMHG | TEMPERATURE: 98.7 F | RESPIRATION RATE: 16 BRPM | OXYGEN SATURATION: 96 % | DIASTOLIC BLOOD PRESSURE: 50 MMHG

## 2022-08-05 DIAGNOSIS — R71.8 OTHER ABNORMALITY OF RED BLOOD CELLS: ICD-10-CM

## 2022-08-05 DIAGNOSIS — D64.89 ANEMIA DUE TO OTHER CAUSE, NOT CLASSIFIED: ICD-10-CM

## 2022-08-05 PROCEDURE — 99214 OFFICE O/P EST MOD 30 MIN: CPT | Performed by: FAMILY MEDICINE

## 2022-08-05 ASSESSMENT — FIBROSIS 4 INDEX: FIB4 SCORE: 0.71

## 2022-08-05 NOTE — ASSESSMENT & PLAN NOTE
New issue  Noted over the past month  Hb down 10  No acute blood loss    Last colonoscopy was last October - results were positive for polyps.     Does not get her periods

## 2022-08-05 NOTE — PROGRESS NOTES
"Subjective:     Chief Complaint   Patient presents with   • Follow-Up     Test results about anemia- 4 week follow up        HPI:   Sylvie presents today to discuss the following.    Other specified anemias  New issue  Noted over the past month  Hb down 10  No acute blood loss    Last colonoscopy was last October - results were positive for polyps.     Does not get her periods      Past Medical History:   Diagnosis Date   • Allergy, unspecified not elsewhere classified    • Breast cancer (HCC) 1995   • Hyperlipidemia    • Hypertension        Current Outpatient Medications Ordered in Epic   Medication Sig Dispense Refill   • venlafaxine (EFFEXOR) 75 MG Tab Take 1 Tablet by mouth every day. 90 Tablet 1   • lisinopril (PRINIVIL) 10 MG Tab Take 1 Tablet by mouth every day. 90 Tablet 3   • pravastatin (PRAVACHOL) 40 MG tablet Take 1 Tablet by mouth every day. 90 Tablet 3   • fenofibrate micronized (LOFIBRA) 134 MG capsule Take 1 Capsule by mouth every day. 90 Capsule 3   • metoprolol SR (TOPROL XL) 25 MG TABLET SR 24 HR Take 1 Tablet by mouth every day. 90 Tablet 3   • calcium citrate (CALCITRATE) 950 (200 Ca) MG Tab Take 950 mg by mouth every day.     • therapeutic multivitamin-minerals (THERAGRAN-M) Tab Take 1 Tablet by mouth every day.       No current [facility]-ordered facility-administered medications on file.       Allergies:  Codeine    Health Maintenance: Completed    ROS:  Gen: no fevers/chills, no changes in weight  Eyes: no changes in vision  Pulm: no sob, no cough  CV: no chest pain, no palpitations  GI: no nausea/vomiting, no diarrhea      Objective:     Exam:  /50 (BP Location: Left arm, Patient Position: Sitting, BP Cuff Size: Adult)   Pulse 87   Temp 37.1 °C (98.7 °F) (Temporal)   Resp 16   Ht 1.638 m (5' 4.5\")   Wt 54 kg (119 lb)   SpO2 96%   BMI 20.11 kg/m²  Body mass index is 20.11 kg/m².        Constitutional: Alert, no distress, well-groomed.  Skin: Warm, dry, good turgor, no rashes in " visible areas.  Eye: Equal, round and reactive, conjunctiva clear, lids normal.  ENMT: Lips without lesions, good dentition, moist mucous membranes.  Neck: Trachea midline, no masses, no thyromegaly.  Respiratory: Unlabored respiratory effort, no cough.  MSK: Normal gait, moves all extremities.  Neuro: Grossly non-focal.   Psych: Alert and oriented x3, normal affect and mood.        Assessment & Plan:     67 y.o. female with the following -     1. Anemia due to other cause, not classified  New problem.  Unknown etiology prognosis.  Will order further blood work and order fit test.  She will also discuss this with gastroenterologist and nephrologist.  - OCCULT BLOOD FECES IMMUNOASSAY; Future  - IRON/TOTAL IRON BIND; Future  - TRANSFERRIN; Future  - FERRITIN; Future    2. Other abnormality of red blood cells   - IRON/TOTAL IRON BIND; Future  - TRANSFERRIN; Future  - FERRITIN; Future      Return in about 3 months (around 11/5/2022).    Please note that this dictation was created using voice recognition software. I have made every reasonable attempt to correct obvious errors, but I expect that there are errors of grammar and possibly content that I did not discover before finalizing the note.

## 2022-08-07 ENCOUNTER — PATIENT MESSAGE (OUTPATIENT)
Dept: MEDICAL GROUP | Facility: PHYSICIAN GROUP | Age: 67
End: 2022-08-07
Payer: MEDICARE

## 2022-08-07 DIAGNOSIS — E78.5 DYSLIPIDEMIA: ICD-10-CM

## 2022-08-07 DIAGNOSIS — F41.8 OTHER SPECIFIED ANXIETY DISORDERS: ICD-10-CM

## 2022-08-08 RX ORDER — PRAVASTATIN SODIUM 40 MG
40 TABLET ORAL DAILY
Qty: 90 TABLET | Refills: 0 | Status: SHIPPED | OUTPATIENT
Start: 2022-08-08 | End: 2023-01-03

## 2022-08-08 RX ORDER — NITROFURANTOIN 25; 75 MG/1; MG/1
100 CAPSULE ORAL 2 TIMES DAILY
Qty: 10 CAPSULE | Refills: 0 | Status: SHIPPED | OUTPATIENT
Start: 2022-08-08 | End: 2022-08-13

## 2022-08-08 RX ORDER — VENLAFAXINE 75 MG/1
75 TABLET ORAL DAILY
Qty: 90 TABLET | Refills: 0 | Status: SHIPPED | OUTPATIENT
Start: 2022-08-08 | End: 2023-01-03

## 2022-08-10 ENCOUNTER — TELEPHONE (OUTPATIENT)
Dept: NEPHROLOGY | Facility: MEDICAL CENTER | Age: 67
End: 2022-08-10
Payer: MEDICARE

## 2022-08-10 ENCOUNTER — APPOINTMENT (OUTPATIENT)
Dept: LAB | Facility: MEDICAL CENTER | Age: 67
End: 2022-08-10
Attending: FAMILY MEDICINE
Payer: MEDICARE

## 2022-08-10 DIAGNOSIS — I10 ESSENTIAL HYPERTENSION: ICD-10-CM

## 2022-08-10 DIAGNOSIS — N18.31 STAGE 3A CHRONIC KIDNEY DISEASE: ICD-10-CM

## 2022-08-10 DIAGNOSIS — R71.8 OTHER ABNORMALITY OF RED BLOOD CELLS: ICD-10-CM

## 2022-08-10 DIAGNOSIS — D64.89 ANEMIA DUE TO OTHER CAUSE, NOT CLASSIFIED: ICD-10-CM

## 2022-08-10 LAB
ANION GAP SERPL CALC-SCNC: 8 MMOL/L (ref 7–16)
BUN SERPL-MCNC: 34 MG/DL (ref 8–22)
CALCIUM SERPL-MCNC: 11.1 MG/DL (ref 8.5–10.5)
CHLORIDE SERPL-SCNC: 109 MMOL/L (ref 96–112)
CO2 SERPL-SCNC: 20 MMOL/L (ref 20–33)
CREAT SERPL-MCNC: 1.31 MG/DL (ref 0.5–1.4)
CREAT UR-MCNC: 51.71 MG/DL
FERRITIN SERPL-MCNC: 198 NG/ML (ref 10–291)
GFR SERPLBLD CREATININE-BSD FMLA CKD-EPI: 45 ML/MIN/1.73 M 2
GLUCOSE SERPL-MCNC: 81 MG/DL (ref 65–99)
IRON SATN MFR SERPL: 23 % (ref 15–55)
IRON SERPL-MCNC: 117 UG/DL (ref 40–170)
MICROALBUMIN UR-MCNC: <1.2 MG/DL
MICROALBUMIN/CREAT UR: NORMAL MG/G (ref 0–30)
POTASSIUM SERPL-SCNC: 6.6 MMOL/L (ref 3.6–5.5)
SODIUM SERPL-SCNC: 137 MMOL/L (ref 135–145)
TIBC SERPL-MCNC: 499 UG/DL (ref 250–450)
TRANSFERRIN SERPL-MCNC: 419 MG/DL (ref 200–370)
UIBC SERPL-MCNC: 382 UG/DL (ref 110–370)

## 2022-08-10 PROCEDURE — 82570 ASSAY OF URINE CREATININE: CPT

## 2022-08-10 PROCEDURE — 82043 UR ALBUMIN QUANTITATIVE: CPT

## 2022-08-10 PROCEDURE — 83540 ASSAY OF IRON: CPT

## 2022-08-10 PROCEDURE — 36415 COLL VENOUS BLD VENIPUNCTURE: CPT

## 2022-08-10 PROCEDURE — 84466 ASSAY OF TRANSFERRIN: CPT

## 2022-08-10 PROCEDURE — 80048 BASIC METABOLIC PNL TOTAL CA: CPT

## 2022-08-10 PROCEDURE — 82728 ASSAY OF FERRITIN: CPT

## 2022-08-10 PROCEDURE — 83550 IRON BINDING TEST: CPT

## 2022-08-11 DIAGNOSIS — D64.89 ANEMIA DUE TO OTHER CAUSE, NOT CLASSIFIED: ICD-10-CM

## 2022-08-11 NOTE — TELEPHONE ENCOUNTER
I got a call from the lab abour recent K level at 6.6 from earlier today, I called pt (mobile number), no answer, will try again tomorrow

## 2022-08-12 ENCOUNTER — HOSPITAL ENCOUNTER (OUTPATIENT)
Facility: MEDICAL CENTER | Age: 67
End: 2022-08-12
Attending: FAMILY MEDICINE
Payer: MEDICARE

## 2022-08-12 PROCEDURE — 82274 ASSAY TEST FOR BLOOD FECAL: CPT

## 2022-08-15 DIAGNOSIS — D64.89 ANEMIA DUE TO OTHER CAUSE, NOT CLASSIFIED: ICD-10-CM

## 2022-08-17 ENCOUNTER — OFFICE VISIT (OUTPATIENT)
Dept: NEPHROLOGY | Facility: MEDICAL CENTER | Age: 67
End: 2022-08-17
Payer: MEDICARE

## 2022-08-17 VITALS
HEIGHT: 65 IN | OXYGEN SATURATION: 98 % | TEMPERATURE: 97.4 F | HEART RATE: 90 BPM | DIASTOLIC BLOOD PRESSURE: 72 MMHG | BODY MASS INDEX: 19.83 KG/M2 | WEIGHT: 119 LBS | SYSTOLIC BLOOD PRESSURE: 118 MMHG

## 2022-08-17 DIAGNOSIS — I10 ESSENTIAL HYPERTENSION: ICD-10-CM

## 2022-08-17 DIAGNOSIS — N18.31 STAGE 3A CHRONIC KIDNEY DISEASE: ICD-10-CM

## 2022-08-17 DIAGNOSIS — E87.5 HYPERKALEMIA: ICD-10-CM

## 2022-08-17 DIAGNOSIS — E83.52 HYPERCALCEMIA: ICD-10-CM

## 2022-08-17 LAB — IMM ASSAY OCC BLD FITOB: NEGATIVE

## 2022-08-17 PROCEDURE — 99214 OFFICE O/P EST MOD 30 MIN: CPT | Performed by: INTERNAL MEDICINE

## 2022-08-17 ASSESSMENT — ENCOUNTER SYMPTOMS
VOMITING: 0
NAUSEA: 0
SHORTNESS OF BREATH: 0
HYPERTENSION: 1
COUGH: 0
CHILLS: 0
FEVER: 0

## 2022-08-17 ASSESSMENT — FIBROSIS 4 INDEX: FIB4 SCORE: 0.71

## 2022-08-17 NOTE — PROGRESS NOTES
"Subjective     Sylvie Andersen is a 67 y.o. female who presents with Hypertension and Chronic Kidney Disease            Hypertension  This is a chronic problem. The current episode started more than 1 year ago. The problem is unchanged. The problem is controlled. Pertinent negatives include no chest pain, malaise/fatigue, peripheral edema or shortness of breath. Risk factors for coronary artery disease include post-menopausal state. Past treatments include ACE inhibitors. The current treatment provides significant improvement. There are no compliance problems.  Hypertensive end-organ damage includes kidney disease. Identifiable causes of hypertension include chronic renal disease.   Chronic Kidney Disease  This is a chronic problem. The current episode started more than 1 year ago. The problem occurs constantly. The problem has been unchanged. Pertinent negatives include no chest pain, chills, coughing, fever, nausea, urinary symptoms or vomiting.     Review of Systems   Constitutional:  Negative for chills, fever and malaise/fatigue.   Respiratory:  Negative for cough and shortness of breath.    Cardiovascular:  Negative for chest pain and leg swelling.   Gastrointestinal:  Negative for nausea and vomiting.   Genitourinary:  Negative for dysuria, frequency and urgency.            Objective     /72 (BP Location: Right arm, Patient Position: Sitting, BP Cuff Size: Adult)   Pulse 90   Temp 36.3 °C (97.4 °F) (Temporal)   Ht 1.638 m (5' 4.5\")   Wt 54 kg (119 lb)   SpO2 98%   BMI 20.11 kg/m²      Physical Exam  Vitals and nursing note reviewed.   Constitutional:       General: She is not in acute distress.     Appearance: Normal appearance. She is well-developed. She is not diaphoretic.   HENT:      Head: Normocephalic and atraumatic.      Right Ear: External ear normal.      Left Ear: External ear normal.      Nose: Nose normal.   Eyes:      General: No scleral icterus.        Right eye: No discharge.   "       Left eye: No discharge.      Conjunctiva/sclera: Conjunctivae normal.   Cardiovascular:      Rate and Rhythm: Normal rate and regular rhythm.      Heart sounds: No murmur heard.  Pulmonary:      Effort: Pulmonary effort is normal. No respiratory distress.      Breath sounds: Normal breath sounds.   Musculoskeletal:         General: No tenderness.      Right lower leg: No edema.      Left lower leg: No edema.   Skin:     General: Skin is warm and dry.      Findings: No erythema.   Neurological:      General: No focal deficit present.      Mental Status: She is alert and oriented to person, place, and time.      Cranial Nerves: No cranial nerve deficit.   Psychiatric:         Mood and Affect: Mood normal.         Behavior: Behavior normal.   Past Medical History:   Diagnosis Date    Allergy, unspecified not elsewhere classified     Breast cancer (HCC)     Hyperlipidemia     Hypertension      Social History     Socioeconomic History    Marital status:      Spouse name: Not on file    Number of children: Not on file    Years of education: Not on file    Highest education level: Not on file   Occupational History    Not on file   Tobacco Use    Smoking status: Former     Packs/day: 0.25     Years: 45.00     Pack years: 11.25     Types: Cigarettes     Quit date: 2022     Years since quittin.1    Smokeless tobacco: Former     Quit date: 2022    Tobacco comments:     5 daily    Vaping Use    Vaping Use: Never used   Substance and Sexual Activity    Alcohol use: No     Alcohol/week: 0.0 oz    Drug use: No    Sexual activity: Not Currently   Other Topics Concern    Not on file   Social History Narrative    Not on file     Social Determinants of Health     Financial Resource Strain: Not on file   Food Insecurity: Not on file   Transportation Needs: Not on file   Physical Activity: Not on file   Stress: Not on file   Social Connections: Not on file   Intimate Partner Violence: Not on file    Housing Stability: Not on file     Family History   Problem Relation Age of Onset    Cancer Mother         breast cancer/Breast    Hypertension Maternal Uncle     Hypertension Maternal Grandmother     Hyperlipidemia Maternal Grandmother     Heart Disease Maternal Grandmother     Cancer Paternal Grandfather         Lung ca, smoker    Diabetes Neg Hx      Recent Labs     08/30/21  1313 07/05/22  1703 08/10/22  1303   ALBUMIN  --  3.2  --    SODIUM 140 133* 137   POTASSIUM 4.8 4.6 6.6*   CHLORIDE 106 97 109   CO2 23 22 20   BUN 34* 27* 34*   CREATININE 1.21 1.28 1.31                             Assessment & Plan        1. Essential hypertension  Controlled  Continue same medication regimen  Continue low-sodium diet      2. Stage 3a chronic kidney disease (HCC)  Stable  No uremic symptoms  Renal dose of medication  Avoid nephrotoxins  Continue same medication regimen      3. Hyperkalemia  Secondary to potassium rich diet   patient was advised to avoid potassium rich food  Recheck labs    4. Hypercalcemia  Patient was advised to discontinue calcium supplement  Recheck labs

## 2022-08-26 ENCOUNTER — HOSPITAL ENCOUNTER (OUTPATIENT)
Dept: LAB | Facility: MEDICAL CENTER | Age: 67
End: 2022-08-26
Attending: INTERNAL MEDICINE
Payer: MEDICARE

## 2022-08-26 DIAGNOSIS — E87.5 HYPERKALEMIA: ICD-10-CM

## 2022-08-26 DIAGNOSIS — I10 ESSENTIAL HYPERTENSION: ICD-10-CM

## 2022-08-26 DIAGNOSIS — N18.31 STAGE 3A CHRONIC KIDNEY DISEASE: ICD-10-CM

## 2022-08-26 LAB
ANION GAP SERPL CALC-SCNC: 10 MMOL/L (ref 7–16)
BUN SERPL-MCNC: 28 MG/DL (ref 8–22)
CALCIUM SERPL-MCNC: 10.3 MG/DL (ref 8.5–10.5)
CHLORIDE SERPL-SCNC: 108 MMOL/L (ref 96–112)
CO2 SERPL-SCNC: 22 MMOL/L (ref 20–33)
CREAT SERPL-MCNC: 1.17 MG/DL (ref 0.5–1.4)
GFR SERPLBLD CREATININE-BSD FMLA CKD-EPI: 51 ML/MIN/1.73 M 2
GLUCOSE SERPL-MCNC: 97 MG/DL (ref 65–99)
POTASSIUM SERPL-SCNC: 5 MMOL/L (ref 3.6–5.5)
SODIUM SERPL-SCNC: 140 MMOL/L (ref 135–145)

## 2022-08-26 PROCEDURE — 36415 COLL VENOUS BLD VENIPUNCTURE: CPT

## 2022-08-26 PROCEDURE — 80048 BASIC METABOLIC PNL TOTAL CA: CPT

## 2022-09-26 DIAGNOSIS — I10 ESSENTIAL HYPERTENSION: ICD-10-CM

## 2022-09-26 RX ORDER — LISINOPRIL 10 MG/1
10 TABLET ORAL DAILY
Qty: 100 TABLET | Refills: 3 | Status: SHIPPED | OUTPATIENT
Start: 2022-09-26 | End: 2022-12-12 | Stop reason: SDUPTHER

## 2022-11-04 ENCOUNTER — TELEPHONE (OUTPATIENT)
Dept: HEALTH INFORMATION MANAGEMENT | Facility: OTHER | Age: 67
End: 2022-11-04

## 2022-11-04 NOTE — TELEPHONE ENCOUNTER
Outcome: Left Message    Please transfer to Patient Outreach Team at [phone] when patient returns call.

## 2022-11-08 ENCOUNTER — HOSPITAL ENCOUNTER (OUTPATIENT)
Dept: LAB | Facility: MEDICAL CENTER | Age: 67
End: 2022-11-08
Attending: FAMILY MEDICINE
Payer: MEDICARE

## 2022-11-08 ENCOUNTER — OFFICE VISIT (OUTPATIENT)
Dept: MEDICAL GROUP | Facility: PHYSICIAN GROUP | Age: 67
End: 2022-11-08
Payer: MEDICARE

## 2022-11-08 VITALS
DIASTOLIC BLOOD PRESSURE: 74 MMHG | HEIGHT: 65 IN | WEIGHT: 122 LBS | HEART RATE: 102 BPM | OXYGEN SATURATION: 95 % | TEMPERATURE: 97.2 F | BODY MASS INDEX: 20.33 KG/M2 | SYSTOLIC BLOOD PRESSURE: 138 MMHG

## 2022-11-08 DIAGNOSIS — Z23 NEED FOR VACCINATION: ICD-10-CM

## 2022-11-08 DIAGNOSIS — D64.89 ANEMIA DUE TO OTHER CAUSE, NOT CLASSIFIED: ICD-10-CM

## 2022-11-08 LAB
BASOPHILS # BLD AUTO: 0.7 % (ref 0–1.8)
BASOPHILS # BLD: 0.06 K/UL (ref 0–0.12)
EOSINOPHIL # BLD AUTO: 0.13 K/UL (ref 0–0.51)
EOSINOPHIL NFR BLD: 1.5 % (ref 0–6.9)
ERYTHROCYTE [DISTWIDTH] IN BLOOD BY AUTOMATED COUNT: 46.7 FL (ref 35.9–50)
HCT VFR BLD AUTO: 42.9 % (ref 37–47)
HGB BLD-MCNC: 13.8 G/DL (ref 12–16)
IMM GRANULOCYTES # BLD AUTO: 0.03 K/UL (ref 0–0.11)
IMM GRANULOCYTES NFR BLD AUTO: 0.4 % (ref 0–0.9)
LYMPHOCYTES # BLD AUTO: 3.38 K/UL (ref 1–4.8)
LYMPHOCYTES NFR BLD: 39.7 % (ref 22–41)
MCH RBC QN AUTO: 29.2 PG (ref 27–33)
MCHC RBC AUTO-ENTMCNC: 32.2 G/DL (ref 33.6–35)
MCV RBC AUTO: 90.7 FL (ref 81.4–97.8)
MONOCYTES # BLD AUTO: 0.62 K/UL (ref 0–0.85)
MONOCYTES NFR BLD AUTO: 7.3 % (ref 0–13.4)
NEUTROPHILS # BLD AUTO: 4.3 K/UL (ref 2–7.15)
NEUTROPHILS NFR BLD: 50.4 % (ref 44–72)
NRBC # BLD AUTO: 0 K/UL
NRBC BLD-RTO: 0 /100 WBC
PLATELET # BLD AUTO: 358 K/UL (ref 164–446)
PMV BLD AUTO: 9.5 FL (ref 9–12.9)
RBC # BLD AUTO: 4.73 M/UL (ref 4.2–5.4)
WBC # BLD AUTO: 8.5 K/UL (ref 4.8–10.8)

## 2022-11-08 PROCEDURE — 99214 OFFICE O/P EST MOD 30 MIN: CPT | Mod: 25 | Performed by: FAMILY MEDICINE

## 2022-11-08 PROCEDURE — 90662 IIV NO PRSV INCREASED AG IM: CPT | Performed by: FAMILY MEDICINE

## 2022-11-08 PROCEDURE — 36415 COLL VENOUS BLD VENIPUNCTURE: CPT

## 2022-11-08 PROCEDURE — 85025 COMPLETE CBC W/AUTO DIFF WBC: CPT

## 2022-11-08 PROCEDURE — G0008 ADMIN INFLUENZA VIRUS VAC: HCPCS | Performed by: FAMILY MEDICINE

## 2022-11-08 ASSESSMENT — FIBROSIS 4 INDEX: FIB4 SCORE: 0.71

## 2022-11-08 NOTE — ASSESSMENT & PLAN NOTE
Persistent issue  Has had anemia with Hb down to 10  No acute blood loss  FIT was neg  Seeing GI and will get colonoscopy/EGD in the near future

## 2022-11-08 NOTE — PROGRESS NOTES
"Subjective:     Chief Complaint   Patient presents with    Anemia     Whats causing it       HPI:   Sylvie presents today to discuss the following.    Other specified anemias  Persistent issue  Has had anemia with Hb down to 10  No acute blood loss  FIT was neg  Seeing GI and will get colonoscopy/EGD in the near future    Past Medical History:   Diagnosis Date    Allergy, unspecified not elsewhere classified     Breast cancer (HCC) 1995    Hyperlipidemia     Hypertension        Current Outpatient Medications Ordered in Epic   Medication Sig Dispense Refill    lisinopril (PRINIVIL) 10 MG Tab Take 1 Tablet by mouth every day. 100 Tablet 3    pravastatin (PRAVACHOL) 40 MG tablet Take 1 Tablet by mouth every day. 90 Tablet 0    venlafaxine (EFFEXOR) 75 MG Tab Take 1 Tablet by mouth every day. 90 Tablet 0    fenofibrate micronized (LOFIBRA) 134 MG capsule Take 1 Capsule by mouth every day. 90 Capsule 3    metoprolol SR (TOPROL XL) 25 MG TABLET SR 24 HR Take 1 Tablet by mouth every day. 90 Tablet 3    therapeutic multivitamin-minerals (THERAGRAN-M) Tab Take 1 Tablet by mouth every day.       No current [facility]-ordered facility-administered medications on file.       Allergies:  Codeine    Health Maintenance: Completed    ROS:  Gen: no fevers/chills, no changes in weight  Eyes: no changes in vision  Pulm: no sob, no cough  CV: no chest pain, no palpitations  GI: no nausea/vomiting, no diarrhea      Objective:     Exam:  /74 (BP Location: Left arm, Patient Position: Sitting, BP Cuff Size: Adult)   Pulse (!) 102   Temp 36.2 °C (97.2 °F) (Temporal)   Ht 1.638 m (5' 4.5\")   Wt 55.3 kg (122 lb)   SpO2 95%   BMI 20.62 kg/m²  Body mass index is 20.62 kg/m².      Constitutional: Alert, no distress, well-groomed.  Skin: Warm, dry, good turgor, no rashes in visible areas.  Eye: Equal, round and reactive, conjunctiva clear, lids normal.  ENMT: Lips without lesions, good dentition, moist mucous membranes.  Neck: Trachea " midline, no masses, no thyromegaly.  Respiratory: Unlabored respiratory effort, no cough.  MSK: Normal gait, moves all extremities.  Neuro: Grossly non-focal.   Psych: Alert and oriented x3, normal affect and mood.        Assessment & Plan:     67 y.o. female with the following -     1. Anemia due to other cause, not classified  New problem.  Unknown etiology prognosis.  Over the past 4 months or so the patient has been noted to be anemic with hemoglobin down to 10.  She is closely followed up with gastroenterology and she has to schedule an endoscopy and colonoscopy.  I stressed the importance of trying to expedite this with their office as much as possible.  I will repeat CBC to trend hemoglobin and proceed with chest x-ray.  - DX-CHEST-2 VIEWS; Future  - CBC WITH DIFFERENTIAL; Future    2. Need for vaccination  - INFLUENZA VACCINE, HIGH DOSE (65+ ONLY)      Return in about 3 months (around 2/8/2023).          Please note that this dictation was created using voice recognition software. I have made every reasonable attempt to correct obvious errors, but I expect that there are errors of grammar and possibly content that I did not discover before finalizing the note.

## 2022-11-11 ENCOUNTER — PATIENT MESSAGE (OUTPATIENT)
Dept: HEALTH INFORMATION MANAGEMENT | Facility: OTHER | Age: 67
End: 2022-11-11

## 2022-12-12 DIAGNOSIS — I10 ESSENTIAL HYPERTENSION: ICD-10-CM

## 2022-12-13 RX ORDER — LISINOPRIL 10 MG/1
10 TABLET ORAL DAILY
Qty: 100 TABLET | Refills: 3 | Status: SHIPPED | OUTPATIENT
Start: 2022-12-13 | End: 2024-03-04 | Stop reason: SDUPTHER

## 2022-12-13 NOTE — TELEPHONE ENCOUNTER
Received request via: Pharmacy    Was the patient seen in the last year in this department? Yes    Does the patient have an active prescription (recently filled or refills available) for medication(s) requested? No    Does the patient have [facility] and need 100 day supply (blood pressure, diabetes and cholesterol meds only)? Yes, quantity updated to 100 days

## 2023-01-26 ENCOUNTER — PATIENT MESSAGE (OUTPATIENT)
Dept: HEALTH INFORMATION MANAGEMENT | Facility: OTHER | Age: 68
End: 2023-01-26

## 2023-01-26 ENCOUNTER — DOCUMENTATION (OUTPATIENT)
Dept: HEALTH INFORMATION MANAGEMENT | Facility: OTHER | Age: 68
End: 2023-01-26
Payer: MEDICARE

## 2023-02-07 ENCOUNTER — HOSPITAL ENCOUNTER (OUTPATIENT)
Dept: LAB | Facility: MEDICAL CENTER | Age: 68
End: 2023-02-07
Attending: PHYSICIAN ASSISTANT
Payer: MEDICARE

## 2023-02-07 ENCOUNTER — HOSPITAL ENCOUNTER (OUTPATIENT)
Dept: LAB | Facility: MEDICAL CENTER | Age: 68
End: 2023-02-07
Attending: FAMILY MEDICINE
Payer: MEDICARE

## 2023-02-07 ENCOUNTER — OFFICE VISIT (OUTPATIENT)
Dept: MEDICAL GROUP | Facility: PHYSICIAN GROUP | Age: 68
End: 2023-02-07
Payer: MEDICARE

## 2023-02-07 VITALS
TEMPERATURE: 96.9 F | SYSTOLIC BLOOD PRESSURE: 124 MMHG | BODY MASS INDEX: 20.83 KG/M2 | OXYGEN SATURATION: 99 % | HEART RATE: 92 BPM | WEIGHT: 125 LBS | DIASTOLIC BLOOD PRESSURE: 70 MMHG | HEIGHT: 65 IN

## 2023-02-07 DIAGNOSIS — N18.31 STAGE 3A CHRONIC KIDNEY DISEASE: ICD-10-CM

## 2023-02-07 DIAGNOSIS — D64.89 ANEMIA DUE TO OTHER CAUSE, NOT CLASSIFIED: ICD-10-CM

## 2023-02-07 DIAGNOSIS — Z12.83 SKIN CANCER SCREENING: ICD-10-CM

## 2023-02-07 LAB
BASOPHILS # BLD AUTO: 1.2 % (ref 0–1.8)
BASOPHILS # BLD: 0.1 K/UL (ref 0–0.12)
EOSINOPHIL # BLD AUTO: 0.22 K/UL (ref 0–0.51)
EOSINOPHIL NFR BLD: 2.6 % (ref 0–6.9)
ERYTHROCYTE [DISTWIDTH] IN BLOOD BY AUTOMATED COUNT: 51.3 FL (ref 35.9–50)
ERYTHROCYTE [DISTWIDTH] IN BLOOD BY AUTOMATED COUNT: 51.8 FL (ref 35.9–50)
FERRITIN SERPL-MCNC: 66.2 NG/ML (ref 10–291)
HCT VFR BLD AUTO: 40.3 % (ref 37–47)
HCT VFR BLD AUTO: 40.8 % (ref 37–47)
HGB BLD-MCNC: 12.6 G/DL (ref 12–16)
HGB BLD-MCNC: 12.6 G/DL (ref 12–16)
IMM GRANULOCYTES # BLD AUTO: 0.04 K/UL (ref 0–0.11)
IMM GRANULOCYTES NFR BLD AUTO: 0.5 % (ref 0–0.9)
LYMPHOCYTES # BLD AUTO: 3.03 K/UL (ref 1–4.8)
LYMPHOCYTES NFR BLD: 35.5 % (ref 22–41)
MCH RBC QN AUTO: 28.3 PG (ref 27–33)
MCH RBC QN AUTO: 28.8 PG (ref 27–33)
MCHC RBC AUTO-ENTMCNC: 30.9 G/DL (ref 33.6–35)
MCHC RBC AUTO-ENTMCNC: 31.3 G/DL (ref 33.6–35)
MCV RBC AUTO: 91.7 FL (ref 81.4–97.8)
MCV RBC AUTO: 92.2 FL (ref 81.4–97.8)
MONOCYTES # BLD AUTO: 0.69 K/UL (ref 0–0.85)
MONOCYTES NFR BLD AUTO: 8.1 % (ref 0–13.4)
NEUTROPHILS # BLD AUTO: 4.45 K/UL (ref 2–7.15)
NEUTROPHILS NFR BLD: 52.1 % (ref 44–72)
NRBC # BLD AUTO: 0 K/UL
NRBC BLD-RTO: 0 /100 WBC
PLATELET # BLD AUTO: 396 K/UL (ref 164–446)
PLATELET # BLD AUTO: 403 K/UL (ref 164–446)
PMV BLD AUTO: 9.5 FL (ref 9–12.9)
PMV BLD AUTO: 9.5 FL (ref 9–12.9)
RBC # BLD AUTO: 4.37 M/UL (ref 4.2–5.4)
RBC # BLD AUTO: 4.45 M/UL (ref 4.2–5.4)
VIT B12 SERPL-MCNC: 449 PG/ML (ref 211–911)
WBC # BLD AUTO: 8.4 K/UL (ref 4.8–10.8)
WBC # BLD AUTO: 8.5 K/UL (ref 4.8–10.8)

## 2023-02-07 PROCEDURE — 83540 ASSAY OF IRON: CPT

## 2023-02-07 PROCEDURE — 85025 COMPLETE CBC W/AUTO DIFF WBC: CPT

## 2023-02-07 PROCEDURE — 85027 COMPLETE CBC AUTOMATED: CPT

## 2023-02-07 PROCEDURE — 82746 ASSAY OF FOLIC ACID SERUM: CPT

## 2023-02-07 PROCEDURE — 83550 IRON BINDING TEST: CPT

## 2023-02-07 PROCEDURE — 82728 ASSAY OF FERRITIN: CPT

## 2023-02-07 PROCEDURE — 36415 COLL VENOUS BLD VENIPUNCTURE: CPT

## 2023-02-07 PROCEDURE — 99213 OFFICE O/P EST LOW 20 MIN: CPT | Performed by: FAMILY MEDICINE

## 2023-02-07 PROCEDURE — 82607 VITAMIN B-12: CPT

## 2023-02-07 ASSESSMENT — PATIENT HEALTH QUESTIONNAIRE - PHQ9: CLINICAL INTERPRETATION OF PHQ2 SCORE: 0

## 2023-02-07 ASSESSMENT — FIBROSIS 4 INDEX: FIB4 SCORE: 0.94

## 2023-02-07 NOTE — PROGRESS NOTES
"Subjective:     Chief Complaint   Patient presents with    Follow-Up       HPI:   Sylvie presents today to discuss the following.    Stage 3a chronic kidney disease (HCC)  Chronic issue  Seeing nephrology  Stable GFR at 51    Other specified anemias  Stable at this time  Hb back up  No blood loss    Past Medical History:   Diagnosis Date    Allergy, unspecified not elsewhere classified     Breast cancer (HCC) 1995    Hyperlipidemia     Hypertension        Current Outpatient Medications Ordered in Epic   Medication Sig Dispense Refill    pravastatin (PRAVACHOL) 40 MG tablet Take 1 Tablet by mouth every day. 100 Tablet 1    venlafaxine (EFFEXOR) 75 MG Tab Take 1 Tablet by mouth every day. 90 Tablet 1    lisinopril (PRINIVIL) 10 MG Tab Take 1 Tablet by mouth every day. 100 Tablet 3    fenofibrate micronized (LOFIBRA) 134 MG capsule TAKE 1 CAPSULE BY MOUTH EVERY DAY 90 Capsule 3    metoprolol SR (TOPROL XL) 25 MG TABLET SR 24 HR Take 1 Tablet by mouth every day. 90 Tablet 3    therapeutic multivitamin-minerals (THERAGRAN-M) Tab Take 1 Tablet by mouth every day.       No current [facility]-ordered facility-administered medications on file.       Allergies:  Codeine    Health Maintenance: Completed    ROS:  Gen: no fevers/chills, no changes in weight  Eyes: no changes in vision  Pulm: no sob, no cough  CV: no chest pain, no palpitations  GI: no nausea/vomiting, no diarrhea      Objective:     Exam:  /70 (BP Location: Left arm, Patient Position: Sitting, BP Cuff Size: Adult)   Pulse 92   Temp 36.1 °C (96.9 °F) (Temporal)   Ht 1.638 m (5' 4.5\")   Wt 56.7 kg (125 lb)   SpO2 99%   BMI 21.12 kg/m²  Body mass index is 21.12 kg/m².      Constitutional: Alert, no distress, well-groomed.  Skin: Warm, dry, good turgor, no rashes in visible areas.  Eye: Equal, round and reactive, conjunctiva clear, lids normal.  ENMT: Lips without lesions, good dentition, moist mucous membranes.  Neck: Trachea midline, no masses, no " thyromegaly.  Respiratory: Unlabored respiratory effort, no cough.  MSK: Normal gait, moves all extremities.  Neuro: Grossly non-focal.   Psych: Alert and oriented x3, normal affect and mood.        Assessment & Plan:     67 y.o. female with the following -     1. Stage 3a chronic kidney disease (HCC)  Chronic, stable condition.  Continue to avoid nephrotoxins.  Continue to follow-up with nephrology.    2. Anemia due to other cause, not classified  Stable at this time.  I will repeat CBC in 6 months to ensure stability.  - CBC WITHOUT DIFFERENTIAL; Future    3. Skin cancer screening  - Referral to Dermatology      Return in about 6 months (around 8/7/2023).    HCC Gap Form    Diagnosis to address: N18.31 - Stage 3a chronic kidney disease (HCC)  Assessment and plan: Chronic, stable. Continue with current defined treatment plan: stable. Follow-up at least annually.  Last edited 02/07/23 14:08 PST by Kermit Bergman M.D.           Please note that this dictation was created using voice recognition software. I have made every reasonable attempt to correct obvious errors, but I expect that there are errors of grammar and possibly content that I did not discover before finalizing the note.

## 2023-02-08 LAB
FOLATE SERPL-MCNC: >40 NG/ML
IRON SATN MFR SERPL: 14 % (ref 15–55)
IRON SERPL-MCNC: 68 UG/DL (ref 40–170)
TIBC SERPL-MCNC: 481 UG/DL (ref 250–450)
UIBC SERPL-MCNC: 413 UG/DL (ref 110–370)

## 2023-02-18 ENCOUNTER — HOSPITAL ENCOUNTER (OUTPATIENT)
Dept: RADIOLOGY | Facility: MEDICAL CENTER | Age: 68
End: 2023-02-18
Attending: FAMILY MEDICINE
Payer: MEDICARE

## 2023-02-18 DIAGNOSIS — D64.89 ANEMIA DUE TO OTHER CAUSE, NOT CLASSIFIED: ICD-10-CM

## 2023-02-18 PROCEDURE — 71046 X-RAY EXAM CHEST 2 VIEWS: CPT

## 2023-02-21 DIAGNOSIS — R91.1 PULMONARY NODULE: ICD-10-CM

## 2023-03-07 ENCOUNTER — HOSPITAL ENCOUNTER (OUTPATIENT)
Dept: RADIOLOGY | Facility: MEDICAL CENTER | Age: 68
End: 2023-03-07
Attending: FAMILY MEDICINE
Payer: MEDICARE

## 2023-03-07 DIAGNOSIS — R91.1 PULMONARY NODULE: ICD-10-CM

## 2023-03-07 PROCEDURE — 71250 CT THORAX DX C-: CPT

## 2023-03-08 ENCOUNTER — TELEPHONE (OUTPATIENT)
Dept: MEDICAL GROUP | Facility: PHYSICIAN GROUP | Age: 68
End: 2023-03-08
Payer: MEDICARE

## 2023-03-08 NOTE — TELEPHONE ENCOUNTER
VOICEMAIL  1. Caller Name: Sylvie                      Call Back Number: [phone]     2. Message: Pt left vm requesting to know results of her CT scan     3. Patient approves office to leave a detailed voicemail/MyChart message: N\A

## 2023-04-26 ENCOUNTER — OFFICE VISIT (OUTPATIENT)
Dept: DERMATOLOGY | Facility: IMAGING CENTER | Age: 68
End: 2023-04-26
Payer: MEDICARE

## 2023-04-26 DIAGNOSIS — Z12.83 SKIN CANCER SCREENING: ICD-10-CM

## 2023-04-26 DIAGNOSIS — L85.1 STUCCO KERATOSES: ICD-10-CM

## 2023-04-26 DIAGNOSIS — D18.01 CHERRY ANGIOMA: ICD-10-CM

## 2023-04-26 DIAGNOSIS — L57.0 ACTINIC KERATOSIS: ICD-10-CM

## 2023-04-26 DIAGNOSIS — L82.1 SK (SEBORRHEIC KERATOSIS): ICD-10-CM

## 2023-04-26 DIAGNOSIS — L81.4 LENTIGINES: ICD-10-CM

## 2023-04-26 DIAGNOSIS — D22.9 MULTIPLE NEVI: ICD-10-CM

## 2023-04-26 PROCEDURE — 99213 OFFICE O/P EST LOW 20 MIN: CPT | Mod: 25 | Performed by: NURSE PRACTITIONER

## 2023-04-26 PROCEDURE — 17000 DESTRUCT PREMALG LESION: CPT | Performed by: NURSE PRACTITIONER

## 2023-04-26 NOTE — PROGRESS NOTES
DERMATOLOGY NOTE  NEW VISIT       Chief complaint: Establish Care (ALISON and Skin lesion on chest)     HPI/location: chest  Time present: about 30 yrs   Painful lesion: No  Itching lesion: No  Enlarging lesion: Yes  Anything make it better or worse?    History of skin cancer: No  History of precancers/actinic keratoses: No  History of biopsies:No  History of blistering/severe sunburns:Yes, Details: Teenager   Family history of skin cancer: Yes. Maternal uncle   Family history of atypical moles:No    Allergies   Allergen Reactions    Codeine Unspecified     Memory issues     MEDICATIONS:  Medications relevant to specialty reviewed.     REVIEW OF SYSTEMS:   Positive for none  Negative for none    EXAM:  There were no vitals taken for this visit.  Constitutional: Well-developed, well-nourished, and in no distress.     A total body skin exam was performed excluding the genitals per patient preference and including the following areas: head (including face), neck, chest, abdomen, groin/buttocks, back, bilateral upper extremities, and bilateral lower extremities with the following pertinent findings listed below and/or in assessment/plan.     -AK lt upper forehead  -sun exposed skin of trunk and b/l upper, lower extremities and face with scattered clinically benign light brown reticulated macules all of which were morphologically similar and none of which were suspicious for skin cancer today on exam  -Several scattered 1-3mm bright red macules and thin papules on the trunk and extremities  -Multiple tan medium brown skin-colored macules papules scattered over the trunk >> extremities--All with benign-appearing pigment network patterns on dermoscopy  -Several tan medium brown skin-colored stuck-on waxy papules scattered on the trunk and extremities  -Stucco Keratosis Bilateral Lower Ext.     IMPRESSION / PLAN:    1. Actinic keratosis  - [facility] education/counseling   CRYOTHERAPY:  Risks (including, but not limited to: skin  discoloration, redness, blister, blood blister, recurrence, need for further treatment, infection, scar) and benefits of cryotherapy discussed. Patient verbally agreed to proceed with treatment. 1 cryotherapy freeze thaw cycles of 10 seconds were applied to 1 lesion on L upper forehead with cryac. Patient tolerated procedure well. Aftercare instructions given--no specific care needed unless irritated during healing process, can apply Vaseline with small band-aid if needed.      2. Lentigines  - Benign-appearing nature of lesions discussed during exam.   - Advised to continue to monitor for any return to clinic for new or concerning changes.      3. Cherry angioma  - Benign-appearing nature of lesions discussed during exam.   - Advised to continue to monitor for any return to clinic for new or concerning changes.      4. Multiple nevi  - Benign-appearing nature of lesions discussed during exam.   - Advised to continue to monitor for any return to clinic for new or concerning changes.  - ABCDE's of melanoma discussed/handout given      5. SK (seborrheic keratosis)  - Benign-appearing nature of lesions discussed during exam.   - Courtesy LN2 applied to 2 SKs on L upper chest for cosmesis  - Advised to continue to monitor for any return to clinic for new or concerning changes.      6. Stucco keratoses  - Benign-appearing nature of lesions discussed during exam.   - Advised to continue to monitor for any return to clinic for new or concerning changes.      7. Skin cancer screening  Skin cancer education  discussed importance of sun protective clothing, eyewear in addition to the use of broad spectrum sunscreen with SPF 30 or greater, as well as need for reapplication ~every 2 hours when exposed to UVR  discussed importance following up for any new or changing lesions as noted in handout given, but every 12 months exams in clinic in the setting of dermatologic history  ABCDE's of melanoma discussed/handout  given        Discussed risks, benefits, alternative treatments as well as common side effects associated with  LN2, Patient verbalized understanding and agrees with plan regarding the above          Please note that this dictation was created using voice recognition software. I have made every reasonable attempt to correct obvious errors, but I expect that there are errors of grammar and possibly content that I did not discover before finalizing the note.      Return to clinic in: Return in about 1 year (around 4/26/2024) for ALISON. and as needed for any new or changing skin lesions.

## 2023-05-11 ENCOUNTER — OFFICE VISIT (OUTPATIENT)
Dept: SLEEP MEDICINE | Facility: MEDICAL CENTER | Age: 68
End: 2023-05-11
Attending: FAMILY MEDICINE
Payer: MEDICARE

## 2023-05-11 VITALS
DIASTOLIC BLOOD PRESSURE: 64 MMHG | HEART RATE: 89 BPM | HEIGHT: 65 IN | BODY MASS INDEX: 20.99 KG/M2 | SYSTOLIC BLOOD PRESSURE: 112 MMHG | WEIGHT: 126 LBS | OXYGEN SATURATION: 93 %

## 2023-05-11 DIAGNOSIS — R91.1 LEFT UPPER LOBE PULMONARY NODULE: ICD-10-CM

## 2023-05-11 DIAGNOSIS — Z92.3 HISTORY OF RADIATION THERAPY: ICD-10-CM

## 2023-05-11 DIAGNOSIS — R91.1 LUNG NODULE: ICD-10-CM

## 2023-05-11 DIAGNOSIS — Z87.891 FORMER SMOKER: ICD-10-CM

## 2023-05-11 PROCEDURE — 1126F AMNT PAIN NOTED NONE PRSNT: CPT | Performed by: INTERNAL MEDICINE

## 2023-05-11 PROCEDURE — 3078F DIAST BP <80 MM HG: CPT | Performed by: INTERNAL MEDICINE

## 2023-05-11 PROCEDURE — 99212 OFFICE O/P EST SF 10 MIN: CPT | Performed by: INTERNAL MEDICINE

## 2023-05-11 PROCEDURE — 99205 OFFICE O/P NEW HI 60 MIN: CPT | Performed by: INTERNAL MEDICINE

## 2023-05-11 PROCEDURE — 3074F SYST BP LT 130 MM HG: CPT | Performed by: INTERNAL MEDICINE

## 2023-05-11 ASSESSMENT — ENCOUNTER SYMPTOMS
SHORTNESS OF BREATH: 0
DYSPNEA AT REST: 0
RESPIRATORY SYMPTOMS COMMENTS: NO
WHEEZING: 0
HEMOPTYSIS: 0
CHEST TIGHTNESS: 0

## 2023-05-11 ASSESSMENT — FIBROSIS 4 INDEX: FIB4 SCORE: 0.83

## 2023-05-11 NOTE — PROGRESS NOTES
Pulmonary Consult    Date of Initial Consult: 5/11/23    Reason for consult: 2.7 cm Pulmonary nodule     Chief Complaint:  Chief Complaint   Patient presents with    Establish Care     Referred by Dr Bergman for  Pulmonary nodule    Other     CT-Chest 03/07/23     HPI:   Sylvie Andersen is a very pleasant 67 y.o. female referred to pulmonary for a large pulmonary nodule detected on CT scan in March 2023. She has a history of breast cancer and is a former smoker.     Patient has not been seen by pulmonary at [facility] in the past.  she has no prior PFTs    She states she has stage III breast cancer 1995.  He was treated with a left-sided mastectomy as well as left-sided radiation and then chemotherapy.  She is not on any hormone deprivation therapy afterwards, which she states was due to financial constraints.  She is asymptomatic and interestingly said that this abnormality in her CT scan was discovered because she was anemic and her primary physician initially ordered a chest x-ray with abnormality which led to the CT scan.  She stated that her colonoscopy and EGD were unremarkable and she is no longer anemic.  She denies any recent pneumonia-like symptoms.    Past Medical History:   Diagnosis Date    Allergy, unspecified not elsewhere classified     Breast cancer (HCC) 1995    Former smoker 5/11/2023    History of breast cancer 2/6/2017    History of radiation therapy 5/11/2023    To left chest for breast cancer     Hyperlipidemia     Hypertension     Left upper lobe pulmonary nodule 5/11/2023     Answers submitted by the patient for this visit:  Pulmonary History Questionnaire (Submitted on 5/11/2023)  Do you have a cough on most days? If so, how long have you had this cough?: No  Bring up phlegm (mucus, sputum) in the morning or other times during the day?: No  Do you cough up blood from your chest?: No  Do you experience wheezing?: No  Do you experience any chest tightness?: No  Experience shortness of  breath?: No  Experience shortness of breath at rest?: No  Have you ever been hospitalized?: Yes  Reason, year, and hospital in which you were hospitalized::  Masectomy [facility]  Have you ever needed to be intubated or placed on a ventilator? : No  Have you ever had problems with anesthesia?: No  Have you experienced post-operative delirium?: No  Any complications with surgery?: No  What year did you receive your last Flu shot?:   What year did you receive you last Pneumonia shot?:   Have you had a TB skin test? If so, please list the year and result:: No  Have you had Allergy skin testing? If so, please list the year and result:: No  Birds?: No  Dogs?: Yes  Cats?: No  Mice and/or Deer Mice?: No  Reptiles?: No  Coffee: 1 cup  Past Surgical History:   Procedure Laterality Date    MASTECTOMY      Left    TN CHEMOTHERAPY, UNSPECIFIED PROCEDURE      TN RADIATION THERAPY PLAN SIMPLE         Social History     Socioeconomic History    Marital status:      Spouse name: Not on file    Number of children: Not on file    Years of education: Not on file    Highest education level: Not on file   Occupational History    Not on file   Tobacco Use    Smoking status: Former     Packs/day: 0.25     Years: 45.00     Pack years: 11.25     Types: Cigarettes     Quit date: 2022     Years since quittin.8    Smokeless tobacco: Former     Quit date: 2022    Tobacco comments:     5 daily    Vaping Use    Vaping Use: Never used   Substance and Sexual Activity    Alcohol use: No     Alcohol/week: 0.0 oz    Drug use: No    Sexual activity: Not Currently   Other Topics Concern    Not on file   Social History Narrative    Not on file     Social Determinants of Health     Financial Resource Strain: Not on file   Food Insecurity: Not on file   Transportation Needs: Not on file   Physical Activity: Not on file   Stress: Not on file   Social Connections: Not on file   Intimate Partner Violence: Not on file   Housing  "Stability: Not on file          Family History   Problem Relation Age of Onset    Cancer Mother         breast cancer/Breast    Hypertension Maternal Uncle     Hypertension Maternal Grandmother     Hyperlipidemia Maternal Grandmother     Heart Disease Maternal Grandmother     Cancer Paternal Grandfather         Lung ca, smoker    Diabetes Neg Hx        Current Outpatient Medications on File Prior to Visit   Medication Sig Dispense Refill    metoprolol SR (TOPROL XL) 25 MG TABLET SR 24 HR Take 1 Tablet by mouth every day. 100 Tablet 3    pravastatin (PRAVACHOL) 40 MG tablet Take 1 Tablet by mouth every day. 100 Tablet 3    venlafaxine (EFFEXOR) 75 MG Tab Take 1 Tablet by mouth every day. 90 Tablet 1    lisinopril (PRINIVIL) 10 MG Tab Take 1 Tablet by mouth every day. 100 Tablet 3    fenofibrate micronized (LOFIBRA) 134 MG capsule TAKE 1 CAPSULE BY MOUTH EVERY DAY 90 Capsule 3    therapeutic multivitamin-minerals (THERAGRAN-M) Tab Take 1 Tablet by mouth every day.       No current facility-administered medications on file prior to visit.       Allergies: Codeine      ROS: A 12 point ROS was performed on intake and during my interview. ROS negative unless specifically noted in HPI.     Vitals:  /64 (BP Location: Left arm, Patient Position: Sitting, BP Cuff Size: Adult)   Pulse 89   Ht 1.638 m (5' 4.5\")   Wt 57.2 kg (126 lb)   SpO2 93%     Physical Exam:  Physical Exam  Vitals reviewed.   Constitutional:       General: She is not in acute distress.     Appearance: Normal appearance. She is not ill-appearing, toxic-appearing or diaphoretic.   Eyes:      General: No scleral icterus.        Right eye: No discharge.         Left eye: No discharge.      Conjunctiva/sclera: Conjunctivae normal.   Cardiovascular:      Rate and Rhythm: Normal rate.   Pulmonary:      Effort: Pulmonary effort is normal.      Breath sounds: Normal breath sounds.   Musculoskeletal:      Right lower leg: No edema.      Left lower leg: No " edema.   Skin:     General: Skin is warm.      Coloration: Skin is not jaundiced.   Neurological:      General: No focal deficit present.      Mental Status: She is alert and oriented to person, place, and time.   Psychiatric:         Mood and Affect: Mood normal.         Behavior: Behavior normal.         Laboratory Data: I personally reviewed labs including historical lab trends.       PFTs as reviewed by me personally show: not available     Imaging as reviewed by me personally show:    March 2023      Assessment/Plan:    Pulmonary problem list:  #ANSHUL nodule - possible XRT scar vs. Neoplasm  #History of stage III breast cancer with mastectomy and XRT to left chest +chemo  #former smoker    Discussed with her that the fact that she had radiation to her left chest is encouraging.  It is possible that this is left-sided nodular opacity is a radiation scar.  Does have a linear streaking appearance.  For this reason, I do think that we should start with a PET/CT scan.  There are no evidence size, this is likely a scar.  Of course, if there is PET avidity then a biopsy will need to be discussed.  Personally reviewed the images with her.  Instructed her to follow-up in clinic after the PET CT is performed.    Total consult time was 65. This includes reviewing previous provider notes, personally reviewing previous imaging and spirometry, educating the patient about their disease process, and inhaler education.   __________  Jax Wilkins, DO  Pulmonary and Critical Care Medicine  [facility]    Please note that this dictation was created using voice recognition software. The accuracy of the dictation is limited to the abilities of the software. I have made every reasonable attempt to correct obvious errors, but I expect that there are errors of grammar and possibly content that I did not discover before finalizing the note.

## 2023-05-11 NOTE — PATIENT INSTRUCTIONS
Please schedule your PET/CT as soon as possible.   Please return to clinic after the PET/CT scan to review the plan.

## 2023-05-30 ENCOUNTER — HOSPITAL ENCOUNTER (OUTPATIENT)
Dept: RADIOLOGY | Facility: MEDICAL CENTER | Age: 68
End: 2023-05-30
Attending: INTERNAL MEDICINE
Payer: MEDICARE

## 2023-06-06 ENCOUNTER — HOSPITAL ENCOUNTER (OUTPATIENT)
Dept: RADIOLOGY | Facility: MEDICAL CENTER | Age: 68
End: 2023-06-06
Attending: INTERNAL MEDICINE
Payer: MEDICARE

## 2023-06-06 DIAGNOSIS — R91.1 LUNG NODULE: ICD-10-CM

## 2023-06-06 PROCEDURE — A9552 F18 FDG: HCPCS

## 2023-06-08 ENCOUNTER — APPOINTMENT (OUTPATIENT)
Dept: ADMISSIONS | Facility: MEDICAL CENTER | Age: 68
End: 2023-06-08
Attending: INTERNAL MEDICINE
Payer: MEDICARE

## 2023-06-08 DIAGNOSIS — Z85.3 HISTORY OF BREAST CANCER: ICD-10-CM

## 2023-06-09 ENCOUNTER — OFFICE VISIT (OUTPATIENT)
Dept: SLEEP MEDICINE | Facility: MEDICAL CENTER | Age: 68
End: 2023-06-09
Attending: INTERNAL MEDICINE
Payer: MEDICARE

## 2023-06-09 ENCOUNTER — PRE-ADMISSION TESTING (OUTPATIENT)
Dept: ADMISSIONS | Facility: MEDICAL CENTER | Age: 68
End: 2023-06-09
Attending: INTERNAL MEDICINE
Payer: MEDICARE

## 2023-06-09 VITALS
HEART RATE: 91 BPM | SYSTOLIC BLOOD PRESSURE: 128 MMHG | BODY MASS INDEX: 20.49 KG/M2 | HEIGHT: 65 IN | DIASTOLIC BLOOD PRESSURE: 70 MMHG | WEIGHT: 123 LBS | OXYGEN SATURATION: 94 %

## 2023-06-09 DIAGNOSIS — R91.1 LEFT UPPER LOBE PULMONARY NODULE: ICD-10-CM

## 2023-06-09 PROCEDURE — 99214 OFFICE O/P EST MOD 30 MIN: CPT | Performed by: INTERNAL MEDICINE

## 2023-06-09 PROCEDURE — 3078F DIAST BP <80 MM HG: CPT | Performed by: INTERNAL MEDICINE

## 2023-06-09 PROCEDURE — 99212 OFFICE O/P EST SF 10 MIN: CPT | Performed by: INTERNAL MEDICINE

## 2023-06-09 PROCEDURE — 3074F SYST BP LT 130 MM HG: CPT | Performed by: INTERNAL MEDICINE

## 2023-06-09 ASSESSMENT — ENCOUNTER SYMPTOMS
EYE PAIN: 0
PSYCHIATRIC NEGATIVE: 1
WEIGHT LOSS: 0
NAUSEA: 0
STRIDOR: 0
FEVER: 0
MYALGIAS: 0
SINUS PAIN: 0
ABDOMINAL PAIN: 0
SPUTUM PRODUCTION: 0
SORE THROAT: 0
DIZZINESS: 0
DIARRHEA: 0
ORTHOPNEA: 0
EYE DISCHARGE: 0
LOSS OF CONSCIOUSNESS: 0
FOCAL WEAKNESS: 0
SENSORY CHANGE: 0
COUGH: 0
SHORTNESS OF BREATH: 0
WHEEZING: 0
VOMITING: 0
HEADACHES: 0
CHILLS: 0

## 2023-06-09 ASSESSMENT — FIBROSIS 4 INDEX: FIB4 SCORE: 0.83

## 2023-06-09 NOTE — ASSESSMENT & PLAN NOTE
Incidentally noted on CT chest performed 3/2023  PET avid on repeat PET/CT in 6/2023  Distant history of stage III breast cancer status post left mastectomy/XRT/chemo, no adjuvant HRT  Concern is for recurrence of prior breast CA versus new primary lung CA. Risk/benefits/alternatives discussed extensively with patient in clinic today.  She would like to proceed with bronchoscopic biopsy with lymph node survey.  Scheduled for Monday 6/12 with Dr. Spangler.  All questions answered to patient's satisfaction.

## 2023-06-09 NOTE — PREPROCEDURE INSTRUCTIONS
Pre-admit telephone appointment completed. Pt states all instructions given are understood. Medications the patient will take the morning of surgery per anesthesia protocol:  Metoprolol, Pravastatin, Venlafaxine. Instructions given for other prescribed medications per protocol.     Denies anesthesia complications.  Pt not able to get here to do EKG and lab work this afternoon. Will have to get done day of procedure.

## 2023-06-09 NOTE — PROGRESS NOTES
Pulmonary Clinic Note    Chief Complaint:  Chief Complaint   Patient presents with    Follow-Up     Lung nodule. Last seen 05/11/23    Results     Pet/CT 06/06/23, Bronch scheduled 06/12/23     HPI:   Sylvie Andersen is a very pleasant 67 y.o. female returns to the pulmonary clinic for follow-up after PET scan was performed for further evaluation of the left upper lobe nodule that was incidentally noted on CT scan performed for anemia in March 2023.  She has a past medical history of stage III breast cancer in 1995 treated with left-sided mastectomy/XRT/chemotherapy.  She was seen last in the pulmonary clinic by Dr. Oconnor for evaluation of 2.7 cm subpleural lingular mass on a forementioned CT scan from 3/2023.  A PET scan was ordered which demonstrated intense uptake in the left upper lobe mass concerning for either primary lung cancer or possible recurrence of breast CA.  There is also focal uptake in the left parasternal region concerning for possible internal mammary lymph node metastases.    Sylvie is otherwise asymptomatic.  She denies any recent weight loss, cough, fevers, shortness of breath.    Past Medical History:   Diagnosis Date    Allergy, unspecified not elsewhere classified     Breast cancer (HCC) 1995    Former smoker 5/11/2023    History of breast cancer 2/6/2017    History of radiation therapy 5/11/2023    To left chest for breast cancer     Hyperlipidemia     Hypertension     Left upper lobe pulmonary nodule 5/11/2023       Past Surgical History:   Procedure Laterality Date    MASTECTOMY      Left    AL CHEMOTHERAPY, UNSPECIFIED PROCEDURE      AL RADIATION THERAPY PLAN SIMPLE         Social History     Socioeconomic History    Marital status:      Spouse name: Not on file    Number of children: Not on file    Years of education: Not on file    Highest education level: Not on file   Occupational History    Not on file   Tobacco Use    Smoking status: Former     Packs/day: 0.25     Years:  45.00     Pack years: 11.25     Types: Cigarettes     Quit date: 2022     Years since quittin.9     Passive exposure: Current (Spouse)    Smokeless tobacco: Former     Quit date: 2022    Tobacco comments:     5 daily    Vaping Use    Vaping Use: Never used   Substance and Sexual Activity    Alcohol use: No     Alcohol/week: 0.0 oz    Drug use: No    Sexual activity: Not Currently   Other Topics Concern    Not on file   Social History Narrative    Not on file     Social Determinants of Health     Financial Resource Strain: Not on file   Food Insecurity: Not on file   Transportation Needs: Not on file   Physical Activity: Not on file   Stress: Not on file   Social Connections: Not on file   Intimate Partner Violence: Not on file   Housing Stability: Not on file          Family History   Problem Relation Age of Onset    Cancer Mother         breast cancer/Breast    Hypertension Maternal Uncle     Hypertension Maternal Grandmother     Hyperlipidemia Maternal Grandmother     Heart Disease Maternal Grandmother     Cancer Paternal Grandfather         Lung ca, smoker    Diabetes Neg Hx        Current Outpatient Medications on File Prior to Visit   Medication Sig Dispense Refill    metoprolol SR (TOPROL XL) 25 MG TABLET SR 24 HR Take 1 Tablet by mouth every day. 100 Tablet 3    pravastatin (PRAVACHOL) 40 MG tablet Take 1 Tablet by mouth every day. 100 Tablet 3    venlafaxine (EFFEXOR) 75 MG Tab Take 1 Tablet by mouth every day. 90 Tablet 1    lisinopril (PRINIVIL) 10 MG Tab Take 1 Tablet by mouth every day. 100 Tablet 3    fenofibrate micronized (LOFIBRA) 134 MG capsule TAKE 1 CAPSULE BY MOUTH EVERY DAY 90 Capsule 3    therapeutic multivitamin-minerals (THERAGRAN-M) Tab Take 1 Tablet by mouth every day.       No current facility-administered medications on file prior to visit.       Allergies: Codeine      ROS:   Review of Systems   Constitutional:  Negative for chills, fever and weight loss.   HENT:  Negative  "for congestion, sinus pain and sore throat.    Eyes:  Negative for pain and discharge.   Respiratory:  Negative for cough, sputum production, shortness of breath, wheezing and stridor.    Cardiovascular:  Negative for chest pain, orthopnea and leg swelling.   Gastrointestinal:  Negative for abdominal pain, diarrhea, nausea and vomiting.   Genitourinary:  Negative for dysuria, frequency and urgency.   Musculoskeletal:  Negative for myalgias.   Skin:  Negative for rash.   Neurological:  Negative for dizziness, sensory change, focal weakness, loss of consciousness and headaches.   Psychiatric/Behavioral: Negative.     All other systems reviewed and are negative.      Vitals:  /70 (BP Location: Left arm, Patient Position: Sitting, BP Cuff Size: Adult)   Pulse 91   Ht 1.638 m (5' 4.5\")   Wt 55.8 kg (123 lb)   SpO2 94%     Physical Exam:  Physical Exam  Vitals and nursing note reviewed.   Constitutional:       General: She is not in acute distress.     Appearance: Normal appearance. She is well-developed. She is not diaphoretic.      Comments: Very pleasant   Eyes:      General: No scleral icterus.        Right eye: No discharge.         Left eye: No discharge.      Conjunctiva/sclera: Conjunctivae normal.      Pupils: Pupils are equal, round, and reactive to light.   Neck:      Thyroid: No thyromegaly.      Vascular: No JVD.   Cardiovascular:      Rate and Rhythm: Normal rate and regular rhythm.      Heart sounds: Normal heart sounds. No murmur heard.     No gallop.   Pulmonary:      Effort: Pulmonary effort is normal. No respiratory distress.      Breath sounds: Normal breath sounds. No wheezing or rales.   Abdominal:      General: There is no distension.      Palpations: Abdomen is soft.      Tenderness: There is no abdominal tenderness. There is no guarding.   Musculoskeletal:         General: No tenderness.   Lymphadenopathy:      Cervical: No cervical adenopathy.   Skin:     General: Skin is warm.      " Capillary Refill: Capillary refill takes less than 2 seconds.      Findings: No erythema or rash.   Neurological:      Mental Status: She is alert and oriented to person, place, and time.      Cranial Nerves: No cranial nerve deficit.      Sensory: No sensory deficit.      Motor: No abnormal muscle tone.   Psychiatric:         Behavior: Behavior normal.       Laboratory Data:    PFTs as reviewed by me personally show:  See HPI    Imaging as reviewed by me personally show:    See HPI    Assessment/Plan:    Problem List Items Addressed This Visit       Left upper lobe pulmonary nodule     Incidentally noted on CT chest performed 3/2023  PET avid on repeat PET/CT in 6/2023  Distant history of stage III breast cancer status post left mastectomy/XRT/chemo, no adjuvant HRT  Concern is for recurrence of prior breast CA versus new primary lung CA. Risk/benefits/alternatives discussed extensively with patient in clinic today.  She would like to proceed with bronchoscopic biopsy with lymph node survey.  Scheduled for Monday 6/12 with Dr. Spangler.  All questions answered to patient's satisfaction.         Relevant Orders    CT-CHEST (THORAX) W/O       This note was generated using voice recognition software which has a chance of producing errors of grammar and possibly content.  I have made every reasonable attempt to find and correct any obvious errors, but it should be expected that some may not be found prior to finalization of this note.    Time spent in record review prior to patient arrival, reviewing results, and in face-to-face encounter totaled 35 min, excluding any procedures if performed.  __________  Nickolas Berrios MD  Pulmonary and Critical Care Medicine  [facility]

## 2023-06-12 ENCOUNTER — ANESTHESIA EVENT (OUTPATIENT)
Dept: SURGERY | Facility: MEDICAL CENTER | Age: 68
End: 2023-06-12
Payer: MEDICARE

## 2023-06-12 ENCOUNTER — ANESTHESIA (OUTPATIENT)
Dept: SURGERY | Facility: MEDICAL CENTER | Age: 68
End: 2023-06-12
Payer: MEDICARE

## 2023-06-12 ENCOUNTER — HOSPITAL ENCOUNTER (OUTPATIENT)
Facility: MEDICAL CENTER | Age: 68
End: 2023-06-12
Attending: INTERNAL MEDICINE | Admitting: INTERNAL MEDICINE
Payer: MEDICARE

## 2023-06-12 ENCOUNTER — APPOINTMENT (OUTPATIENT)
Dept: RADIOLOGY | Facility: MEDICAL CENTER | Age: 68
End: 2023-06-12
Attending: INTERNAL MEDICINE
Payer: MEDICARE

## 2023-06-12 VITALS
WEIGHT: 123.02 LBS | BODY MASS INDEX: 21 KG/M2 | HEART RATE: 78 BPM | OXYGEN SATURATION: 91 % | RESPIRATION RATE: 16 BRPM | DIASTOLIC BLOOD PRESSURE: 52 MMHG | TEMPERATURE: 97.9 F | SYSTOLIC BLOOD PRESSURE: 114 MMHG | HEIGHT: 64 IN

## 2023-06-12 LAB
ANION GAP SERPL CALC-SCNC: 11 MMOL/L (ref 7–16)
APPEARANCE FLD: NORMAL
BODY FLD TYPE: NORMAL
BUN BLD-MCNC: 31 MG/DL (ref 8–22)
BUN SERPL-MCNC: 30 MG/DL (ref 8–22)
CA-I BLD ISE-SCNC: 1.36 MMOL/L (ref 1.1–1.3)
CALCIUM SERPL-MCNC: 9.8 MG/DL (ref 8.4–10.2)
CHLORIDE BLD-SCNC: 109 MMOL/L (ref 96–112)
CHLORIDE SERPL-SCNC: 108 MMOL/L (ref 96–112)
CO2 BLD-SCNC: 20 MMOL/L (ref 20–33)
CO2 SERPL-SCNC: 20 MMOL/L (ref 20–33)
COLOR FLD: NORMAL
CREAT BLD-MCNC: 1.1 MG/DL (ref 0.5–1.4)
CREAT SERPL-MCNC: 1.05 MG/DL (ref 0.5–1.4)
EKG IMPRESSION: NORMAL
EKG IMPRESSION: NORMAL
GFR SERPLBLD CREATININE-BSD FMLA CKD-EPI: 58 ML/MIN/1.73 M 2
GLUCOSE BLD-MCNC: 94 MG/DL (ref 65–99)
GLUCOSE SERPL-MCNC: 100 MG/DL (ref 65–99)
LYMPHOCYTES NFR FLD: 16 %
NEUTROPHILS NFR FLD: 84 %
PATHOLOGY CONSULT NOTE: NORMAL
POTASSIUM BLD-SCNC: 4.2 MMOL/L (ref 3.6–5.5)
POTASSIUM SERPL-SCNC: 4.1 MMOL/L (ref 3.6–5.5)
SODIUM BLD-SCNC: 143 MMOL/L (ref 135–145)
SODIUM SERPL-SCNC: 139 MMOL/L (ref 135–145)

## 2023-06-12 PROCEDURE — 700105 HCHG RX REV CODE 258: Performed by: INTERNAL MEDICINE

## 2023-06-12 PROCEDURE — 160042 HCHG SURGERY MINUTES - EA ADDL 1 MIN LEVEL 5: Performed by: INTERNAL MEDICINE

## 2023-06-12 PROCEDURE — 87205 SMEAR GRAM STAIN: CPT | Mod: 91

## 2023-06-12 PROCEDURE — 160009 HCHG ANES TIME/MIN: Performed by: INTERNAL MEDICINE

## 2023-06-12 PROCEDURE — 93005 ELECTROCARDIOGRAM TRACING: CPT | Performed by: INTERNAL MEDICINE

## 2023-06-12 PROCEDURE — 88172 CYTP DX EVAL FNA 1ST EA SITE: CPT

## 2023-06-12 PROCEDURE — 80047 BASIC METABLC PNL IONIZED CA: CPT

## 2023-06-12 PROCEDURE — 80048 BASIC METABOLIC PNL TOTAL CA: CPT

## 2023-06-12 PROCEDURE — 36415 COLL VENOUS BLD VENIPUNCTURE: CPT

## 2023-06-12 PROCEDURE — 71045 X-RAY EXAM CHEST 1 VIEW: CPT

## 2023-06-12 PROCEDURE — 160036 HCHG PACU - EA ADDL 30 MINS PHASE I: Performed by: INTERNAL MEDICINE

## 2023-06-12 PROCEDURE — 88333 PATH CONSLTJ SURG CYTO XM 1: CPT

## 2023-06-12 PROCEDURE — 31627 NAVIGATIONAL BRONCHOSCOPY: CPT | Performed by: INTERNAL MEDICINE

## 2023-06-12 PROCEDURE — 502714 HCHG ROBOTIC SURGERY SERVICES: Performed by: INTERNAL MEDICINE

## 2023-06-12 PROCEDURE — 87116 MYCOBACTERIA CULTURE: CPT

## 2023-06-12 PROCEDURE — 87206 SMEAR FLUORESCENT/ACID STAI: CPT

## 2023-06-12 PROCEDURE — 31629 BRONCHOSCOPY/NEEDLE BX EACH: CPT | Performed by: INTERNAL MEDICINE

## 2023-06-12 PROCEDURE — 160025 RECOVERY II MINUTES (STATS): Performed by: INTERNAL MEDICINE

## 2023-06-12 PROCEDURE — 88173 CYTOPATH EVAL FNA REPORT: CPT

## 2023-06-12 PROCEDURE — 93010 ELECTROCARDIOGRAM REPORT: CPT | Mod: 76 | Performed by: INTERNAL MEDICINE

## 2023-06-12 PROCEDURE — 160002 HCHG RECOVERY MINUTES (STAT): Performed by: INTERNAL MEDICINE

## 2023-06-12 PROCEDURE — 31654 BRONCH EBUS IVNTJ PERPH LES: CPT | Performed by: INTERNAL MEDICINE

## 2023-06-12 PROCEDURE — 31628 BRONCHOSCOPY/LUNG BX EACH: CPT | Performed by: INTERNAL MEDICINE

## 2023-06-12 PROCEDURE — C1887 CATHETER, GUIDING: HCPCS | Performed by: INTERNAL MEDICINE

## 2023-06-12 PROCEDURE — 88342 IMHCHEM/IMCYTCHM 1ST ANTB: CPT

## 2023-06-12 PROCEDURE — 00520 ANES CLOSED CHEST PX NOS: CPT | Performed by: STUDENT IN AN ORGANIZED HEALTH CARE EDUCATION/TRAINING PROGRAM

## 2023-06-12 PROCEDURE — 87102 FUNGUS ISOLATION CULTURE: CPT

## 2023-06-12 PROCEDURE — 88112 CYTOPATH CELL ENHANCE TECH: CPT

## 2023-06-12 PROCEDURE — 87015 SPECIMEN INFECT AGNT CONCNTJ: CPT

## 2023-06-12 PROCEDURE — 160031 HCHG SURGERY MINUTES - 1ST 30 MINS LEVEL 5: Performed by: INTERNAL MEDICINE

## 2023-06-12 PROCEDURE — 71250 CT THORAX DX C-: CPT

## 2023-06-12 PROCEDURE — 88341 IMHCHEM/IMCYTCHM EA ADD ANTB: CPT

## 2023-06-12 PROCEDURE — 89240 UNLISTED MISC PATH TEST: CPT

## 2023-06-12 PROCEDURE — 88305 TISSUE EXAM BY PATHOLOGIST: CPT

## 2023-06-12 PROCEDURE — 160048 HCHG OR STATISTICAL LEVEL 1-5: Performed by: INTERNAL MEDICINE

## 2023-06-12 PROCEDURE — 87070 CULTURE OTHR SPECIMN AEROBIC: CPT

## 2023-06-12 PROCEDURE — 31624 DX BRONCHOSCOPE/LAVAGE: CPT | Performed by: INTERNAL MEDICINE

## 2023-06-12 PROCEDURE — 160035 HCHG PACU - 1ST 60 MINS PHASE I: Performed by: INTERNAL MEDICINE

## 2023-06-12 PROCEDURE — 700101 HCHG RX REV CODE 250: Performed by: STUDENT IN AN ORGANIZED HEALTH CARE EDUCATION/TRAINING PROGRAM

## 2023-06-12 PROCEDURE — 160046 HCHG PACU - 1ST 60 MINS PHASE II: Performed by: INTERNAL MEDICINE

## 2023-06-12 PROCEDURE — 700111 HCHG RX REV CODE 636 W/ 250 OVERRIDE (IP): Performed by: STUDENT IN AN ORGANIZED HEALTH CARE EDUCATION/TRAINING PROGRAM

## 2023-06-12 RX ORDER — LIDOCAINE HYDROCHLORIDE 20 MG/ML
INJECTION, SOLUTION EPIDURAL; INFILTRATION; INTRACAUDAL; PERINEURAL PRN
Status: DISCONTINUED | OUTPATIENT
Start: 2023-06-12 | End: 2023-06-12 | Stop reason: SURG

## 2023-06-12 RX ORDER — SODIUM CHLORIDE, SODIUM LACTATE, POTASSIUM CHLORIDE, CALCIUM CHLORIDE 600; 310; 30; 20 MG/100ML; MG/100ML; MG/100ML; MG/100ML
INJECTION, SOLUTION INTRAVENOUS CONTINUOUS
Status: DISCONTINUED | OUTPATIENT
Start: 2023-06-12 | End: 2023-06-12 | Stop reason: HOSPADM

## 2023-06-12 RX ORDER — ONDANSETRON 2 MG/ML
4 INJECTION INTRAMUSCULAR; INTRAVENOUS
Status: DISCONTINUED | OUTPATIENT
Start: 2023-06-12 | End: 2023-06-12 | Stop reason: HOSPADM

## 2023-06-12 RX ORDER — HALOPERIDOL 5 MG/ML
1 INJECTION INTRAMUSCULAR
Status: DISCONTINUED | OUTPATIENT
Start: 2023-06-12 | End: 2023-06-12 | Stop reason: HOSPADM

## 2023-06-12 RX ORDER — DEXAMETHASONE SODIUM PHOSPHATE 4 MG/ML
INJECTION, SOLUTION INTRA-ARTICULAR; INTRALESIONAL; INTRAMUSCULAR; INTRAVENOUS; SOFT TISSUE PRN
Status: DISCONTINUED | OUTPATIENT
Start: 2023-06-12 | End: 2023-06-12 | Stop reason: SURG

## 2023-06-12 RX ADMIN — ROCURONIUM BROMIDE 10 MG: 10 INJECTION, SOLUTION INTRAVENOUS at 16:02

## 2023-06-12 RX ADMIN — FENTANYL CITRATE 50 MCG: 50 INJECTION, SOLUTION INTRAMUSCULAR; INTRAVENOUS at 15:22

## 2023-06-12 RX ADMIN — FENTANYL CITRATE 50 MCG: 50 INJECTION, SOLUTION INTRAMUSCULAR; INTRAVENOUS at 16:24

## 2023-06-12 RX ADMIN — DEXAMETHASONE SODIUM PHOSPHATE 4 MG: 4 INJECTION INTRA-ARTICULAR; INTRALESIONAL; INTRAMUSCULAR; INTRAVENOUS; SOFT TISSUE at 15:28

## 2023-06-12 RX ADMIN — ROCURONIUM BROMIDE 50 MG: 10 INJECTION, SOLUTION INTRAVENOUS at 15:24

## 2023-06-12 RX ADMIN — SODIUM CHLORIDE, POTASSIUM CHLORIDE, SODIUM LACTATE AND CALCIUM CHLORIDE: 600; 310; 30; 20 INJECTION, SOLUTION INTRAVENOUS at 15:20

## 2023-06-12 RX ADMIN — PROPOFOL 110 MG: 10 INJECTION, EMULSION INTRAVENOUS at 15:24

## 2023-06-12 RX ADMIN — SUGAMMADEX 200 MG: 100 INJECTION, SOLUTION INTRAVENOUS at 16:20

## 2023-06-12 RX ADMIN — LIDOCAINE HYDROCHLORIDE 60 MG: 20 INJECTION, SOLUTION EPIDURAL; INFILTRATION; INTRACAUDAL at 15:24

## 2023-06-12 ASSESSMENT — FIBROSIS 4 INDEX: FIB4 SCORE: 0.83

## 2023-06-12 NOTE — ANESTHESIA TIME REPORT
Anesthesia Start and Stop Event Times     Date Time Event    6/12/2023 1517 Ready for Procedure     1520 Anesthesia Start     1627 Anesthesia Stop        Responsible Staff  06/12/23    Name Role Begin End    Santi Villaseñor M.D. Anesth 1520 1627        Overtime Reason:  no overtime (within assigned shift)    Comments:

## 2023-06-12 NOTE — PROCEDURES
Fiberoptic Bronchoscopy/Endotracheal Ultrasound(EBUS)/Navigational bronchoscopy     Date/Time of Procedure:6/12/2023     Indication(s): brittany mass    Consent: Informed, written consent was obtained prior to the procedure; risks, benefits, & alternatives were discussed.    Universal Protocol/Time Out: A Time Out with checklist was performed prior to the procedure to ensure correct identification of the patient and procedure.    Pre-Procedure Diagnosis: r/o breast vs lung ca    Allergies:ALLER@     Sedation/Analgesia/Anesthesia: Sedation as per anesthesia.    Additional Modalities:    [x] Fluoroscopy  [x] Radial Ultrasound  [x] Linear Endobronchial Ultrasound  [x] Rapid On-Site Evaluation       Route of Entry:  [_] Nasal [_] Oral [X] ET tube [_] Trach [_] LMA      Findings: After the patient is adequately anesthetized (see procedure for anesthesia), the flexible bronchoscope was passed through the endotracheal tube visualizing the distal trachea:  Trachea: normal appearing  Breanna: sharp  Right lung: RUL, RML, and RLL all level one subsegments visualized. No endobronchial lesions  Left lung: BRITTANY, Lingula and LLL , All level one subsegments visualized. No endobronchial lesions    Robotic Navigational Bronchoscopy    Robotic navigation bronchoscopy was performed with Ion platform.  Partial registration was used.    Ion robotic catheter was used to engage the inf segment of left lingula lung.  Target lesion is about 3  cm in diameter.   Under navigational guidance the ion robotic catheter was 0.5 m away from the planned target.     Radial EBUS was performed to confirm that the location of the nodule is  eccentric. Additional features noted intermittently concentric    Transbronchial needle aspiration was performed with 21 gauge needle through the extended working channel catheter.  Total 10 samples were collected.  Samples sent for pathology.    Transbronchial biopsy was performed with forceps through the extended working  channel catheter.  Total 6 samples were collected.  Samples sent for pathology    Bronchial alveolar lavage was performed the extended working channel catheter.  Instilled 10 cc of NS, suction returned with 10 cc of NS.  Samples sent for afb/fungal and pathology.    FCO findings: + regional tissue     ENDOBRONCHIAL US    Using the endobronchial ultrasound, a systematic survey of the accessible mediastinal and hilar lymph nodes was performed, NOT notable for lymphadenopathy at stations 7, 4R, 4 L 10R and 10 L largest LN was station 7 which was 0.5 cm      Specimens: _  [x] Bronchoalveolar Lavage (BAL):    [x] Transbronchial Needle Aspiration (TBNA):    [x] Transbronchial Biopsy (TBBx): _  [x] Endobronchial Ultrasound (EBUS) TBNA:  inspected  not biopsied    Impression and plan  ANSHUL mass with hx of breast cancer   PET positive  Concerning for breast versus lung cancer  CXR pending  Pulmonary will call with results

## 2023-06-12 NOTE — ANESTHESIA PROCEDURE NOTES
Airway    Date/Time: 6/12/2023 3:26 PM    Performed by: Santi Villaseñor M.D.  Authorized by: Santi Villaseñor M.D.    Location:  OR  Urgency:  Elective  Indications for Airway Management:  Anesthesia      Spontaneous Ventilation: absent    Sedation Level:  Deep  Preoxygenated: Yes    Patient Position:  Sniffing  Final Airway Type:  Endotracheal airway  Final Endotracheal Airway:  ETT  Cuffed: Yes    Technique Used for Successful ETT Placement:  Direct laryngoscopy    Insertion Site:  Oral  Blade Type:  Elisa  Laryngoscope Blade/Videolaryngoscope Blade Size:  3  ETT Size (mm):  8.0  Measured from:  Teeth  ETT to Teeth (cm):  21  Placement Verified by: auscultation and capnometry    Cormack-Lehane Classification:  Grade IIa - partial view of glottis  Number of Attempts at Approach:  1

## 2023-06-12 NOTE — OR NURSING
1625 : To PACU from bronchoscopy, report received from anesthesia and RN. Patient asleep, respirations are spontaneous and unlabored. VSS on 6L.     1630: Xray at bedside. Patient arousable, weaned to room air.     1645: Patient denies pain and nausea. Patient spouse updated.     1700: Patient complaining of chest pressure, MD Perrin to bedside for update. Order for repeat chest xray placed, tech notified.     1715: Xray at bedside. Order placed for EKG.     1730: MD Perrin aware of both CXR and EKG. Per MD, once CXR has been resulted, patient may discharge. Patient states chest pressure now resolved.     1745: Patient denies pain and nausea. Meets criteria to transfer to Stage 2. Report to SIS Akbar.

## 2023-06-12 NOTE — FLOWSHEET NOTE
06/12/23 1525   Bronchoscopy Procedure   Bronchoscopy Procedure Yes   Order placed in Epic? Yes   Start Time 1525   End Time 1635   Performing Physician Dr. Spangler   Procedure Monitoring Tech Time Bronchoscopy (60 Min)

## 2023-06-12 NOTE — DISCHARGE INSTRUCTIONS
Discharge Instructions from MD Perrin:  -Call you doctor and go to the ER if you are coughing up more than 2 tablespoons of bright red blood.   -Call your doctor and go to the ER if you experience acute onset of shortness of breath and/or increased chest pain.   -Call your doctor and go to the ER if you develop a fever greater than 101.5 degrees Fahrenheit.    Bronchoscopy Discharge Instructions  Home Care Instructions    ACTIVITY: Rest and take it easy for the first 24 hours.  A responsible adult is recommended to remain with you during that time.  It is normal to feel sleepy.  We encourage you to not do anything that requires balance, judgment or coordination.    The medicine you had during the bronchoscopy will make you sleepy.    FOR 24 HOURS DO NOT:  Drive, operate machinery or run household appliances.  Drink beer or alcoholic beverages.  Make important decisions or sign legal documents.  Engage in activity that requires sharp judgment and reflexes for 24 hours    SPECIAL INSTRUCTIONS:     Bronchoscopy is a procedure to look inside your windpipe and bronchial tubes.  An anesthetic solution is sprayed in your throat to make it numb.    You may experience a mild sore throat, hoarseness, fever up to 101?F, and /or coughing up small amounts of blood immediately following your bronchoscopy, especially if a biopsy was performed.  The discomfort should subside in 24-48 hours.    Do not smoke for 6-8 hours after the procedure to decrease your risk of coughing and /or bleeding.    Do not drink fluids or eat until your gag reflex returns, for two hours after the bronchoscopy.  Otherwise you will not feel the food or fluid in your throat, and it may go down your windpipe and cause you to choke.    Take ice chips or slowly sip cool fluids to make sure your gag reflex has returned.  Avoid hot fluids from the microwave for several hours.    After 2 hours or when you get home you may take throat lozenges or gargle  with salt water if your throat is sore.  Drink liquids to help dryness in your mouth and throat.    Resume your normal activities the following day.    MEDICATIONS: Resume taking daily medication as directed by your doctor.      A follow-up appointment should be arranged with your doctor in 1 week to get the results of the bronchoscopy and any tests done during the procedure; call to schedule.    You should CALL YOUR PHYSICIAN if you develop:  Fever greater than 101?F  You cough up more than a teaspoon of blood other than blood-tinged mucus  You have increasing amounts of bleeding from coughing after the bronchoscopy  You are wheezing  You develop any unusual signs or symptoms or have any questions                You should call 911 if you develop problems with breathing or chest pain.    If you are unable to contact your doctor or surgical center, you should go to the nearest emergency room or urgent care center.  Physician's telephone #: [phone]      If any questions arise, call your doctor.  If your doctor is not available, please feel free to call the Surgical Center at [phone]. The Center is open Monday through Friday from 7AM to 7PM.  You can also call the [facility] open 24 hours/day, 7 days/week and speak to a nurse at [phone], or toll free at [phone].    You may receive a survey in the mail within the next two weeks and we ask that you take a few moments to complete the survey and return it to us.  Our goal is to provide you with very good care and we value your comments.

## 2023-06-12 NOTE — ANESTHESIA POSTPROCEDURE EVALUATION
Patient: Latrice Andersen    Procedure Summary     Date: 06/12/23 Room / Location:  PROCEDURE ROOM / SURGERY [facility]    Anesthesia Start: 1520 Anesthesia Stop: 1627    Procedures:       FIBER OPTIC BRONCHOSCOPY WITH  WASH, BRUSH, BRONCHOALVEOLAR LAVAGE, BIOPSY AND FINE NEEDLE ASPIRATION, ENDOBRONCHIAL ULTRASOUND & NAVIGATION, ROBOTICS      ENDOBRONCHIAL ULTRASOUND (EBUS) Diagnosis:       History of breast cancer      (HISTORY OF BREAST CANCER)    Surgeons: Brooke Perrin M.D. Responsible Provider: Santi Villaseñor M.D.    Anesthesia Type: general ASA Status: 2          Final Anesthesia Type: general  Last vitals  BP   Blood Pressure : 118/45    Temp   36.2 °C (97.2 °F)    Pulse   75   Resp   16    SpO2   93 %      Anesthesia Post Evaluation    Patient location during evaluation: PACU  Patient participation: complete - patient participated  Level of consciousness: awake and alert    Airway patency: patent  Anesthetic complications: no  Cardiovascular status: hemodynamically stable  Respiratory status: acceptable  Hydration status: euvolemic    PONV: none          No notable events documented.     Nurse Pain Score: 0 (NPRS)

## 2023-06-12 NOTE — ANESTHESIA PREPROCEDURE EVALUATION
Case: [id number] Date/Time: 06/12/23 1445    Procedures:       FIBER OPTIC BRONCHOSCOPY WITH POSSIBLE WASH, BRUSH, BRONCHOALVEOLAR LAVAGE, BIOPSY AND FINE NEEDLE ASPIRATION, ENDOBRONCHIAL ULTRASOUND & NAVIGATION, ROBOTICS      ENDOBRONCHIAL ULTRASOUND (EBUS)    Pre-op diagnosis: HISTORY OF BREAST CANCER    Location:  PROCEDURE ROOM / SURGERY [facility]    Surgeons: Brooke Perrin M.D.          Relevant Problems   CARDIAC   (positive) Essential hypertension         (positive) Stage 3a chronic kidney disease (HCC)       Physical Exam    Airway   Mallampati: II  TM distance: >3 FB  Neck ROM: full       Cardiovascular - normal exam  Rhythm: regular  Rate: normal     Dental - normal exam           Pulmonary - normal exam     Abdominal    Neurological - normal exam                 Anesthesia Plan    ASA 2       Plan - general       Airway plan will be ETT          Induction: intravenous    Postoperative Plan: Postoperative administration of opioids is intended.    Pertinent diagnostic labs and testing reviewed    Informed Consent:    Anesthetic plan and risks discussed with patient.    Use of blood products discussed with: patient whom consented to blood products.

## 2023-06-13 LAB
FUNGUS SPEC FUNGUS STN: NORMAL
GRAM STN SPEC: NORMAL
RHODAMINE-AURAMINE STN SPEC: NORMAL
SIGNIFICANT IND 70042: NORMAL
SITE SITE: NORMAL
SOURCE SOURCE: NORMAL

## 2023-06-13 NOTE — OR NURSING
1748: Patient to Phase II from PACU. Patient assisted with dressing with help from CNA.     1804: Discharge education completed and family denies further questions.     1807: Discharged to care of family post uneventful stay in phase II recovery.

## 2023-06-14 DIAGNOSIS — C34.12 MALIGNANT NEOPLASM OF UPPER LOBE OF LEFT LUNG (HCC): ICD-10-CM

## 2023-06-14 LAB
BACTERIA SPEC RESP CULT: NORMAL
GRAM STN SPEC: NORMAL
SIGNIFICANT IND 70042: NORMAL
SITE SITE: NORMAL
SOURCE SOURCE: NORMAL

## 2023-06-15 ENCOUNTER — PATIENT MESSAGE (OUTPATIENT)
Dept: SLEEP MEDICINE | Facility: MEDICAL CENTER | Age: 68
End: 2023-06-15
Payer: MEDICARE

## 2023-06-15 DIAGNOSIS — C34.12 MALIGNANT NEOPLASM OF UPPER LOBE OF LEFT LUNG (HCC): ICD-10-CM

## 2023-06-15 NOTE — PROGRESS NOTES
Called pt to review the results of her lung bx      FINAL DIAGNOSIS:     A. Fine needle aspiration, left upper lung lobe mass:          Positive for malignancy, pulmonary adenocarcinoma, nonmucinous           as sampled   B. Lung, left upper lobe mass, core biopsy:          Pulmonary adenocarcinoma, nonmucinous as sampled   C. Lung, left upper lobe mass, biopsy:          Pulmonary adenocarcinoma, nonmucinous as sampled   D. Bronchoalveolar lavage, left upper lobe:          Rare malignant cell cluster on the Thin Prep slide     Comment: The patient's history of breast cancer is noted and there is   no evidence of recurrent or metastatic breast cancer as sampled. If   ancillary studies are desired, Block C1 is the preferred block.       IMPRESSION:     1.  Intense increased activity corresponding to LEFT upper lobe mass consistent with malignancy.  2.  Focal uptake in the LEFT parasternal region which may indicate pleural versus internal mammary lymph node metastasis.  3.  No other evidence for metastatic disease.  Prior LEFT mastectomy.    Main question is the left parasternal area if a met or not and is it solitary?  Referrals made to onc, radonc, surgery and nursenavigator  Stat PFT ordered

## 2023-06-16 ENCOUNTER — TELEPHONE (OUTPATIENT)
Dept: HEMATOLOGY ONCOLOGY | Facility: MEDICAL CENTER | Age: 68
End: 2023-06-16
Payer: MEDICARE

## 2023-06-16 DIAGNOSIS — Z79.899 ENCOUNTER FOR LONG-TERM (CURRENT) USE OF HIGH-RISK MEDICATION: ICD-10-CM

## 2023-06-16 DIAGNOSIS — G89.3 CANCER RELATED PAIN: ICD-10-CM

## 2023-06-16 DIAGNOSIS — C34.92 ADENOCARCINOMA OF LEFT LUNG (HCC): ICD-10-CM

## 2023-06-16 DIAGNOSIS — C34.32 PRIMARY ADENOCARCINOMA OF LOWER LOBE OF LEFT LUNG (HCC): ICD-10-CM

## 2023-06-16 NOTE — TELEPHONE ENCOUNTER
Reviewed referral to oncology.    Ideally patient would be seen in multidisciplinary clinic to be evaluated by myself and radiation oncology  Prior to this visit patient needs brain MRI and NGS  I called patient to discuss this plan, no answer.    Will order brain MRI and NGS to have schedulers reach out to the patient to schedule her in the next MDC on 6/29 after imaging is obtained.     Nancy Campbell MD

## 2023-06-19 NOTE — PATIENT COMMUNICATION
Faxed Referral with Clinicals to [facility].     Called pt and lm. I will send her a [employer] message with more detail information.     [employer] message sent

## 2023-06-19 NOTE — PATIENT COMMUNICATION
Referral to Dr Ganser at [facility] is still incomplete.  Called and spoke with [facility]. They have not received referral.     Redid Order for referral to [facility]. Pended    For Oncology referral; Ok to schedule per Dr Campbell - MRI first. Dr Campbell would like to see pt in MDC on 6/29.

## 2023-06-20 ENCOUNTER — HOSPITAL ENCOUNTER (OUTPATIENT)
Dept: RADIOLOGY | Facility: MEDICAL CENTER | Age: 68
End: 2023-06-20
Attending: STUDENT IN AN ORGANIZED HEALTH CARE EDUCATION/TRAINING PROGRAM
Payer: MEDICARE

## 2023-06-20 DIAGNOSIS — C34.32 PRIMARY ADENOCARCINOMA OF LOWER LOBE OF LEFT LUNG (HCC): ICD-10-CM

## 2023-06-20 DIAGNOSIS — C34.12 SQUAMOUS CELL CARCINOMA OF BRONCHUS IN LEFT UPPER LOBE (HCC): ICD-10-CM

## 2023-06-20 DIAGNOSIS — C34.92 ADENOCARCINOMA OF LEFT LUNG (HCC): ICD-10-CM

## 2023-06-20 PROCEDURE — A9579 GAD-BASE MR CONTRAST NOS,1ML: HCPCS | Performed by: STUDENT IN AN ORGANIZED HEALTH CARE EDUCATION/TRAINING PROGRAM

## 2023-06-20 PROCEDURE — 70553 MRI BRAIN STEM W/O & W/DYE: CPT

## 2023-06-20 PROCEDURE — 700117 HCHG RX CONTRAST REV CODE 255: Performed by: STUDENT IN AN ORGANIZED HEALTH CARE EDUCATION/TRAINING PROGRAM

## 2023-06-20 RX ADMIN — GADOTERIDOL 10 ML: 279.3 INJECTION, SOLUTION INTRAVENOUS at 14:30

## 2023-06-21 ENCOUNTER — NON-PROVIDER VISIT (OUTPATIENT)
Dept: SLEEP MEDICINE | Facility: MEDICAL CENTER | Age: 68
End: 2023-06-21
Attending: INTERNAL MEDICINE
Payer: MEDICARE

## 2023-06-21 ENCOUNTER — DOCUMENTATION (OUTPATIENT)
Dept: HEALTH INFORMATION MANAGEMENT | Facility: OTHER | Age: 68
End: 2023-06-21
Payer: MEDICARE

## 2023-06-21 VITALS — HEIGHT: 63 IN | BODY MASS INDEX: 21.97 KG/M2 | WEIGHT: 124 LBS

## 2023-06-21 DIAGNOSIS — C34.12 MALIGNANT NEOPLASM OF UPPER LOBE OF LEFT LUNG (HCC): ICD-10-CM

## 2023-06-21 PROCEDURE — 94726 PLETHYSMOGRAPHY LUNG VOLUMES: CPT | Mod: 26 | Performed by: INTERNAL MEDICINE

## 2023-06-21 PROCEDURE — 94729 DIFFUSING CAPACITY: CPT | Mod: 26 | Performed by: INTERNAL MEDICINE

## 2023-06-21 PROCEDURE — 94060 EVALUATION OF WHEEZING: CPT | Mod: 26 | Performed by: INTERNAL MEDICINE

## 2023-06-21 PROCEDURE — 94726 PLETHYSMOGRAPHY LUNG VOLUMES: CPT | Performed by: INTERNAL MEDICINE

## 2023-06-21 PROCEDURE — 94729 DIFFUSING CAPACITY: CPT | Performed by: INTERNAL MEDICINE

## 2023-06-21 PROCEDURE — 94060 EVALUATION OF WHEEZING: CPT | Performed by: INTERNAL MEDICINE

## 2023-06-21 ASSESSMENT — PULMONARY FUNCTION TESTS
FEV1/FVC: 71.14
FVC: 2.98
FEV1_PERCENT_CHANGE: 3
FEV1/FVC_PERCENT_LLN: 66
FEV1: 2.12
FEV1: 2.16
FEV1/FVC_PERCENT_PREDICTED: 90
FEV1_PERCENT_PREDICTED: 95
FEV1/FVC_PERCENT_PREDICTED: 91
FVC_PREDICTED: 284
FEV1_PERCENT_CHANGE: -1
FEV1/FVC_PERCENT_PREDICTED: 96
FVC: 2.88
FEV1/FVC_PERCENT_CHANGE: -33
FEV1/FVC_PERCENT_CHANGE: -5
FVC_LLN: 2.37
FEV1_PREDICTED: 2.22
FEV1/FVC_PREDICTED: 79
FEV1/FVC: 75
FEV1_PERCENT_PREDICTED: 97
FEV1_LLN: 1.85
FEV1/FVC_PERCENT_PREDICTED: 95
FEV1/FVC: 75
FVC_PERCENT_PREDICTED: 104
FEV1/FVC_PERCENT_PREDICTED: 1
FEV1/FVC: 71
FVC_PERCENT_PREDICTED: 101

## 2023-06-21 ASSESSMENT — FIBROSIS 4 INDEX: FIB4 SCORE: 0.83

## 2023-06-21 NOTE — PROCEDURES
Technician: KIEL Montes    Technician Comment:  Good patient effort & cooperation.  The results of this test meet the ATS/ERS standards for acceptability & reproducibility.  Test was performed on the [employer] Body Plethysmograph-Elite DX system.  Predicted values were GLI-2012 for spirometry, GLI-2017 for DLCO, ITS for Lung Volumes.  The DLCO was uncorrected for Hgb.  A bronchodilator of Albuterol HFA -2puffs via spacer administered.  DLCO performed during dilation period.    Interpretation:   Baseline spirometry shows normal airflows.  No significant bronchodilator response.  Total lung capacity is elevated at 6.42 L or 130% predicted.  There is evidence for air trapping with residual volume of 164% predicted.  Diffusion capacity is preserved at 97% predicted.  Pulmonary function testing is normal other than possible hyperinflation with mild air trapping although this could be physiologic normal.  Correlate clinically and with imaging.

## 2023-06-23 ENCOUNTER — PATIENT MESSAGE (OUTPATIENT)
Dept: ONCOLOGY | Facility: MEDICAL CENTER | Age: 68
End: 2023-06-23
Payer: MEDICARE

## 2023-06-23 ENCOUNTER — PATIENT OUTREACH (OUTPATIENT)
Dept: ONCOLOGY | Facility: MEDICAL CENTER | Age: 68
End: 2023-06-23
Payer: MEDICARE

## 2023-06-23 NOTE — LETTER
Nickolas LUNDBERG [facility]    [address]  [place] [address]  Phone: [phone] - Fax: [phone]              Sylvie nAdersen  [place] [address]     Date: 06/23/23    Dear Sylvie,    I am a Cancer Nurse Navigator, a certified oncology nurse. My role is to assess any needs you may have with education, guidance and support. I am available to you and your family through your treatment at [facility].       I am available to address your needs during your journey with the following services:     Care Coordination  I can assist you in facilitating communication between your cancer care treatment team to ensure timely treatment and follow-up.  I can also assist with transition of care back to your primary care provider, or other specialist, as needed.  My goal is to bridge gaps for you throughout the course of your active treatment.       Education Services  Understanding the recommended treatment course by your physician is key. I can provide educational resources personalized to your cancer diagnosis to help you understand your diagnosis and treatment. Please let me know if you would like to receive information about your diagnosis and treatment plan.  I am here to help.     Support Services/Resource Information  [facility] we offer a full scope of support services.  I can assist you with referral information to:  Cancer Clinical Trials & Research  Nutrition counseling  Support groups  Complementary Therapies such as Mind-Body Techniques Meditation  Patient Financial Advocates    [facility], an [employer] affiliate office, our volunteers can assist you with accessing our [employer] library, support services information, head coverings and comfort items  Community and national resources, included eligibility based jose assistance and pharmaceutical access programs, if you are in need of additional information.     [facility]  [facility] offers services that include:  Behavioral Health  Genetic counseling & testing  Acupuncture  Lymphedema prevention/treatment program  Palliative care services.        I hope you have an excellent patient experience.  Please feel free to share with me your comments regarding the care you have received- we value your feedback.    Sincerely,       Angela Ceballos R.N.  Cancer Nurse Navigator    Office: [phone]  Main:  [phone]   Email:  [email or web address]

## 2023-06-27 ASSESSMENT — LIFESTYLE VARIABLES
SMOKING_STATUS: YES
TOBACCO_USE: NO
SMOKING_YEARS: 40

## 2023-06-29 ENCOUNTER — RESEARCH ENCOUNTER (OUTPATIENT)
Dept: HEMATOLOGY ONCOLOGY | Facility: MEDICAL CENTER | Age: 68
End: 2023-06-29

## 2023-06-29 ENCOUNTER — HOSPITAL ENCOUNTER (OUTPATIENT)
Dept: RADIATION ONCOLOGY | Facility: MEDICAL CENTER | Age: 68
End: 2023-06-30
Attending: RADIOLOGY
Payer: MEDICARE

## 2023-06-29 ENCOUNTER — HOSPITAL ENCOUNTER (OUTPATIENT)
Dept: HEMATOLOGY ONCOLOGY | Facility: MEDICAL CENTER | Age: 68
End: 2023-06-29
Attending: STUDENT IN AN ORGANIZED HEALTH CARE EDUCATION/TRAINING PROGRAM
Payer: MEDICARE

## 2023-06-29 VITALS
TEMPERATURE: 97.9 F | SYSTOLIC BLOOD PRESSURE: 138 MMHG | OXYGEN SATURATION: 94 % | BODY MASS INDEX: 21.91 KG/M2 | HEART RATE: 93 BPM | RESPIRATION RATE: 18 BRPM | WEIGHT: 123.68 LBS | HEIGHT: 63 IN | DIASTOLIC BLOOD PRESSURE: 76 MMHG

## 2023-06-29 VITALS
TEMPERATURE: 97.8 F | OXYGEN SATURATION: 94 % | WEIGHT: 123.68 LBS | DIASTOLIC BLOOD PRESSURE: 76 MMHG | RESPIRATION RATE: 18 BRPM | HEIGHT: 63 IN | SYSTOLIC BLOOD PRESSURE: 138 MMHG | HEART RATE: 93 BPM | BODY MASS INDEX: 21.91 KG/M2

## 2023-06-29 DIAGNOSIS — C34.92 NSCLC OF LEFT LUNG (HCC): ICD-10-CM

## 2023-06-29 PROCEDURE — 99212 OFFICE O/P EST SF 10 MIN: CPT | Performed by: STUDENT IN AN ORGANIZED HEALTH CARE EDUCATION/TRAINING PROGRAM

## 2023-06-29 PROCEDURE — 99205 OFFICE O/P NEW HI 60 MIN: CPT | Performed by: STUDENT IN AN ORGANIZED HEALTH CARE EDUCATION/TRAINING PROGRAM

## 2023-06-29 PROCEDURE — 99214 OFFICE O/P EST MOD 30 MIN: CPT | Performed by: RADIOLOGY

## 2023-06-29 PROCEDURE — 99205 OFFICE O/P NEW HI 60 MIN: CPT | Performed by: RADIOLOGY

## 2023-06-29 RX ORDER — 0.9 % SODIUM CHLORIDE 0.9 %
10 VIAL (ML) INJECTION PRN
Status: CANCELLED | OUTPATIENT
Start: 2023-07-19

## 2023-06-29 RX ORDER — CYANOCOBALAMIN 1000 UG/ML
1000 INJECTION, SOLUTION INTRAMUSCULAR; SUBCUTANEOUS
Status: CANCELLED | OUTPATIENT
Start: 2023-07-19

## 2023-06-29 RX ORDER — ONDANSETRON 2 MG/ML
4 INJECTION INTRAMUSCULAR; INTRAVENOUS PRN
Status: CANCELLED | OUTPATIENT
Start: 2023-07-19

## 2023-06-29 RX ORDER — METHYLPREDNISOLONE SODIUM SUCCINATE 125 MG/2ML
125 INJECTION, POWDER, LYOPHILIZED, FOR SOLUTION INTRAMUSCULAR; INTRAVENOUS PRN
Status: CANCELLED | OUTPATIENT
Start: 2023-07-19

## 2023-06-29 RX ORDER — 0.9 % SODIUM CHLORIDE 0.9 %
10 VIAL (ML) INJECTION PRN
Status: CANCELLED | OUTPATIENT
Start: 2023-07-18

## 2023-06-29 RX ORDER — 0.9 % SODIUM CHLORIDE 0.9 %
3 VIAL (ML) INJECTION PRN
Status: CANCELLED | OUTPATIENT
Start: 2023-07-19

## 2023-06-29 RX ORDER — 0.9 % SODIUM CHLORIDE 0.9 %
VIAL (ML) INJECTION PRN
Status: CANCELLED | OUTPATIENT
Start: 2023-07-19

## 2023-06-29 RX ORDER — PROCHLORPERAZINE MALEATE 10 MG
10 TABLET ORAL EVERY 6 HOURS PRN
Status: CANCELLED | OUTPATIENT
Start: 2023-07-19

## 2023-06-29 RX ORDER — 0.9 % SODIUM CHLORIDE 0.9 %
3 VIAL (ML) INJECTION PRN
Status: CANCELLED | OUTPATIENT
Start: 2023-07-18

## 2023-06-29 RX ORDER — SODIUM CHLORIDE 9 MG/ML
INJECTION, SOLUTION INTRAVENOUS CONTINUOUS
Status: CANCELLED | OUTPATIENT
Start: 2023-07-19

## 2023-06-29 RX ORDER — EPINEPHRINE 1 MG/ML(1)
0.5 AMPUL (ML) INJECTION PRN
Status: CANCELLED | OUTPATIENT
Start: 2023-07-19

## 2023-06-29 RX ORDER — DIPHENHYDRAMINE HYDROCHLORIDE 50 MG/ML
50 INJECTION INTRAMUSCULAR; INTRAVENOUS PRN
Status: CANCELLED | OUTPATIENT
Start: 2023-07-19

## 2023-06-29 RX ORDER — 0.9 % SODIUM CHLORIDE 0.9 %
VIAL (ML) INJECTION PRN
Status: CANCELLED | OUTPATIENT
Start: 2023-07-18

## 2023-06-29 RX ORDER — ONDANSETRON 8 MG/1
8 TABLET, ORALLY DISINTEGRATING ORAL PRN
Status: CANCELLED | OUTPATIENT
Start: 2023-07-19

## 2023-06-29 ASSESSMENT — ENCOUNTER SYMPTOMS
BRUISES/BLEEDS EASILY: 0
COUGH: 0
HEARTBURN: 0
CHILLS: 0
NECK PAIN: 0
MEMORY LOSS: 1
ORTHOPNEA: 0
FOCAL WEAKNESS: 0
DIZZINESS: 0
TREMORS: 0
ABDOMINAL PAIN: 0
WHEEZING: 0
FEVER: 0
TINGLING: 0
SPUTUM PRODUCTION: 0
SORE THROAT: 0
DEPRESSION: 0
NAUSEA: 0
SHORTNESS OF BREATH: 0
VOMITING: 0
PALPITATIONS: 0
BLURRED VISION: 0
SENSORY CHANGE: 0
HEADACHES: 0
WEIGHT LOSS: 1

## 2023-06-29 ASSESSMENT — PAIN SCALES - GENERAL: PAINLEVEL: NO PAIN

## 2023-06-29 ASSESSMENT — FIBROSIS 4 INDEX
FIB4 SCORE: 0.83
FIB4 SCORE: 0.83

## 2023-06-29 NOTE — RESEARCH NOTE
I/E criteria/Screening/Consent note:     1) Inclusion criteria:  Age ?30 years within 30 days of enrollment: Yes  Able and willing to provide blood samples per protocol: Yes  Able to comprehend and willing to sign and date the informed consent and HIPAA Authorization documents: Yes  Able and willing to allow their retrospective and prospective data to be utilized for study purposes: Yes  Site/Sponsor has access to subject’s health information including past diagnoses, medications, and procedures and a minimum of 1 encounter in the site’s EHR system in the past 12 months: Yes  Group: Lung  Cancer addendum: 1  Does the subject have a confirmed cancer diagnosis or have a presumptive cancer diagnosis / high clinical suspicion? Confirmed    Lung Group:  Subject must be diagnosed with pathologically-confirmed lung cancer (i.e., adenocarcinoma, squamous cell carcinoma, large cell carcinoma or small cell carcinoma) or have a presumptive diagnosis of or high clinical suspicion for the same by imaging (e.g., CT, MRI or PET) and have not yet received any cancer treatment (including but not limited to surgery, chemotherapy, and/or radiation). -Yes    Exclusion criteria:  Any history of solid organ or bone marrow transplantation: No   Any physical trauma or surgery requiring inpatient overnight hospitalization in the 30 days preceding enrollment: No   Received a blood transfusion in the 30 days preceding enrollment: No   A medical condition that, in the opinion of the Investigator, should preclude enrollment in the study: No    Known to be pregnant: No   Any therapy for cancer, including but not limited to surgery, chemotherapy, biologic therapy, immunotherapy, and/or radiation therapy in the 5 years preceding enrollment: No    Participated or currently participating in a clinical research study in which an experimental medication has been administered during the 30 days preceding enrollment: No    Participated or currently  participating in another [facility]-sponsored clinical study: No     Lung Group: Any previous cancer diagnosis (with the exception of basal cell skin cancer or squamous cell skin cancer) in the 5 years preceding enrollment, apart from the current lung cancer diagnosis  OR recurrence of the same primary cancer within any timeframe;  OR concurrent diagnosis of multiple primary cancers within any timeframe. -No     Does patient qualify? Yes    2) Participation in the [facility] clinical trial was discussed with the patient today. All aspects of the study purpose and procedures were explained.  They were given ample time to review the consent and all questions were answered to thier satisfaction. Patient aware that the clinical trial is voluntary and they may withdraw consent at any time without affecting the level of care they receive.  Subject signed consent without coercion and undue influence and was given a copy of the signed consent. No study-related procedures took place prior to consenting and all assessments were conducted per protocol.  Did patient accept stipend?: Yes  Did the subject consent to the blood samples being used for future research? Yes  Did the subject consent to the medical information being used for future research? Yes  Did the subject consent to being re-contacted in the future regarding other studies or to provide feedback on this study? Yes  Master Informed Consent Version: 3  Protocol Version: 2    3) Study specimen was drawn prior to standard care cancer treatment:    Accession Number: [id number]   Date of collection: [id number]   Start time: 1109    End time: 1113   Phlebotomist initials: MM    Subject has a confirmed cancer diagnosis  Patient meets all eligibility criteria  Subject did not experience any adverse events or serious adverse events during today's study visit or blood draw  Study specimen was drawn prior to standard care cancer treatment    Was the medical history  questionnaire completed by the patient? Yes

## 2023-06-29 NOTE — PROGRESS NOTES
Latrice Andersen is a 67 y.o. female here for a non-provider visit for: Lab Draws  on 6/29/2023 at 11:14 AM    Procedure Performed: Venipuncture     Anatomical site: Right Antecubital Area (AC)    Equipment used: 25 butterfly    Labs drawn: [facility], hematology    Ordering Provider: Dr. Nancy Arias By: Karthik Beckford

## 2023-06-29 NOTE — PROGRESS NOTES
Consult Note: Hematology/Oncology     Primary Care:  Kermit Bergman M.D.    Chief Complaint   Patient presents with    New Patient     NSCLC adenocarcinoma of left lung        Current Treatment: None    Prior Treatment: None    Subjective:   History of Presenting Illness:  Latrice Andersen is a 67 y.o. female with a PMHx of Breast Cancer in 1995 (s/p RT and Chemo) with a new diagnosis of NSCLC Adenocarcinoma of the left lung.     Patient reports that last May 2022, she went to [place] and was feeling very sick, weak and had dysuria.  She came home and went to the ER, and was found to have a UTI.  She was found to be anemic, which prompted her to get a Colonoscopy/EGD.  She was told to get a CXR, but chose not to get it done at that time. She waited to Feb 2023,  which showed 21 mm density in or overlying the left, not visualized on the previous exam. This prompted her to get a CT CAP 3/2023, 2.7 x 2.5 cm subpleural lingular mass as well as  4 mm left lower lobe pulmonary nodule and 3 mm right lower lobe pulmonary nodule.    She had a PET scan 6/6/2023, which showed hypermetabolic mass in the left upper lobe mass consistent with malignancy, as well as an increase uptake in the L parasternal region vs internal mammary LN. No evidence of metastatic disease elsewhere.     6/12/23, biopsy of the ANSHUL mass was preformed which showed malignancy, pulmonary adenocarcinoma.    MRI brain on 6/20/23, shows an incidental meningioma, but a dural met cannot be excluded.      Sylvie is taking care of her 5 yo granddaughter - Bill (her daughter in laws mother has her other grandbaby, Bassam).  She did office work prior.  She has 2 boys (Ace, Mary). She lives in [facility] with Wander her , she has fair support.      She says she feels fine, washes the care, mows the lawn, does all of her housework.      She smoked 5 cig/day, and started when she was 17.  She has not smoked since last Thursday.  No alcohol. No  ELY.      Her grandfather, uncle, and cousin  of lung cancer.    Past Medical History:   Diagnosis Date    Allergy, unspecified not elsewhere classified     Breast cancer (HCC)     Chickenpox     Dental disorder     upper partial    Former smoker 2023    High cholesterol     History of breast cancer 2017    History of radiation therapy 2023    To left chest for breast cancer     Hyperlipidemia     Hypertension     Left upper lobe pulmonary nodule 2023    Tonsillitis         Past Surgical History:   Procedure Laterality Date    OH BRONCHOSCOPY,DIAGNOSTIC N/A 2023    Procedure: FIBER OPTIC BRONCHOSCOPY WITH  WASH, BRUSH, BRONCHOALVEOLAR LAVAGE, BIOPSY AND FINE NEEDLE ASPIRATION, ENDOBRONCHIAL ULTRASOUND & NAVIGATION, ROBOTICS;  Surgeon: Brooke Perrin M.D.;  Location: SURGERY [facility];  Service: Pulmonary Robotic    ENDOBRONCHIAL US ADD-ON N/A 2023    Procedure: ENDOBRONCHIAL ULTRASOUND (EBUS);  Surgeon: Brooke Perrin M.D.;  Location: SURGERY [facility];  Service: Pulmonary Robotic    MASTECTOMY      Left    OH CHEMOTHERAPY, UNSPECIFIED PROCEDURE      OH RADIATION THERAPY PLAN SIMPLE         Social History     Tobacco Use    Smoking status: Every Day     Packs/day: 0.25     Years: 45.00     Pack years: 11.25     Types: Cigarettes     Last attempt to quit: 2022     Years since quittin.0     Passive exposure: Current (Spouse)    Smokeless tobacco: Former     Quit date: 2022    Tobacco comments:     5 daily    Vaping Use    Vaping Use: Never used   Substance Use Topics    Alcohol use: No    Drug use: No        Family History   Problem Relation Age of Onset    Cancer Mother         breast cancer/Breast    Breast Cancer Mother     Hypertension Maternal Uncle     Cancer Maternal Uncle     Hypertension Maternal Grandmother     Hyperlipidemia Maternal Grandmother     Heart Disease Maternal Grandmother     Cancer Paternal Grandfather         Lung  ca, smoker    Lung Cancer Maternal Grandfather     Alzheimer's Disease Maternal Aunt     Kidney Disease Maternal Aunt     Diabetes Neg Hx        Allergies   Allergen Reactions    Codeine Unspecified     Memory issues       Current Outpatient Medications   Medication Sig Dispense Refill    metoprolol SR (TOPROL XL) 25 MG TABLET SR 24 HR Take 1 Tablet by mouth every day. 100 Tablet 3    pravastatin (PRAVACHOL) 40 MG tablet Take 1 Tablet by mouth every day. 100 Tablet 3    venlafaxine (EFFEXOR) 75 MG Tab Take 1 Tablet by mouth every day. 90 Tablet 1    lisinopril (PRINIVIL) 10 MG Tab Take 1 Tablet by mouth every day. 100 Tablet 3    fenofibrate micronized (LOFIBRA) 134 MG capsule TAKE 1 CAPSULE BY MOUTH EVERY DAY 90 Capsule 3    therapeutic multivitamin-minerals (THERAGRAN-M) Tab Take 1 Tablet by mouth every day.       No current facility-administered medications for this encounter.       Review of Systems   Constitutional:  Positive for weight loss. Negative for chills, fever and malaise/fatigue.   HENT:  Negative for congestion, ear pain, nosebleeds and sore throat.         Hoarseness    Eyes:  Negative for blurred vision.   Respiratory:  Negative for cough, sputum production, shortness of breath and wheezing.    Cardiovascular:  Negative for chest pain, palpitations, orthopnea and leg swelling.   Gastrointestinal:  Negative for abdominal pain, heartburn, nausea and vomiting.   Genitourinary:  Negative for dysuria, frequency and urgency.        Nocturia   Musculoskeletal:  Negative for neck pain.   Neurological:  Negative for dizziness, tingling, tremors, sensory change, focal weakness and headaches.   Endo/Heme/Allergies:  Does not bruise/bleed easily.   Psychiatric/Behavioral:  Positive for memory loss. Negative for depression and suicidal ideas.         Anxiety   All other systems reviewed and are negative.      Problem list, medications, and allergies reviewed by myself today in Epic.     Objective:     Vitals:  "   06/29/23 1112   BP: 138/76   Pulse: 93   Resp: 18   Temp: 36.6 °C (97.9 °F)   TempSrc: Temporal   SpO2: 94%   Weight: 56.1 kg (123 lb 10.9 oz)   Height: 1.6 m (5' 2.99\")       DESC; KARNOFSKY SCALE WITH ECOG EQUIVALENT: 100, Fully active, able to carry on all pre-disease performed without restriction (ECOG equivalent 0)    DISTRESS LEVEL: mild distress    Physical Exam  Constitutional:       General: She is not in acute distress.     Appearance: Normal appearance. She is not ill-appearing.      Comments: Tearful throughout exam   HENT:      Head: Normocephalic and atraumatic.      Nose: Nose normal.      Mouth/Throat:      Mouth: Mucous membranes are moist.      Pharynx: No oropharyngeal exudate or posterior oropharyngeal erythema.   Eyes:      General: No scleral icterus.     Conjunctiva/sclera: Conjunctivae normal.      Pupils: Pupils are equal, round, and reactive to light.   Cardiovascular:      Rate and Rhythm: Normal rate and regular rhythm.      Pulses: Normal pulses.      Heart sounds: Normal heart sounds. No murmur heard.     No friction rub. No gallop.   Pulmonary:      Effort: Pulmonary effort is normal. No respiratory distress.      Breath sounds: Normal breath sounds. No stridor. No wheezing, rhonchi or rales.   Chest:      Chest wall: No tenderness.   Abdominal:      General: Abdomen is flat. Bowel sounds are normal. There is no distension.      Palpations: Abdomen is soft. There is no mass.      Tenderness: There is no abdominal tenderness. There is no guarding.   Musculoskeletal:         General: No swelling, tenderness or deformity. Normal range of motion.      Cervical back: Normal range of motion and neck supple. No rigidity or tenderness.      Right lower leg: No edema.      Left lower leg: No edema.   Skin:     General: Skin is warm and dry.      Coloration: Skin is not jaundiced or pale.      Findings: No bruising, erythema or rash.   Neurological:      General: No focal deficit present.     "  Mental Status: She is alert and oriented to person, place, and time. Mental status is at baseline.      Sensory: No sensory deficit.      Motor: No weakness.      Coordination: Coordination normal.      Gait: Gait normal.   Psychiatric:         Mood and Affect: Mood normal.         Behavior: Behavior normal.         Thought Content: Thought content normal.         Judgment: Judgment normal.         Labs:   Most recent labs reviewed.  Cr is high with GRF of 58    Imaging:   Most recent images below have been independently reviewed by me.     6/20/23: Brain MRI   1.  There is an approximately 8 mm sized enhancing extra-axial lesion noted along the roof of the right orbit. This lesion likely represent an incidental meningioma. However the possibility of dural based metastasis cannot be excluded. Follow-up study is   recommended.  2.  Mild chronic microvascular ischemic disease.    06/06/2023 PET SCAN  1.  Intense increased activity corresponding to LEFT upper lobe mass consistent with malignancy.  2.  Focal uptake in the LEFT parasternal region which may indicate pleural versus internal mammary lymph node metastasis.  3.  No other evidence for metastatic disease.  Prior LEFT mastectomy.    Pathology:  FINAL DIAGNOSIS:     A. Fine needle aspiration, left upper lung lobe mass:          Positive for malignancy, pulmonary adenocarcinoma, nonmucinous           as sampled   B. Lung, left upper lobe mass, core biopsy:          Pulmonary adenocarcinoma, nonmucinous as sampled   C. Lung, left upper lobe mass, biopsy:          Pulmonary adenocarcinoma, nonmucinous as sampled   D. Bronchoalveolar lavage, left upper lobe:          Rare malignant cell cluster on the Thin Prep slide     Assessment/Plan:      Cancer Staging   NSCLC of left lung (HCC)  Staging form: Lung, AJCC 8th Edition  - Clinical stage from 6/29/2023: Stage IIIA (cT1c, cN2, cM0) - Signed by Nancy Campbell M.D. on 6/29/2023       Ms. Andersen is a 66 yo F  with a new diagnosis of adenocarcinoma of the ANSHUL.      First, we discussed the stage and type of their cancer.  Based on the pathology results, which confirms adenocaricnoma, and the CT imaging results showing lesions in the ANSHUL and the parasternal LN, patient likely has Stage IIIA (cT1c, N2, M0) disease.   We discussed the results of her brain MRI shows a potential meningioma and this is less likely a metastatic lesion from the known lung cancer; although we will continue to monitor closely.     We discussed the natural history of stage IIIA adenocarcinoma as well as overall survival data in patients who undergo treatment.  We discussed that the goal of treatment in Stage IIIA disease is cure.     Today we discussed that based on Checkmate 816 trial, I recommend going forward with chemotherapy and immunotherapy prior to surgery.     Regiment would consist of Carbo vs Cis, Pemetrexed and Nivolumab.  We discussed that based on her kidney function, carboplatin would be a good option for her and she agrees.    We discussed that the side effects of platinum based chemo can consist of, but are not limited to, blood in stools and urine; burning, numbness and tingling; change in frequency of urination; cough or hoarseness; shortness of breath; dizziness; drowsiness; ringing in the eats; loss of appetite; loss of balance; loss of hearing; back/side pain; NVD; weakness.     We discussed the mechanism of action of immunotherapy.  We discussed that this medication can cause severe, life-threatening immune mediated adverse reactions.  These immune mediated reactions can include but are not limited to pneumonitis, colitis, hepatitis, myocarditis, endocrinopathies, exfoliative dermatological conditions, renal failure, and nephritis and ocular toxicities.  Most of these reactions occur during the treatments that eczema can occur weeks to months after the discontinuation of therapy.  We discussed in the setting of immune mediated  reactions, I would hold the immunotherapy and or the agent would be permanently discontinued.  We discussed that high-dose systemic corticosteroids can be initiated for immune mediated reactions.    We also discussed that this agent may cause life-threatening diarrhea.  Patient may require IV hydration.  This medication can cause life-threatening endocrinopathies.  Systemic peripheral steroids and appropriate hormone replacement can be initiated in symptomatic patients.  We discussed that this drug may cause an autoimmune process similar to that of type 1 diabetes with ketoacidosis.  Blood glucose will be monitored prior to each dose of immunotherapy.  Patients who develop this immune mediated reaction may require insulin replacement.Also discussed that infusion reactions may occur with administration.  Patient will be monitored for fever, chills, flushing, itching, muscle pain.  This agent may also cause pneumonitis.  Explained to the patient that prior to each dose of immunotherapy I will be getting a CBC to monitor for any immune mediated hematological issues, CMP to monitor liver function, renal function, glucose.  I will also be monitoring TSH for hyper or hypothyroidism.  We also will be monitoring closely for signs and symptoms of hypophysitis and adrenal insufficiency.    Following surgery, there may be several adjuvant options for stage IIIA NSCLC. We discussed various options of  adjuvant systemic therapy based on next generation sequencing results.  I explained to the patient that I will send the most recent pathology off for NGS which will inform me if he has any targetable mutations. Currently, EGFR inhibitor osimertinib is approved in the adjuvant setting based on the ADAURA trial.      Ultimately, we will need to wait for the results of her mutational status to start immunotherapy.     Plan  -start NAC with carbo/pemetrexed and Nivo (if EGFR/Alk negative)  -f/u with Dr. Ganser for surgical  planning  -chemo education  -liquid biopsy for mutational assessment  -foundation 1 for PDL1    Regimen  21-day cycle for 3 cycles  Nivolumab 360 mg IV over 30 minutes on day 1 followed by  Pemetrexed 500mg per metered squared IV   Carboplatin 5-6 AUC IV over 30 minutes on day 1    Reference  NCCN Guidelines for NSCLC V.1.2023  Steph FIELDS et al. [facility] 2022; 386(21): 0826-4524      No follow-ups on file.     Any questions and concerns raised by the patient were addressed and answered. Patient denies any further questions.  Patient encouraged to call the office with any concerns or issues.     Nancy Campbell M.D.  Hematology/Oncology      63 min spent on this case

## 2023-06-29 NOTE — CONSULTS
RADIATION ONCOLOGY CONSULT    Patient name:  Latrice Andersen    Primary Physician:  Kermit Bergman M.D. MRN: [id number]  CSN: [id number]   Referring physician:  Brooke Perrin, *  : 1955, 67 y.o.     DATE OF SERVICE: 2023    IDENTIFICATION: A 67 y.o. female with   NSCLC of left lung (HCC)  Staging form: Lung, AJCC 8th Edition  - Clinical stage from 2023: Stage IIIB (cT1c, cN3, cM0) - Signed by Cleveland Melchor M.D. on 2023  Histopathologic type: Adenocarcinoma, NOS  Stage prefix: Initial diagnosis        She is here at the kind request of Brooke Anglin, *        HISTORY OF PRESENT ILLNESS:  Subjective     Ms. Andersen is a 67-year-old lady who presents today to discuss treatment options for her recently diagnosed non-small cell lung cancer.  In brief, her recent history dates back to February of this year when a chest x-ray noted a 2 cm density in her overlying the left chest.  This prompted further work-up with a CT of the chest that identified a 2.7 cm lingular mass with patchy adjacent groundglass opacities, as well as what appears to be either a pleural based parasternal lesion or a internal mammary node.    Because of her prior history of radiation and breast cancer, there was some consideration given to posttreatment effects, and therefore a PET scan was done that confirmed intense FDG avidity in the left upper lobe mass consistent with malignancy, as well as focal uptake in the left IMN node.  This resulted in a follow-up CT chest, followed by a bronchoscopy, endobronchial ultrasound and biopsy.  The left upper lobe mass biopsy was positive for pulmonary adenocarcinoma, no obvious adenopathy was noted.  She had completion brain MRI that noted an 8 mm extra-axial lesion in the roof of the right orbit likely an incidental meningioma, and no other obvious concerning findings.    She had PFTs performed subsequent to that that identified DLCO of 97%, and some  hyperinflation, but otherwise relatively normal PFTs.  She now comes to review all of these findings and neck steps and treatment options.    She is a longtime recent smoker, quarter pack per day x45 years, quit about 1 week ago.  Of note, her first cousin  of lung cancer 2 years ago, maternal breast cancer, and paternal grandfather with lung cancer.  She has a personal history of left-sided breast cancer treated in  with mastectomy followed by chemotherapy and radiation at that time.        PROBLEM LIST:  Patient Active Problem List   Diagnosis    Dyslipidemia    Anxiety disorder    Status post mastectomy    Essential hypertension    History of breast cancer    Stage 3a chronic kidney disease (HCC)    Other fatigue    Dysuria    Other specified anemias    Left upper lobe pulmonary nodule    History of radiation therapy    Former smoker    NSCLC of left lung (HCC)        PAST SURGICAL HISTORY:  Past Surgical History:   Procedure Laterality Date    UT BRONCHOSCOPY,DIAGNOSTIC N/A 2023    Procedure: FIBER OPTIC BRONCHOSCOPY WITH  WASH, BRUSH, BRONCHOALVEOLAR LAVAGE, BIOPSY AND FINE NEEDLE ASPIRATION, ENDOBRONCHIAL ULTRASOUND & NAVIGATION, ROBOTICS;  Surgeon: Brooke Perrin M.D.;  Location: SURGERY [facility];  Service: Pulmonary Robotic    ENDOBRONCHIAL US ADD-ON N/A 2023    Procedure: ENDOBRONCHIAL ULTRASOUND (EBUS);  Surgeon: Brooke Perrin M.D.;  Location: SURGERY [facility];  Service: Pulmonary Robotic    MASTECTOMY      Left    UT CHEMOTHERAPY, UNSPECIFIED PROCEDURE      UT RADIATION THERAPY PLAN SIMPLE         CURRENT MEDICATIONS:  Current Outpatient Medications   Medication Sig Dispense Refill    metoprolol SR (TOPROL XL) 25 MG TABLET SR 24 HR Take 1 Tablet by mouth every day. 100 Tablet 3    pravastatin (PRAVACHOL) 40 MG tablet Take 1 Tablet by mouth every day. 100 Tablet 3    venlafaxine (EFFEXOR) 75 MG Tab Take 1 Tablet by mouth every day. 90 Tablet 1    lisinopril  "(PRINIVIL) 10 MG Tab Take 1 Tablet by mouth every day. 100 Tablet 3    fenofibrate micronized (LOFIBRA) 134 MG capsule TAKE 1 CAPSULE BY MOUTH EVERY DAY 90 Capsule 3    therapeutic multivitamin-minerals (THERAGRAN-M) Tab Take 1 Tablet by mouth every day.       No current facility-administered medications for this encounter.       ALLERGIES:    Codeine    FAMILY HISTORY:    family history includes Alzheimer's Disease in her maternal aunt; Breast Cancer in her mother; Cancer in her maternal uncle, mother, and paternal grandfather; Heart Disease in her maternal grandmother; Hyperlipidemia in her maternal grandmother; Hypertension in her maternal grandmother and maternal uncle; Kidney Disease in her maternal aunt; Lung Cancer in her maternal grandfather.    SOCIAL HISTORY:     reports that she has been smoking cigarettes. She has a 11.25 pack-year smoking history. She has been exposed to tobacco smoke. She quit smokeless tobacco use about a year ago. She reports that she does not drink alcohol and does not use drugs.  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    REVIEW OF SYSTEMS:    A complete review of systems taken. Pertinent items in HPI. All others negative.    PHYSICAL EXAM:    PERFORMANCE STATUS:      6/29/2023     9:54 AM   ECOG Performance Review   ECOG Performance Status Fully active, able to carry on all pre-disease performance without restriction         6/29/2023     9:54 AM   Karnofsky Score   Karnofsky Score 100     /76   Pulse 93   Temp 36.6 °C (97.8 °F)   Resp 18   Ht 1.6 m (5' 3\")   Wt 56.1 kg (123 lb 10.9 oz)   SpO2 94%   BMI 21.91 kg/m²   Physical Exam  Constitutional:       Appearance: Normal appearance.   HENT:      Head: Normocephalic and atraumatic.   Eyes:      Extraocular Movements: Extraocular movements intact.      Conjunctiva/sclera: Conjunctivae normal.   Cardiovascular:      Rate and Rhythm: Normal " rate and regular rhythm.   Pulmonary:      Effort: Pulmonary effort is normal.      Breath sounds: Normal breath sounds.   Abdominal:      General: Abdomen is flat.      Palpations: Abdomen is soft.   Musculoskeletal:         General: Normal range of motion.   Neurological:      General: No focal deficit present.      Mental Status: She is alert and oriented to person, place, and time.          LABORATORY DATA:   Lab Results   Component Value Date/Time    WBC 8.4 02/07/2023 02:36 PM    RBC 4.37 02/07/2023 02:36 PM    HEMOGLOBIN 12.6 02/07/2023 02:36 PM    HEMATOCRIT 40.3 02/07/2023 02:36 PM    MCV 92.2 02/07/2023 02:36 PM    MCH 28.8 02/07/2023 02:36 PM    MCHC 31.3 (L) 02/07/2023 02:36 PM    RDW 51.8 (H) 02/07/2023 02:36 PM    PLATELETCT 403 02/07/2023 02:36 PM    MPV 9.5 02/07/2023 02:36 PM    NEUTSPOLYS 52.10 02/07/2023 02:35 PM    LYMPHOCYTES 35.50 02/07/2023 02:35 PM    MONOCYTES 8.10 02/07/2023 02:35 PM    EOSINOPHILS 2.60 02/07/2023 02:35 PM    BASOPHILS 1.20 02/07/2023 02:35 PM    HYPOCHROMIA 1+ 05/16/2013 11:50 AM      Lab Results   Component Value Date/Time    SODIUM 139 06/12/2023 02:35 PM    POTASSIUM 4.1 06/12/2023 02:35 PM    CHLORIDE 108 06/12/2023 02:35 PM    CO2 20 06/12/2023 02:35 PM    GLUCOSE 100 (H) 06/12/2023 02:35 PM    BUN 30 (H) 06/12/2023 02:35 PM    CREATININE 1.05 06/12/2023 02:35 PM    CREATININE 0.7 08/21/2007 12:37 PM           RADIOLOGY DATA:  CT-CHEST (THORAX) W/O    Result Date: 6/12/2023  1.  2.4 x 3.0 cm mass within the lingula unchanged. 2.  4.4 mm left lower lobe nodule and smaller nodules unchanged. 3.  No new infiltrates or pleural effusions. 4.  Surgical absence left breast. Nodule greater than 8 mm: Fleischner Society pulmonary nodule recommendations: Low and High Risk: Consider CT at 3 months, PET/CT, or tissue sampling. Low Risk - Minimal or absent history of smoking and of other known risk factors. High Risk - History of smoking or of other known risk factors. Note: These  recommendations do not apply to lung cancer screening, patients with immunosuppression, or patients with known primary cancer. Fleischner Society 2017 Guidelines for Management of Incidentally Detected Pulmonary Nodules in Adults     DX-CHEST-LIMITED (1 VIEW)    Result Date: 6/12/2023  1.  No significant change from prior exam obtained earlier same day. 2.  Ill-defined LEFT mid lung opacity is unchanged. 3.  No pneumothorax.    DX-CHEST-LIMITED (1 VIEW)    Result Date: 6/12/2023  1.  No acute cardiac or pulmonary abnormalities are identified. No pneumothorax seen. 2.  Mildly increased size of the left midlung nodule.    MR-BRAIN-WITH & W/O    Result Date: 6/20/2023  1.  There is an approximately 8 mm sized enhancing extra-axial lesion noted along the roof of the right orbit. This lesion likely represent an incidental meningioma. However the possibility of dural based metastasis cannot be excluded. Follow-up study is  recommended. 2.  Mild chronic microvascular ischemic disease.    DX-PORTABLE FLUORO > 1 HOUR    Result Date: 6/13/2023  Portable fluoroscopy utilized for 1 minute 3 seconds. INTERPRETING LOCATION: [address] [place] [address], [id number]    IF-[id number]-[id number] BASE TO MID-THIGH    Result Date: 6/6/2023  1.  Intense increased activity corresponding to LEFT upper lobe mass consistent with malignancy. 2.  Focal uptake in the LEFT parasternal region which may indicate pleural versus internal mammary lymph node metastasis. 3.  No other evidence for metastatic disease.  Prior LEFT mastectomy.      IMPRESSION:    A 67 y.o. with  NSCLC of left lung (HCC)  Staging form: Lung, AJCC 8th Edition  - Clinical stage from 6/29/2023: Stage IIIB (cT1c, cN3, cM0) - Signed by Cleveland Melchor M.D. on 6/29/2023  Histopathologic type: Adenocarcinoma, NOS  Stage prefix: Initial diagnosis        RECOMMENDATIONS:   I reviewed with Ms. Hammond the recent findings from her diagnosis of left-sided non-small cell lung cancer.  She has a very  unusual finding and what appears to be an IMN jeni metastasis.  Technically by AJCC staging, this is an M1 site, though some consideration has to be given to her prior mastectomy and left-sided chest wall radiation that may have altered her lymphatics chronically resulting in an unusual drainage pattern.  I reviewed the scan, and also discussed the case with my colleague Dr. Ganser to assess resectability of both the primary lesion as well as IMN node, and he thinks this may be amenable to resection.  Therefore, particularly given her left-sided prior radiation, the fact that she had relatively cardiotoxic chemotherapy already, she would ideally be better served by avoiding radiation and the definitive management of her current lung cancer, and I favor proceeding with neoadjuvant chemotherapy and immunotherapy followed by definitive resection, assuming this is technically doable.    While in the setting of what is technically stage IV disease this would be relatively aggressive treatment, she essentially has an intrathoracic jeni metastasis only, and does not have any distant metastatic disease, and given her prior surgery and radiation in this area, and the fact that she is doing well from a PFT standpoint as well as an overall performance status standpoint, I think it is reasonable to proceed with relatively aggressive curative intent treatment in this case.  She will plan to see Dr. Ganser in the next few days, and will continue and start systemic treatment for her neoadjuvant course of treatment with Dr. Campbell.    If for some reason after restaging scans after her neoadjuvant treatment or during Intra-op she is felt to be no longer resectable, then we can certainly alter the plan and plan to proceed with definitive chemoradiation, and I reviewed the logistics, and the acute and long-term toxicities of a primary chemoradiation based approach with her briefly today.  We will plan to review this again if it  becomes necessary.  No follow-ups with radiation oncology for now.    Thank you for the opportunity to participate in her care.  If any questions or comments, please do not hesitate in calling.  Approximately 65 minutes were spent on this visit, including face-to-face visit, imaging and records review, coordination with thoracic surgeon medical oncology, and postvisit documentation.    Orders Placed This Encounter    Referral to Oncology Psychosocial Screening for Distress

## 2023-06-29 NOTE — PROGRESS NOTES
"Patient was seen today in clinic with Dr. Melchor for consult.  Vitals signs and weight were obtained and pain assessment was completed.  Allergies and medications were reviewed with the patient.       Vitals/Pain:  Vitals:    06/29/23 0950   BP: 138/76   Pulse: 93   Resp: 18   Temp: 36.6 °C (97.8 °F)   SpO2: 94%   Weight: 56.1 kg (123 lb 10.9 oz)   Height: 1.6 m (5' 3\")   Pain Score: No pain        Allergies:   Codeine    Current Medications:  Current Outpatient Medications   Medication Sig Dispense Refill    metoprolol SR (TOPROL XL) 25 MG TABLET SR 24 HR Take 1 Tablet by mouth every day. 100 Tablet 3    pravastatin (PRAVACHOL) 40 MG tablet Take 1 Tablet by mouth every day. 100 Tablet 3    venlafaxine (EFFEXOR) 75 MG Tab Take 1 Tablet by mouth every day. 90 Tablet 1    lisinopril (PRINIVIL) 10 MG Tab Take 1 Tablet by mouth every day. 100 Tablet 3    fenofibrate micronized (LOFIBRA) 134 MG capsule TAKE 1 CAPSULE BY MOUTH EVERY DAY 90 Capsule 3    therapeutic multivitamin-minerals (THERAGRAN-M) Tab Take 1 Tablet by mouth every day.       No current facility-administered medications for this encounter.         PCP:  Pacheco Vazquez R.N.   "

## 2023-06-30 ENCOUNTER — DOCUMENTATION (OUTPATIENT)
Dept: RADIATION ONCOLOGY | Facility: MEDICAL CENTER | Age: 68
End: 2023-06-30
Payer: MEDICARE

## 2023-06-30 ENCOUNTER — PATIENT OUTREACH (OUTPATIENT)
Dept: ONCOLOGY | Facility: MEDICAL CENTER | Age: 68
End: 2023-06-30
Payer: MEDICARE

## 2023-06-30 NOTE — PROGRESS NOTES
"Follow up call placed to patient for navigation and DST 9/10.  Pt reports feeing a lot better after talking to oncologists, reviewing information and discussing plan of care.  Pt stating before she was crying whenever she tried to talk about her diagnosis, now she is doing much better.  Talked to her about benefit/resource of counseling, she declined at this time.  Reviewed support group, [id number] phone vale and \"living room\" for support as well.  Will mail these resources for her.  Pt asking about if there was a specific \"diet\" for lung cancer.  Provided general information, mailed [employer] \"Eating Hints\" booklet and referral to oncology dietician placed.  Pt states staff has been very kind and helpful to her, she is glad to know there is a team to support her.  Provided information on financial resource advocate as well.  Gave patient contact information and encouraged her to call if barriers or questions come up.  "

## 2023-06-30 NOTE — PROGRESS NOTES
Nutrition Services: [facility] Referral  Latrice Andersen is a 67 y.o. female with diagnosis of NSCLC of left lung. Referral received for nutrition services. Due to high volume of referrals, please allow for up to 14 days for initial RD consultation per policy. RD will attempt to see at next oncology appointment or will contact per policy for nutrition assessment.    Please contact as needed.  [phone]

## 2023-07-10 ENCOUNTER — APPOINTMENT (OUTPATIENT)
Dept: RADIOLOGY | Facility: MEDICAL CENTER | Age: 68
End: 2023-07-10
Attending: FAMILY MEDICINE
Payer: MEDICARE

## 2023-07-11 ENCOUNTER — TELEPHONE (OUTPATIENT)
Dept: ONCOLOGY | Facility: MEDICAL CENTER | Age: 68
End: 2023-07-11
Payer: MEDICARE

## 2023-07-11 LAB
FUNGUS SPEC CULT: NORMAL
FUNGUS SPEC FUNGUS STN: NORMAL
SIGNIFICANT IND 70042: NORMAL
SITE SITE: NORMAL
SOURCE SOURCE: NORMAL

## 2023-07-11 NOTE — TELEPHONE ENCOUNTER
Nutrition Services: Telephone Encounter     RD received referral for nutrition services. RD able to call and introduce self and services to pt. Pt states is not a great time to talk, though would be happy to get RD contact info and call back at another time. RD agreed, provided direct contact information for follow-up.     Please contact [phone]

## 2023-07-16 ENCOUNTER — OFFICE VISIT (OUTPATIENT)
Dept: URGENT CARE | Facility: CLINIC | Age: 68
End: 2023-07-16
Payer: MEDICARE

## 2023-07-16 VITALS
WEIGHT: 123 LBS | DIASTOLIC BLOOD PRESSURE: 76 MMHG | BODY MASS INDEX: 21 KG/M2 | RESPIRATION RATE: 16 BRPM | HEIGHT: 64 IN | TEMPERATURE: 98 F | OXYGEN SATURATION: 97 % | HEART RATE: 89 BPM | SYSTOLIC BLOOD PRESSURE: 138 MMHG

## 2023-07-16 DIAGNOSIS — J32.9 RHINOSINUSITIS: ICD-10-CM

## 2023-07-16 DIAGNOSIS — R05.1 ACUTE COUGH: ICD-10-CM

## 2023-07-16 LAB
FLUAV RNA SPEC QL NAA+PROBE: NEGATIVE
FLUBV RNA SPEC QL NAA+PROBE: NEGATIVE
RSV RNA SPEC QL NAA+PROBE: NEGATIVE
SARS-COV-2 RNA RESP QL NAA+PROBE: NEGATIVE

## 2023-07-16 PROCEDURE — 3078F DIAST BP <80 MM HG: CPT | Performed by: FAMILY MEDICINE

## 2023-07-16 PROCEDURE — 0241U POCT CEPHEID COV-2, FLU A/B, RSV - PCR: CPT | Performed by: FAMILY MEDICINE

## 2023-07-16 PROCEDURE — 3075F SYST BP GE 130 - 139MM HG: CPT | Performed by: FAMILY MEDICINE

## 2023-07-16 PROCEDURE — 99213 OFFICE O/P EST LOW 20 MIN: CPT | Performed by: FAMILY MEDICINE

## 2023-07-16 RX ORDER — BENZONATATE 100 MG/1
100 CAPSULE ORAL 3 TIMES DAILY PRN
Qty: 30 CAPSULE | Refills: 0 | Status: SHIPPED | OUTPATIENT
Start: 2023-07-16 | End: 2023-08-08

## 2023-07-16 RX ORDER — AMOXICILLIN AND CLAVULANATE POTASSIUM 875; 125 MG/1; MG/1
1 TABLET, FILM COATED ORAL 2 TIMES DAILY
Qty: 14 TABLET | Refills: 0 | Status: SHIPPED | OUTPATIENT
Start: 2023-07-16 | End: 2023-07-23

## 2023-07-16 ASSESSMENT — ENCOUNTER SYMPTOMS
EYE REDNESS: 0
MYALGIAS: 0
NAUSEA: 0
VOMITING: 0
EYE DISCHARGE: 0
WEIGHT LOSS: 0

## 2023-07-16 ASSESSMENT — FIBROSIS 4 INDEX: FIB4 SCORE: 0.83

## 2023-07-16 NOTE — PROGRESS NOTES
"Subjective     Sylvie Mady Andersen is a 67 y.o. female who presents with Nasal Congestion (Headaches / body aches / sx yesterday )            Onset yesterday nasal congestion, myalgia, HA, ST. Productive cough without blood in sputum. No SOB/wheeze. No PMH asthma/pneumonia. +PMH lung cancer. She is scheduled to start chemotherapy this week. No PMH C19. No other aggravating or alleviating factors.        Review of Systems   Constitutional:  Negative for malaise/fatigue and weight loss.   Eyes:  Negative for discharge and redness.   Gastrointestinal:  Negative for nausea and vomiting.   Musculoskeletal:  Negative for joint pain and myalgias.   Skin:  Negative for itching and rash.              Objective     /76 (BP Location: Left arm, Patient Position: Sitting, BP Cuff Size: Adult)   Pulse 89   Temp 36.7 °C (98 °F) (Temporal)   Resp 16   Ht 1.626 m (5' 4\")   Wt 55.8 kg (123 lb)   SpO2 97%   BMI 21.11 kg/m²      Physical Exam  Constitutional:       General: She is not in acute distress.     Appearance: She is well-developed.   HENT:      Head: Normocephalic and atraumatic.      Right Ear: Tympanic membrane normal.      Left Ear: Tympanic membrane normal.      Nose: Congestion present.      Mouth/Throat:      Mouth: Mucous membranes are moist.      Pharynx: No posterior oropharyngeal erythema.      Comments: PND  Eyes:      Conjunctiva/sclera: Conjunctivae normal.   Cardiovascular:      Rate and Rhythm: Normal rate and regular rhythm.      Heart sounds: Normal heart sounds. No murmur heard.  Pulmonary:      Effort: Pulmonary effort is normal.      Breath sounds: Normal breath sounds. No wheezing.   Skin:     General: Skin is warm and dry.      Findings: No rash.   Neurological:      Mental Status: She is alert.                             Assessment & Plan   POCT PCR respiratory panel negative     1. Rhinosinusitis  POCT CoV-2, Flu A/B, RSV by PCR    amoxicillin-clavulanate (AUGMENTIN) 875-125 MG Tab    "   2. Acute cough  benzonatate (TESSALON PERLES) 100 MG Cap    POCT CoV-2, Flu A/B, RSV by PCR        Differential diagnosis, natural history, supportive care, and indications for immediate follow-up were discussed.     Nasal saline, decongestant, nasal corticosteroid    Contingent antibiotic prescription given to patient to fill upon meeting criteria of guidelines discussed.

## 2023-07-18 NOTE — PROGRESS NOTES
"Pharmacy Chemotherapy Calculations    Protocol: Nivolumab + Pemetrexed/Carboplatin  Nivolumab 360 mg IV over 30 minutes followed by  Pemetrexed 500 mg/m2 IV over 10 minutes followed by  Carboplatin AUC 6 IV over 30 minutes   21-day cycle for 3 cycles  NCCN Guidelines for Non-Small Cell Lung Cancer V.1.2023.  Steph PM, et al. N Engl J Med. 2022;386(21):8073-5293.    Allergies:  Codeine       /74   Pulse 92   Temp 36.3 °C (97.4 °F) (Temporal)   Resp 16   Ht 1.638 m (5' 4.5\")   Wt 57.1 kg (125 lb 14.1 oz)   SpO2 94%   BMI 21.27 kg/m²  Body surface area is 1.61 meters squared.    Labs 7/19/23:  ANC~ 3070 Plt = 363k   Hgb = 12.7     SCr = 1.31 mg/dL CrCl ~ 37 mL/min*  AST/ALT/AP = 24/23/56 TBili = <0.2  TSH = 3.51 Free T4 = 1.03  *Ok to proceed with treatment today per APRN, to receive extra hydration    Cyanocobalamin due every 9 weeks, last given 7/19/23    Drug Order   (Drug name, dose, route, IV Fluid & volume, frequency, number of doses) Cycle 1  Previous treatment: n/a     Medication = Nivolumab  Base Dose = 360 mg  Fixed dose, no calc required  Final Dose = 360 mg  Route = IV  Fluid & Volume =  mL  Admin Duration = Over 30 mins          Fixed dose   Medication = Pemetrexed  Base Dose = 500 mg/m2  Calc Dose: Base Dose x 1.61 m2 = 805 mg  Final Dose = 800 mg  Route = IV  Fluid & Volume =  mL  Admin Duration = Over 10 mins          <10% difference, OK to treat with final dose   Medication = Carboplatin  Base Dose = AUC 6  Calc Dose:Base Dose x (37 mL/min + 25) = 372 mg  Final Dose = 340 mg  Route = IV  Fluid & Volume =  mL  Admin Duration = Over 30 mins          <10% difference, OK to treat with final dose     By my signature below, I confirm this process was performed independently with the BSA and all final chemotherapy dosing calculations congruent. I have reviewed the above chemotherapy order and that my calculation of the final dose and BSA (when applicable) corroborate those " calculations of the  pharmacist. Discrepancies of 10% or greater in the written dose have been addressed and documented within the EPIC Progress notes.    Zachery Haley, PharmD

## 2023-07-19 ENCOUNTER — DOCUMENTATION (OUTPATIENT)
Dept: ONCOLOGY | Facility: MEDICAL CENTER | Age: 68
End: 2023-07-19
Payer: MEDICARE

## 2023-07-19 ENCOUNTER — TELEPHONE (OUTPATIENT)
Dept: HEMATOLOGY ONCOLOGY | Facility: MEDICAL CENTER | Age: 68
End: 2023-07-19
Payer: MEDICARE

## 2023-07-19 ENCOUNTER — OUTPATIENT INFUSION SERVICES (OUTPATIENT)
Dept: ONCOLOGY | Facility: MEDICAL CENTER | Age: 68
End: 2023-07-19
Attending: PSYCHIATRY & NEUROLOGY
Payer: MEDICARE

## 2023-07-19 ENCOUNTER — HOSPITAL ENCOUNTER (OUTPATIENT)
Dept: HEMATOLOGY ONCOLOGY | Facility: MEDICAL CENTER | Age: 68
End: 2023-07-19
Attending: STUDENT IN AN ORGANIZED HEALTH CARE EDUCATION/TRAINING PROGRAM
Payer: MEDICARE

## 2023-07-19 VITALS
DIASTOLIC BLOOD PRESSURE: 74 MMHG | HEIGHT: 65 IN | RESPIRATION RATE: 16 BRPM | HEART RATE: 92 BPM | WEIGHT: 125.88 LBS | SYSTOLIC BLOOD PRESSURE: 125 MMHG | BODY MASS INDEX: 20.97 KG/M2 | OXYGEN SATURATION: 94 % | TEMPERATURE: 97.4 F

## 2023-07-19 DIAGNOSIS — C34.92 NSCLC OF LEFT LUNG (HCC): ICD-10-CM

## 2023-07-19 DIAGNOSIS — Z79.899 HIGH RISK MEDICATION USE: ICD-10-CM

## 2023-07-19 LAB
ALBUMIN SERPL BCP-MCNC: 4 G/DL (ref 3.2–4.9)
ALBUMIN/GLOB SERPL: 1.3 G/DL
ALP SERPL-CCNC: 56 U/L (ref 30–99)
ALT SERPL-CCNC: 23 U/L (ref 2–50)
ANION GAP SERPL CALC-SCNC: 9 MMOL/L (ref 7–16)
AST SERPL-CCNC: 24 U/L (ref 12–45)
BASOPHILS # BLD AUTO: 1.2 % (ref 0–1.8)
BASOPHILS # BLD: 0.08 K/UL (ref 0–0.12)
BILIRUB SERPL-MCNC: <0.2 MG/DL (ref 0.1–1.5)
BUN SERPL-MCNC: 37 MG/DL (ref 8–22)
CALCIUM ALBUM COR SERPL-MCNC: 10.5 MG/DL (ref 8.5–10.5)
CALCIUM SERPL-MCNC: 10.5 MG/DL (ref 8.5–10.5)
CHLORIDE SERPL-SCNC: 109 MMOL/L (ref 96–112)
CO2 SERPL-SCNC: 22 MMOL/L (ref 20–33)
CREAT SERPL-MCNC: 1.31 MG/DL (ref 0.5–1.4)
EOSINOPHIL # BLD AUTO: 0.22 K/UL (ref 0–0.51)
EOSINOPHIL NFR BLD: 3.3 % (ref 0–6.9)
ERYTHROCYTE [DISTWIDTH] IN BLOOD BY AUTOMATED COUNT: 48.6 FL (ref 35.9–50)
GFR SERPLBLD CREATININE-BSD FMLA CKD-EPI: 44 ML/MIN/1.73 M 2
GLOBULIN SER CALC-MCNC: 3 G/DL (ref 1.9–3.5)
GLUCOSE SERPL-MCNC: 96 MG/DL (ref 65–99)
HCT VFR BLD AUTO: 40 % (ref 37–47)
HGB BLD-MCNC: 12.7 G/DL (ref 12–16)
IMM GRANULOCYTES # BLD AUTO: 0.04 K/UL (ref 0–0.11)
IMM GRANULOCYTES NFR BLD AUTO: 0.6 % (ref 0–0.9)
LYMPHOCYTES # BLD AUTO: 2.65 K/UL (ref 1–4.8)
LYMPHOCYTES NFR BLD: 40.1 % (ref 22–41)
MCH RBC QN AUTO: 28.5 PG (ref 27–33)
MCHC RBC AUTO-ENTMCNC: 31.8 G/DL (ref 32.2–35.5)
MCV RBC AUTO: 89.9 FL (ref 81.4–97.8)
MONOCYTES # BLD AUTO: 0.55 K/UL (ref 0–0.85)
MONOCYTES NFR BLD AUTO: 8.3 % (ref 0–13.4)
NEUTROPHILS # BLD AUTO: 3.07 K/UL (ref 1.82–7.42)
NEUTROPHILS NFR BLD: 46.5 % (ref 44–72)
NRBC # BLD AUTO: 0 K/UL
NRBC BLD-RTO: 0 /100 WBC (ref 0–0.2)
OUTPT INFUS CBC COMMENT OICOM: ABNORMAL
PLATELET # BLD AUTO: 363 K/UL (ref 164–446)
PMV BLD AUTO: 9.1 FL (ref 9–12.9)
POTASSIUM SERPL-SCNC: 4.4 MMOL/L (ref 3.6–5.5)
PROT SERPL-MCNC: 7 G/DL (ref 6–8.2)
RBC # BLD AUTO: 4.45 M/UL (ref 4.2–5.4)
SODIUM SERPL-SCNC: 140 MMOL/L (ref 135–145)
T4 FREE SERPL-MCNC: 1.03 NG/DL (ref 0.93–1.7)
TSH SERPL DL<=0.005 MIU/L-ACNC: 3.51 UIU/ML (ref 0.38–5.33)
WBC # BLD AUTO: 6.6 K/UL (ref 4.8–10.8)

## 2023-07-19 PROCEDURE — 96411 CHEMO IV PUSH ADDL DRUG: CPT

## 2023-07-19 PROCEDURE — 96413 CHEMO IV INFUSION 1 HR: CPT

## 2023-07-19 PROCEDURE — 700105 HCHG RX REV CODE 258: Mod: JZ | Performed by: STUDENT IN AN ORGANIZED HEALTH CARE EDUCATION/TRAINING PROGRAM

## 2023-07-19 PROCEDURE — 84443 ASSAY THYROID STIM HORMONE: CPT

## 2023-07-19 PROCEDURE — 96375 TX/PRO/DX INJ NEW DRUG ADDON: CPT

## 2023-07-19 PROCEDURE — 700111 HCHG RX REV CODE 636 W/ 250 OVERRIDE (IP): Performed by: STUDENT IN AN ORGANIZED HEALTH CARE EDUCATION/TRAINING PROGRAM

## 2023-07-19 PROCEDURE — 96367 TX/PROPH/DG ADDL SEQ IV INF: CPT

## 2023-07-19 PROCEDURE — 84439 ASSAY OF FREE THYROXINE: CPT

## 2023-07-19 PROCEDURE — 700105 HCHG RX REV CODE 258: Performed by: NURSE PRACTITIONER

## 2023-07-19 PROCEDURE — 85025 COMPLETE CBC W/AUTO DIFF WBC: CPT

## 2023-07-19 PROCEDURE — 96361 HYDRATE IV INFUSION ADD-ON: CPT

## 2023-07-19 PROCEDURE — 80053 COMPREHEN METABOLIC PANEL: CPT

## 2023-07-19 PROCEDURE — 96372 THER/PROPH/DIAG INJ SC/IM: CPT

## 2023-07-19 PROCEDURE — 96417 CHEMO IV INFUS EACH ADDL SEQ: CPT

## 2023-07-19 RX ORDER — 0.9 % SODIUM CHLORIDE 0.9 %
10 VIAL (ML) INJECTION PRN
Status: CANCELLED | OUTPATIENT
Start: 2023-07-27

## 2023-07-19 RX ORDER — CYANOCOBALAMIN 1000 UG/ML
1000 INJECTION, SOLUTION INTRAMUSCULAR; SUBCUTANEOUS
Status: DISCONTINUED | OUTPATIENT
Start: 2023-07-19 | End: 2023-07-19 | Stop reason: HOSPADM

## 2023-07-19 RX ORDER — SODIUM CHLORIDE 9 MG/ML
500 INJECTION, SOLUTION INTRAVENOUS ONCE
Status: COMPLETED | OUTPATIENT
Start: 2023-07-19 | End: 2023-07-19

## 2023-07-19 RX ORDER — 0.9 % SODIUM CHLORIDE 0.9 %
3 VIAL (ML) INJECTION PRN
Status: CANCELLED | OUTPATIENT
Start: 2023-07-27

## 2023-07-19 RX ORDER — SODIUM CHLORIDE 9 MG/ML
1000 INJECTION, SOLUTION INTRAVENOUS ONCE
Status: CANCELLED | OUTPATIENT
Start: 2023-07-27 | End: 2023-07-26

## 2023-07-19 RX ORDER — 0.9 % SODIUM CHLORIDE 0.9 %
VIAL (ML) INJECTION PRN
Status: CANCELLED | OUTPATIENT
Start: 2023-07-27

## 2023-07-19 RX ADMIN — ONDANSETRON HYDROCHLORIDE 16 MG: 2 INJECTION, SOLUTION INTRAMUSCULAR; INTRAVENOUS at 13:30

## 2023-07-19 RX ADMIN — CYANOCOBALAMIN 1000 MCG: 1000 INJECTION, SOLUTION INTRAMUSCULAR; SUBCUTANEOUS at 13:41

## 2023-07-19 RX ADMIN — DEXAMETHASONE SODIUM PHOSPHATE 12 MG: 4 INJECTION, SOLUTION INTRAMUSCULAR; INTRAVENOUS at 13:13

## 2023-07-19 RX ADMIN — FOSAPREPITANT 150 MG: 150 INJECTION, POWDER, LYOPHILIZED, FOR SOLUTION INTRAVENOUS at 13:45

## 2023-07-19 RX ADMIN — SODIUM CHLORIDE 500 ML: 9 INJECTION, SOLUTION INTRAVENOUS at 12:28

## 2023-07-19 RX ADMIN — SODIUM CHLORIDE 360 MG: 9 INJECTION, SOLUTION INTRAVENOUS at 14:22

## 2023-07-19 RX ADMIN — CARBOPLATIN 340 MG: 10 INJECTION INTRAVENOUS at 15:28

## 2023-07-19 RX ADMIN — PEMETREXED DISODIUM 800 MG: 500 INJECTION, POWDER, LYOPHILIZED, FOR SOLUTION INTRAVENOUS at 15:01

## 2023-07-19 ASSESSMENT — FIBROSIS 4 INDEX: FIB4 SCORE: 0.83

## 2023-07-19 NOTE — PROGRESS NOTES
Chemotherapy Verification - PRIMARY RN      Height = 1.638 m  Weight = 57.1 kg  BSA = 1.61 m2       Medication: opdivo  Dose: 360 mg  Calculated Dose: 360mg                             (In mg/m2, AUC, mg/kg)     Medication: alimta  Dose: 500mg/m2  Calculated Dose: 805 mg                             (In mg/m2, AUC, mg/kg)    Medication: carboplatin  Dose: AUC 6  Calculated Dose: 372 mg                            (In mg/m2, AUC, mg/kg)        Carboplatin calculation (if applicable):  (6*(37.068+25)) = 372.408      I confirm this process was performed independently with the BSA and all final chemotherapy dosing calculations congruent.  Any discrepancies of 10% or greater have been addressed with the chemotherapy pharmacist. The resolution of the discrepancy has been documented in the EPIC progress notes.

## 2023-07-19 NOTE — PROGRESS NOTES
Ms Andersen is here today for day 1 cycle 1 of chemotherapy for left lung cancer.     IV was placed in her right AC, labs drawn. CrCL today is 37, below parameters. TORB to Tanya CHAMBERS okay to treat today and to please add 500 ml of NS for hydration.      Chemotherapy education provided, chemo care handouts given. Anh ALEGRE from [facility] also came down to provide education.    Q 9 week B 12 injection given today to right deltoid.   Pre medications given per MAR and were tolerated well.  Chemotherapy was given and was tolerated well.    IV removed intact. Gauze and coban applied to the site.

## 2023-07-19 NOTE — TELEPHONE ENCOUNTER
Patient called back and is agreeable to having her chemo education done in infusion services (as discussed with RN).  Patient stated that she went to urgent care on Sunday, July 16, 2023 and was given an antibiotic and some cough medication.  I verified that both medications are listed in her chart.  Patient on her way to infusion services.

## 2023-07-19 NOTE — PROGRESS NOTES
"Pharmacy Chemotherapy calculation:    DX: NSLC    Cycle 1  Previous treatment = xrt/chemo for h/o breast cancer in 1995    Regimen: nivolumab + pemetrexed/carboplatin  Nivolumab 360 mg IV over 30 minutes on day 1 followed by  Pemetrexed 500mg/m2 IV over 10 min on day 1  Carboplatin AUC 5-6 IV over 30 minutes on day 1  Q 21-day cycle for 3 cycles  NCCN Guidelines for NSCLC V.1.2023  Steph FIELDS et al. [facility] 2022; 386(21): 4961-1029     /74   Pulse 92   Temp 36.3 °C (97.4 °F) (Temporal)   Resp 16   Ht 1.638 m (5' 4.5\")   Wt 57.1 kg (125 lb 14.1 oz)   SpO2 94%   BMI 21.27 kg/m²   Body surface area is 1.61 meters squared.    All lab results 7/19/23 within treatment parameters. Except Crcl < 45mL/min  MD aware of all current lab results. Orders received to proceed with treatment Patient will receive additional hydration.  Scr = 1.31 Est crcl ~ 37mL/min  Vit B12 q9 weeks- given 7/19/23      Nivolumab 360 mg fixed dose   No calculation required   ok to treat with final dose = 360mg IV    Pemetrexed 500mg/m2  x 1.61m2 = 805Mg    <10% difference, ok to treat with final dose = 800mg IV    Carboplatin AUC 6 (37 + 25) = 372mg   <10% difference, ok to treat with final dose = 340mg IV      Mehul HarrisD.    "

## 2023-07-19 NOTE — PROGRESS NOTES
Chemotherapy Verification - SECONDARY RN       Height = 1.638m  Weight = 57.1kg  BSA = 1.61m2       Medication: nivolumab  Dose: flat dose  Calculated Dose: 360mg                             (In mg/m2, AUC, mg/kg)     Medication: pemetrexed  Dose: 500mg/m2  Calculated Dose:  805mg                            (In mg/m2, AUC, mg/kg)    Medication: carboplatin  Dose: AUC 6   Calculated Dose: 376.36mg                             (In mg/m2, AUC, mg/kg)      Carboplatin calculation (if applicable):  (6*(37.06+25)) = 372.36    I confirm that this process was performed independently.

## 2023-07-19 NOTE — PROGRESS NOTES
The following appointments, labs, and medications have been provided to the patient:  Line: PIV  ECHO: NA  WBC Support (g-csf): NA  Labs: CBC w/diff, CMP, TSH, Free T4  Follow up appts: Tox check scheduled  Chemo/immunotherapy class: Completed 7/19/2023  Nausea medication: zofran/compazine prescribed   Other treatment specific meds: Folic acid/ dexamethasone  Oral chemo follow up: NA  Pain medication: Per provider discretion  Nurse edis: Referred on 06/19/2023  Dietician:  Referred 06/30/2023  SW: NA  FRA: Referred  on 06/19/2023    Additional info/teaching for immunotherapy patients:  If hospitalized, inform staff of immunotherapy treatment and have them contact oncologist - immunotherapy alert card provided.    Additional info/teaching for chemotherapy patients:  Neutropenia reminder card provided to patient    Additional teaching:  NSC Nivolumab + Pemetrexed + Carboplatin    Patient was provided with drug specific handouts and [facility] side effects sheet. Patient verbalized that questions and concerns regarding treatment have been addressed. Consent to proceed with treatment was reviewed and signed during this visit.    Spent 30 minutes of continuous, non-interrupted, face-to-face patient contact in which greater than 50% of the visit was spent counseling and coordinating of care.

## 2023-07-19 NOTE — TELEPHONE ENCOUNTER
7/17/23-Attempted to contact the patient via cell phone number on file and lvm for patient to call the office in regards to infusion therapy scheduled on 7/19/23.    I called and left another voicemail for patient to call the office in regards to chemotherapy start/appointment that is scheduled for today at 11:00 am.  Patient will need chemo education.

## 2023-07-19 NOTE — PROGRESS NOTES
Creatinine clearance did not meet parameters to proceed with cycle 1 of treatment.  I discussed with Dr. Powers and he agreed that patient needs to start treatment soon as possible.  Therefore we will go ahead and proceed with her cycle 1 of Nivo, carbo, Pem today.  I will proceed with a 500 cc of normal saline today and request 1 L of normal saline next week.

## 2023-07-20 DIAGNOSIS — Z79.899 HIGH RISK MEDICATION USE: ICD-10-CM

## 2023-07-20 DIAGNOSIS — C34.92 NSCLC OF LEFT LUNG (HCC): ICD-10-CM

## 2023-07-20 RX ORDER — DEXAMETHASONE 4 MG/1
4 TABLET ORAL 2 TIMES DAILY
Qty: 6 TABLET | Refills: 3 | Status: SHIPPED | OUTPATIENT
Start: 2023-07-20 | End: 2023-09-11

## 2023-07-20 RX ORDER — PROCHLORPERAZINE MALEATE 10 MG
10 TABLET ORAL EVERY 6 HOURS PRN
Qty: 30 TABLET | Refills: 3 | Status: SHIPPED | OUTPATIENT
Start: 2023-07-20 | End: 2023-09-11

## 2023-07-20 RX ORDER — ONDANSETRON 4 MG/1
4 TABLET, FILM COATED ORAL EVERY 4 HOURS PRN
Qty: 20 TABLET | Refills: 3 | Status: SHIPPED | OUTPATIENT
Start: 2023-07-20 | End: 2023-09-11

## 2023-07-20 RX ORDER — FOLIC ACID 1 MG/1
1 TABLET ORAL DAILY
Qty: 30 TABLET | Refills: 3 | Status: SHIPPED | OUTPATIENT
Start: 2023-07-20 | End: 2023-11-13

## 2023-07-22 DIAGNOSIS — F41.8 OTHER SPECIFIED ANXIETY DISORDERS: ICD-10-CM

## 2023-07-22 NOTE — PROGRESS NOTES
"Sylvie Andersen is a 65 y.o. female here for a non-provider visit for:   FLU  PNEUMOVAX (PPSV23)  SHINGRIX (Shingles)    Reason for immunization: Overdue/Provider Recommended  Immunization records indicate need for vaccine: Yes, confirmed with Epic  Minimum interval has been met for this vaccine: Yes  ABN completed: Not Indicated    Order and dose verified by: Karthik ORANTES Dated  Flu: 8/15/2019 Shingrix and PPSV23: 10/30/2019 was given to patient: Yes  All IAC Questionnaire questions were answered \"No.\"    Patient tolerated injection and no adverse effects were observed or reported: Yes    Pt scheduled for next dose in series: Not Indicated    " hard copy, drawn during this pregnancy

## 2023-07-24 RX ORDER — VENLAFAXINE 75 MG/1
75 TABLET ORAL DAILY
Qty: 90 TABLET | Refills: 0 | Status: SHIPPED | OUTPATIENT
Start: 2023-07-24 | End: 2023-08-10

## 2023-07-25 LAB
MYCOBACTERIUM SPEC CULT: NORMAL
RHODAMINE-AURAMINE STN SPEC: NORMAL
SIGNIFICANT IND 70042: NORMAL
SITE SITE: NORMAL
SOURCE SOURCE: NORMAL

## 2023-07-27 ENCOUNTER — HOSPITAL ENCOUNTER (OUTPATIENT)
Dept: HEMATOLOGY ONCOLOGY | Facility: MEDICAL CENTER | Age: 68
End: 2023-07-27
Attending: STUDENT IN AN ORGANIZED HEALTH CARE EDUCATION/TRAINING PROGRAM
Payer: MEDICARE

## 2023-07-27 VITALS
TEMPERATURE: 97.3 F | WEIGHT: 122.69 LBS | HEART RATE: 97 BPM | OXYGEN SATURATION: 98 % | DIASTOLIC BLOOD PRESSURE: 60 MMHG | BODY MASS INDEX: 20.73 KG/M2 | SYSTOLIC BLOOD PRESSURE: 116 MMHG

## 2023-07-27 VITALS
OXYGEN SATURATION: 98 % | DIASTOLIC BLOOD PRESSURE: 55 MMHG | SYSTOLIC BLOOD PRESSURE: 105 MMHG | HEART RATE: 81 BPM | TEMPERATURE: 98.1 F | RESPIRATION RATE: 18 BRPM

## 2023-07-27 DIAGNOSIS — C34.92 NSCLC OF LEFT LUNG (HCC): ICD-10-CM

## 2023-07-27 DIAGNOSIS — Z79.899 ENCOUNTER FOR LONG-TERM (CURRENT) USE OF HIGH-RISK MEDICATION: ICD-10-CM

## 2023-07-27 PROCEDURE — 700105 HCHG RX REV CODE 258: Performed by: NURSE PRACTITIONER

## 2023-07-27 PROCEDURE — 99214 OFFICE O/P EST MOD 30 MIN: CPT | Performed by: STUDENT IN AN ORGANIZED HEALTH CARE EDUCATION/TRAINING PROGRAM

## 2023-07-27 PROCEDURE — 96360 HYDRATION IV INFUSION INIT: CPT

## 2023-07-27 PROCEDURE — 99212 OFFICE O/P EST SF 10 MIN: CPT | Performed by: STUDENT IN AN ORGANIZED HEALTH CARE EDUCATION/TRAINING PROGRAM

## 2023-07-27 RX ORDER — 0.9 % SODIUM CHLORIDE 0.9 %
VIAL (ML) INJECTION PRN
Status: CANCELLED | OUTPATIENT
Start: 2023-08-09

## 2023-07-27 RX ORDER — ONDANSETRON 8 MG/1
8 TABLET, ORALLY DISINTEGRATING ORAL PRN
Status: CANCELLED | OUTPATIENT
Start: 2023-08-09

## 2023-07-27 RX ORDER — ONDANSETRON 2 MG/ML
4 INJECTION INTRAMUSCULAR; INTRAVENOUS PRN
Status: CANCELLED | OUTPATIENT
Start: 2023-08-09

## 2023-07-27 RX ORDER — 0.9 % SODIUM CHLORIDE 0.9 %
3 VIAL (ML) INJECTION PRN
Status: CANCELLED | OUTPATIENT
Start: 2023-08-08

## 2023-07-27 RX ORDER — DIPHENHYDRAMINE HYDROCHLORIDE 50 MG/ML
50 INJECTION INTRAMUSCULAR; INTRAVENOUS PRN
Status: CANCELLED | OUTPATIENT
Start: 2023-08-09

## 2023-07-27 RX ORDER — SODIUM CHLORIDE 9 MG/ML
1000 INJECTION, SOLUTION INTRAVENOUS ONCE
Status: COMPLETED | OUTPATIENT
Start: 2023-07-27 | End: 2023-07-27

## 2023-07-27 RX ORDER — 0.9 % SODIUM CHLORIDE 0.9 %
10 VIAL (ML) INJECTION PRN
Status: CANCELLED | OUTPATIENT
Start: 2023-08-08

## 2023-07-27 RX ORDER — 0.9 % SODIUM CHLORIDE 0.9 %
10 VIAL (ML) INJECTION PRN
Status: CANCELLED | OUTPATIENT
Start: 2023-08-09

## 2023-07-27 RX ORDER — EPINEPHRINE 1 MG/ML(1)
0.5 AMPUL (ML) INJECTION PRN
Status: CANCELLED | OUTPATIENT
Start: 2023-08-09

## 2023-07-27 RX ORDER — 0.9 % SODIUM CHLORIDE 0.9 %
3 VIAL (ML) INJECTION PRN
Status: CANCELLED | OUTPATIENT
Start: 2023-08-09

## 2023-07-27 RX ORDER — METHYLPREDNISOLONE SODIUM SUCCINATE 125 MG/2ML
125 INJECTION, POWDER, LYOPHILIZED, FOR SOLUTION INTRAMUSCULAR; INTRAVENOUS PRN
Status: CANCELLED | OUTPATIENT
Start: 2023-08-09

## 2023-07-27 RX ORDER — SODIUM CHLORIDE 9 MG/ML
INJECTION, SOLUTION INTRAVENOUS CONTINUOUS
Status: CANCELLED | OUTPATIENT
Start: 2023-08-09

## 2023-07-27 RX ORDER — 0.9 % SODIUM CHLORIDE 0.9 %
VIAL (ML) INJECTION PRN
Status: CANCELLED | OUTPATIENT
Start: 2023-08-08

## 2023-07-27 RX ORDER — CYANOCOBALAMIN 1000 UG/ML
1000 INJECTION, SOLUTION INTRAMUSCULAR; SUBCUTANEOUS
Status: CANCELLED | OUTPATIENT
Start: 2023-08-09

## 2023-07-27 RX ORDER — PROCHLORPERAZINE MALEATE 10 MG
10 TABLET ORAL EVERY 6 HOURS PRN
Status: CANCELLED | OUTPATIENT
Start: 2023-08-09

## 2023-07-27 RX ADMIN — SODIUM CHLORIDE 1000 ML: 9 INJECTION, SOLUTION INTRAVENOUS at 13:35

## 2023-07-27 ASSESSMENT — ENCOUNTER SYMPTOMS
SHORTNESS OF BREATH: 0
SPUTUM PRODUCTION: 0
BRUISES/BLEEDS EASILY: 0
COUGH: 0
TREMORS: 0
FOCAL WEAKNESS: 0
SORE THROAT: 0
NAUSEA: 0
BLURRED VISION: 0
DEPRESSION: 0
TINGLING: 0
FEVER: 0
SENSORY CHANGE: 0
MEMORY LOSS: 1
WEIGHT LOSS: 1
HEADACHES: 0
VOMITING: 0
CHILLS: 0
NECK PAIN: 0
ABDOMINAL PAIN: 0
PALPITATIONS: 0
ORTHOPNEA: 0
HEARTBURN: 0
WHEEZING: 0
DIZZINESS: 0

## 2023-07-27 ASSESSMENT — PAIN SCALES - GENERAL: PAINLEVEL: NO PAIN

## 2023-07-27 ASSESSMENT — FIBROSIS 4 INDEX: FIB4 SCORE: 0.92

## 2023-07-27 NOTE — PROGRESS NOTES
Consult Note: Hematology/Oncology     Primary Care:  Kermit Bergman M.D.    Chief Complaint   Patient presents with    Lung Cancer     Toxicity check        Current Treatment:     7/19/23: C1: Carbo/pemetrexed/Nivo    Prior Treatment: None    Subjective:   History of Presenting Illness:  Latrice Andersen is a 67 y.o. female with a PMHx of Breast Cancer in 1995 (s/p RT and Chemo) with a new diagnosis of NSCLC Adenocarcinoma of the left lung.     Patient reports that last May 2022, she went to [place] and was feeling very sick, weak and had dysuria.  She came home and went to the ER, and was found to have a UTI.  She was found to be anemic, which prompted her to get a Colonoscopy/EGD.  She was told to get a CXR, but chose not to get it done at that time. She waited to Feb 2023,  which showed 21 mm density in or overlying the left, not visualized on the previous exam. This prompted her to get a CT CAP 3/2023, 2.7 x 2.5 cm subpleural lingular mass as well as  4 mm left lower lobe pulmonary nodule and 3 mm right lower lobe pulmonary nodule.    She had a PET scan 6/6/2023, which showed hypermetabolic mass in the left upper lobe mass consistent with malignancy, as well as an increase uptake in the L parasternal region vs internal mammary LN. No evidence of metastatic disease elsewhere.     6/12/23, biopsy of the ANSHUL mass was preformed which showed malignancy, pulmonary adenocarcinoma.    MRI brain on 6/20/23, shows an incidental meningioma, but a dural met cannot be excluded.      Sylvie is taking care of her 3 yo granddaughter - Bill (her daughter in laws mother has her other grandbaby, Bassam).  She did office work prior.  She has 2 boys (Ace, Mary). She lives in [place] [facility] [place] with Wander her , she has fair support.      She says she feels fine, washes the care, mows the lawn, does all of her housework.      She smoked 5 cig/day, and started when she was 17.  She has not smoked since last Thursday.  No  alcohol. No IVDU.      Her grandfather, uncle, and cousin  of lung cancer.      Interval History    Patient reports she did well with chemo.  She had no nausea, vomiting, diarrhea.  She did sleep more than she expected but reports she is sleeping well.  She is staying hydrated, eating a lot of food.  The only thing she cannot do is mow the lawn.   Past Medical History:   Diagnosis Date    Allergy, unspecified not elsewhere classified     Breast cancer (HCC)     Chickenpox     Dental disorder     upper partial    Former smoker 2023    High cholesterol     History of breast cancer 2017    History of radiation therapy 2023    To left chest for breast cancer     Hyperlipidemia     Hypertension     Left upper lobe pulmonary nodule 2023    Tonsillitis         Past Surgical History:   Procedure Laterality Date    VA BRONCHOSCOPY,DIAGNOSTIC N/A 2023    Procedure: FIBER OPTIC BRONCHOSCOPY WITH  WASH, BRUSH, BRONCHOALVEOLAR LAVAGE, BIOPSY AND FINE NEEDLE ASPIRATION, ENDOBRONCHIAL ULTRASOUND & NAVIGATION, ROBOTICS;  Surgeon: Brooke Perrin M.D.;  Location: SURGERY [facility];  Service: Pulmonary Robotic    ENDOBRONCHIAL US ADD-ON N/A 2023    Procedure: ENDOBRONCHIAL ULTRASOUND (EBUS);  Surgeon: Brooke Perrin M.D.;  Location: SURGERY [facility];  Service: Pulmonary Robotic    MASTECTOMY      Left    VA CHEMOTHERAPY, UNSPECIFIED PROCEDURE      VA RADIATION THERAPY PLAN SIMPLE         Social History     Tobacco Use    Smoking status: Every Day     Packs/day: 0.25     Years: 45.00     Pack years: 11.25     Types: Cigarettes     Last attempt to quit: 2022     Years since quittin.0     Passive exposure: Current (Spouse)    Smokeless tobacco: Former     Quit date: 2022    Tobacco comments:     5 daily    Vaping Use    Vaping Use: Never used   Substance Use Topics    Alcohol use: No    Drug use: No        Family History   Problem Relation Age of Onset     Cancer Mother         breast cancer/Breast    Breast Cancer Mother     Hypertension Maternal Uncle     Cancer Maternal Uncle     Hypertension Maternal Grandmother     Hyperlipidemia Maternal Grandmother     Heart Disease Maternal Grandmother     Cancer Paternal Grandfather         Lung ca, smoker    Lung Cancer Maternal Grandfather     Alzheimer's Disease Maternal Aunt     Kidney Disease Maternal Aunt     Diabetes Neg Hx        Allergies   Allergen Reactions    Codeine Unspecified     Memory issues       Current Outpatient Medications   Medication Sig Dispense Refill    venlafaxine (EFFEXOR) 75 MG Tab Take 1 Tablet by mouth every day. 90 Tablet 0    ondansetron (ZOFRAN) 4 MG Tab tablet Take 1 Tablet by mouth every four hours as needed for Nausea/Vomiting. 20 Tablet 3    prochlorperazine (COMPAZINE) 10 MG Tab Take 1 Tablet by mouth every 6 hours as needed for Nausea/Vomiting. 30 Tablet 3    folic acid (FOLVITE) 1 MG Tab Take 1 Tablet by mouth every day. 30 Tablet 3    dexamethasone (DECADRON) 4 MG Tab Take 1 Tablet by mouth 2 times a day. Take for 3 days starting the day prior to pemetrexed on days 0, 1, 2 of each chemotherapy cycle 6 Tablet 3    metoprolol SR (TOPROL XL) 25 MG TABLET SR 24 HR Take 1 Tablet by mouth every day. 100 Tablet 3    pravastatin (PRAVACHOL) 40 MG tablet Take 1 Tablet by mouth every day. 100 Tablet 3    lisinopril (PRINIVIL) 10 MG Tab Take 1 Tablet by mouth every day. 100 Tablet 3    fenofibrate micronized (LOFIBRA) 134 MG capsule TAKE 1 CAPSULE BY MOUTH EVERY DAY 90 Capsule 3    therapeutic multivitamin-minerals (THERAGRAN-M) Tab Take 1 Tablet by mouth every day.      benzonatate (TESSALON PERLES) 100 MG Cap Take 1 Capsule by mouth 3 times a day as needed for Cough. 30 Capsule 0     No current facility-administered medications for this encounter.       Review of Systems   Constitutional:  Positive for weight loss. Negative for chills, fever and malaise/fatigue.   HENT:  Negative for  "congestion, ear pain, nosebleeds and sore throat.         Hoarseness    Eyes:  Negative for blurred vision.   Respiratory:  Negative for cough, sputum production, shortness of breath and wheezing.    Cardiovascular:  Negative for chest pain, palpitations, orthopnea and leg swelling.   Gastrointestinal:  Negative for abdominal pain, heartburn, nausea and vomiting.   Genitourinary:  Negative for dysuria, frequency and urgency.        Nocturia   Musculoskeletal:  Negative for neck pain.   Neurological:  Negative for dizziness, tingling, tremors, sensory change, focal weakness and headaches.   Endo/Heme/Allergies:  Does not bruise/bleed easily.   Psychiatric/Behavioral:  Positive for memory loss. Negative for depression and suicidal ideas.         Anxiety   All other systems reviewed and are negative.      Problem list, medications, and allergies reviewed by myself today in Epic.     Objective:     Vitals:    07/27/23 1246   BP: 116/60   BP Location: Right arm   Patient Position: Sitting   BP Cuff Size: Adult   Pulse: 97   Temp: 36.3 °C (97.3 °F)   TempSrc: Temporal   SpO2: 98%   Weight: 55.7 kg (122 lb 11 oz)   Height: (P) 1.638 m (5' 4.49\")       DESC; KARNOFSKY SCALE WITH ECOG EQUIVALENT: 100, Fully active, able to carry on all pre-disease performed without restriction (ECOG equivalent 0)    DISTRESS LEVEL: mild distress    Physical Exam  Constitutional:       General: She is not in acute distress.     Appearance: Normal appearance. She is not ill-appearing.      Comments: Tearful throughout exam   HENT:      Head: Normocephalic and atraumatic.      Nose: Nose normal.      Mouth/Throat:      Mouth: Mucous membranes are moist.      Pharynx: No oropharyngeal exudate or posterior oropharyngeal erythema.   Eyes:      General: No scleral icterus.     Conjunctiva/sclera: Conjunctivae normal.      Pupils: Pupils are equal, round, and reactive to light.   Cardiovascular:      Rate and Rhythm: Normal rate and regular rhythm. "      Pulses: Normal pulses.      Heart sounds: Normal heart sounds. No murmur heard.     No friction rub. No gallop.   Pulmonary:      Effort: Pulmonary effort is normal. No respiratory distress.      Breath sounds: Normal breath sounds. No stridor. No wheezing, rhonchi or rales.   Chest:      Chest wall: No tenderness.   Abdominal:      General: Abdomen is flat. Bowel sounds are normal. There is no distension.      Palpations: Abdomen is soft. There is no mass.      Tenderness: There is no abdominal tenderness. There is no guarding.   Musculoskeletal:         General: No swelling, tenderness or deformity. Normal range of motion.      Cervical back: Normal range of motion and neck supple. No rigidity or tenderness.      Right lower leg: No edema.      Left lower leg: No edema.   Skin:     General: Skin is warm and dry.      Coloration: Skin is not jaundiced or pale.      Findings: No bruising, erythema or rash.   Neurological:      General: No focal deficit present.      Mental Status: She is alert and oriented to person, place, and time. Mental status is at baseline.      Sensory: No sensory deficit.      Motor: No weakness.      Coordination: Coordination normal.      Gait: Gait normal.   Psychiatric:         Mood and Affect: Mood normal.         Behavior: Behavior normal.         Thought Content: Thought content normal.         Judgment: Judgment normal.         Labs:   Most recent labs reviewed.  Cr is high with GRF of 58    Imaging:   Most recent images below have been independently reviewed by me.     6/20/23: Brain MRI   1.  There is an approximately 8 mm sized enhancing extra-axial lesion noted along the roof of the right orbit. This lesion likely represent an incidental meningioma. However the possibility of dural based metastasis cannot be excluded. Follow-up study is   recommended.  2.  Mild chronic microvascular ischemic disease.    06/06/2023 PET SCAN  1.  Intense increased activity corresponding to  LEFT upper lobe mass consistent with malignancy.  2.  Focal uptake in the LEFT parasternal region which may indicate pleural versus internal mammary lymph node metastasis.  3.  No other evidence for metastatic disease.  Prior LEFT mastectomy.    Pathology:  FINAL DIAGNOSIS:     A. Fine needle aspiration, left upper lung lobe mass:          Positive for malignancy, pulmonary adenocarcinoma, nonmucinous           as sampled   B. Lung, left upper lobe mass, core biopsy:          Pulmonary adenocarcinoma, nonmucinous as sampled   C. Lung, left upper lobe mass, biopsy:          Pulmonary adenocarcinoma, nonmucinous as sampled   D. Bronchoalveolar lavage, left upper lobe:          Rare malignant cell cluster on the Thin Prep slide     Assessment/Plan:      Cancer Staging   NSCLC of left lung (HCC)  Staging form: Lung, AJCC 8th Edition  - Clinical stage from 6/29/2023: Stage IIIA (cT1c, cN2, cM0) - Signed by Nancy Campbell M.D. on 6/29/2023       Ms. Andersen is a 66 yo F with a new diagnosis of adenocarcinoma of the ANSHUL (PDL1 1%,  TP53, NF1) who is s/p C1 of NAC+IO.     Plan  -proceed with C2 NAC with carbo/pemetrexed and Nivo   -f/u with Dr. Ganser for surgical planning    Regimen  21-day cycle for 3 cycles  Nivolumab 360 mg IV over 30 minutes on day 1 followed by  Pemetrexed 500mg per metered squared IV   Carboplatin 5-6 AUC IV over 30 minutes on day 1    Reference  NCCN Guidelines for NSCLC V.1.2023  Steph PM et al. NEJ 2022; 386(21): 3005-3687      No follow-ups on file.     Any questions and concerns raised by the patient were addressed and answered. Patient denies any further questions.  Patient encouraged to call the office with any concerns or issues.     Nancy Campbell M.D.  Hematology/Oncology

## 2023-07-27 NOTE — ADDENDUM NOTE
Encounter addended by: Karthik Beckford on: 7/27/2023 3:43 PM   Actions taken: Charge Capture section accepted

## 2023-07-27 NOTE — PROGRESS NOTES
Patient presents to Medical Oncology RN for 1 L hydration per orders from [facility].  Established 22g PIV in right AC. IV with brisk blood return noted.  Administered 1L ND over 60 minutes with start time 1335 and end time 1435 (refer to MAR).  No s/s of infiltration/phlebitis throughout the infusion. PIV removed and site covered with gauze and tape.  VSS.  Pt released ambulatory from clinic to self care in no apparent distress.

## 2023-07-31 NOTE — ADDENDUM NOTE
Encounter addended by: Radhika Solomon, Med Ass't on: 7/31/2023 7:39 AM   Actions taken: Charge Capture section accepted

## 2023-08-01 ENCOUNTER — PATIENT OUTREACH (OUTPATIENT)
Dept: ONCOLOGY | Facility: MEDICAL CENTER | Age: 68
End: 2023-08-01
Payer: MEDICARE

## 2023-08-01 NOTE — PROGRESS NOTES
On August 1st, 2023, Oncology Social Worker Pretty Epps attempted telephone contact with pt. to follow up on psychosocial distress screening.  [facility] left voicemail message for pt. requesting pt. call back at earliest convenience.  [facility] left contact information in voicemail message.

## 2023-08-04 ENCOUNTER — TELEPHONE (OUTPATIENT)
Dept: HEALTH INFORMATION MANAGEMENT | Facility: OTHER | Age: 68
End: 2023-08-04
Payer: MEDICARE

## 2023-08-07 ENCOUNTER — HOSPITAL ENCOUNTER (OUTPATIENT)
Dept: LAB | Facility: MEDICAL CENTER | Age: 68
End: 2023-08-07
Attending: STUDENT IN AN ORGANIZED HEALTH CARE EDUCATION/TRAINING PROGRAM
Payer: MEDICARE

## 2023-08-07 DIAGNOSIS — Z79.899 HIGH RISK MEDICATION USE: ICD-10-CM

## 2023-08-07 DIAGNOSIS — C34.92 NSCLC OF LEFT LUNG (HCC): ICD-10-CM

## 2023-08-07 LAB
ALBUMIN SERPL BCP-MCNC: 4 G/DL (ref 3.2–4.9)
ALBUMIN/GLOB SERPL: 1.5 G/DL
ALP SERPL-CCNC: 55 U/L (ref 30–99)
ALT SERPL-CCNC: 39 U/L (ref 2–50)
ANION GAP SERPL CALC-SCNC: 9 MMOL/L (ref 7–16)
AST SERPL-CCNC: 48 U/L (ref 12–45)
BASOPHILS # BLD AUTO: 0.6 % (ref 0–1.8)
BASOPHILS # BLD: 0.03 K/UL (ref 0–0.12)
BILIRUB SERPL-MCNC: <0.2 MG/DL (ref 0.1–1.5)
BUN SERPL-MCNC: 24 MG/DL (ref 8–22)
CALCIUM ALBUM COR SERPL-MCNC: 9.6 MG/DL (ref 8.5–10.5)
CALCIUM SERPL-MCNC: 9.6 MG/DL (ref 8.5–10.5)
CHLORIDE SERPL-SCNC: 110 MMOL/L (ref 96–112)
CO2 SERPL-SCNC: 23 MMOL/L (ref 20–33)
CREAT SERPL-MCNC: 1.33 MG/DL (ref 0.5–1.4)
EOSINOPHIL # BLD AUTO: 0.1 K/UL (ref 0–0.51)
EOSINOPHIL NFR BLD: 2.1 % (ref 0–6.9)
ERYTHROCYTE [DISTWIDTH] IN BLOOD BY AUTOMATED COUNT: 47.2 FL (ref 35.9–50)
GFR SERPLBLD CREATININE-BSD FMLA CKD-EPI: 44 ML/MIN/1.73 M 2
GLOBULIN SER CALC-MCNC: 2.7 G/DL (ref 1.9–3.5)
GLUCOSE SERPL-MCNC: 106 MG/DL (ref 65–99)
HCT VFR BLD AUTO: 35.1 % (ref 37–47)
HGB BLD-MCNC: 10.9 G/DL (ref 12–16)
IMM GRANULOCYTES # BLD AUTO: 0.1 K/UL (ref 0–0.11)
IMM GRANULOCYTES NFR BLD AUTO: 2.1 % (ref 0–0.9)
LYMPHOCYTES # BLD AUTO: 2.26 K/UL (ref 1–4.8)
LYMPHOCYTES NFR BLD: 47.2 % (ref 22–41)
MCH RBC QN AUTO: 28.8 PG (ref 27–33)
MCHC RBC AUTO-ENTMCNC: 31.1 G/DL (ref 32.2–35.5)
MCV RBC AUTO: 92.9 FL (ref 81.4–97.8)
MONOCYTES # BLD AUTO: 0.68 K/UL (ref 0–0.85)
MONOCYTES NFR BLD AUTO: 14.2 % (ref 0–13.4)
NEUTROPHILS # BLD AUTO: 1.62 K/UL (ref 1.82–7.42)
NEUTROPHILS NFR BLD: 33.8 % (ref 44–72)
NRBC # BLD AUTO: 0 K/UL
NRBC BLD-RTO: 0 /100 WBC (ref 0–0.2)
PLATELET # BLD AUTO: 263 K/UL (ref 164–446)
PMV BLD AUTO: 9.2 FL (ref 9–12.9)
POTASSIUM SERPL-SCNC: 5.4 MMOL/L (ref 3.6–5.5)
PROT SERPL-MCNC: 6.7 G/DL (ref 6–8.2)
RBC # BLD AUTO: 3.78 M/UL (ref 4.2–5.4)
SODIUM SERPL-SCNC: 142 MMOL/L (ref 135–145)
T4 FREE SERPL-MCNC: 1.15 NG/DL (ref 0.93–1.7)
TSH SERPL DL<=0.005 MIU/L-ACNC: 3.95 UIU/ML (ref 0.38–5.33)
WBC # BLD AUTO: 4.8 K/UL (ref 4.8–10.8)

## 2023-08-07 PROCEDURE — 84439 ASSAY OF FREE THYROXINE: CPT

## 2023-08-07 PROCEDURE — 36415 COLL VENOUS BLD VENIPUNCTURE: CPT

## 2023-08-07 PROCEDURE — 85025 COMPLETE CBC W/AUTO DIFF WBC: CPT

## 2023-08-07 PROCEDURE — 84443 ASSAY THYROID STIM HORMONE: CPT

## 2023-08-07 PROCEDURE — 80053 COMPREHEN METABOLIC PANEL: CPT

## 2023-08-08 ENCOUNTER — HOSPITAL ENCOUNTER (OUTPATIENT)
Dept: HEMATOLOGY ONCOLOGY | Facility: MEDICAL CENTER | Age: 68
End: 2023-08-08
Attending: NURSE PRACTITIONER
Payer: MEDICARE

## 2023-08-08 ENCOUNTER — TELEPHONE (OUTPATIENT)
Dept: HEMATOLOGY ONCOLOGY | Facility: MEDICAL CENTER | Age: 68
End: 2023-08-08

## 2023-08-08 VITALS
DIASTOLIC BLOOD PRESSURE: 68 MMHG | TEMPERATURE: 96.4 F | WEIGHT: 126.98 LBS | OXYGEN SATURATION: 96 % | SYSTOLIC BLOOD PRESSURE: 118 MMHG | RESPIRATION RATE: 12 BRPM | HEIGHT: 64 IN | HEART RATE: 90 BPM | BODY MASS INDEX: 21.68 KG/M2

## 2023-08-08 DIAGNOSIS — C34.92 NSCLC OF LEFT LUNG (HCC): ICD-10-CM

## 2023-08-08 DIAGNOSIS — Z79.899 ENCOUNTER FOR LONG-TERM (CURRENT) USE OF HIGH-RISK MEDICATION: ICD-10-CM

## 2023-08-08 PROCEDURE — 99212 OFFICE O/P EST SF 10 MIN: CPT | Performed by: NURSE PRACTITIONER

## 2023-08-08 PROCEDURE — 99214 OFFICE O/P EST MOD 30 MIN: CPT | Performed by: NURSE PRACTITIONER

## 2023-08-08 RX ORDER — SODIUM CHLORIDE 9 MG/ML
1000 INJECTION, SOLUTION INTRAVENOUS ONCE
Status: CANCELLED | OUTPATIENT
Start: 2023-08-17 | End: 2023-08-17

## 2023-08-08 RX ORDER — 0.9 % SODIUM CHLORIDE 0.9 %
10 VIAL (ML) INJECTION PRN
Status: CANCELLED | OUTPATIENT
Start: 2023-08-17

## 2023-08-08 RX ORDER — SODIUM CHLORIDE 9 MG/ML
500 INJECTION, SOLUTION INTRAVENOUS ONCE
Status: CANCELLED
Start: 2023-08-09

## 2023-08-08 RX ORDER — 0.9 % SODIUM CHLORIDE 0.9 %
VIAL (ML) INJECTION PRN
Status: CANCELLED | OUTPATIENT
Start: 2023-08-17

## 2023-08-08 RX ORDER — 0.9 % SODIUM CHLORIDE 0.9 %
3 VIAL (ML) INJECTION PRN
Status: CANCELLED | OUTPATIENT
Start: 2023-08-17

## 2023-08-08 ASSESSMENT — ENCOUNTER SYMPTOMS
VOMITING: 0
MYALGIAS: 0
FEVER: 0
NAUSEA: 0
COUGH: 0
WEIGHT LOSS: 0
HEADACHES: 0
SHORTNESS OF BREATH: 0
INSOMNIA: 0
CHILLS: 0
DIZZINESS: 0
DIARRHEA: 0
TINGLING: 0
PALPITATIONS: 0
CONSTIPATION: 0
DIAPHORESIS: 1

## 2023-08-08 ASSESSMENT — FIBROSIS 4 INDEX: FIB4 SCORE: 1.99

## 2023-08-08 ASSESSMENT — PAIN SCALES - GENERAL: PAINLEVEL: NO PAIN

## 2023-08-08 NOTE — PROGRESS NOTES
Subjective     Sylvie Andersen is a 68 y.o. female who presents with Cancer (Prechemo)          HPI    Patient seen today for evaluation prior to cycle 2 of chemotherapy employing Carboplatin/Pemetrexed/Nivolumab for Stage IIIA (cT1c, cN2, cM0) NSCLC, for continued monitoring of symptoms and side effects of cancer treatments.    Oncology history of presenting illness:  Patient reports that in May 2022 she was in [place] and felt very sick, weak and had dysuria.  She came home and went to the emergency department and was found to have a UTI.  She was also found to be anemic which prompted her to get a colonoscopy and EGD.  She was also recommended to get a chest x-ray but chose not to get it done at that time.  She then waited until February 2023 when she had a chest x-ray which showed a 21 mm density in or overlying the left, and CT chest recommended.  She had a CT chest on 3/7/2023 which showed a 2.7 x 2.5 cm subpleural lingular mass as well as a 4 mm left lower lobe pulmonary nodule and a 3 mm right lower lobe pulmonary nodule.    PET/CT completed on 6/6/2023 showed a hypermetabolic mass in the left upper lobe consistent with malignancy, as well as increased uptake in the left parasternal region versus internal mammary lymph node.  There is no evidence of metastatic disease elsewhere.  She did undergo a biopsy of the left upper lobe mass on 6/12/2023 which showed malignancy, pulmonary adenocarcinoma.  She did have a brain MRI on 6/20/2023 which showed an incidental meningioma but a dural mat could not be excluded.    Patient does have a history of smoking in which she smoked 5 cigarettes/day starting at the age of 17.  She quit at the time of her diagnosis.    Based on patient's clinical staging, stage IIIa adenocarcinoma plan is for neoadjuvant chemotherapy and immunotherapy employing carboplatin, pemetrexed and nivolumab.  Initiation of treatment with cycle 1 on 7/19/2023.  She established with Dr. Ganser,  thoracic surgeon.    Treatment history:  07/19/23: C1D1 neoadjuvant Carboplatin/Pemetrexed/Nivolumab  08/09/23: C2D1 neoadjuvant Carboplatin/Pemetrexed/Nivolumab    Interval history:  Patient is doing very well.  She denies any symptoms at this time.  She stated that over the last 2 weeks she is now able to mow the lawn.  She denies any significant fatigue.  She denied any nausea or vomiting with her treatment.  She does have chronic night sweats which have not changed.  She did note over the last 3 days some blood when wiping after a bowel movement but no blood noted within the stool or within the toilet.  She denies any pain with her bowel movements.  She stated she did not notice this this morning.    Allergies   Allergen Reactions    Codeine Unspecified     Memory issues       Current Outpatient Medications on File Prior to Encounter   Medication Sig Dispense Refill    venlafaxine (EFFEXOR) 75 MG Tab Take 1 Tablet by mouth every day. 90 Tablet 0    ondansetron (ZOFRAN) 4 MG Tab tablet Take 1 Tablet by mouth every four hours as needed for Nausea/Vomiting. 20 Tablet 3    prochlorperazine (COMPAZINE) 10 MG Tab Take 1 Tablet by mouth every 6 hours as needed for Nausea/Vomiting. 30 Tablet 3    folic acid (FOLVITE) 1 MG Tab Take 1 Tablet by mouth every day. 30 Tablet 3    dexamethasone (DECADRON) 4 MG Tab Take 1 Tablet by mouth 2 times a day. Take for 3 days starting the day prior to pemetrexed on days 0, 1, 2 of each chemotherapy cycle 6 Tablet 3    metoprolol SR (TOPROL XL) 25 MG TABLET SR 24 HR Take 1 Tablet by mouth every day. 100 Tablet 3    pravastatin (PRAVACHOL) 40 MG tablet Take 1 Tablet by mouth every day. 100 Tablet 3    lisinopril (PRINIVIL) 10 MG Tab Take 1 Tablet by mouth every day. 100 Tablet 3    therapeutic multivitamin-minerals (THERAGRAN-M) Tab Take 1 Tablet by mouth every day.      benzonatate (TESSALON PERLES) 100 MG Cap Take 1 Capsule by mouth 3 times a day as needed for Cough. 30 Capsule 0     "fenofibrate micronized (LOFIBRA) 134 MG capsule TAKE 1 CAPSULE BY MOUTH EVERY DAY (Patient not taking: Reported on 8/8/2023) 90 Capsule 3     No current facility-administered medications on file prior to encounter.            Review of Systems   Constitutional:  Positive for diaphoresis (chronic and not worsening). Negative for chills, fever, malaise/fatigue and weight loss.   Respiratory:  Negative for cough and shortness of breath.    Cardiovascular:  Negative for chest pain and palpitations.   Gastrointestinal:  Negative for constipation, diarrhea, nausea and vomiting.        Noted some blood on the paper but not in the stool or in the toilet   Genitourinary:  Negative for dysuria.   Musculoskeletal:  Negative for myalgias.   Neurological:  Negative for dizziness, tingling and headaches.   Psychiatric/Behavioral:  The patient does not have insomnia.               Objective     /68   Pulse 90   Temp (!) 35.8 °C (96.4 °F) (Temporal)   Resp 12   Ht 1.626 m (5' 4\")   Wt 57.6 kg (126 lb 15.8 oz)   SpO2 96%   BMI 21.80 kg/m²      Physical Exam  Vitals reviewed.   Constitutional:       General: She is not in acute distress.     Appearance: Normal appearance. She is not diaphoretic.   HENT:      Head: Normocephalic and atraumatic.   Cardiovascular:      Rate and Rhythm: Normal rate and regular rhythm.      Heart sounds: Normal heart sounds. No murmur heard.     No friction rub. No gallop.   Pulmonary:      Effort: Pulmonary effort is normal. No respiratory distress.      Breath sounds: Normal breath sounds. No wheezing.   Abdominal:      General: Bowel sounds are normal. There is no distension.      Palpations: Abdomen is soft.      Tenderness: There is no abdominal tenderness.   Musculoskeletal:         General: No swelling or tenderness. Normal range of motion.   Skin:     General: Skin is warm and dry.   Neurological:      Mental Status: She is alert and oriented to person, place, and time.   Psychiatric: "         Mood and Affect: Mood normal.         Behavior: Behavior normal.               Latest Reference Range & Units 08/07/23 14:43   WBC 4.8 - 10.8 K/uL 4.8   RBC 4.20 - 5.40 M/uL 3.78 (L)   Hemoglobin 12.0 - 16.0 g/dL 10.9 (L)   Hematocrit 37.0 - 47.0 % 35.1 (L)   MCV 81.4 - 97.8 fL 92.9   MCH 27.0 - 33.0 pg 28.8   MCHC 32.2 - 35.5 g/dL 31.1 (L)   RDW 35.9 - 50.0 fL 47.2   Platelet Count 164 - 446 K/uL 263   MPV 9.0 - 12.9 fL 9.2   Neutrophils-Polys 44.00 - 72.00 % 33.80 (L)   Neutrophils (Absolute) 1.82 - 7.42 K/uL 1.62 (L)   Lymphocytes 22.00 - 41.00 % 47.20 (H)   Lymphs (Absolute) 1.00 - 4.80 K/uL 2.26   Monocytes 0.00 - 13.40 % 14.20 (H)   Monos (Absolute) 0.00 - 0.85 K/uL 0.68   Eosinophils 0.00 - 6.90 % 2.10   Eos (Absolute) 0.00 - 0.51 K/uL 0.10   Basophils 0.00 - 1.80 % 0.60   Baso (Absolute) 0.00 - 0.12 K/uL 0.03   Immature Granulocytes 0.00 - 0.90 % 2.10 (H)   Immature Granulocytes (abs) 0.00 - 0.11 K/uL 0.10   Nucleated RBC 0.00 - 0.20 /100 WBC 0.00   NRBC (Absolute) K/uL 0.00   Sodium 135 - 145 mmol/L 142   Potassium 3.6 - 5.5 mmol/L 5.4   Chloride 96 - 112 mmol/L 110   Co2 20 - 33 mmol/L 23   Anion Gap 7.0 - 16.0  9.0   Glucose 65 - 99 mg/dL 106 (H)   Bun 8 - 22 mg/dL 24 (H)   Creatinine 0.50 - 1.40 mg/dL 1.33   GFR (CKD-EPI) >60 mL/min/1.73 m 2 44 !   Calcium 8.5 - 10.5 mg/dL 9.6   Correct Calcium 8.5 - 10.5 mg/dL 9.6   AST(SGOT) 12 - 45 U/L 48 (H)   ALT(SGPT) 2 - 50 U/L 39   Alkaline Phosphatase 30 - 99 U/L 55   Total Bilirubin 0.1 - 1.5 mg/dL <0.2   Albumin 3.2 - 4.9 g/dL 4.0   Total Protein 6.0 - 8.2 g/dL 6.7   Globulin 1.9 - 3.5 g/dL 2.7   A-G Ratio g/dL 1.5   TSH 0.380 - 5.330 uIU/mL 3.950   Free T-4 0.93 - 1.70 ng/dL 1.15              Assessment & Plan       1. NSCLC of left lung (HCC)        2. Encounter for long-term (current) use of high-risk medication                1. Patient with Stage IIIA (cT1c, cN2, cM0) NSCLC currently on Carboplatin/Pemetrexed/Nivolumab scheduled for cycle 2  tomorrow.  I did review her labs, CBC, CMP, TSH and free T4.  Her creatinine clearance is still slightly low, but we will go ahead and proceed with treatment tomorrow as planned.  I will add an additional 500 NS to her treatment and request that she return in 1 week for 1 L of NS as well, just as we did with cycle 1.    2.  Plan would be for patient to complete 3 full cycles of neoadjuvant carboplatin, pemetrexed with nivolumab.  Will order a CT chest, abdomen and pelvis after the completion of 3 full cycles and patient to get back into see Dr. Ganser, thoracic surgeon after scans been completed.  Dr. Campbell would see patient approximately 3 months post surgery.    3.  Patient to return in 3 weeks, prior to cycle 3, or sooner if needed.      Please note that this dictation was created using voice recognition software. I have made every reasonable attempt to correct obvious errors, but I expect that there are errors of grammar and possibly content that I did not discover before finalizing the note.

## 2023-08-08 NOTE — PROGRESS NOTES
"Pharmacy Chemotherapy Verification    DX: NSLC    Cycle 2  Previous treatment = 7/19/23; xrt/chemo for h/o breast cancer in 1995    Regimen: nivolumab + pemetrexed/CARBOplatin  Nivolumab 360 mg IV over 30 minutes on day 1 followed by  Pemetrexed 500 mg/m2 IV over 10 min on day 1  CARBOplatin AUC 5-6 IV over 30 minutes on day 1  Q 21-day cycle for 3 cycles  NCCN Guidelines for NSCLC V.1.2023  Steph FIELDS et al. [facility] 2022; 386(21): 8419-3456     Allergies:Codeine  BP (!) 147/70   Pulse 85   Temp 35.8 °C (96.5 °F) (Temporal)   Resp 18   Ht 1.62 m (5' 3.78\")   Wt 58 kg (127 lb 13.9 oz)   SpO2 94%   BMI 22.10 kg/m²   Body surface area is 1.62 meters squared.    Labs 8/7/23  ANC 1620 Hgb 10.9 Plt 263k  SCr 1.33 CrCl 37 mL/min   AST/ALT/AP = 48/39/55 Tbili<0.2  TSH 3.95 FreeT4 1.15    Vit B12 q9 weeks- given 7/19/23    **Provider aware of labs, OK to proceed with treatment as outlined below.**    Nivolumab 360 mg fixed dose, no calculation required   ok to treat with final dose = 360 mg IV    Pemetrexed 500mg/m2  x 1.62 m2 = 810 mg   <10% difference, ok to treat with final dose = 800 mg IV    CARBOplatin AUC 6 (37 + 25) = 372 mg   <10% difference, ok to treat with final dose = 370 mg IV    Sheeba Romero, PharmD, BCOP    "

## 2023-08-09 ENCOUNTER — TELEPHONE (OUTPATIENT)
Dept: HEMATOLOGY ONCOLOGY | Facility: MEDICAL CENTER | Age: 68
End: 2023-08-09

## 2023-08-09 ENCOUNTER — OUTPATIENT INFUSION SERVICES (OUTPATIENT)
Dept: ONCOLOGY | Facility: MEDICAL CENTER | Age: 68
End: 2023-08-09
Attending: STUDENT IN AN ORGANIZED HEALTH CARE EDUCATION/TRAINING PROGRAM
Payer: MEDICARE

## 2023-08-09 VITALS
DIASTOLIC BLOOD PRESSURE: 70 MMHG | TEMPERATURE: 96.5 F | HEART RATE: 85 BPM | HEIGHT: 64 IN | BODY MASS INDEX: 21.83 KG/M2 | SYSTOLIC BLOOD PRESSURE: 147 MMHG | RESPIRATION RATE: 18 BRPM | OXYGEN SATURATION: 94 % | WEIGHT: 127.87 LBS

## 2023-08-09 DIAGNOSIS — F41.8 OTHER SPECIFIED ANXIETY DISORDERS: ICD-10-CM

## 2023-08-09 DIAGNOSIS — C34.92 NSCLC OF LEFT LUNG (HCC): ICD-10-CM

## 2023-08-09 PROCEDURE — 96417 CHEMO IV INFUS EACH ADDL SEQ: CPT

## 2023-08-09 PROCEDURE — 96367 TX/PROPH/DG ADDL SEQ IV INF: CPT

## 2023-08-09 PROCEDURE — 700111 HCHG RX REV CODE 636 W/ 250 OVERRIDE (IP): Performed by: STUDENT IN AN ORGANIZED HEALTH CARE EDUCATION/TRAINING PROGRAM

## 2023-08-09 PROCEDURE — 96413 CHEMO IV INFUSION 1 HR: CPT

## 2023-08-09 PROCEDURE — 96361 HYDRATE IV INFUSION ADD-ON: CPT

## 2023-08-09 PROCEDURE — 700105 HCHG RX REV CODE 258: Performed by: NURSE PRACTITIONER

## 2023-08-09 PROCEDURE — 700105 HCHG RX REV CODE 258: Mod: JZ | Performed by: STUDENT IN AN ORGANIZED HEALTH CARE EDUCATION/TRAINING PROGRAM

## 2023-08-09 PROCEDURE — 96375 TX/PRO/DX INJ NEW DRUG ADDON: CPT

## 2023-08-09 RX ORDER — SODIUM CHLORIDE 9 MG/ML
500 INJECTION, SOLUTION INTRAVENOUS ONCE
Status: COMPLETED | OUTPATIENT
Start: 2023-08-09 | End: 2023-08-09

## 2023-08-09 RX ORDER — CYANOCOBALAMIN 1000 UG/ML
1000 INJECTION, SOLUTION INTRAMUSCULAR; SUBCUTANEOUS
Status: DISCONTINUED | OUTPATIENT
Start: 2023-08-09 | End: 2023-08-09

## 2023-08-09 RX ADMIN — DEXAMETHASONE SODIUM PHOSPHATE 12 MG: 4 INJECTION, SOLUTION INTRA-ARTICULAR; INTRALESIONAL; INTRAMUSCULAR; INTRAVENOUS; SOFT TISSUE at 11:45

## 2023-08-09 RX ADMIN — PEMETREXED DISODIUM 800 MG: 500 INJECTION, POWDER, LYOPHILIZED, FOR SOLUTION INTRAVENOUS at 14:02

## 2023-08-09 RX ADMIN — SODIUM CHLORIDE 500 ML: 9 INJECTION, SOLUTION INTRAVENOUS at 11:30

## 2023-08-09 RX ADMIN — CARBOPLATIN 370 MG: 600 INJECTION, SOLUTION INTRAVENOUS at 14:30

## 2023-08-09 RX ADMIN — SODIUM CHLORIDE 360 MG: 9 INJECTION, SOLUTION INTRAVENOUS at 13:15

## 2023-08-09 RX ADMIN — ONDANSETRON 16 MG: 2 INJECTION INTRAMUSCULAR; INTRAVENOUS at 12:00

## 2023-08-09 RX ADMIN — FOSAPREPITANT 150 MG: 150 INJECTION, POWDER, LYOPHILIZED, FOR SOLUTION INTRAVENOUS at 12:20

## 2023-08-09 ASSESSMENT — FIBROSIS 4 INDEX: FIB4 SCORE: 1.99

## 2023-08-09 NOTE — PROGRESS NOTES
Chemotherapy Verification - SECONDARY RN   C2 D1    Height = 162 cm  Weight = 58 kg  BSA = 1.62 m^2       Medication: nivolumab (OPDIVO)  Dose: 360 mg set dose  Calculated Dose: 360 mg set dose                             (In mg/m2, AUC, mg/kg)     Medication: pemetrexed (ALIMTA)  Dose: 500 mg/m2  Calculated Dose: 810 mg                              (In mg/m2, AUC, mg/kg)    Medication: carboplatin (PARAPLATIN)  Dose: target AUC 6  Calculated Dose: 372.34 mg                             (In mg/m2, AUC, mg/kg)    Carboplatin calculation (if applicable):  (6*(37.056+25)) = 372.336    I confirm that this process was performed independently.

## 2023-08-09 NOTE — PROGRESS NOTES
Chemotherapy Verification - PRIMARY RN      Height = 63.78 in  Weight = 127 lb  BSA = 1.62 m2       Medication: Opdivo  Dose: 360 mg  Calculated Dose: 360 mg / Fixed dose                             (In mg/m2, AUC, mg/kg)     Medication: Alimta  Dose: 500 mg/m2  Calculated Dose: 810 mg                             (In mg/m2, AUC, mg/kg)    Medication: Carboplatin  Dose: 6 AUC  Calculated Dose: 372.406 mg                             (In mg/m2, AUC, mg/kg)    Carboplatin calculation (if applicable):  (((140-68)*[id number])*(0.7+(1*0.15))/(1.33*72)+25)*6*((1=1)+(1=2)) = 372.406 mg      I confirm this process was performed independently with the BSA and all final chemotherapy dosing calculations congruent.  Any discrepancies of 10% or greater have been addressed with the chemotherapy pharmacist. The resolution of the discrepancy has been documented in the EPIC progress notes.

## 2023-08-09 NOTE — PROGRESS NOTES
van into [facility] for Cycle 2 of Opdivo / Alimta / Carboplatin for NSCLC of left lung . Pt denied having any new or acute complaints today, reports tolerating past treatments well. PIV started, had + blood return flushed briskly. Pt given anticancer therapy + hydration as prescribed, tolerated well, denied having any complaints during or after infusion. PIV discontinued, bleeding controlled with gauze and coban. Discharge home to self care in [facility]. Appointment confirm for next treatment.

## 2023-08-09 NOTE — PROGRESS NOTES
"Pharmacy Chemotherapy Calculations    Protocol: Nivolumab + Pemetrexed/Carboplatin  Nivolumab 360 mg IV over 30 minutes followed by  Pemetrexed 500 mg/m2 IV over 10 minutes followed by  Carboplatin AUC 6 IV over 30 minutes   21-day cycle for 3 cycles  NCCN Guidelines for Non-Small Cell Lung Cancer V.1.2023.  Steph PM, et al. N Engl J Med. 2022;386(21):3625-9739.    Allergies:  Codeine       BP (!) 147/70   Pulse 85   Temp 35.8 °C (96.5 °F) (Temporal)   Resp 18   Ht 1.62 m (5' 3.78\")   Wt 58 kg (127 lb 13.9 oz)   SpO2 94%   BMI 22.10 kg/m²  Body surface area is 1.62 meters squared.    Labs 8/7/23:  ANC~ 1620 Plt = 263k   Hgb = 10.9     SCr = 1.33 mg/dL CrCl ~ 37 mL/min   AST/ALT/AP = 48/39/55 TBili < 0.2     TSH = 3.95   Free T4 = 1.15       *Ok to proceed with treatment today per APRN, pt to receive extra hydration    Cyanocobalamin due every 9 weeks, last given 7/19/23    Drug Order   (Drug name, dose, route, IV Fluid & volume, frequency, number of doses) Cycle 2  Previous treatment: C1 on 7/19/23     Medication = Nivolumab  Base Dose = 360 mg  Fixed dose, no calc required  Final Dose = 360 mg  Route = IV  Fluid & Volume =  mL  Admin Duration = Over 30 mins          Fixed dose, ok to treat with final dose.    Medication = Pemetrexed  Base Dose = 500 mg/m2  Calc Dose: Base Dose x 1.62 m2 = 810 mg  Final Dose = 800 mg  Route = IV  Fluid & Volume =  mL  Admin Duration = Over 10 mins          <10% difference, OK to treat with final dose   Medication = Carboplatin  Base Dose = AUC 6  Calc Dose:Base Dose x (37 mL/min + 25) = 372 mg  Final Dose = 370 mg  Route = IV  Fluid & Volume =  mL  Admin Duration = Over 30 mins          <10% difference, OK to treat with final dose     By my signature below, I confirm this process was performed independently with the BSA and all final chemotherapy dosing calculations congruent. I have reviewed the above chemotherapy order and that my calculation of the final " dose and BSA (when applicable) corroborate those calculations of the  pharmacist. Discrepancies of 10% or greater in the written dose have been addressed and documented within the EPIC Progress notes.    Amina Bernstein, PharmD, BCOP

## 2023-08-10 RX ORDER — VENLAFAXINE 75 MG/1
75 TABLET ORAL DAILY
Qty: 90 TABLET | Refills: 0 | Status: SHIPPED | OUTPATIENT
Start: 2023-08-10 | End: 2024-01-15 | Stop reason: SDUPTHER

## 2023-08-11 ENCOUNTER — TELEPHONE (OUTPATIENT)
Dept: HEALTH INFORMATION MANAGEMENT | Facility: OTHER | Age: 68
End: 2023-08-11

## 2023-08-15 RX ORDER — ONDANSETRON 8 MG/1
8 TABLET, ORALLY DISINTEGRATING ORAL PRN
Status: CANCELLED | OUTPATIENT
Start: 2023-09-06

## 2023-08-15 RX ORDER — PROCHLORPERAZINE MALEATE 10 MG
10 TABLET ORAL EVERY 6 HOURS PRN
Status: CANCELLED | OUTPATIENT
Start: 2023-09-06

## 2023-08-15 RX ORDER — 0.9 % SODIUM CHLORIDE 0.9 %
10 VIAL (ML) INJECTION PRN
Status: CANCELLED | OUTPATIENT
Start: 2023-09-06

## 2023-08-15 RX ORDER — ONDANSETRON 2 MG/ML
4 INJECTION INTRAMUSCULAR; INTRAVENOUS PRN
Status: CANCELLED | OUTPATIENT
Start: 2023-09-06

## 2023-08-15 RX ORDER — SODIUM CHLORIDE 9 MG/ML
INJECTION, SOLUTION INTRAVENOUS CONTINUOUS
Status: CANCELLED | OUTPATIENT
Start: 2023-09-06

## 2023-08-15 RX ORDER — 0.9 % SODIUM CHLORIDE 0.9 %
10 VIAL (ML) INJECTION PRN
Status: CANCELLED | OUTPATIENT
Start: 2023-09-05

## 2023-08-15 RX ORDER — EPINEPHRINE 1 MG/ML(1)
0.5 AMPUL (ML) INJECTION PRN
Status: CANCELLED | OUTPATIENT
Start: 2023-09-06

## 2023-08-15 RX ORDER — 0.9 % SODIUM CHLORIDE 0.9 %
VIAL (ML) INJECTION PRN
Status: CANCELLED | OUTPATIENT
Start: 2023-09-06

## 2023-08-15 RX ORDER — CYANOCOBALAMIN 1000 UG/ML
1000 INJECTION, SOLUTION INTRAMUSCULAR; SUBCUTANEOUS
Status: CANCELLED | OUTPATIENT
Start: 2023-09-06

## 2023-08-15 RX ORDER — DIPHENHYDRAMINE HYDROCHLORIDE 50 MG/ML
50 INJECTION INTRAMUSCULAR; INTRAVENOUS PRN
Status: CANCELLED | OUTPATIENT
Start: 2023-09-06

## 2023-08-15 RX ORDER — 0.9 % SODIUM CHLORIDE 0.9 %
VIAL (ML) INJECTION PRN
Status: CANCELLED | OUTPATIENT
Start: 2023-09-05

## 2023-08-15 RX ORDER — 0.9 % SODIUM CHLORIDE 0.9 %
3 VIAL (ML) INJECTION PRN
Status: CANCELLED | OUTPATIENT
Start: 2023-09-06

## 2023-08-15 RX ORDER — 0.9 % SODIUM CHLORIDE 0.9 %
3 VIAL (ML) INJECTION PRN
Status: CANCELLED | OUTPATIENT
Start: 2023-09-05

## 2023-08-15 RX ORDER — METHYLPREDNISOLONE SODIUM SUCCINATE 125 MG/2ML
125 INJECTION, POWDER, LYOPHILIZED, FOR SOLUTION INTRAMUSCULAR; INTRAVENOUS PRN
Status: CANCELLED | OUTPATIENT
Start: 2023-09-06

## 2023-08-17 ENCOUNTER — OUTPATIENT INFUSION SERVICES (OUTPATIENT)
Dept: ONCOLOGY | Facility: MEDICAL CENTER | Age: 68
End: 2023-08-17
Attending: STUDENT IN AN ORGANIZED HEALTH CARE EDUCATION/TRAINING PROGRAM
Payer: MEDICARE

## 2023-08-17 VITALS
OXYGEN SATURATION: 97 % | HEIGHT: 64 IN | TEMPERATURE: 96.8 F | SYSTOLIC BLOOD PRESSURE: 164 MMHG | BODY MASS INDEX: 21.42 KG/M2 | WEIGHT: 125.44 LBS | HEART RATE: 97 BPM | DIASTOLIC BLOOD PRESSURE: 72 MMHG | RESPIRATION RATE: 18 BRPM

## 2023-08-17 DIAGNOSIS — C34.92 NSCLC OF LEFT LUNG (HCC): ICD-10-CM

## 2023-08-17 PROCEDURE — 96360 HYDRATION IV INFUSION INIT: CPT

## 2023-08-17 PROCEDURE — 700105 HCHG RX REV CODE 258: Performed by: NURSE PRACTITIONER

## 2023-08-17 RX ORDER — SODIUM CHLORIDE 9 MG/ML
1000 INJECTION, SOLUTION INTRAVENOUS ONCE
Status: COMPLETED | OUTPATIENT
Start: 2023-08-17 | End: 2023-08-17

## 2023-08-17 RX ADMIN — SODIUM CHLORIDE 1000 ML: 9 INJECTION, SOLUTION INTRAVENOUS at 08:19

## 2023-08-17 ASSESSMENT — FIBROSIS 4 INDEX: FIB4 SCORE: 1.99

## 2023-08-17 NOTE — PROGRESS NOTES
Sylvie arrives ambulatory to [facility] for hydration.  She denies any new or acute changes.  PIV placed, flushes easily, brisk blood return observed.  NS 1L administered as ordered, Sylvie tolerated well.  PIV flushed and removed, gauze and coban to site.  Discharged in NAD, next appointment confirmed.

## 2023-08-18 ENCOUNTER — TELEPHONE (OUTPATIENT)
Dept: NEPHROLOGY | Facility: MEDICAL CENTER | Age: 68
End: 2023-08-18
Payer: MEDICARE

## 2023-08-18 DIAGNOSIS — N18.31 STAGE 3A CHRONIC KIDNEY DISEASE: ICD-10-CM

## 2023-08-21 ENCOUNTER — HOSPITAL ENCOUNTER (OUTPATIENT)
Dept: LAB | Facility: MEDICAL CENTER | Age: 68
End: 2023-08-21
Attending: INTERNAL MEDICINE
Payer: MEDICARE

## 2023-08-21 DIAGNOSIS — N18.31 STAGE 3A CHRONIC KIDNEY DISEASE: ICD-10-CM

## 2023-08-21 LAB
ERYTHROCYTE [DISTWIDTH] IN BLOOD BY AUTOMATED COUNT: 46.5 FL (ref 35.9–50)
HCT VFR BLD AUTO: 31.9 % (ref 37–47)
HGB BLD-MCNC: 10.1 G/DL (ref 12–16)
MCH RBC QN AUTO: 29.3 PG (ref 27–33)
MCHC RBC AUTO-ENTMCNC: 31.7 G/DL (ref 32.2–35.5)
MCV RBC AUTO: 92.5 FL (ref 81.4–97.8)
PLATELET # BLD AUTO: 168 K/UL (ref 164–446)
PMV BLD AUTO: 9.3 FL (ref 9–12.9)
RBC # BLD AUTO: 3.45 M/UL (ref 4.2–5.4)
WBC # BLD AUTO: 4.3 K/UL (ref 4.8–10.8)

## 2023-08-21 PROCEDURE — 85027 COMPLETE CBC AUTOMATED: CPT

## 2023-08-21 PROCEDURE — 36415 COLL VENOUS BLD VENIPUNCTURE: CPT

## 2023-08-22 ENCOUNTER — HOSPITAL ENCOUNTER (OUTPATIENT)
Dept: RADIOLOGY | Facility: MEDICAL CENTER | Age: 68
End: 2023-08-22
Attending: FAMILY MEDICINE
Payer: MEDICARE

## 2023-08-22 DIAGNOSIS — Z12.31 ENCOUNTER FOR SCREENING MAMMOGRAM FOR MALIGNANT NEOPLASM OF BREAST: ICD-10-CM

## 2023-08-22 PROCEDURE — 77063 BREAST TOMOSYNTHESIS BI: CPT | Mod: 52

## 2023-08-23 ENCOUNTER — OFFICE VISIT (OUTPATIENT)
Dept: NEPHROLOGY | Facility: MEDICAL CENTER | Age: 68
End: 2023-08-23
Payer: MEDICARE

## 2023-08-23 VITALS
BODY MASS INDEX: 22.32 KG/M2 | TEMPERATURE: 97.4 F | HEIGHT: 63 IN | OXYGEN SATURATION: 95 % | HEART RATE: 92 BPM | RESPIRATION RATE: 18 BRPM | WEIGHT: 126 LBS | DIASTOLIC BLOOD PRESSURE: 62 MMHG | SYSTOLIC BLOOD PRESSURE: 100 MMHG

## 2023-08-23 DIAGNOSIS — I10 ESSENTIAL HYPERTENSION: ICD-10-CM

## 2023-08-23 DIAGNOSIS — N18.31 STAGE 3A CHRONIC KIDNEY DISEASE: ICD-10-CM

## 2023-08-23 PROCEDURE — 3074F SYST BP LT 130 MM HG: CPT | Performed by: INTERNAL MEDICINE

## 2023-08-23 PROCEDURE — 99214 OFFICE O/P EST MOD 30 MIN: CPT | Performed by: INTERNAL MEDICINE

## 2023-08-23 PROCEDURE — 3078F DIAST BP <80 MM HG: CPT | Performed by: INTERNAL MEDICINE

## 2023-08-23 ASSESSMENT — ENCOUNTER SYMPTOMS
SHORTNESS OF BREATH: 0
VOMITING: 0
COUGH: 0
NERVOUS/ANXIOUS: 1
HYPERTENSION: 1
FEVER: 0
NAUSEA: 0
CHILLS: 0

## 2023-08-23 ASSESSMENT — FIBROSIS 4 INDEX: FIB4 SCORE: 3.11

## 2023-08-24 NOTE — PROGRESS NOTES
"Subjective     Sylvie Mady Andersen is a 68 y.o. female who presents with Hypertension and Chronic Kidney Disease            Patient was recently diagnosed with lung cancer, she is undergoing chemotherapy    Hypertension  This is a chronic problem. The current episode started more than 1 year ago. The problem is unchanged. The problem is controlled. Pertinent negatives include no chest pain, malaise/fatigue, peripheral edema or shortness of breath. Risk factors for coronary artery disease include post-menopausal state. Past treatments include ACE inhibitors and beta blockers. The current treatment provides significant improvement. There are no compliance problems.  Hypertensive end-organ damage includes kidney disease. Identifiable causes of hypertension include chronic renal disease.   Chronic Kidney Disease  This is a chronic problem. The current episode started more than 1 year ago. The problem occurs constantly. The problem has been waxing and waning. Pertinent negatives include no chest pain, chills, coughing, fever, nausea, urinary symptoms or vomiting.       Review of Systems   Constitutional:  Negative for chills, fever and malaise/fatigue.   Respiratory:  Negative for cough and shortness of breath.    Cardiovascular:  Negative for chest pain and leg swelling.   Gastrointestinal:  Negative for nausea and vomiting.   Genitourinary:  Negative for dysuria, frequency and urgency.   Psychiatric/Behavioral:  The patient is nervous/anxious.               Objective     /62 (BP Location: Right arm, Patient Position: Sitting, BP Cuff Size: Adult)   Pulse 92   Temp 36.3 °C (97.4 °F) (Temporal)   Resp 18   Ht 1.6 m (5' 3\")   Wt 57.2 kg (126 lb)   SpO2 95%   BMI 22.32 kg/m²      Physical Exam  Vitals and nursing note reviewed.   Constitutional:       General: She is not in acute distress.     Appearance: Normal appearance. She is well-developed. She is not diaphoretic.   HENT:      Head: Normocephalic and " atraumatic.      Right Ear: External ear normal.      Left Ear: External ear normal.      Nose: Nose normal.   Eyes:      General: No scleral icterus.        Right eye: No discharge.         Left eye: No discharge.      Conjunctiva/sclera: Conjunctivae normal.   Cardiovascular:      Rate and Rhythm: Normal rate and regular rhythm.      Heart sounds: No murmur heard.  Pulmonary:      Effort: Pulmonary effort is normal. No respiratory distress.      Breath sounds: Normal breath sounds.   Musculoskeletal:         General: No tenderness.      Right lower leg: No edema.      Left lower leg: No edema.   Skin:     General: Skin is warm and dry.      Findings: No erythema.   Neurological:      General: No focal deficit present.      Mental Status: She is alert and oriented to person, place, and time.      Cranial Nerves: No cranial nerve deficit.   Psychiatric:         Mood and Affect: Mood normal.         Behavior: Behavior normal.       Past Medical History:   Diagnosis Date    Allergy, unspecified not elsewhere classified     Breast cancer ([facility])     Chickenpox     Dental disorder     upper partial    Former smoker 2023    High cholesterol     History of breast cancer 2017    History of radiation therapy 2023    To left chest for breast cancer     Hyperlipidemia     Hypertension     Left upper lobe pulmonary nodule 2023    Tonsillitis      Social History     Socioeconomic History    Marital status:      Spouse name: Not on file    Number of children: Not on file    Years of education: Not on file    Highest education level: Not on file   Occupational History    Not on file   Tobacco Use    Smoking status: Every Day     Current packs/day: 0.00     Average packs/day: 0.3 packs/day for 45.0 years (11.3 ttl pk-yrs)     Types: Cigarettes     Start date: 1977     Last attempt to quit: 2022     Years since quittin.1     Passive exposure: Current (Spouse)    Smokeless tobacco:  Former     Quit date: 6/28/2022    Tobacco comments:     5 daily    Vaping Use    Vaping Use: Never used   Substance and Sexual Activity    Alcohol use: No    Drug use: No    Sexual activity: Not Currently   Other Topics Concern    Not on file   Social History Narrative    Not on file     Social Determinants of Health     Financial Resource Strain: Not on file   Food Insecurity: Not on file   Transportation Needs: Not on file   Physical Activity: Not on file   Stress: Not on file   Social Connections: Not on file   Intimate Partner Violence: Not on file   Housing Stability: Not on file     Family History   Problem Relation Age of Onset    Cancer Mother         breast cancer/Breast    Breast Cancer Mother     Hypertension Maternal Uncle     Cancer Maternal Uncle     Hypertension Maternal Grandmother     Hyperlipidemia Maternal Grandmother     Heart Disease Maternal Grandmother     Cancer Paternal Grandfather         Lung ca, smoker    Lung Cancer Maternal Grandfather     Alzheimer's Disease Maternal Aunt     Kidney Disease Maternal Aunt     Diabetes Neg Hx      Recent Labs     06/12/23  1435 07/19/23  1116 08/07/23  1443   ALBUMIN  --  4.0 4.0   SODIUM 139 140 142   POTASSIUM 4.1 4.4 5.4   CHLORIDE 108 109 110   CO2 20 22 23   BUN 30* 37* 24*   CREATININE 1.05 1.31 1.33                             Assessment & Plan        1. Stage 3a chronic kidney disease (HCC)  No uremic symptoms  Renal dose of medication  Avoid nephrotoxins  Continue same medication regimen  Patient was advised to call us if symptoms worsen      2. Essential hypertension  Controlled  Continue same medication regimen  Continue low-sodium diet

## 2023-08-28 ENCOUNTER — HOSPITAL ENCOUNTER (OUTPATIENT)
Dept: LAB | Facility: MEDICAL CENTER | Age: 68
End: 2023-08-28
Attending: STUDENT IN AN ORGANIZED HEALTH CARE EDUCATION/TRAINING PROGRAM
Payer: MEDICARE

## 2023-08-28 DIAGNOSIS — Z79.899 HIGH RISK MEDICATION USE: ICD-10-CM

## 2023-08-28 DIAGNOSIS — C34.92 NSCLC OF LEFT LUNG (HCC): ICD-10-CM

## 2023-08-28 LAB
ALBUMIN SERPL BCP-MCNC: 4 G/DL (ref 3.2–4.9)
ALBUMIN/GLOB SERPL: 1.5 G/DL
ALP SERPL-CCNC: 66 U/L (ref 30–99)
ALT SERPL-CCNC: 39 U/L (ref 2–50)
ANION GAP SERPL CALC-SCNC: 10 MMOL/L (ref 7–16)
AST SERPL-CCNC: 39 U/L (ref 12–45)
BASOPHILS # BLD AUTO: 0.7 % (ref 0–1.8)
BASOPHILS # BLD: 0.04 K/UL (ref 0–0.12)
BILIRUB SERPL-MCNC: <0.2 MG/DL (ref 0.1–1.5)
BUN SERPL-MCNC: 34 MG/DL (ref 8–22)
CALCIUM ALBUM COR SERPL-MCNC: 10.4 MG/DL (ref 8.5–10.5)
CALCIUM SERPL-MCNC: 10.4 MG/DL (ref 8.5–10.5)
CHLORIDE SERPL-SCNC: 107 MMOL/L (ref 96–112)
CO2 SERPL-SCNC: 24 MMOL/L (ref 20–33)
CREAT SERPL-MCNC: 1.67 MG/DL (ref 0.5–1.4)
EOSINOPHIL # BLD AUTO: 0.08 K/UL (ref 0–0.51)
EOSINOPHIL NFR BLD: 1.5 % (ref 0–6.9)
ERYTHROCYTE [DISTWIDTH] IN BLOOD BY AUTOMATED COUNT: 48.9 FL (ref 35.9–50)
GFR SERPLBLD CREATININE-BSD FMLA CKD-EPI: 33 ML/MIN/1.73 M 2
GLOBULIN SER CALC-MCNC: 2.7 G/DL (ref 1.9–3.5)
GLUCOSE SERPL-MCNC: 125 MG/DL (ref 65–99)
HCT VFR BLD AUTO: 33.7 % (ref 37–47)
HGB BLD-MCNC: 10.4 G/DL (ref 12–16)
IMM GRANULOCYTES # BLD AUTO: 0.18 K/UL (ref 0–0.11)
IMM GRANULOCYTES NFR BLD AUTO: 3.3 % (ref 0–0.9)
LYMPHOCYTES # BLD AUTO: 1.8 K/UL (ref 1–4.8)
LYMPHOCYTES NFR BLD: 33.4 % (ref 22–41)
MCH RBC QN AUTO: 28.9 PG (ref 27–33)
MCHC RBC AUTO-ENTMCNC: 30.9 G/DL (ref 32.2–35.5)
MCV RBC AUTO: 93.6 FL (ref 81.4–97.8)
MONOCYTES # BLD AUTO: 0.91 K/UL (ref 0–0.85)
MONOCYTES NFR BLD AUTO: 16.9 % (ref 0–13.4)
NEUTROPHILS # BLD AUTO: 2.38 K/UL (ref 1.82–7.42)
NEUTROPHILS NFR BLD: 44.2 % (ref 44–72)
NRBC # BLD AUTO: 0.03 K/UL
NRBC BLD-RTO: 0.6 /100 WBC (ref 0–0.2)
PLATELET # BLD AUTO: 355 K/UL (ref 164–446)
PMV BLD AUTO: 9.2 FL (ref 9–12.9)
POTASSIUM SERPL-SCNC: 4.6 MMOL/L (ref 3.6–5.5)
PROT SERPL-MCNC: 6.7 G/DL (ref 6–8.2)
RBC # BLD AUTO: 3.6 M/UL (ref 4.2–5.4)
SODIUM SERPL-SCNC: 141 MMOL/L (ref 135–145)
T4 FREE SERPL-MCNC: 2.01 NG/DL (ref 0.93–1.7)
TSH SERPL DL<=0.005 MIU/L-ACNC: 0.26 UIU/ML (ref 0.38–5.33)
WBC # BLD AUTO: 5.4 K/UL (ref 4.8–10.8)

## 2023-08-28 PROCEDURE — 80053 COMPREHEN METABOLIC PANEL: CPT

## 2023-08-28 PROCEDURE — 36415 COLL VENOUS BLD VENIPUNCTURE: CPT

## 2023-08-28 PROCEDURE — 84439 ASSAY OF FREE THYROXINE: CPT

## 2023-08-28 PROCEDURE — 85025 COMPLETE CBC W/AUTO DIFF WBC: CPT

## 2023-08-28 PROCEDURE — 84443 ASSAY THYROID STIM HORMONE: CPT

## 2023-08-29 ENCOUNTER — HOSPITAL ENCOUNTER (OUTPATIENT)
Dept: HEMATOLOGY ONCOLOGY | Facility: MEDICAL CENTER | Age: 68
End: 2023-08-29
Attending: NURSE PRACTITIONER
Payer: MEDICARE

## 2023-08-29 VITALS
RESPIRATION RATE: 12 BRPM | TEMPERATURE: 95.7 F | HEART RATE: 96 BPM | WEIGHT: 126.45 LBS | OXYGEN SATURATION: 95 % | DIASTOLIC BLOOD PRESSURE: 58 MMHG | BODY MASS INDEX: 22.41 KG/M2 | HEIGHT: 63 IN | SYSTOLIC BLOOD PRESSURE: 124 MMHG

## 2023-08-29 DIAGNOSIS — Z79.899 ENCOUNTER FOR LONG-TERM (CURRENT) USE OF HIGH-RISK MEDICATION: ICD-10-CM

## 2023-08-29 DIAGNOSIS — C34.92 NSCLC OF LEFT LUNG (HCC): ICD-10-CM

## 2023-08-29 DIAGNOSIS — K62.5 BLOOD PER RECTUM: ICD-10-CM

## 2023-08-29 DIAGNOSIS — R79.89 ELEVATED SERUM FREE T4 LEVEL: ICD-10-CM

## 2023-08-29 DIAGNOSIS — N28.9 ABNORMAL KIDNEY FUNCTION: ICD-10-CM

## 2023-08-29 DIAGNOSIS — R79.89 LOW TSH LEVEL: ICD-10-CM

## 2023-08-29 PROCEDURE — 99214 OFFICE O/P EST MOD 30 MIN: CPT | Performed by: NURSE PRACTITIONER

## 2023-08-29 PROCEDURE — 99212 OFFICE O/P EST SF 10 MIN: CPT | Performed by: NURSE PRACTITIONER

## 2023-08-29 RX ORDER — 0.9 % SODIUM CHLORIDE 0.9 %
VIAL (ML) INJECTION PRN
Status: CANCELLED | OUTPATIENT
Start: 2023-09-06

## 2023-08-29 RX ORDER — SODIUM CHLORIDE 9 MG/ML
1000 INJECTION, SOLUTION INTRAVENOUS ONCE
Status: CANCELLED | OUTPATIENT
Start: 2023-09-06 | End: 2023-09-06

## 2023-08-29 RX ORDER — 0.9 % SODIUM CHLORIDE 0.9 %
10 VIAL (ML) INJECTION PRN
Status: CANCELLED | OUTPATIENT
Start: 2023-08-31

## 2023-08-29 RX ORDER — SODIUM CHLORIDE 9 MG/ML
1000 INJECTION, SOLUTION INTRAVENOUS ONCE
Status: CANCELLED | OUTPATIENT
Start: 2023-08-31 | End: 2023-08-31

## 2023-08-29 RX ORDER — 0.9 % SODIUM CHLORIDE 0.9 %
VIAL (ML) INJECTION PRN
Status: CANCELLED | OUTPATIENT
Start: 2023-08-31

## 2023-08-29 RX ORDER — 0.9 % SODIUM CHLORIDE 0.9 %
10 VIAL (ML) INJECTION PRN
Status: CANCELLED | OUTPATIENT
Start: 2023-09-06

## 2023-08-29 RX ORDER — 0.9 % SODIUM CHLORIDE 0.9 %
3 VIAL (ML) INJECTION PRN
Status: CANCELLED | OUTPATIENT
Start: 2023-09-06

## 2023-08-29 RX ORDER — 0.9 % SODIUM CHLORIDE 0.9 %
3 VIAL (ML) INJECTION PRN
Status: CANCELLED | OUTPATIENT
Start: 2023-08-31

## 2023-08-29 ASSESSMENT — ENCOUNTER SYMPTOMS
MYALGIAS: 0
NAUSEA: 0
CONSTIPATION: 0
HEADACHES: 0
VOMITING: 0
ABDOMINAL PAIN: 0
DIARRHEA: 0
DIZZINESS: 0
PALPITATIONS: 0
SHORTNESS OF BREATH: 0
TINGLING: 0
COUGH: 0
WEIGHT LOSS: 0
FEVER: 0
CHILLS: 0
DIAPHORESIS: 1

## 2023-08-29 ASSESSMENT — FIBROSIS 4 INDEX: FIB4 SCORE: 1.2

## 2023-08-29 ASSESSMENT — PAIN SCALES - GENERAL: PAINLEVEL: NO PAIN

## 2023-08-29 NOTE — PROGRESS NOTES
Subjective     Sylvie Andersen is a 68 y.o. female who presents with Cancer (Prechemo)          HPI  Patient seen today for evaluation prior to cycle 3 of chemotherapy employing Carboplatin/Pemetrexed/Nivolumab for Stage IIIA (cT1c, cN2, cM0) NSCLC, for continued monitoring of symptoms and side effects of cancer treatments.     Oncology history of presenting illness:  Patient reports that in May 2022 she was in [place] and felt very sick, weak and had dysuria.  She came home and went to the emergency department and was found to have a UTI.  She was also found to be anemic which prompted her to get a colonoscopy and EGD.  She was also recommended to get a chest x-ray but chose not to get it done at that time.  She then waited until February 2023 when she had a chest x-ray which showed a 21 mm density in or overlying the left, and CT chest recommended.  She had a CT chest on 3/7/2023 which showed a 2.7 x 2.5 cm subpleural lingular mass as well as a 4 mm left lower lobe pulmonary nodule and a 3 mm right lower lobe pulmonary nodule.     PET/CT completed on 6/6/2023 showed a hypermetabolic mass in the left upper lobe consistent with malignancy, as well as increased uptake in the left parasternal region versus internal mammary lymph node.  There is no evidence of metastatic disease elsewhere.  She did undergo a biopsy of the left upper lobe mass on 6/12/2023 which showed malignancy, pulmonary adenocarcinoma.  She did have a brain MRI on 6/20/2023 which showed an incidental meningioma but a dural mat could not be excluded.     Patient does have a history of smoking in which she smoked 5 cigarettes/day starting at the age of 17.  She quit at the time of her diagnosis.     Based on patient's clinical staging, stage IIIa adenocarcinoma plan is for neoadjuvant chemotherapy and immunotherapy employing carboplatin, pemetrexed and nivolumab.  Initiation of treatment with cycle 1 on 7/19/2023.  She established with   Ganser, thoracic surgeon.     Treatment history:  07/19/23: C1D1 neoadjuvant Carboplatin/Pemetrexed/Nivolumab  08/09/23: C2D1 neoadjuvant Carboplatin/Pemetrexed/Nivolumab  08/30/23: C3D1 neoadjuvant Carboplatin/Pemetrexed/Nivolumab delayed x1 week d/t creatinine clearance     Interval history:  Patient overall is doing well.  She did have a little increased fatigue with this last cycle but denies any other symptoms or side effects.  She denies any coughing or shortness of breath.  She denies any nausea or vomiting.  She denies any fevers or chills and is eating well with stable appetite.  She has continued to notice blood per rectum.  She stated it is when she wipes but yesterday she noticed blood in the toilet bowl.  She states that it feels like its increasing overall.  Physical examination was unremarkable today with no evidence of lesion or hemorrhoid.      Allergies   Allergen Reactions    Codeine Unspecified     Memory issues     Current Outpatient Medications on File Prior to Encounter   Medication Sig Dispense Refill    venlafaxine (EFFEXOR) 75 MG Tab Take 1 Tablet by mouth every day. 90 Tablet 0    ondansetron (ZOFRAN) 4 MG Tab tablet Take 1 Tablet by mouth every four hours as needed for Nausea/Vomiting. 20 Tablet 3    prochlorperazine (COMPAZINE) 10 MG Tab Take 1 Tablet by mouth every 6 hours as needed for Nausea/Vomiting. 30 Tablet 3    folic acid (FOLVITE) 1 MG Tab Take 1 Tablet by mouth every day. 30 Tablet 3    dexamethasone (DECADRON) 4 MG Tab Take 1 Tablet by mouth 2 times a day. Take for 3 days starting the day prior to pemetrexed on days 0, 1, 2 of each chemotherapy cycle 6 Tablet 3    metoprolol SR (TOPROL XL) 25 MG TABLET SR 24 HR Take 1 Tablet by mouth every day. 100 Tablet 3    pravastatin (PRAVACHOL) 40 MG tablet Take 1 Tablet by mouth every day. 100 Tablet 3    lisinopril (PRINIVIL) 10 MG Tab Take 1 Tablet by mouth every day. 100 Tablet 3    fenofibrate micronized (LOFIBRA) 134 MG capsule  "TAKE 1 CAPSULE BY MOUTH EVERY DAY 90 Capsule 3    therapeutic multivitamin-minerals (THERAGRAN-M) Tab Take 1 Tablet by mouth every day.       No current facility-administered medications on file prior to encounter.       Review of Systems   Constitutional:  Positive for diaphoresis (night sweats noted since chemo) and malaise/fatigue. Negative for chills, fever and weight loss.   Respiratory:  Negative for cough and shortness of breath.    Cardiovascular:  Negative for chest pain and palpitations.   Gastrointestinal:  Negative for abdominal pain, constipation, diarrhea, nausea and vomiting.   Genitourinary:  Negative for dysuria.   Musculoskeletal:  Negative for myalgias.   Neurological:  Negative for dizziness, tingling and headaches.              Objective     /58   Pulse 96   Temp (!) 35.4 °C (95.7 °F) (Temporal)   Resp 12   Ht 1.6 m (5' 3\")   Wt 57.4 kg (126 lb 7.2 oz)   SpO2 95%   BMI 22.40 kg/m²      Physical Exam  Vitals reviewed.   Constitutional:       General: She is not in acute distress.     Appearance: Normal appearance. She is not diaphoretic.   HENT:      Head: Normocephalic and atraumatic.   Cardiovascular:      Rate and Rhythm: Normal rate and regular rhythm.      Heart sounds: Normal heart sounds. No murmur heard.     No friction rub. No gallop.   Pulmonary:      Effort: Pulmonary effort is normal. No respiratory distress.      Breath sounds: No wheezing.      Comments: Slightly diminished in the right lower lobe  Abdominal:      General: Bowel sounds are normal. There is no distension.      Palpations: Abdomen is soft.      Tenderness: There is no abdominal tenderness.   Genitourinary:     Rectum: Normal.   Musculoskeletal:         General: No swelling or tenderness. Normal range of motion.   Skin:     General: Skin is warm and dry.   Neurological:      Mental Status: She is alert and oriented to person, place, and time.   Psychiatric:         Mood and Affect: Mood normal.         " Behavior: Behavior normal.               Latest Reference Range & Units 08/28/23 15:11   WBC 4.8 - 10.8 K/uL 5.4   RBC 4.20 - 5.40 M/uL 3.60 (L)   Hemoglobin 12.0 - 16.0 g/dL 10.4 (L)   Hematocrit 37.0 - 47.0 % 33.7 (L)   MCV 81.4 - 97.8 fL 93.6   MCH 27.0 - 33.0 pg 28.9   MCHC 32.2 - 35.5 g/dL 30.9 (L)   RDW 35.9 - 50.0 fL 48.9   Platelet Count 164 - 446 K/uL 355   MPV 9.0 - 12.9 fL 9.2   Neutrophils-Polys 44.00 - 72.00 % 44.20   Neutrophils (Absolute) 1.82 - 7.42 K/uL 2.38   Lymphocytes 22.00 - 41.00 % 33.40   Lymphs (Absolute) 1.00 - 4.80 K/uL 1.80   Monocytes 0.00 - 13.40 % 16.90 (H)   Monos (Absolute) 0.00 - 0.85 K/uL 0.91 (H)   Eosinophils 0.00 - 6.90 % 1.50   Eos (Absolute) 0.00 - 0.51 K/uL 0.08   Basophils 0.00 - 1.80 % 0.70   Baso (Absolute) 0.00 - 0.12 K/uL 0.04   Immature Granulocytes 0.00 - 0.90 % 3.30 (H)   Immature Granulocytes (abs) 0.00 - 0.11 K/uL 0.18 (H)   Nucleated RBC 0.00 - 0.20 /100 WBC 0.60 (H)   NRBC (Absolute) K/uL 0.03   Sodium 135 - 145 mmol/L 141   Potassium 3.6 - 5.5 mmol/L 4.6   Chloride 96 - 112 mmol/L 107   Co2 20 - 33 mmol/L 24   Anion Gap 7.0 - 16.0  10.0   Glucose 65 - 99 mg/dL 125 (H)   Bun 8 - 22 mg/dL 34 (H)   Creatinine 0.50 - 1.40 mg/dL 1.67 (H)   GFR (CKD-EPI) >60 mL/min/1.73 m 2 33 !   Calcium 8.5 - 10.5 mg/dL 10.4   Correct Calcium 8.5 - 10.5 mg/dL 10.4   AST(SGOT) 12 - 45 U/L 39   ALT(SGPT) 2 - 50 U/L 39   Alkaline Phosphatase 30 - 99 U/L 66   Total Bilirubin 0.1 - 1.5 mg/dL <0.2   Albumin 3.2 - 4.9 g/dL 4.0   Total Protein 6.0 - 8.2 g/dL 6.7   Globulin 1.9 - 3.5 g/dL 2.7   A-G Ratio g/dL 1.5   TSH 0.380 - 5.330 uIU/mL 0.260 (L)   Free T-4 0.93 - 1.70 ng/dL 2.01 (H)                Assessment & Plan       1. NSCLC of left lung (HCC)  CT-CHEST,ABDOMEN,PELVIS WITH      2. Low TSH level        3. Elevated serum free T4 level        4. Abnormal kidney function        5. Blood per rectum  Referral to Gastroenterology      6. Encounter for long-term (current) use of high-risk  medication                1. Patient with Stage IIIA (cT1c, cN2, cM0) NSCLC currently on Carboplatin/Pemetrexed/Nivolumab scheduled for cycle 3 tomorrow.  I did review her labs, CBC, CMP, TSH and free T4.  Patient's creatinine clearance is lower at 29.1.  We will go ahead and delay her treatment x1 week and provide patient with hydration tomorrow as well as next week.  We will reschedule her treatment and hopefully she will be able to proceed with treatment in 1 week.    2.  Patient also noted to have low TSH with high free T4.  We will continue to monitor her thyroid functions closely.  No interventions needed at this time but continued to monitor.  Will repeat next week.     3.  Plan would be for patient to complete 3 full cycles of neoadjuvant carboplatin, pemetrexed with nivolumab.  Will order a CT chest, abdomen and pelvis after the completion of 3 full cycles and patient to get back into see Dr. Ganser, thoracic surgeon after scans been completed.  Dr. Campbell would see patient approximately 3 months post surgery.  Discussed this in detail with the patient today.    4.  Patient noted to have blood with bowel movements.  She notices this when she wipes.  She has no pain with bowel movements.  Yesterday she did note blood in the toilet bowl.  It appears to be worsening.  Physical examination was unremarkable today.  I will go ahead and place referral to GI for further evaluation.      Please note that this dictation was created using voice recognition software. I have made every reasonable attempt to correct obvious errors, but I expect that there are errors of grammar and possibly content that I did not discover before finalizing the note.

## 2023-08-29 NOTE — ADDENDUM NOTE
Encounter addended by: LUCI Mills.P.NLyn on: 8/29/2023 12:45 PM   Actions taken: Order list changed, Diagnosis association updated

## 2023-08-30 ENCOUNTER — OUTPATIENT INFUSION SERVICES (OUTPATIENT)
Dept: ONCOLOGY | Facility: MEDICAL CENTER | Age: 68
End: 2023-08-30
Attending: STUDENT IN AN ORGANIZED HEALTH CARE EDUCATION/TRAINING PROGRAM
Payer: MEDICARE

## 2023-08-30 VITALS
RESPIRATION RATE: 18 BRPM | HEIGHT: 64 IN | WEIGHT: 127.65 LBS | TEMPERATURE: 96.5 F | DIASTOLIC BLOOD PRESSURE: 59 MMHG | BODY MASS INDEX: 21.79 KG/M2 | HEART RATE: 100 BPM | SYSTOLIC BLOOD PRESSURE: 131 MMHG | OXYGEN SATURATION: 93 %

## 2023-08-30 DIAGNOSIS — C34.92 NSCLC OF LEFT LUNG (HCC): ICD-10-CM

## 2023-08-30 PROCEDURE — 96360 HYDRATION IV INFUSION INIT: CPT

## 2023-08-30 PROCEDURE — 700105 HCHG RX REV CODE 258: Performed by: NURSE PRACTITIONER

## 2023-08-30 RX ORDER — SODIUM CHLORIDE 9 MG/ML
1000 INJECTION, SOLUTION INTRAVENOUS ONCE
Status: COMPLETED | OUTPATIENT
Start: 2023-08-30 | End: 2023-08-30

## 2023-08-30 RX ADMIN — SODIUM CHLORIDE 1000 ML: 9 INJECTION, SOLUTION INTRAVENOUS at 10:14

## 2023-08-30 ASSESSMENT — FIBROSIS 4 INDEX: FIB4 SCORE: 1.2

## 2023-08-30 NOTE — PROGRESS NOTES
Pt arrived ambulatory to IS for hydration only. POC discussed and pt verbalized agreement. PIV placed without difficulty, brisk blood return noted and 1L NS administered per MAR. Pt tolerated well. No s/s of complications noted. PIV removed with tip intact and site covered with sterile gauze/coban. Pt confirms she has a hydration appt in med/onc on Tuesday with follow-up in IS for repeat labs/chemo. Educated pt on increasing hydration and pt verbalized understanding. Pt discharged ambulatory in [facility].

## 2023-09-04 NOTE — PROGRESS NOTES
"Pharmacy Chemotherapy Verification    DX: NSLC    Cycle 3  Previous treatment = 8/9/23; xrt/chemo for h/o breast cancer in 1995    Regimen: nivolumab + pemetrexed/CARBOplatin  Nivolumab 360 mg IV over 30 minutes on day 1 followed by  Pemetrexed 500 mg/m2 IV over 10 min on day 1  CARBOplatin AUC 5-6 IV over 30 minutes on day 1  Q 21-day cycle for 3 cycles  NCCN Guidelines for NSCLC V.1.2023  Steph FIELDS et al. [facility] 2022; 386(21): 4813-7235     Allergies:Codeine  BP (!) 143/68   Pulse (!) 104   Temp 35.9 °C (96.7 °F) (Temporal)   Resp 18   Ht 1.62 m (5' 3.78\")   Wt 56.3 kg (124 lb 1.9 oz)   SpO2 94%   BMI 21.45 kg/m²   Body surface area is 1.59 meters squared.    Labs 9/5/23  ANC 4730 Hgb 11.3 Plt 499k  SCr 1.11 CrCL 43.1 mL/min   AST/ALT/AP = 99/76/93 Tbili<0.2  TSH 0.03 Free T4 3.65    Vit B12 q9 weeks - given 7/19/23    **Provider aware of labs, OK to proceed with treatment as outlined below.**    Nivolumab 360 mg fixed dose, no calculation required   ok to treat with final dose = 360 mg IV    Pemetrexed 500 mg/m2  x 1.59 m2 = 795 mg   <10% difference, ok to treat with final dose = 800 mg IV    CARBOplatin AUC 6 (43.1 + 25) = 408.6 mg   <10% difference, ok to treat with final dose = 400 mg IV    Sheeba Romero, PharmD, BCOP  "

## 2023-09-05 ENCOUNTER — HOSPITAL ENCOUNTER (OUTPATIENT)
Dept: HEMATOLOGY ONCOLOGY | Facility: MEDICAL CENTER | Age: 68
End: 2023-09-05
Attending: STUDENT IN AN ORGANIZED HEALTH CARE EDUCATION/TRAINING PROGRAM
Payer: MEDICARE

## 2023-09-05 ENCOUNTER — TELEPHONE (OUTPATIENT)
Dept: HEMATOLOGY ONCOLOGY | Facility: MEDICAL CENTER | Age: 68
End: 2023-09-05
Payer: MEDICARE

## 2023-09-05 ENCOUNTER — HOSPITAL ENCOUNTER (OUTPATIENT)
Facility: MEDICAL CENTER | Age: 68
End: 2023-09-05
Attending: NURSE PRACTITIONER
Payer: MEDICARE

## 2023-09-05 VITALS
HEART RATE: 94 BPM | SYSTOLIC BLOOD PRESSURE: 100 MMHG | TEMPERATURE: 98.3 F | RESPIRATION RATE: 18 BRPM | DIASTOLIC BLOOD PRESSURE: 55 MMHG | OXYGEN SATURATION: 94 %

## 2023-09-05 DIAGNOSIS — C34.92 NSCLC OF LEFT LUNG (HCC): ICD-10-CM

## 2023-09-05 DIAGNOSIS — Z79.899 HIGH RISK MEDICATION USE: ICD-10-CM

## 2023-09-05 LAB
ALBUMIN SERPL BCP-MCNC: 3.7 G/DL (ref 3.2–4.9)
ALBUMIN/GLOB SERPL: 1.3 G/DL
ALP SERPL-CCNC: 93 U/L (ref 30–99)
ALT SERPL-CCNC: 76 U/L (ref 2–50)
ANION GAP SERPL CALC-SCNC: 11 MMOL/L (ref 7–16)
AST SERPL-CCNC: 99 U/L (ref 12–45)
BASOPHILS # BLD AUTO: 1 % (ref 0–1.8)
BASOPHILS # BLD: 0.08 K/UL (ref 0–0.12)
BILIRUB SERPL-MCNC: <0.2 MG/DL (ref 0.1–1.5)
BUN SERPL-MCNC: 30 MG/DL (ref 8–22)
CALCIUM ALBUM COR SERPL-MCNC: 10.8 MG/DL (ref 8.5–10.5)
CALCIUM SERPL-MCNC: 10.6 MG/DL (ref 8.5–10.5)
CHLORIDE SERPL-SCNC: 106 MMOL/L (ref 96–112)
CO2 SERPL-SCNC: 23 MMOL/L (ref 20–33)
CREAT SERPL-MCNC: 1.11 MG/DL (ref 0.5–1.4)
EOSINOPHIL # BLD AUTO: 0.07 K/UL (ref 0–0.51)
EOSINOPHIL NFR BLD: 0.9 % (ref 0–6.9)
ERYTHROCYTE [DISTWIDTH] IN BLOOD BY AUTOMATED COUNT: 52.2 FL (ref 35.9–50)
GFR SERPLBLD CREATININE-BSD FMLA CKD-EPI: 54 ML/MIN/1.73 M 2
GLOBULIN SER CALC-MCNC: 2.8 G/DL (ref 1.9–3.5)
GLUCOSE SERPL-MCNC: 106 MG/DL (ref 65–99)
HCT VFR BLD AUTO: 35.9 % (ref 37–47)
HGB BLD-MCNC: 11.3 G/DL (ref 12–16)
IMM GRANULOCYTES # BLD AUTO: 0.14 K/UL (ref 0–0.11)
IMM GRANULOCYTES NFR BLD AUTO: 1.8 % (ref 0–0.9)
LYMPHOCYTES # BLD AUTO: 1.63 K/UL (ref 1–4.8)
LYMPHOCYTES NFR BLD: 20.4 % (ref 22–41)
MCH RBC QN AUTO: 29 PG (ref 27–33)
MCHC RBC AUTO-ENTMCNC: 31.5 G/DL (ref 32.2–35.5)
MCV RBC AUTO: 92.3 FL (ref 81.4–97.8)
MONOCYTES # BLD AUTO: 1.33 K/UL (ref 0–0.85)
MONOCYTES NFR BLD AUTO: 16.7 % (ref 0–13.4)
NEUTROPHILS # BLD AUTO: 4.73 K/UL (ref 1.82–7.42)
NEUTROPHILS NFR BLD: 59.2 % (ref 44–72)
NRBC # BLD AUTO: 0 K/UL
NRBC BLD-RTO: 0 /100 WBC (ref 0–0.2)
PLATELET # BLD AUTO: 499 K/UL (ref 164–446)
PMV BLD AUTO: 8.9 FL (ref 9–12.9)
POTASSIUM SERPL-SCNC: 4.2 MMOL/L (ref 3.6–5.5)
PROT SERPL-MCNC: 6.5 G/DL (ref 6–8.2)
RBC # BLD AUTO: 3.89 M/UL (ref 4.2–5.4)
SODIUM SERPL-SCNC: 140 MMOL/L (ref 135–145)
T4 FREE SERPL-MCNC: 3.65 NG/DL (ref 0.93–1.7)
TSH SERPL DL<=0.005 MIU/L-ACNC: 0.03 UIU/ML (ref 0.38–5.33)
WBC # BLD AUTO: 8 K/UL (ref 4.8–10.8)

## 2023-09-05 PROCEDURE — 85025 COMPLETE CBC W/AUTO DIFF WBC: CPT

## 2023-09-05 PROCEDURE — 700105 HCHG RX REV CODE 258: Performed by: NURSE PRACTITIONER

## 2023-09-05 PROCEDURE — 96360 HYDRATION IV INFUSION INIT: CPT

## 2023-09-05 PROCEDURE — 84443 ASSAY THYROID STIM HORMONE: CPT

## 2023-09-05 PROCEDURE — 80053 COMPREHEN METABOLIC PANEL: CPT

## 2023-09-05 PROCEDURE — 84439 ASSAY OF FREE THYROXINE: CPT

## 2023-09-05 RX ORDER — SODIUM CHLORIDE 9 MG/ML
1000 INJECTION, SOLUTION INTRAVENOUS ONCE
Status: CANCELLED | OUTPATIENT
Start: 2023-09-19 | End: 2023-09-21

## 2023-09-05 RX ORDER — 0.9 % SODIUM CHLORIDE 0.9 %
10 VIAL (ML) INJECTION PRN
OUTPATIENT
Start: 2023-09-19

## 2023-09-05 RX ORDER — 0.9 % SODIUM CHLORIDE 0.9 %
VIAL (ML) INJECTION PRN
OUTPATIENT
Start: 2023-09-28

## 2023-09-05 RX ORDER — 0.9 % SODIUM CHLORIDE 0.9 %
10 VIAL (ML) INJECTION PRN
OUTPATIENT
Start: 2023-09-28

## 2023-09-05 RX ORDER — 0.9 % SODIUM CHLORIDE 0.9 %
3 VIAL (ML) INJECTION PRN
OUTPATIENT
Start: 2023-09-28

## 2023-09-05 RX ORDER — 0.9 % SODIUM CHLORIDE 0.9 %
3 VIAL (ML) INJECTION PRN
OUTPATIENT
Start: 2023-09-19

## 2023-09-05 RX ORDER — SODIUM CHLORIDE 9 MG/ML
1000 INJECTION, SOLUTION INTRAVENOUS ONCE
Status: COMPLETED | OUTPATIENT
Start: 2023-09-05 | End: 2023-09-05

## 2023-09-05 RX ORDER — SODIUM CHLORIDE 9 MG/ML
1000 INJECTION, SOLUTION INTRAVENOUS ONCE
OUTPATIENT
Start: 2023-09-28 | End: 2023-09-28

## 2023-09-05 RX ORDER — 0.9 % SODIUM CHLORIDE 0.9 %
VIAL (ML) INJECTION PRN
OUTPATIENT
Start: 2023-09-19

## 2023-09-05 RX ADMIN — SODIUM CHLORIDE 1000 ML: 9 INJECTION, SOLUTION INTRAVENOUS at 11:10

## 2023-09-05 ASSESSMENT — PAIN SCALES - GENERAL: PAINLEVEL: 2=MINIMAL-SLIGHT

## 2023-09-05 NOTE — TELEPHONE ENCOUNTER
Called patient to update on plan of treatment. Pt will go to treatment as planned on 9/6/23. Updated patient about the worsening thyroid labs, and that she will see endocrinology for those. Advised patient to watch for a phone call from them to schedule an appointment. Educated patient on what endocrinology does, no other questions at this time. Pt will look on [facility] for appointments scheduled for additional IV hydration in clinic.

## 2023-09-05 NOTE — PROGRESS NOTES
Pt presents to clinic for lab work and hydration. 20g PIV placed right AC, labs obtained and sent to lab, 1L normal saline bolus started. Pt feeling okay today, did have c/o minor pains in abdomen but symptoms have since improved.     Patient vitals prior to normal saline infusion.   Vitals:    09/05/23 1104   BP: 106/57   Pulse: (!) 103   Resp: 18   Temp: 37.6 °C (99.6 °F)   SpO2: 94%       Vitals post bolus    Vitals:    09/05/23 1218   BP: 100/55   Pulse: 94   Resp: 18   Temp: 36.8 °C (98.3 °F)   SpO2: 94%     Pt tolerated bolus well, PIV removed from Right AC. Pt ambulatory in no apparent distress upon leaving clinic.

## 2023-09-05 NOTE — PROGRESS NOTES
We will proceed with treatment tomorrow as planned for cycle 3.  Discussed in detail with Dr. Campbell.  Guille to proceed with treatment with kidney functions at the level they are at.  Creatinine clearance is 44 today.    We will also go ahead and proceed with treatment with nivolumab despite hyperthyroidism.  We will send a referral over to endocrinology as well.  
Right ear hearing screen completed date: 2023  Right ear screen method: EOAE (evoked otoacoustic emission)  Right ear screen result: Passed  Right ear screen comment: N/A    Left ear hearing screen completed date: 2023  Left ear screen method: EOAE (evoked otoacoustic emission)  Left ear screen result: Passed  Left ear screen comments: N/A

## 2023-09-06 ENCOUNTER — TELEPHONE (OUTPATIENT)
Dept: ENDOCRINOLOGY | Facility: MEDICAL CENTER | Age: 68
End: 2023-09-06
Payer: MEDICARE

## 2023-09-06 ENCOUNTER — OUTPATIENT INFUSION SERVICES (OUTPATIENT)
Dept: ONCOLOGY | Facility: MEDICAL CENTER | Age: 68
End: 2023-09-06
Attending: NURSE PRACTITIONER
Payer: MEDICARE

## 2023-09-06 VITALS
OXYGEN SATURATION: 94 % | WEIGHT: 124.12 LBS | RESPIRATION RATE: 18 BRPM | BODY MASS INDEX: 21.19 KG/M2 | SYSTOLIC BLOOD PRESSURE: 143 MMHG | HEART RATE: 104 BPM | DIASTOLIC BLOOD PRESSURE: 68 MMHG | HEIGHT: 64 IN | TEMPERATURE: 96.7 F

## 2023-09-06 DIAGNOSIS — E05.90 HYPERTHYROIDISM: ICD-10-CM

## 2023-09-06 DIAGNOSIS — C34.92 NSCLC OF LEFT LUNG (HCC): ICD-10-CM

## 2023-09-06 PROCEDURE — 96411 CHEMO IV PUSH ADDL DRUG: CPT

## 2023-09-06 PROCEDURE — 700102 HCHG RX REV CODE 250 W/ 637 OVERRIDE(OP)

## 2023-09-06 PROCEDURE — 700105 HCHG RX REV CODE 258

## 2023-09-06 PROCEDURE — 96413 CHEMO IV INFUSION 1 HR: CPT

## 2023-09-06 PROCEDURE — 96417 CHEMO IV INFUS EACH ADDL SEQ: CPT

## 2023-09-06 PROCEDURE — 700111 HCHG RX REV CODE 636 W/ 250 OVERRIDE (IP): Mod: JZ

## 2023-09-06 PROCEDURE — 96375 TX/PRO/DX INJ NEW DRUG ADDON: CPT

## 2023-09-06 PROCEDURE — 96367 TX/PROPH/DG ADDL SEQ IV INF: CPT

## 2023-09-06 PROCEDURE — 700105 HCHG RX REV CODE 258: Performed by: NURSE PRACTITIONER

## 2023-09-06 PROCEDURE — 700111 HCHG RX REV CODE 636 W/ 250 OVERRIDE (IP): Mod: JZ | Performed by: NURSE PRACTITIONER

## 2023-09-06 RX ADMIN — PEMETREXED DISODIUM 800 MG: 500 INJECTION, POWDER, LYOPHILIZED, FOR SOLUTION INTRAVENOUS at 11:34

## 2023-09-06 RX ADMIN — FOSAPREPITANT 150 MG: 150 INJECTION, POWDER, LYOPHILIZED, FOR SOLUTION INTRAVENOUS at 10:08

## 2023-09-06 RX ADMIN — ONDANSETRON 16 MG: 2 INJECTION INTRAMUSCULAR; INTRAVENOUS at 09:50

## 2023-09-06 RX ADMIN — DEXAMETHASONE SODIUM PHOSPHATE 12 MG: 4 INJECTION, SOLUTION INTRA-ARTICULAR; INTRALESIONAL; INTRAMUSCULAR; INTRAVENOUS; SOFT TISSUE at 09:36

## 2023-09-06 RX ADMIN — CARBOPLATIN 400 MG: 10 INJECTION, SOLUTION INTRAVENOUS at 11:57

## 2023-09-06 ASSESSMENT — FIBROSIS 4 INDEX: FIB4 SCORE: 1.55

## 2023-09-06 NOTE — TELEPHONE ENCOUNTER
Left a detailed message for patient to get scheduled with Dr. Stockton urgently per the referring provider as well as Dr. Stockton

## 2023-09-06 NOTE — PROGRESS NOTES
Sylvie presented into [facility] for Day [facility]/ Cycle 3 of nivolumab, pemetrexed and carboplatin for NSC lung cancer. Pt denied having any new or acute complaints today, reports tolerating past treatments well. PIV started to RAC, had positive blood return and flushed briskly. Labs were drawn yesterday and reviewed. TRISH Mills aware of creatinine clearance = 43.053 and elevated TSH. Ok to treat placed in the treatment plan. Premeds given as ordered. Pt given nivolumab, pemetrexed and carboplatin as prescribed, tolerated well, denied having any complaints during or after infusion. PIV discontinued, bleeding controlled with gauze and coban. Schedulers emailed for future appointments. Pt discharged to home in good condition.

## 2023-09-06 NOTE — PROGRESS NOTES
Chemotherapy Verification - SECONDARY RN       Height = 162 cm  Weight = 56.3 kg  BSA = 1.59 m2       Medication: Opdivo  Dose: 360 mg set dose  Calculated Dose: 360 mg                             (In mg/m2, AUC, mg/kg)     Medication: Alimta  Dose: 500 mg/m2  Calculated Dose: 795 mg                             (In mg/m2, AUC, mg/kg)    Medication: Carboplatin  Dose: AUC 6  Calculated Dose: 408 mg                             (In mg/m2, AUC, mg/kg)    Carboplatin calculation (if applicable):  (6*(43+25)) = 408    I confirm that this process was performed independently.

## 2023-09-06 NOTE — PROGRESS NOTES
Spoke to endocrinology.  They are requesting a thyroid uptake scan.  This has been ordered and stat referral to endocrinology.  They have agreed to see patient.

## 2023-09-06 NOTE — PROGRESS NOTES
"Pharmacy Chemotherapy Calculations    Protocol: Nivolumab + Pemetrexed/Carboplatin  Nivolumab 360 mg IV over 30 minutes followed by  Pemetrexed 500 mg/m2 IV over 10 minutes followed by  Carboplatin AUC 6 IV over 30 minutes   21-day cycle for 3 cycles  NCCN Guidelines for Non-Small Cell Lung Cancer V.1.2023.  Steph PM, et al. N Engl J Med. 2022;386(21):4847-6848.    Allergies:  Codeine       BP (!) 143/68   Pulse (!) 104   Temp 35.9 °C (96.7 °F) (Temporal)   Resp 18   Ht 1.62 m (5' 3.78\")   Wt 56.3 kg (124 lb 1.9 oz)   SpO2 94%   BMI 21.45 kg/m²  Body surface area is 1.59 meters squared.    All lab results 9/5/23 within treatment parameters. Abnormal thyroid results to be reported to MD.   Scr = 1.11 Est crcl ~ 43mL/min    Drug Order   (Drug name, dose, route, IV Fluid & volume, frequency, number of doses) Cycle 3-   delayed 1 week for elevated SCr  Previous treatment: 8/9/23   Medication = Nivolumab  Base Dose = 360 mg  Fixed dose, no calc required  Final Dose = 360 mg  Route = IV  Fluid & Volume =  mL  Admin Duration = Over 30 mins          Fixed dose, ok to treat with final dose.    Medication = Pemetrexed  Base Dose = 500 mg/m2  Calc Dose: Base Dose x 1.59m2 = 795mg  Final Dose = 800mg  Route = IV  Fluid & Volume =  mL  Admin Duration = Over 10 mins          <10% difference, OK to treat with final dose   Medication = Carboplatin  Base Dose = AUC 6  Calc Dose:Base Dose x (43 + 25) = 408mg  Final Dose = 400mg  Route = IV  Fluid & Volume =  mL  Admin Duration = Over 30 mins          <10% difference, OK to treat with final dose     By my signature below, I confirm this process was performed independently with the BSA and all final chemotherapy dosing calculations congruent. I have reviewed the above chemotherapy order and that my calculation of the final dose and BSA (when applicable) corroborate those calculations of the  pharmacist. Discrepancies of 10% or greater in the written " dose have been addressed and documented within the EPIC Progress notes.    Dave Dow, GavinD

## 2023-09-06 NOTE — PROGRESS NOTES
Chemotherapy Verification - PRIMARY RN      Height = 162 cm  Weight = 56.3 kg  BSA = 1.59 m^2       Medication: nivolumab (Opdivo)  Dose: 360 mg set dose  Calculated Dose: 360 mg set dose                             (In mg/m2, AUC, mg/kg)     Medication: pemetrexed (Alimta)  Dose: 500 mg/m^2  Calculated Dose: 795 mg                             (In mg/m2, AUC, mg/kg)    Medication: carboplatin (Paraplatin)  Dose: Target AUC = 6  Calculated Dose: 408.318 mg                             (In mg/m2, AUC, mg/kg)    Carboplatin calculation (if applicable):  (6*(43.053+25)) = 408.318 mg    I confirm this process was performed independently with the BSA and all final chemotherapy dosing calculations congruent.  Any discrepancies of 10% or greater have been addressed with the chemotherapy pharmacist. The resolution of the discrepancy has been documented in the EPIC progress notes.

## 2023-09-07 ENCOUNTER — TELEPHONE (OUTPATIENT)
Dept: HEMATOLOGY ONCOLOGY | Facility: MEDICAL CENTER | Age: 68
End: 2023-09-07
Payer: MEDICARE

## 2023-09-08 DIAGNOSIS — R79.89 ELEVATED SERUM FREE T4 LEVEL: ICD-10-CM

## 2023-09-08 DIAGNOSIS — E05.90 HYPERTHYROIDISM: ICD-10-CM

## 2023-09-08 DIAGNOSIS — R79.89 LOW TSH LEVEL: ICD-10-CM

## 2023-09-08 NOTE — PROGRESS NOTES
In preparation for upcoming endocrine appt, MD requesting a thyroid US and labs.     1. Hyperthyroidism  US-THYROID    TSI    THYROTROPIN RECEP AB      2. Low TSH level  US-THYROID    TSI    THYROTROPIN RECEP AB      3. Elevated serum free T4 level  US-THYROID    TSI    THYROTROPIN RECEP AB

## 2023-09-11 ENCOUNTER — TELEPHONE (OUTPATIENT)
Dept: HEMATOLOGY ONCOLOGY | Facility: MEDICAL CENTER | Age: 68
End: 2023-09-11
Payer: MEDICARE

## 2023-09-11 ENCOUNTER — HOSPITAL ENCOUNTER (OUTPATIENT)
Facility: MEDICAL CENTER | Age: 68
DRG: 393 | End: 2023-09-11
Attending: NURSE PRACTITIONER
Payer: MEDICARE

## 2023-09-11 ENCOUNTER — APPOINTMENT (OUTPATIENT)
Dept: RADIOLOGY | Facility: MEDICAL CENTER | Age: 68
DRG: 393 | End: 2023-09-11
Payer: MEDICARE

## 2023-09-11 ENCOUNTER — HOSPITAL ENCOUNTER (INPATIENT)
Facility: MEDICAL CENTER | Age: 68
LOS: 2 days | DRG: 393 | End: 2023-09-15
Attending: EMERGENCY MEDICINE | Admitting: STUDENT IN AN ORGANIZED HEALTH CARE EDUCATION/TRAINING PROGRAM
Payer: MEDICARE

## 2023-09-11 ENCOUNTER — APPOINTMENT (OUTPATIENT)
Dept: RADIOLOGY | Facility: MEDICAL CENTER | Age: 68
DRG: 393 | End: 2023-09-11
Attending: EMERGENCY MEDICINE
Payer: MEDICARE

## 2023-09-11 DIAGNOSIS — E86.0 DEHYDRATION: ICD-10-CM

## 2023-09-11 DIAGNOSIS — E87.1 HYPONATREMIA: ICD-10-CM

## 2023-09-11 DIAGNOSIS — R19.7 BLOODY DIARRHEA: ICD-10-CM

## 2023-09-11 DIAGNOSIS — K52.9 COLITIS: ICD-10-CM

## 2023-09-11 DIAGNOSIS — C34.90 MALIGNANT NEOPLASM OF LUNG, UNSPECIFIED LATERALITY, UNSPECIFIED PART OF LUNG (HCC): ICD-10-CM

## 2023-09-11 PROBLEM — E87.20 METABOLIC ACIDOSIS: Status: ACTIVE | Noted: 2023-09-11

## 2023-09-11 PROBLEM — N28.89 RENAL MASS: Status: ACTIVE | Noted: 2023-09-11

## 2023-09-11 PROBLEM — N17.9 AKI (ACUTE KIDNEY INJURY) (HCC): Status: ACTIVE | Noted: 2023-09-11

## 2023-09-11 LAB
ABO + RH BLD: NORMAL
ABO GROUP BLD: NORMAL
ALBUMIN SERPL BCP-MCNC: 3.2 G/DL (ref 3.2–4.9)
ALBUMIN/GLOB SERPL: 0.8 G/DL
ALP SERPL-CCNC: 78 U/L (ref 30–99)
ALT SERPL-CCNC: 45 U/L (ref 2–50)
ANION GAP SERPL CALC-SCNC: 19 MMOL/L (ref 7–16)
APTT PPP: 24.5 SEC (ref 24.7–36)
AST SERPL-CCNC: 35 U/L (ref 12–45)
BASOPHILS # BLD AUTO: 0.4 % (ref 0–1.8)
BASOPHILS # BLD: 0.02 K/UL (ref 0–0.12)
BILIRUB SERPL-MCNC: 0.4 MG/DL (ref 0.1–1.5)
BLD GP AB SCN SERPL QL: NORMAL
BUN SERPL-MCNC: 49 MG/DL (ref 8–22)
C DIFF DNA SPEC QL NAA+PROBE: NEGATIVE
C DIFF TOX GENS STL QL NAA+PROBE: NEGATIVE
CALCIUM ALBUM COR SERPL-MCNC: 10.3 MG/DL (ref 8.5–10.5)
CALCIUM SERPL-MCNC: 9.7 MG/DL (ref 8.5–10.5)
CHLORIDE SERPL-SCNC: 99 MMOL/L (ref 96–112)
CO2 SERPL-SCNC: 12 MMOL/L (ref 20–33)
CREAT SERPL-MCNC: 2.07 MG/DL (ref 0.5–1.4)
EOSINOPHIL # BLD AUTO: 0.05 K/UL (ref 0–0.51)
EOSINOPHIL NFR BLD: 1.1 % (ref 0–6.9)
ERYTHROCYTE [DISTWIDTH] IN BLOOD BY AUTOMATED COUNT: 50.1 FL (ref 35.9–50)
GFR SERPLBLD CREATININE-BSD FMLA CKD-EPI: 26 ML/MIN/1.73 M 2
GLOBULIN SER CALC-MCNC: 3.9 G/DL (ref 1.9–3.5)
GLUCOSE SERPL-MCNC: 129 MG/DL (ref 65–99)
HCT VFR BLD AUTO: 39.8 % (ref 37–47)
HGB BLD-MCNC: 12.8 G/DL (ref 12–16)
IMM GRANULOCYTES # BLD AUTO: 0.03 K/UL (ref 0–0.11)
IMM GRANULOCYTES NFR BLD AUTO: 0.7 % (ref 0–0.9)
INR PPP: 1.03 (ref 0.87–1.13)
LIPASE SERPL-CCNC: 59 U/L (ref 11–82)
LYMPHOCYTES # BLD AUTO: 1.64 K/UL (ref 1–4.8)
LYMPHOCYTES NFR BLD: 36.9 % (ref 22–41)
MCH RBC QN AUTO: 29.2 PG (ref 27–33)
MCHC RBC AUTO-ENTMCNC: 32.2 G/DL (ref 32.2–35.5)
MCV RBC AUTO: 90.9 FL (ref 81.4–97.8)
MONOCYTES # BLD AUTO: 0.14 K/UL (ref 0–0.85)
MONOCYTES NFR BLD AUTO: 3.1 % (ref 0–13.4)
NEUTROPHILS # BLD AUTO: 2.57 K/UL (ref 1.82–7.42)
NEUTROPHILS NFR BLD: 57.8 % (ref 44–72)
NRBC # BLD AUTO: 0 K/UL
NRBC BLD-RTO: 0 /100 WBC (ref 0–0.2)
PLATELET # BLD AUTO: 443 K/UL (ref 164–446)
PMV BLD AUTO: 8.7 FL (ref 9–12.9)
POTASSIUM SERPL-SCNC: 4.9 MMOL/L (ref 3.6–5.5)
PROT SERPL-MCNC: 7.1 G/DL (ref 6–8.2)
PROTHROMBIN TIME: 13.6 SEC (ref 12–14.6)
RBC # BLD AUTO: 4.38 M/UL (ref 4.2–5.4)
RH BLD: NORMAL
SODIUM SERPL-SCNC: 130 MMOL/L (ref 135–145)
WBC # BLD AUTO: 4.5 K/UL (ref 4.8–10.8)

## 2023-09-11 PROCEDURE — 700102 HCHG RX REV CODE 250 W/ 637 OVERRIDE(OP): Performed by: HOSPITALIST

## 2023-09-11 PROCEDURE — 36415 COLL VENOUS BLD VENIPUNCTURE: CPT

## 2023-09-11 PROCEDURE — G0378 HOSPITAL OBSERVATION PER HR: HCPCS

## 2023-09-11 PROCEDURE — 87493 C DIFF AMPLIFIED PROBE: CPT

## 2023-09-11 PROCEDURE — A9270 NON-COVERED ITEM OR SERVICE: HCPCS | Performed by: HOSPITALIST

## 2023-09-11 PROCEDURE — 71045 X-RAY EXAM CHEST 1 VIEW: CPT

## 2023-09-11 PROCEDURE — 99285 EMERGENCY DEPT VISIT HI MDM: CPT

## 2023-09-11 PROCEDURE — 86901 BLOOD TYPING SEROLOGIC RH(D): CPT

## 2023-09-11 PROCEDURE — 74176 CT ABD & PELVIS W/O CONTRAST: CPT

## 2023-09-11 PROCEDURE — 85610 PROTHROMBIN TIME: CPT

## 2023-09-11 PROCEDURE — 86850 RBC ANTIBODY SCREEN: CPT

## 2023-09-11 PROCEDURE — 85730 THROMBOPLASTIN TIME PARTIAL: CPT

## 2023-09-11 PROCEDURE — 83520 IMMUNOASSAY QUANT NOS NONAB: CPT

## 2023-09-11 PROCEDURE — 85025 COMPLETE CBC W/AUTO DIFF WBC: CPT

## 2023-09-11 PROCEDURE — 83690 ASSAY OF LIPASE: CPT

## 2023-09-11 PROCEDURE — 99223 1ST HOSP IP/OBS HIGH 75: CPT | Performed by: HOSPITALIST

## 2023-09-11 PROCEDURE — 80053 COMPREHEN METABOLIC PANEL: CPT

## 2023-09-11 PROCEDURE — 700105 HCHG RX REV CODE 258: Performed by: HOSPITALIST

## 2023-09-11 PROCEDURE — 700105 HCHG RX REV CODE 258: Performed by: EMERGENCY MEDICINE

## 2023-09-11 PROCEDURE — 86900 BLOOD TYPING SEROLOGIC ABO: CPT

## 2023-09-11 RX ORDER — LACTOBACILLUS RHAMNOSUS GG 10B CELL
1 CAPSULE ORAL
Status: DISCONTINUED | OUTPATIENT
Start: 2023-09-12 | End: 2023-09-12

## 2023-09-11 RX ORDER — VENLAFAXINE 75 MG/1
75 TABLET ORAL DAILY
Status: DISCONTINUED | OUTPATIENT
Start: 2023-09-12 | End: 2023-09-15 | Stop reason: HOSPADM

## 2023-09-11 RX ORDER — FOLIC ACID 1 MG/1
1 TABLET ORAL DAILY
Status: DISCONTINUED | OUTPATIENT
Start: 2023-09-11 | End: 2023-09-15 | Stop reason: HOSPADM

## 2023-09-11 RX ORDER — FENOFIBRATE 134 MG/1
134 CAPSULE ORAL DAILY
Status: DISCONTINUED | OUTPATIENT
Start: 2023-09-11 | End: 2023-09-11

## 2023-09-11 RX ORDER — SODIUM CHLORIDE, SODIUM LACTATE, POTASSIUM CHLORIDE, CALCIUM CHLORIDE 600; 310; 30; 20 MG/100ML; MG/100ML; MG/100ML; MG/100ML
1000 INJECTION, SOLUTION INTRAVENOUS ONCE
Status: DISCONTINUED | OUTPATIENT
Start: 2023-09-11 | End: 2023-09-11

## 2023-09-11 RX ORDER — SODIUM BICARBONATE 650 MG/1
650 TABLET ORAL 3 TIMES DAILY
Status: DISCONTINUED | OUTPATIENT
Start: 2023-09-11 | End: 2023-09-13

## 2023-09-11 RX ORDER — SODIUM CHLORIDE, SODIUM LACTATE, POTASSIUM CHLORIDE, CALCIUM CHLORIDE 600; 310; 30; 20 MG/100ML; MG/100ML; MG/100ML; MG/100ML
1000 INJECTION, SOLUTION INTRAVENOUS ONCE
Status: COMPLETED | OUTPATIENT
Start: 2023-09-11 | End: 2023-09-11

## 2023-09-11 RX ORDER — SODIUM CHLORIDE 9 MG/ML
INJECTION, SOLUTION INTRAVENOUS CONTINUOUS
Status: DISCONTINUED | OUTPATIENT
Start: 2023-09-11 | End: 2023-09-12

## 2023-09-11 RX ORDER — ACETAMINOPHEN 325 MG/1
650 TABLET ORAL EVERY 6 HOURS PRN
Status: DISCONTINUED | OUTPATIENT
Start: 2023-09-11 | End: 2023-09-15 | Stop reason: HOSPADM

## 2023-09-11 RX ORDER — METOPROLOL SUCCINATE 25 MG/1
25 TABLET, EXTENDED RELEASE ORAL DAILY
COMMUNITY

## 2023-09-11 RX ORDER — METOPROLOL SUCCINATE 25 MG/1
12.5 TABLET, EXTENDED RELEASE ORAL DAILY
Status: DISCONTINUED | OUTPATIENT
Start: 2023-09-11 | End: 2023-09-15 | Stop reason: HOSPADM

## 2023-09-11 RX ORDER — PRAVASTATIN SODIUM 20 MG
40 TABLET ORAL DAILY
Status: DISCONTINUED | OUTPATIENT
Start: 2023-09-11 | End: 2023-09-13

## 2023-09-11 RX ADMIN — PRAVASTATIN SODIUM 40 MG: 20 TABLET ORAL at 19:01

## 2023-09-11 RX ADMIN — FOLIC ACID 1 MG: 1 TABLET ORAL at 19:02

## 2023-09-11 RX ADMIN — SODIUM CHLORIDE, POTASSIUM CHLORIDE, SODIUM LACTATE AND CALCIUM CHLORIDE 1000 ML: 600; 310; 30; 20 INJECTION, SOLUTION INTRAVENOUS at 12:30

## 2023-09-11 RX ADMIN — METOPROLOL SUCCINATE 12.5 MG: 25 TABLET, EXTENDED RELEASE ORAL at 19:03

## 2023-09-11 RX ADMIN — SODIUM CHLORIDE: 9 INJECTION, SOLUTION INTRAVENOUS at 19:03

## 2023-09-11 RX ADMIN — SODIUM BICARBONATE 650 MG: 650 TABLET ORAL at 19:02

## 2023-09-11 ASSESSMENT — PATIENT HEALTH QUESTIONNAIRE - PHQ9
1. LITTLE INTEREST OR PLEASURE IN DOING THINGS: NOT AT ALL
SUM OF ALL RESPONSES TO PHQ9 QUESTIONS 1 AND 2: 0
2. FEELING DOWN, DEPRESSED, IRRITABLE, OR HOPELESS: NOT AT ALL

## 2023-09-11 ASSESSMENT — LIFESTYLE VARIABLES
ALCOHOL_USE: NO
AVERAGE NUMBER OF DAYS PER WEEK YOU HAVE A DRINK CONTAINING ALCOHOL: 0
TOTAL SCORE: 0
HOW MANY TIMES IN THE PAST YEAR HAVE YOU HAD 5 OR MORE DRINKS IN A DAY: 0
EVER HAD A DRINK FIRST THING IN THE MORNING TO STEADY YOUR NERVES TO GET RID OF A HANGOVER: NO
EVER FELT BAD OR GUILTY ABOUT YOUR DRINKING: NO
TOTAL SCORE: 0
ON A TYPICAL DAY WHEN YOU DRINK ALCOHOL HOW MANY DRINKS DO YOU HAVE: 0
HAVE PEOPLE ANNOYED YOU BY CRITICIZING YOUR DRINKING: NO
CONSUMPTION TOTAL: NEGATIVE
HAVE YOU EVER FELT YOU SHOULD CUT DOWN ON YOUR DRINKING: NO
TOTAL SCORE: 0

## 2023-09-11 ASSESSMENT — ENCOUNTER SYMPTOMS
MYALGIAS: 0
HEADACHES: 0
FEVER: 0
VOMITING: 0
DIZZINESS: 0
DOUBLE VISION: 0
CLAUDICATION: 0
DIARRHEA: 1
DEPRESSION: 0
WHEEZING: 0
COUGH: 0
BLURRED VISION: 0
HEARTBURN: 0
BACK PAIN: 0
PALPITATIONS: 0
HEMOPTYSIS: 0
BRUISES/BLEEDS EASILY: 0
PND: 0
CHILLS: 0
NAUSEA: 0

## 2023-09-11 ASSESSMENT — COGNITIVE AND FUNCTIONAL STATUS - GENERAL
MOBILITY SCORE: 24
SUGGESTED CMS G CODE MODIFIER DAILY ACTIVITY: CH
DAILY ACTIVITIY SCORE: 24
SUGGESTED CMS G CODE MODIFIER MOBILITY: CH

## 2023-09-11 ASSESSMENT — FIBROSIS 4 INDEX
FIB4 SCORE: 1.55
FIB4 SCORE: 0.8

## 2023-09-11 ASSESSMENT — PAIN DESCRIPTION - PAIN TYPE: TYPE: ACUTE PAIN

## 2023-09-11 NOTE — ED NOTES
IVF fluids started per MAR. Pt resting in bed, states all needs are met at this time, updated on POC.

## 2023-09-11 NOTE — ED NOTES
Bedside [facility] from [facility]. Pt resting in room, VSS, call light in reach. Pt is on cardiac, pulse ox and BP monitors, currently requiring on RA. Fall sign in place, bed in lowest position, pt is alert and aware of limitations, call light in reach.

## 2023-09-11 NOTE — ED NOTES
Rounded on pt. Pt resting in bed with eyes closed, respirations even and unlabored, VSS, NAD at this time, call light in reach.

## 2023-09-11 NOTE — ED PROVIDER NOTES
CHIEF COMPLAINT  Chief Complaint   Patient presents with    Diarrhea     Patient reports diarrhea x 4 days with bright red blood noted. Patient also endorses vomiting as well.    Bloody Stools     X 4 days. Bright red blood       LIMITATION TO HISTORY   Select: none    HPI    Latrice Andersen is a 68 y.o. female who presents to the Emergency Department complaints of abdominal cramps diarrhea bloody diarrhea nausea and vomiting.  Patient has a history of lung cancer finished her last dose of immunotherapy on Wednesday of last week.  On Thursday or Friday she started having some abdominal cramps and then started having diarrhea that became more severe and she started having blood in her stools.  The patient states that she had 4 bloody stools today already she describes nausea vomited once today.  She contacted her oncologist Dr. Campbell who recommend she come to the emergency department for evaluation.  Upon arrival here she relates the above history denies any overt fevers chills describes abdominal cramps with bloody diarrhea denies any other symptoms.    OUTSIDE HISTORIAN(S):  Select: None    EXTERNAL RECORDS REVIEWED  Select: Other otology notes most recently from today recommending the patient to come the emergency department.      PAST MEDICAL HISTORY  Past Medical History:   Diagnosis Date    Allergy, unspecified not elsewhere classified     Breast cancer (HCC) 1995    Chickenpox     Dental disorder     upper partial    Former smoker 05/11/2023    High cholesterol     History of breast cancer 02/06/2017    History of radiation therapy 05/11/2023    To left chest for breast cancer     Hyperlipidemia     Hypertension     Left upper lobe pulmonary nodule 05/11/2023    Tonsillitis      .    SURGICAL HISTORY  Past Surgical History:   Procedure Laterality Date    CA BRONCHOSCOPY,DIAGNOSTIC N/A 6/12/2023    Procedure: FIBER OPTIC BRONCHOSCOPY WITH  WASH, BRUSH, BRONCHOALVEOLAR LAVAGE, BIOPSY AND FINE NEEDLE  ASPIRATION, ENDOBRONCHIAL ULTRASOUND & NAVIGATION, ROBOTICS;  Surgeon: Brooke Perrin M.D.;  Location: SURGERY [facility];  Service: Pulmonary Robotic    ENDOBRONCHIAL US ADD-ON N/A 2023    Procedure: ENDOBRONCHIAL ULTRASOUND (EBUS);  Surgeon: Brooke Perrin M.D.;  Location: SURGERY [facility];  Service: Pulmonary Robotic    MASTECTOMY      Left    DE CHEMOTHERAPY, UNSPECIFIED PROCEDURE      DE RADIATION THERAPY PLAN SIMPLE           FAMILY HISTORY  Family History   Problem Relation Age of Onset    Cancer Mother         breast cancer/Breast    Breast Cancer Mother     Hypertension Maternal Uncle     Cancer Maternal Uncle     Hypertension Maternal Grandmother     Hyperlipidemia Maternal Grandmother     Heart Disease Maternal Grandmother     Cancer Paternal Grandfather         Lung ca, smoker    Lung Cancer Maternal Grandfather     Alzheimer's Disease Maternal Aunt     Kidney Disease Maternal Aunt     Diabetes Neg Hx           SOCIAL HISTORY  Social History     Socioeconomic History    Marital status:      Spouse name: Not on file    Number of children: Not on file    Years of education: Not on file    Highest education level: Not on file   Occupational History    Not on file   Tobacco Use    Smoking status: Every Day     Current packs/day: 0.00     Average packs/day: 0.3 packs/day for 45.0 years (11.3 ttl pk-yrs)     Types: Cigarettes     Start date: 1977     Last attempt to quit: 2022     Years since quittin.2     Passive exposure: Current (Spouse)    Smokeless tobacco: Former     Quit date: 2022    Tobacco comments:     5 daily    Vaping Use    Vaping Use: Never used   Substance and Sexual Activity    Alcohol use: No    Drug use: No    Sexual activity: Not Currently   Other Topics Concern    Not on file   Social History Narrative    Not on file     Social Determinants of Health     Financial Resource Strain: Not on file   Food Insecurity: Not on file  "  Transportation Needs: Not on file   Physical Activity: Not on file   Stress: Not on file   Social Connections: Not on file   Intimate Partner Violence: Not on file   Housing Stability: Not on file         CURRENT MEDICATIONS  No current facility-administered medications on file prior to encounter.     Current Outpatient Medications on File Prior to Encounter   Medication Sig Dispense Refill    metoprolol SR (TOPROL XL) 25 MG TABLET SR 24 HR Take 12.5 mg by mouth every day.      venlafaxine (EFFEXOR) 75 MG Tab Take 1 Tablet by mouth every day. 90 Tablet 0    folic acid (FOLVITE) 1 MG Tab Take 1 Tablet by mouth every day. 30 Tablet 3    pravastatin (PRAVACHOL) 40 MG tablet Take 1 Tablet by mouth every day. 100 Tablet 3    lisinopril (PRINIVIL) 10 MG Tab Take 1 Tablet by mouth every day. 100 Tablet 3    fenofibrate micronized (LOFIBRA) 134 MG capsule TAKE 1 CAPSULE BY MOUTH EVERY DAY 90 Capsule 3           ALLERGIES  Allergies   Allergen Reactions    Codeine Unspecified     Memory issues       PHYSICAL EXAM  VITAL SIGNS:/53   Pulse 100   Temp 36.1 °C (96.9 °F) (Temporal)   Resp 19   Ht 1.626 m (5' 4\")   Wt 56.2 kg (124 lb)   SpO2 98%   BMI 21.28 kg/m²     Constitutional: Well-developed no acute distress   HENT: Normocephalic, Atraumatic, Bilateral external ears normal.  Eyes:  conjunctiva are normal.   Neck: Supple.  Nontender midline  Cardiovascular: Tachycardic rate and rhythm without murmurs gallops or rubs.   Thorax & Lungs: No respiratory distress. Breathing comfortably. Lungs are clear to auscultation bilaterally, there are no wheezes no rales. Chest wall is nontender.  Abdomen: Soft, non distended, non tender   Skin: Warm, Dry, No erythema,   Back: No tenderness, No CVA tenderness.  Musculoskeletal: No clubbing cyanosis or edema good range of motion   Neurologic: Alert & oriented x 3, normal sensation moving all extremities appears normal   Psychiatric: Affect normal, Judgment normal, Mood normal. "       DIAGNOSTIC STUDIES / PROCEDURES      LABS  Results for orders placed or performed during the hospital encounter of 09/11/23   COD (ADULT)   Result Value Ref Range    ABO Grouping Only O     Rh Grouping Only POS     Antibody Screen-Cod NEG    CBC WITH DIFFERENTIAL   Result Value Ref Range    WBC 4.5 (L) 4.8 - 10.8 K/uL    RBC 4.38 4.20 - 5.40 M/uL    Hemoglobin 12.8 12.0 - 16.0 g/dL    Hematocrit 39.8 37.0 - 47.0 %    MCV 90.9 81.4 - 97.8 fL    MCH 29.2 27.0 - 33.0 pg    MCHC 32.2 32.2 - 35.5 g/dL    RDW 50.1 (H) 35.9 - 50.0 fL    Platelet Count 443 164 - 446 K/uL    MPV 8.7 (L) 9.0 - 12.9 fL    Neutrophils-Polys 57.80 44.00 - 72.00 %    Lymphocytes 36.90 22.00 - 41.00 %    Monocytes 3.10 0.00 - 13.40 %    Eosinophils 1.10 0.00 - 6.90 %    Basophils 0.40 0.00 - 1.80 %    Immature Granulocytes 0.70 0.00 - 0.90 %    Nucleated RBC 0.00 0.00 - 0.20 /100 WBC    Neutrophils (Absolute) 2.57 1.82 - 7.42 K/uL    Lymphs (Absolute) 1.64 1.00 - 4.80 K/uL    Monos (Absolute) 0.14 0.00 - 0.85 K/uL    Eos (Absolute) 0.05 0.00 - 0.51 K/uL    Baso (Absolute) 0.02 0.00 - 0.12 K/uL    Immature Granulocytes (abs) 0.03 0.00 - 0.11 K/uL    NRBC (Absolute) 0.00 K/uL   COMP METABOLIC PANEL   Result Value Ref Range    Sodium 130 (L) 135 - 145 mmol/L    Potassium 4.9 3.6 - 5.5 mmol/L    Chloride 99 96 - 112 mmol/L    Co2 12 (L) 20 - 33 mmol/L    Anion Gap 19.0 (H) 7.0 - 16.0    Glucose 129 (H) 65 - 99 mg/dL    Bun 49 (H) 8 - 22 mg/dL    Creatinine 2.07 (H) 0.50 - 1.40 mg/dL    Calcium 9.7 8.5 - 10.5 mg/dL    Correct Calcium 10.3 8.5 - 10.5 mg/dL    AST(SGOT) 35 12 - 45 U/L    ALT(SGPT) 45 2 - 50 U/L    Alkaline Phosphatase 78 30 - 99 U/L    Total Bilirubin 0.4 0.1 - 1.5 mg/dL    Albumin 3.2 3.2 - 4.9 g/dL    Total Protein 7.1 6.0 - 8.2 g/dL    Globulin 3.9 (H) 1.9 - 3.5 g/dL    A-G Ratio 0.8 g/dL   LIPASE   Result Value Ref Range    Lipase 59 11 - 82 U/L   PROTHROMBIN TIME   Result Value Ref Range    PT 13.6 12.0 - 14.6 sec    INR 1.03  0.87 - 1.13   APTT   Result Value Ref Range    APTT 24.5 (L) 24.7 - 36.0 sec   ESTIMATED GFR   Result Value Ref Range    GFR (CKD-EPI) 26 (A) >60 mL/min/1.73 m 2         RADIOLOGY  I have independently interpreted the diagnostic imaging associated with this visit and am waiting the final reading from the radiologist.   My preliminary interpretation is as follows: No free air on CT scan of the abdomen      Radiologist interpretation:   CT-ABDOMEN-PELVIS W/O   Final Result      1.  No evidence of bowel obstruction or perforation.   2.  No CT evidence for colitis or diverticulitis.   3.  Normal appendix.   4.  No renal stone or hydronephrosis.   5.  Probable RIGHT kidney cyst.   6.  Hyperdense cyst versus mass at the anterior LEFT mid kidney measuring 11 mm.  Consider follow-up nonemergent renal protocol CT or MRI.      DX-CHEST-PORTABLE (1 VIEW)   Final Result      1.  Interval decrease in size of lung nodule in the left midlung.           COURSE & MEDICAL DECISION MAKING    ED COURSE:    ED Observation Status? Yes;I am placing the patient in to an observation status due to a diagnostic uncertainty as well as therapeutic intensity. Patient placed in observation status at 12:01 PM 9/11/2023    Observation plan is as follows: Patient presents with her complaints of bloody diarrhea and abdominal pain.  Concerns are for colitis intra-abdominal infection GI bleed.  I will get CBC CMP and a CT scan.  She is tachycardic and I will give the patient a fluid bolus    INTERVENTIONS BY ME:  Medications   folic acid (Folvite) tablet 1 mg (has no administration in time range)   metoprolol SR (Toprol XL) tablet 12.5 mg (has no administration in time range)   pravastatin (Pravachol) tablet 40 mg (has no administration in time range)   venlafaxine (Effexor) tablet 75 mg (has no administration in time range)   NS infusion (has no administration in time range)   acetaminophen (Tylenol) tablet 650 mg (has no administration in time range)    Pharmacy Consult Request (has no administration in time range)   lactobacillus rhamnosus (Culturelle) capsule 1 Capsule (has no administration in time range)   sodium bicarbonate tablet 650 mg (has no administration in time range)   lactated ringers (LR) bolus (0 mL Intravenous Stopped 9/11/23 [id number])       Response on recheck: Improving from the fluid bolus but still did have 1 bout of bloody diarrhea.      I have discussed management of the patient with the following physicians and sources:   Hospitalist    Patient discharged from ED observation at 4:30 PM 09/11/23 and escalate the patient to admission.      INITIAL ASSESSMENT, COURSE AND PLAN  Care Narrative: Patient presents emerged department for evaluation.  Clinically patient does appear to be slightly pale but otherwise in nontoxic she is tachycardic.  I did give the patient a fluid bolus of lactated Ringer's and her tachycardia is improving.  Laboratory studies do show an elevated creatinine of 2.0 she had normal creatinine before but then in the past she has had an elevated creatinine to 1.6.  At this point I do feel that her renal sufficiency is secondary to dehydration.  Her sodium was 130.  At this point because of her dehydration possible acute kidney injury and hyponatremia I do for the patient to be admitted to the hospital for further fluid hydration.  She was describing bloody stools.  She has bright red blood per rectum.  I could not see anything on exam such as a external hemorrhoid that would be causing it at this point is recommended to also admit for observation of her rectal bleeding.  Have spoken to the hospitalist for this admission.      HYDRATION: Based on the patient's presentation of tachycardia dehydration,  the patient was given IV fluids. IV Hydration was used because oral hydration is unable to be done due to the patient's symptoms. Upon recheck following hydration, the patient was improving.           ADDITIONAL PROBLEM LIST  1.   Lung cancer undergoing immunotherapy  DISPOSITION AND DISCUSSIONS  Patient will be admitted to the hospital for further treatment and care.  FINAL DIAGNOSIS  1. Bloody diarrhea    2. Dehydration    3. Hyponatremia    4. Malignant neoplasm of lung, unspecified laterality, unspecified part of lung (HCC)             Electronically signed by: William Peralta M.D.,5:23 PM 09/11/23

## 2023-09-11 NOTE — ED TRIAGE NOTES
"Chief Complaint   Patient presents with    Diarrhea     Patient reports diarrhea x 4 days with bright red blood noted. Patient also endorses vomiting as well.    Bloody Stools     X 4 days. Bright red blood       69 yo female to triage for above complaint. History of breast cancer. Currently receiving treatment. Last treatment 9/7.  Protocol ordered.    Pt is alert and oriented, speaking in full sentences, follows commands and responds appropriately to questions.     Patient placed back in lobby and educated on triage process. Asked to inform RN of any changes.    /67   Pulse (!) 117   Temp 36.1 °C (96.9 °F) (Temporal)   Resp 18   Ht 1.626 m (5' 4\")   Wt 56.2 kg (124 lb)   SpO2 100%   BMI 21.28 kg/m²     "

## 2023-09-11 NOTE — H&P
Hospital Medicine History & Physical Note    Date of Service  9/11/2023    Primary Care Physician  Kermit Bergman M.D.    Consultants  none    Code Status  Full Code    Chief Complaint  Chief Complaint   Patient presents with    Diarrhea     Patient reports diarrhea x 4 days with bright red blood noted. Patient also endorses vomiting as well.    Bloody Stools     X 4 days. Bright red blood       History of Presenting Illness  Latrice Andersen is a 68 y.o. female who presented 9/11/2023 with past medical history of breast cancer, lung cancer, hypertension, hyperlipidemia, is coming today complaining of diarrhea, patient finished immunotherapy for her lung cancer on Wednesday and after that she developed severe diarrhea that started with loose stool and then became watery diarrhea and lately she has been having bloody diarrhea, patient denies any ill contact, no fever no chills, no new type of food, denies any dizziness lightheadedness but she feels generalized weak, no focal weakness no vomiting but she has no nausea in she has decreased oral intake because she stated that anything that she ate produces diarrhea, patient has CT abdomen in the emergency room that did not show any acute findings, patient has severe metabolic acidosis due to diarrhea with bicarb of 12, patient at this time is going to be admitted, continue IV hydration, checking for C. difficile, started on p.o. bicarb, monitoring BMP, monitoring urine output, I have discussed case and plan of care with patient, ERP nurse staff request have been answered        Review of Systems  Review of Systems   Constitutional:  Negative for chills and fever.   HENT:  Negative for congestion and nosebleeds.    Eyes:  Negative for blurred vision and double vision.   Respiratory:  Negative for cough, hemoptysis and wheezing.    Cardiovascular:  Negative for chest pain, palpitations, claudication, leg swelling and PND.   Gastrointestinal:  Positive for  Obesity, Adult  Obesity is the condition of having too much total body fat. Being overweight or obese means that your weight is greater than what is considered healthy for your body size. Obesity is determined by a measurement called BMI. BMI is an estimate of body fat and is calculated from height and weight. For adults, a BMI of 30 or higher is considered obese.  Obesity can lead to other health concerns and major illnesses, including:  · Stroke.  · Coronary artery disease (CAD).  · Type 2 diabetes.  · Some types of cancer, including cancers of the colon, breast, uterus, and gallbladder.  · Osteoarthritis.  · High blood pressure (hypertension).  · High cholesterol.  · Sleep apnea.  · Gallbladder stones.  · Infertility problems.  What are the causes?  Common causes of this condition include:  · Eating daily meals that are high in calories, sugar, and fat.  · Being born with genes that may make you more likely to become obese.  · Having a medical condition that causes obesity, including:  ? Hypothyroidism.  ? Polycystic ovarian syndrome (PCOS).  ? Binge-eating disorder.  ? Cushing syndrome.  · Taking certain medicines, such as steroids, antidepressants, and seizure medicines.  · Not being physically active (sedentary lifestyle).  · Not getting enough sleep.  · Drinking high amounts of sugar-sweetened beverages, such as soft drinks.  What increases the risk?  The following factors may make you more likely to develop this condition:  · Having a family history of obesity.  · Being a woman of  descent.  · Being a man of  descent.  · Living in an area with limited access to:  ? [place], recreation centers, or sidewalks.  ? Healthy food choices, such as grocery stores and farmers' markets.  What are the signs or symptoms?  The main sign of this condition is having too much body fat.  How is this diagnosed?  This condition is diagnosed based on:  · Your BMI. If you are an adult with a BMI of 30 or  diarrhea. Negative for heartburn, nausea and vomiting.   Genitourinary:  Negative for hematuria and urgency.   Musculoskeletal:  Negative for back pain and myalgias.   Skin:  Negative for rash.   Neurological:  Negative for dizziness and headaches.   Endo/Heme/Allergies:  Does not bruise/bleed easily.   Psychiatric/Behavioral:  Negative for depression.        Past Medical History   has a past medical history of Allergy, unspecified not elsewhere classified, Breast cancer ([facility]) (1995), Chickenpox, Dental disorder, Former smoker (05/11/2023), High cholesterol, History of breast cancer (02/06/2017), History of radiation therapy (05/11/2023), Hyperlipidemia, Hypertension, Left upper lobe pulmonary nodule (05/11/2023), and Tonsillitis.    Surgical History   has a past surgical history that includes mastectomy; pr radiation therapy plan simple; pr chemotherapy, unspecified procedure; pr bronchoscopy,diagnostic (N/A, 6/12/2023); and endobronchial us add-on (N/A, 6/12/2023).     Family History  family history includes Alzheimer's Disease in her maternal aunt; Breast Cancer in her mother; Cancer in her maternal uncle, mother, and paternal grandfather; Heart Disease in her maternal grandmother; Hyperlipidemia in her maternal grandmother; Hypertension in her maternal grandmother and maternal uncle; Kidney Disease in her maternal aunt; Lung Cancer in her maternal grandfather.       Social History   reports that she has been smoking cigarettes. She started smoking about 46 years ago. She has a 11.3 pack-year smoking history. She has been exposed to tobacco smoke. She quit smokeless tobacco use about 14 months ago. She reports that she does not drink alcohol and does not use drugs.    Allergies  Allergies   Allergen Reactions    Codeine Unspecified     Memory issues       Medications  Prior to Admission Medications   Prescriptions Last Dose Informant Patient Reported? Taking?   fenofibrate micronized (LOFIBRA) 134 MG capsule  higher, you are considered obese.  · Your waist circumference. This measures the distance around your waistline.  · Your skinfold thickness. Your health care provider may gently pinch a fold of your skin and measure it.  You may have other tests to check for underlying conditions.  How is this treated?  Treatment for this condition often includes changing your lifestyle. Treatment may include some or all of the following:  · Dietary changes. This may include developing a healthy meal plan.  · Regular physical activity. This may include activity that causes your heart to beat faster (aerobic exercise) and strength training. Work with your health care provider to design an exercise program that works for you.  · Medicine to help you lose weight if you are unable to lose 1 pound a week after 6 weeks of healthy eating and more physical activity.  · Treating conditions that cause the obesity (underlying conditions).  · Surgery. Surgical options may include gastric banding and gastric bypass. Surgery may be done if:  ? Other treatments have not helped to improve your condition.  ? You have a BMI of 40 or higher.  ? You have life-threatening health problems related to obesity.  Follow these instructions at home:  Eating and drinking    · Follow recommendations from your health care provider about what you eat and drink. Your health care provider may advise you to:  ? Limit fast food, sweets, and processed snack foods.  ? Choose low-fat options, such as low-fat milk instead of whole milk.  ? Eat 5 or more servings of fruits or vegetables every day.  ? Eat at home more often. This gives you more control over what you eat.  ? Choose healthy foods when you eat out.  ? Learn to read food labels. This will help you understand how much food is considered 1 serving.  ? Learn what a healthy serving size is.  ? Keep low-fat snacks available.  ? Limit sugary drinks, such as soda, fruit juice, sweetened iced tea, and flavored  9/10/2023 at am  No No   Sig: TAKE 1 CAPSULE BY MOUTH EVERY DAY   folic acid (FOLVITE) 1 MG Tab 9/10/2023 at am  No No   Sig: Take 1 Tablet by mouth every day.   lisinopril (PRINIVIL) 10 MG Tab 9/10/2023 at am  No No   Sig: Take 1 Tablet by mouth every day.   metoprolol SR (TOPROL XL) 25 MG TABLET SR 24 HR 9/10/2023 at am  Yes Yes   Sig: Take 12.5 mg by mouth every day.   pravastatin (PRAVACHOL) 40 MG tablet 9/10/2023 at am  No No   Sig: Take 1 Tablet by mouth every day.   venlafaxine (EFFEXOR) 75 MG Tab 9/10/2023 at am  No No   Sig: Take 1 Tablet by mouth every day.      Facility-Administered Medications: None       Physical Exam  Temp:  [36.1 °C (96.9 °F)] 36.1 °C (96.9 °F)  Pulse:  [100-117] 100  Resp:  [18-19] 19  BP: (102-103)/(53-67) 103/53  SpO2:  [98 %-100 %] 98 %  Blood Pressure : 103/53   Temperature: 36.1 °C (96.9 °F)   Pulse: 100   Respiration: 19   Pulse Oximetry: 98 %       Physical Exam  Vitals and nursing note reviewed.   Constitutional:       Appearance: Normal appearance. She is ill-appearing.   HENT:      Head: Normocephalic and atraumatic.      Nose: Nose normal.      Mouth/Throat:      Mouth: Mucous membranes are dry.      Pharynx: No oropharyngeal exudate or posterior oropharyngeal erythema.   Eyes:      General: No scleral icterus.        Right eye: No discharge.         Left eye: No discharge.      Extraocular Movements: Extraocular movements intact.      Conjunctiva/sclera: Conjunctivae normal.   Cardiovascular:      Rate and Rhythm: Normal rate and regular rhythm.      Pulses: Normal pulses.      Heart sounds: Normal heart sounds.   Pulmonary:      Effort: Pulmonary effort is normal. No respiratory distress.      Breath sounds: Normal breath sounds. No wheezing.   Abdominal:      General: Bowel sounds are normal. There is no distension.      Tenderness: There is no abdominal tenderness. There is no guarding.   Musculoskeletal:         General: Normal range of motion.      Cervical back:  milk.  · Drink enough water to keep your urine pale yellow.  · Do not follow a fad diet. Fad diets can be unhealthy and even dangerous.  Physical activity  · Exercise regularly, as told by your health care provider.  ? Most adults should get up to 150 minutes of moderate-intensity exercise every week.  ? Ask your health care provider what types of exercise are safe for you and how often you should exercise.  · Warm up and stretch before being active.  · Cool down and stretch after being active.  · Rest between periods of activity.  Lifestyle  · Work with your health care provider and a dietitian to set a weight-loss goal that is healthy and reasonable for you.  · Limit your screen time.  · Find ways to reward yourself that do not involve food.  · Do not drink alcohol if:  ? Your health care provider tells you not to drink.  ? You are pregnant, may be pregnant, or are planning to become pregnant.  · If you drink alcohol:  ? Limit how much you use to:  § 0-1 drink a day for women.  § 0-2 drinks a day for men.  ? Be aware of how much alcohol is in your drink. In the [place] one drink equals one 12 oz bottle of beer (355 mL), one 5 oz glass of wine (148 mL), or one 1½ oz glass of hard liquor (44 mL).  General instructions  · Keep a weight-loss journal to keep track of the food you eat and how much exercise you get.  · Take over-the-counter and prescription medicines only as told by your health care provider.  · Take vitamins and supplements only as told by your health care provider.  · Consider joining a support group. Your health care provider may be able to recommend a support group.  · Keep all follow-up visits as told by your health care provider. This is important.  Contact a health care provider if:  · You are unable to meet your weight loss goal after 6 weeks of dietary and lifestyle changes.  Get help right away if you are having:  · Trouble breathing.  · Suicidal thoughts or behaviors.  Summary  · Obesity is the  "Normal range of motion and neck supple.      Right lower leg: No edema.      Left lower leg: No edema.   Skin:     General: Skin is warm and dry.      Capillary Refill: Capillary refill takes less than 2 seconds.      Coloration: Skin is not jaundiced.   Neurological:      General: No focal deficit present.      Mental Status: She is alert and oriented to person, place, and time.      Cranial Nerves: No cranial nerve deficit.      Motor: No weakness.   Psychiatric:         Mood and Affect: Mood normal.         Behavior: Behavior normal.         Laboratory:  Recent Labs     09/11/23  1150   WBC 4.5*   RBC 4.38   HEMOGLOBIN 12.8   HEMATOCRIT 39.8   MCV 90.9   MCH 29.2   MCHC 32.2   RDW 50.1*   PLATELETCT 443   MPV 8.7*     Recent Labs     09/11/23  1150   SODIUM 130*   POTASSIUM 4.9   CHLORIDE 99   CO2 12*   GLUCOSE 129*   BUN 49*   CREATININE 2.07*   CALCIUM 9.7     Recent Labs     09/11/23  1150   ALTSGPT 45   ASTSGOT 35   ALKPHOSPHAT 78   TBILIRUBIN 0.4   LIPASE 59   GLUCOSE 129*     Recent Labs     09/11/23  1150   APTT 24.5*   INR 1.03     No results for input(s): \"NTPROBNP\" in the last 72 hours.      No results for input(s): \"TROPONINT\" in the last 72 hours.    Imaging:  CT-ABDOMEN-PELVIS W/O   Final Result      1.  No evidence of bowel obstruction or perforation.   2.  No CT evidence for colitis or diverticulitis.   3.  Normal appendix.   4.  No renal stone or hydronephrosis.   5.  Probable RIGHT kidney cyst.   6.  Hyperdense cyst versus mass at the anterior LEFT mid kidney measuring 11 mm.  Consider follow-up nonemergent renal protocol CT or MRI.      DX-CHEST-PORTABLE (1 VIEW)   Final Result      1.  Interval decrease in size of lung nodule in the left midlung.          Chest x-ray my reading: Left-sided nodule, no pleural effusion    Assessment/Plan:  Justification for Admission Status  I anticipate this patient is appropriate for observation status at this time because will need close monitoring, IV " condition of having too much total body fat.  · Being overweight or obese means that your weight is greater than what is considered healthy for your body size.  · Work with your health care provider and a dietitian to set a weight-loss goal that is healthy and reasonable for you.  · Exercise regularly, as told by your health care provider. Ask your health care provider what types of exercise are safe for you and how often you should exercise.  This information is not intended to replace advice given to you by your health care provider. Make sure you discuss any questions you have with your health care provider.  Document Revised: 08/22/2019 Document Reviewed: 08/22/2019  [employer] Patient Education © 2020 [employer]  MyPlate from [employer]    MyPlate is an outline of a general healthy diet based on the 2010 Dietary Guidelines for Americans, from the [place] Department of Agriculture (USDA). It sets guidelines for how much food you should eat from each food group based on your age, sex, and level of physical activity.  What are tips for following MyPlate?  To follow MyPlate recommendations:  · Eat a wide variety of fruits and vegetables, grains, and protein foods.  · Serve smaller portions and eat less food throughout the day.  · Limit portion sizes to avoid overeating.  · Enjoy your food.  · Get at least 150 minutes of exercise every week. This is about 30 minutes each day, 5 or more days per week.  It can be difficult to have every meal look like MyPlate. Think about MyPlate as eating guidelines for an entire day, rather than each individual meal.  Fruits and vegetables  · Make half of your plate fruits and vegetables.  · Eat many different colors of fruits and vegetables each day.  · For a 2,000 calorie daily food plan, eat:  ? 2½ cups of vegetables every day.  ? 2 cups of fruit every day.  · 1 cup is equal to:  ? 1 cup raw or cooked vegetables.  ? 1 cup raw fruit.  ? 1 medium-sized orange, apple, or banana.  ? 1 cup  100% fruit or vegetable juice.  ? 2 cups raw leafy greens, such as lettuce, spinach, or kale.  ? ½ cup dried fruit.  Grains  · One fourth of your plate should be grains.  · Make at least half of the grains you eat each day whole grains.  · For a 2,000 calorie daily food plan, eat 6 oz of grains every day.  · 1 oz is equal to:  ? 1 slice bread.  ? 1 cup cereal.  ? ½ cup cooked rice, cereal, or pasta.  Protein  · One fourth of your plate should be protein.  · Eat a wide variety of protein foods, including meat, poultry, fish, eggs, beans, nuts, and tofu.  · For a 2,000 calorie daily food plan, eat 5½ oz of protein every day.  · 1 oz is equal to:  ? 1 oz meat, poultry, or fish.  ? ¼ cup cooked beans.  ? 1 egg.  ? ½ oz nuts or seeds.  ? 1 Tbsp peanut butter.  Dairy  · Drink fat-free or low-fat (1%) milk.  · Eat or drink dairy as a side to meals.  · For a 2,000 calorie daily food plan, eat or drink 3 cups of dairy every day.  · 1 cup is equal to:  ? 1 cup milk, yogurt, cottage cheese, or soy milk (soy beverage).  ? 2 oz processed cheese.  ? 1½ oz natural cheese.  Fats, oils, salt, and sugars  · Only small amounts of oils are recommended.  · Avoid foods that are high in calories and low in nutritional value (empty calories), like foods high in fat or added sugars.  · Choose foods that are low in salt (sodium). Choose foods that have less than 140 milligrams (mg) of sodium per serving.  · Drink water instead of sugary drinks. Drink enough water each day to keep your urine pale yellow.  Where to find support  · Work with your health care provider or a nutrition specialist (dietitian) to develop a customized eating plan that is right for you.  · Download an esau (mobile application) to help you track your daily food intake.  Where to find more information  · Go to ChooseMyPlate.gov for more information.  Summary  · MyPlate is a general guideline for healthy eating from the USDA. It is based on the 2010 Dietary Guidelines for  hydration, monitoring BMP, monitoring urine output checking for C. difficile        * MARIVEL (acute kidney injury) (HCC)- (present on admission)  Assessment & Plan  Nuclear dehydration due to severe diarrhea  Continue IV fluids  Follow-up BMP    Hyponatremia- (present on admission)  Assessment & Plan  Due to dehydration  Due to diarrhea  Continue IV hydration  Follow-up BMP    Metabolic acidosis- (present on admission)  Assessment & Plan  Due to severe diarrhea    On p.o. bicarb  Monitoring BMP    Diarrhea- (present on admission)  Assessment & Plan  Likely due to lung cancer treatment  Continue supportive treatment  Check for C. difficile  If C. difficile negative will start Imodium  Started on probiotics    Renal mass- (present on admission)  Assessment & Plan  Will need MRI as outpatient    NSCLC of left lung (HCC)- (present on admission)  Assessment & Plan  Follows with oncology  Close monitoring    Stage 3a chronic kidney disease (HCC)- (present on admission)  Assessment & Plan  marivel on ckd no acute diarrhea due to lung cancer treatment  Continue supportive treatment  Continue urine output monitoring  IV hydration  Follow-up BMP in a.m.    History of breast cancer- (present on admission)  Assessment & Plan  Follow with oncology as outpatient.     Essential hypertension- (present on admission)  Assessment & Plan  Metoprolol.     Anxiety disorder- (present on admission)  Assessment & Plan  effexor    Dyslipidemia- (present on admission)  Assessment & Plan  Continue statin.         VTE prophylaxis: SCDs/TEDs      I have reviewed patient's CBC  I have reviewed patient's CMP  I have reviewed CT abdomen  I have reviewed and interpreted patient's CXR  I have discussed with ER doctor  I have discussed with nurse staff  I have reviewed patient's current and old records  I have placed order to admit patient.     I have ordered labs for in am incluiding CBC, CMP.   I have ordered C. difficile     Americans.  · In general, fruits and vegetables should take up ½ of your plate, grains should take up ¼ of your plate, and protein should take up ¼ of your plate.  This information is not intended to replace advice given to you by your health care provider. Make sure you discuss any questions you have with your health care provider.  Document Revised: 05/21/2020 Document Reviewed: 03/19/2018  [employer] Patient Education © 2020 [employer]

## 2023-09-11 NOTE — TELEPHONE ENCOUNTER
"Contacted patient in regard to mychart message. Pt states she is having bright red stools, \"it just gushes out\", patient experiencing abdominal pain, dizziness, and light headedness. Pt denies shortness of breath and chest pain. Pt seen GI consultants on 8/31 for having bleeding per rectum. Pt received last chemo treatment on 9/7. Due to worsening symptoms, pt advised to go to ER, patient said her  will take her. Triage RN Brittany notified and updated on patient condition.  "

## 2023-09-12 ENCOUNTER — APPOINTMENT (OUTPATIENT)
Dept: HEMATOLOGY ONCOLOGY | Facility: MEDICAL CENTER | Age: 68
End: 2023-09-12
Payer: MEDICARE

## 2023-09-12 LAB
ALBUMIN SERPL BCP-MCNC: 3.2 G/DL (ref 3.2–4.9)
ALBUMIN/GLOB SERPL: 1.2 G/DL
ALP SERPL-CCNC: 65 U/L (ref 30–99)
ALT SERPL-CCNC: 34 U/L (ref 2–50)
ANION GAP SERPL CALC-SCNC: 15 MMOL/L (ref 7–16)
AST SERPL-CCNC: 27 U/L (ref 12–45)
BILIRUB SERPL-MCNC: 0.3 MG/DL (ref 0.1–1.5)
BUN SERPL-MCNC: 42 MG/DL (ref 8–22)
CALCIUM ALBUM COR SERPL-MCNC: 9.7 MG/DL (ref 8.5–10.5)
CALCIUM SERPL-MCNC: 9.1 MG/DL (ref 8.5–10.5)
CHLORIDE SERPL-SCNC: 105 MMOL/L (ref 96–112)
CO2 SERPL-SCNC: 17 MMOL/L (ref 20–33)
CREAT SERPL-MCNC: 1.35 MG/DL (ref 0.5–1.4)
ERYTHROCYTE [DISTWIDTH] IN BLOOD BY AUTOMATED COUNT: 47.2 FL (ref 35.9–50)
GFR SERPLBLD CREATININE-BSD FMLA CKD-EPI: 43 ML/MIN/1.73 M 2
GLOBULIN SER CALC-MCNC: 2.6 G/DL (ref 1.9–3.5)
GLUCOSE SERPL-MCNC: 99 MG/DL (ref 65–99)
HCT VFR BLD AUTO: 30.7 % (ref 37–47)
HGB BLD-MCNC: 10.3 G/DL (ref 12–16)
MCH RBC QN AUTO: 29.1 PG (ref 27–33)
MCHC RBC AUTO-ENTMCNC: 33.6 G/DL (ref 32.2–35.5)
MCV RBC AUTO: 86.7 FL (ref 81.4–97.8)
PLATELET # BLD AUTO: 306 K/UL (ref 164–446)
PMV BLD AUTO: 8.6 FL (ref 9–12.9)
POTASSIUM SERPL-SCNC: 3.7 MMOL/L (ref 3.6–5.5)
PROT SERPL-MCNC: 5.8 G/DL (ref 6–8.2)
RBC # BLD AUTO: 3.54 M/UL (ref 4.2–5.4)
SODIUM SERPL-SCNC: 137 MMOL/L (ref 135–145)
WBC # BLD AUTO: 2.5 K/UL (ref 4.8–10.8)

## 2023-09-12 PROCEDURE — 700105 HCHG RX REV CODE 258: Performed by: STUDENT IN AN ORGANIZED HEALTH CARE EDUCATION/TRAINING PROGRAM

## 2023-09-12 PROCEDURE — A9270 NON-COVERED ITEM OR SERVICE: HCPCS | Performed by: HOSPITALIST

## 2023-09-12 PROCEDURE — 96372 THER/PROPH/DIAG INJ SC/IM: CPT

## 2023-09-12 PROCEDURE — 85027 COMPLETE CBC AUTOMATED: CPT

## 2023-09-12 PROCEDURE — 99232 SBSQ HOSP IP/OBS MODERATE 35: CPT | Performed by: STUDENT IN AN ORGANIZED HEALTH CARE EDUCATION/TRAINING PROGRAM

## 2023-09-12 PROCEDURE — 80053 COMPREHEN METABOLIC PANEL: CPT

## 2023-09-12 PROCEDURE — 700102 HCHG RX REV CODE 250 W/ 637 OVERRIDE(OP): Performed by: HOSPITALIST

## 2023-09-12 PROCEDURE — 700111 HCHG RX REV CODE 636 W/ 250 OVERRIDE (IP): Performed by: STUDENT IN AN ORGANIZED HEALTH CARE EDUCATION/TRAINING PROGRAM

## 2023-09-12 PROCEDURE — 36415 COLL VENOUS BLD VENIPUNCTURE: CPT

## 2023-09-12 PROCEDURE — G0378 HOSPITAL OBSERVATION PER HR: HCPCS

## 2023-09-12 RX ORDER — HEPARIN SODIUM 5000 [USP'U]/ML
5000 INJECTION, SOLUTION INTRAVENOUS; SUBCUTANEOUS EVERY 8 HOURS
Status: DISCONTINUED | OUTPATIENT
Start: 2023-09-12 | End: 2023-09-15 | Stop reason: HOSPADM

## 2023-09-12 RX ORDER — SODIUM CHLORIDE, SODIUM LACTATE, POTASSIUM CHLORIDE, CALCIUM CHLORIDE 600; 310; 30; 20 MG/100ML; MG/100ML; MG/100ML; MG/100ML
INJECTION, SOLUTION INTRAVENOUS CONTINUOUS
Status: DISCONTINUED | OUTPATIENT
Start: 2023-09-12 | End: 2023-09-13

## 2023-09-12 RX ADMIN — VENLAFAXINE HYDROCHLORIDE 75 MG: 75 TABLET ORAL at 05:56

## 2023-09-12 RX ADMIN — SODIUM CHLORIDE, POTASSIUM CHLORIDE, SODIUM LACTATE AND CALCIUM CHLORIDE: 600; 310; 30; 20 INJECTION, SOLUTION INTRAVENOUS at 09:15

## 2023-09-12 RX ADMIN — SODIUM BICARBONATE 650 MG: 650 TABLET ORAL at 08:47

## 2023-09-12 RX ADMIN — Medication 1 CAPSULE: at 08:47

## 2023-09-12 RX ADMIN — SODIUM BICARBONATE 650 MG: 650 TABLET ORAL at 15:57

## 2023-09-12 RX ADMIN — SODIUM CHLORIDE, POTASSIUM CHLORIDE, SODIUM LACTATE AND CALCIUM CHLORIDE: 600; 310; 30; 20 INJECTION, SOLUTION INTRAVENOUS at 10:08

## 2023-09-12 RX ADMIN — FOLIC ACID 1 MG: 1 TABLET ORAL at 05:55

## 2023-09-12 RX ADMIN — SODIUM BICARBONATE 650 MG: 650 TABLET ORAL at 22:31

## 2023-09-12 RX ADMIN — PRAVASTATIN SODIUM 40 MG: 20 TABLET ORAL at 05:55

## 2023-09-12 RX ADMIN — HEPARIN SODIUM 5000 UNITS: 5000 INJECTION, SOLUTION INTRAVENOUS; SUBCUTANEOUS at 22:31

## 2023-09-12 ASSESSMENT — PATIENT HEALTH QUESTIONNAIRE - PHQ9
2. FEELING DOWN, DEPRESSED, IRRITABLE, OR HOPELESS: NOT AT ALL
1. LITTLE INTEREST OR PLEASURE IN DOING THINGS: NOT AT ALL
SUM OF ALL RESPONSES TO PHQ9 QUESTIONS 1 AND 2: 0

## 2023-09-12 ASSESSMENT — ENCOUNTER SYMPTOMS
WEAKNESS: 1
MUSCULOSKELETAL NEGATIVE: 1
CARDIOVASCULAR NEGATIVE: 1
EYES NEGATIVE: 1
RESPIRATORY NEGATIVE: 1
DIARRHEA: 1
PSYCHIATRIC NEGATIVE: 1

## 2023-09-12 ASSESSMENT — PAIN DESCRIPTION - PAIN TYPE
TYPE: ACUTE PAIN

## 2023-09-12 NOTE — PROGRESS NOTES
Assumed pt care with RN. Pt is A&OX4 and states she is in 0/10 pain and declines interventions at this time. Contact precautions in place. Plan of care discussed, no further questions. Personal belongings and call light within reach.

## 2023-09-12 NOTE — CARE PLAN
The patient is Stable - Low risk of patient condition declining or worsening    Shift Goals  Clinical Goals: GI care, pain management, comfort, safety  Patient Goals: comfort, rest  Family Goals: n/a    Progress made toward(s) clinical / shift goals:  A&Ox4. Pt denies pain, no pain identifiers noted. Pt meets pain goal of sleeping comfortably. Scheduled medications administered per MAR. IVF @ 100 ml/h. Plan of care reviewed with the pt, pt verbally understands. Fall and safety education provided to the pt and precautions maintained. Bed kept in lowest position and call light within reach. Hourly rounding done. Pt calls appropriately. Pt able to self turn, is ambulatory, and remains free from falls. All needs met.    Multiple diarrhea episodes. Stool cx sent = negative cdiff. Per protocol, for acute diarrhea special contact precautions is required until 48 hours after patient's stool has returned to baseline, see results. Contact precautions maintained.     Problem: Pain - Standard  Goal: Alleviation of pain or a reduction in pain to the patient’s comfort goal  Outcome: Progressing     Problem: Knowledge Deficit - Standard  Goal: Patient and family/care givers will demonstrate understanding of plan of care, disease process/condition, diagnostic tests and medications  Outcome: Progressing       Patient is not progressing towards the following goals:

## 2023-09-12 NOTE — ASSESSMENT & PLAN NOTE
Acute diarrhea that started right after her last infusion on 9/6  Multifactorial and likely secondary to chemotherapy and immunotherapy  Thyroid function tests concerning for hypothyroidism, likely contributing  C. difficile negative  IV hydration  Labs in a.m.

## 2023-09-12 NOTE — PROGRESS NOTES
4 Eyes Skin Assessment Completed by Mirtha RN and Marlen RN.    Head WDL  Ears WDL  Nose WDL  Mouth WDL  Neck R small neck scab  Breast/Chest L breast mastectomy, healed  Shoulder Blades WDL  Spine WDL  (R) Arm/Elbow/Hand WDL  (L) Arm/Elbow/Hand WDL  Abdomen WDL  Groin WDL  Scrotum/Coccyx/Buttocks WDL  (R) Leg WDL  (L) Leg WDL  (R) Heel/Foot/Toe WDL  (L) Heel/Foot/Toe WDL          Devices In Places Blood Pressure Cuff, IVF with pump      Interventions In Place N/A  Contact precautions maintained.    Possible Skin Injury No    Pictures Uploaded Into Epic N/A  Wound Consult Placed N/A  RN Wound Prevention Protocol Ordered No

## 2023-09-12 NOTE — DISCHARGE PLANNING
HTH/SCP TCN chart review completed. Collaborated with REGINA Solares. The most current review of medical record, knowledge of pt's PLOF and social support, LACE+ score of 62, 6 clicks scores of 24 ADL's and 24 mobility were considered.      Pt seen at bedside. Introduced TCN program. Provided education regarding post acute levels of care. Discussed HTH/SCP plan benefits (Meds to Beds, medical uber and [facility] transitional care). Pt verbalizes understanding.     Patient states she lives with her spouse Wander in a one level home and was independent with ADL's, IADL's, mobility (no AD) and driving at baseline.  She denies any concerns with food, housing or transportation and states she is at her baseline level of function.      TCN will continue to follow and collaborate with discharge planning team as additional post acute needs arise. Thank you.     Completed today:  Choice obtained: None.  Patient states she is at her baseline level of function.   SCP with [facility] PCP.  Patient states she has a f/u appointment with Dr. Carias on 9/14/23.

## 2023-09-13 ENCOUNTER — TELEPHONE (OUTPATIENT)
Dept: ENDOCRINOLOGY | Facility: MEDICAL CENTER | Age: 68
End: 2023-09-13

## 2023-09-13 ENCOUNTER — APPOINTMENT (OUTPATIENT)
Dept: ENDOCRINOLOGY | Facility: MEDICAL CENTER | Age: 68
End: 2023-09-13
Attending: STUDENT IN AN ORGANIZED HEALTH CARE EDUCATION/TRAINING PROGRAM
Payer: MEDICARE

## 2023-09-13 ENCOUNTER — APPOINTMENT (OUTPATIENT)
Dept: RADIOLOGY | Facility: MEDICAL CENTER | Age: 68
DRG: 393 | End: 2023-09-13
Attending: STUDENT IN AN ORGANIZED HEALTH CARE EDUCATION/TRAINING PROGRAM
Payer: MEDICARE

## 2023-09-13 ENCOUNTER — HOSPITAL ENCOUNTER (OUTPATIENT)
Facility: MEDICAL CENTER | Age: 68
DRG: 393 | End: 2023-09-13
Attending: NURSE PRACTITIONER
Payer: MEDICARE

## 2023-09-13 DIAGNOSIS — R79.89 LOW TSH LEVEL: ICD-10-CM

## 2023-09-13 DIAGNOSIS — E05.90 HYPERTHYROIDISM: ICD-10-CM

## 2023-09-13 DIAGNOSIS — R79.89 ELEVATED SERUM FREE T4 LEVEL: ICD-10-CM

## 2023-09-13 PROBLEM — K52.9 COLITIS: Status: ACTIVE | Noted: 2023-09-13

## 2023-09-13 LAB
ALBUMIN SERPL BCP-MCNC: 2.8 G/DL (ref 3.2–4.9)
ALBUMIN/GLOB SERPL: 1.2 G/DL
ALP SERPL-CCNC: 57 U/L (ref 30–99)
ALT SERPL-CCNC: 31 U/L (ref 2–50)
ANION GAP SERPL CALC-SCNC: 8 MMOL/L (ref 7–16)
AST SERPL-CCNC: 28 U/L (ref 12–45)
BASOPHILS # BLD AUTO: 0 % (ref 0–1.8)
BASOPHILS # BLD: 0 K/UL (ref 0–0.12)
BILIRUB SERPL-MCNC: 0.2 MG/DL (ref 0.1–1.5)
BUN SERPL-MCNC: 18 MG/DL (ref 8–22)
CALCIUM ALBUM COR SERPL-MCNC: 9.4 MG/DL (ref 8.5–10.5)
CALCIUM SERPL-MCNC: 8.4 MG/DL (ref 8.5–10.5)
CHLORIDE SERPL-SCNC: 107 MMOL/L (ref 96–112)
CO2 SERPL-SCNC: 25 MMOL/L (ref 20–33)
CREAT SERPL-MCNC: 0.73 MG/DL (ref 0.5–1.4)
EOSINOPHIL # BLD AUTO: 0.09 K/UL (ref 0–0.51)
EOSINOPHIL NFR BLD: 4.4 % (ref 0–6.9)
ERYTHROCYTE [DISTWIDTH] IN BLOOD BY AUTOMATED COUNT: 48.4 FL (ref 35.9–50)
GFR SERPLBLD CREATININE-BSD FMLA CKD-EPI: 89 ML/MIN/1.73 M 2
GLOBULIN SER CALC-MCNC: 2.3 G/DL (ref 1.9–3.5)
GLUCOSE SERPL-MCNC: 83 MG/DL (ref 65–99)
HCT VFR BLD AUTO: 25.9 % (ref 37–47)
HGB BLD-MCNC: 8.4 G/DL (ref 12–16)
LACTOFERRIN STL QL IA: POSITIVE
LYMPHOCYTES # BLD AUTO: 1.31 K/UL (ref 1–4.8)
LYMPHOCYTES NFR BLD: 65.5 % (ref 22–41)
MANUAL DIFF BLD: NORMAL
MCH RBC QN AUTO: 29.1 PG (ref 27–33)
MCHC RBC AUTO-ENTMCNC: 32.4 G/DL (ref 32.2–35.5)
MCV RBC AUTO: 89.6 FL (ref 81.4–97.8)
MONOCYTES # BLD AUTO: 0.12 K/UL (ref 0–0.85)
MONOCYTES NFR BLD AUTO: 6.2 % (ref 0–13.4)
MORPHOLOGY BLD-IMP: NORMAL
NEUTROPHILS # BLD AUTO: 0.48 K/UL (ref 1.82–7.42)
NEUTROPHILS NFR BLD: 23.9 % (ref 44–72)
NRBC # BLD AUTO: 0 K/UL
NRBC BLD-RTO: 0 /100 WBC (ref 0–0.2)
PLATELET # BLD AUTO: 214 K/UL (ref 164–446)
PLATELET BLD QL SMEAR: NORMAL
PMV BLD AUTO: 8.8 FL (ref 9–12.9)
POTASSIUM SERPL-SCNC: 3.6 MMOL/L (ref 3.6–5.5)
PROT SERPL-MCNC: 5.1 G/DL (ref 6–8.2)
RBC # BLD AUTO: 2.89 M/UL (ref 4.2–5.4)
RBC BLD AUTO: NORMAL
SODIUM SERPL-SCNC: 140 MMOL/L (ref 135–145)
THYROPEROXIDASE AB SERPL-ACNC: 52 IU/ML (ref 0–9)
WBC # BLD AUTO: 2 K/UL (ref 4.8–10.8)

## 2023-09-13 PROCEDURE — 80053 COMPREHEN METABOLIC PANEL: CPT

## 2023-09-13 PROCEDURE — A9270 NON-COVERED ITEM OR SERVICE: HCPCS | Performed by: HOSPITALIST

## 2023-09-13 PROCEDURE — 86376 MICROSOMAL ANTIBODY EACH: CPT

## 2023-09-13 PROCEDURE — 84445 ASSAY OF TSI GLOBULIN: CPT

## 2023-09-13 PROCEDURE — 76536 US EXAM OF HEAD AND NECK: CPT

## 2023-09-13 PROCEDURE — 700102 HCHG RX REV CODE 250 W/ 637 OVERRIDE(OP): Performed by: STUDENT IN AN ORGANIZED HEALTH CARE EDUCATION/TRAINING PROGRAM

## 2023-09-13 PROCEDURE — A9270 NON-COVERED ITEM OR SERVICE: HCPCS | Performed by: STUDENT IN AN ORGANIZED HEALTH CARE EDUCATION/TRAINING PROGRAM

## 2023-09-13 PROCEDURE — 700111 HCHG RX REV CODE 636 W/ 250 OVERRIDE (IP): Performed by: STUDENT IN AN ORGANIZED HEALTH CARE EDUCATION/TRAINING PROGRAM

## 2023-09-13 PROCEDURE — 96372 THER/PROPH/DIAG INJ SC/IM: CPT

## 2023-09-13 PROCEDURE — 700105 HCHG RX REV CODE 258: Performed by: STUDENT IN AN ORGANIZED HEALTH CARE EDUCATION/TRAINING PROGRAM

## 2023-09-13 PROCEDURE — 83630 LACTOFERRIN FECAL (QUAL): CPT

## 2023-09-13 PROCEDURE — 85025 COMPLETE CBC W/AUTO DIFF WBC: CPT

## 2023-09-13 PROCEDURE — 99232 SBSQ HOSP IP/OBS MODERATE 35: CPT | Performed by: STUDENT IN AN ORGANIZED HEALTH CARE EDUCATION/TRAINING PROGRAM

## 2023-09-13 PROCEDURE — 770001 HCHG ROOM/CARE - MED/SURG/GYN PRIV*

## 2023-09-13 PROCEDURE — 700102 HCHG RX REV CODE 250 W/ 637 OVERRIDE(OP): Performed by: HOSPITALIST

## 2023-09-13 PROCEDURE — 36415 COLL VENOUS BLD VENIPUNCTURE: CPT

## 2023-09-13 PROCEDURE — 85007 BL SMEAR W/DIFF WBC COUNT: CPT

## 2023-09-13 RX ORDER — LOPERAMIDE HYDROCHLORIDE 2 MG/1
2 CAPSULE ORAL 4 TIMES DAILY PRN
Status: DISCONTINUED | OUTPATIENT
Start: 2023-09-13 | End: 2023-09-13

## 2023-09-13 RX ORDER — SODIUM CHLORIDE, SODIUM LACTATE, POTASSIUM CHLORIDE, CALCIUM CHLORIDE 600; 310; 30; 20 MG/100ML; MG/100ML; MG/100ML; MG/100ML
INJECTION, SOLUTION INTRAVENOUS CONTINUOUS
Status: DISCONTINUED | OUTPATIENT
Start: 2023-09-13 | End: 2023-09-15 | Stop reason: HOSPADM

## 2023-09-13 RX ADMIN — VENLAFAXINE HYDROCHLORIDE 75 MG: 75 TABLET ORAL at 05:35

## 2023-09-13 RX ADMIN — PRAVASTATIN SODIUM 40 MG: 20 TABLET ORAL at 05:36

## 2023-09-13 RX ADMIN — HEPARIN SODIUM 5000 UNITS: 5000 INJECTION, SOLUTION INTRAVENOUS; SUBCUTANEOUS at 05:36

## 2023-09-13 RX ADMIN — PREDNISONE 50 MG: 20 TABLET ORAL at 18:14

## 2023-09-13 RX ADMIN — SODIUM CHLORIDE, POTASSIUM CHLORIDE, SODIUM LACTATE AND CALCIUM CHLORIDE: 600; 310; 30; 20 INJECTION, SOLUTION INTRAVENOUS at 15:34

## 2023-09-13 RX ADMIN — FOLIC ACID 1 MG: 1 TABLET ORAL at 05:36

## 2023-09-13 RX ADMIN — HEPARIN SODIUM 5000 UNITS: 5000 INJECTION, SOLUTION INTRAVENOUS; SUBCUTANEOUS at 21:40

## 2023-09-13 RX ADMIN — LOPERAMIDE HYDROCHLORIDE 2 MG: 2 CAPSULE ORAL at 14:05

## 2023-09-13 RX ADMIN — METOPROLOL SUCCINATE 12.5 MG: 25 TABLET, EXTENDED RELEASE ORAL at 05:35

## 2023-09-13 ASSESSMENT — ENCOUNTER SYMPTOMS
RESPIRATORY NEGATIVE: 1
DIARRHEA: 1
CARDIOVASCULAR NEGATIVE: 1
WEAKNESS: 1
MUSCULOSKELETAL NEGATIVE: 1
PSYCHIATRIC NEGATIVE: 1
EYES NEGATIVE: 1

## 2023-09-13 ASSESSMENT — PATIENT HEALTH QUESTIONNAIRE - PHQ9
1. LITTLE INTEREST OR PLEASURE IN DOING THINGS: NOT AT ALL
2. FEELING DOWN, DEPRESSED, IRRITABLE, OR HOPELESS: NOT AT ALL
SUM OF ALL RESPONSES TO PHQ9 QUESTIONS 1 AND 2: 0

## 2023-09-13 ASSESSMENT — PAIN DESCRIPTION - PAIN TYPE
TYPE: ACUTE PAIN

## 2023-09-13 NOTE — PROGRESS NOTES
New Patient Consult Note for Endocrinology  Referred by: Kermit Bergman M.D.    Reason for consult:   Hyperthyroidism    HPI:  Latrice Andersen is a 68 y.o. old patient    - weight loss  - heat intolerance, sweating, insomnia, anxiety, emotional lability, palpitations, dyspnea on exertion,  tremors, diarrhea  - eye discomfort, periorbital edema, red eye, double vision, pain with eye movement  - neck discomfort, pain, dysphagia, dyspnea  - FHx of thyroid disease:   - iodine, biotin supplementation, recent contrast use  - recent URI  - pregnancy       Past Medical History:  Patient Active Problem List    Diagnosis Date Noted    MARIVEL (acute kidney injury) (HCC) 09/11/2023    Renal mass 09/11/2023    Diarrhea 09/11/2023    Metabolic acidosis 09/11/2023    Hyponatremia 09/11/2023    NSCLC of left lung (HCC) 06/29/2023    Left upper lobe pulmonary nodule 05/11/2023    History of radiation therapy 05/11/2023    Former smoker 05/11/2023    Other specified anemias 08/05/2022    Other fatigue 07/05/2022    Dysuria 07/05/2022    Stage 3a chronic kidney disease (HCC) 07/02/2021    History of breast cancer 02/06/2017    Essential hypertension 09/02/2015    Status post mastectomy 07/01/2014    Anxiety disorder 04/29/2014    Dyslipidemia 06/07/2013       Past Surgical History:  Past Surgical History:   Procedure Laterality Date    IL BRONCHOSCOPY,DIAGNOSTIC N/A 6/12/2023    Procedure: FIBER OPTIC BRONCHOSCOPY WITH  WASH, BRUSH, BRONCHOALVEOLAR LAVAGE, BIOPSY AND FINE NEEDLE ASPIRATION, ENDOBRONCHIAL ULTRASOUND & NAVIGATION, ROBOTICS;  Surgeon: Brooke Perrin M.D.;  Location: [facility];  Service: Pulmonary Robotic    ENDOBRONCHIAL US ADD-ON N/A 6/12/2023    Procedure: ENDOBRONCHIAL ULTRASOUND (EBUS);  Surgeon: Brooke Perrin M.D.;  Location: [facility];  Service: Pulmonary Robotic    MASTECTOMY      Left    IL CHEMOTHERAPY, UNSPECIFIED PROCEDURE      IL RADIATION THERAPY PLAN SIMPLE          Allergies:  Codeine    Social History:  Social History     Socioeconomic History    Marital status:      Spouse name: Not on file    Number of children: Not on file    Years of education: Not on file    Highest education level: Not on file   Occupational History    Not on file   Tobacco Use    Smoking status: Every Day     Current packs/day: 0.00     Average packs/day: 0.3 packs/day for 45.0 years (11.3 ttl pk-yrs)     Types: Cigarettes     Start date: 1977     Last attempt to quit: 2022     Years since quittin.2     Passive exposure: Current (Spouse)    Smokeless tobacco: Former     Quit date: 2022    Tobacco comments:     5 daily    Vaping Use    Vaping Use: Never used   Substance and Sexual Activity    Alcohol use: No    Drug use: No    Sexual activity: Not Currently   Other Topics Concern    Not on file   Social History Narrative    Not on file     Social Determinants of Health     Financial Resource Strain: Not on file   Food Insecurity: Not on file   Transportation Needs: Not on file   Physical Activity: Not on file   Stress: Not on file   Social Connections: Not on file   Intimate Partner Violence: Not on file   Housing Stability: Not on file       Family History:  Family History   Problem Relation Age of Onset    Cancer Mother         breast cancer/Breast    Breast Cancer Mother     Hypertension Maternal Uncle     Cancer Maternal Uncle     Hypertension Maternal Grandmother     Hyperlipidemia Maternal Grandmother     Heart Disease Maternal Grandmother     Cancer Paternal Grandfather         Lung ca, smoker    Lung Cancer Maternal Grandfather     Alzheimer's Disease Maternal Aunt     Kidney Disease Maternal Aunt     Diabetes Neg Hx        Medications:  No current facility-administered medications for this visit.  No current outpatient medications on file.    Facility-Administered Medications Ordered in Other Visits:     lactated ringers infusion, , Intravenous, Continuous,  Danial Dutta M.D., Last Rate: 125 mL/hr at 09/12/23 1232, Rate Change at 09/12/23 1232    heparin injection 5,000 Units, 5,000 Units, Subcutaneous, Q8HRS, Danial Dutta M.D.    folic acid (Folvite) tablet 1 mg, 1 mg, Oral, DAILY, Abdi Richter M.D., 1 mg at 09/12/23 0555    metoprolol SR (Toprol XL) tablet 12.5 mg, 12.5 mg, Oral, DAILY, Abdi Richter M.D., 12.5 mg at 09/11/23 1903    pravastatin (Pravachol) tablet 40 mg, 40 mg, Oral, DAILY, Abdi Richter M.D., 40 mg at 09/12/23 0555    venlafaxine (Effexor) tablet 75 mg, 75 mg, Oral, DAILY, Abdi Richter M.D., 75 mg at 09/12/23 0556    acetaminophen (Tylenol) tablet 650 mg, 650 mg, Oral, Q6HRS PRN, Abdi Richter M.D.    sodium bicarbonate tablet 650 mg, 650 mg, Oral, TID, Abdi Richter M.D., 650 mg at 09/12/23 1557    Physical Examination:   Vital signs: There were no vitals taken for this visit.  General: No distress, cooperative, well dressed and well nourished.   Eyes: No scleral icterus or discharge, No hyposphagma  ENMT: Normal on external inspection of nose, lips, No nasal drainage   Neck: No abnormal masses on inspection  Resp: Normal effort, Bilateral clear to auscultation, No wheezing, No rales  CVS: Regular rate and rhythm, S1 S2 normal, No murmur. No gallop  Extremities: No edema bilateral extremities  Neuro: Alert and oriented  Skin: No rash, No Ulcers  Psych: Normal mood and affect    Labs:  - reviewed      Imaging:  - reviewed    Assessment and Plan:        RTC:    Total time (face-to-face and non-face-to face time):  min - discussion of diagnoses, treatment, prognosis, medical charts, lab, imaging, pathology review, documentation.      Plan reviewed with the patient and agreed with plan.  All questions answered to patient's satisfaction.  Thank you kindly for allowing me to participate in the care plan for this patient.    Deb Stockton MD    CC:   Kermit Bergman,  M.D.

## 2023-09-13 NOTE — PROGRESS NOTES
As per pt, she had 11 loose BM's since 1 pm yesterday and 4 loose medium BM's this morning. Dr. Wild aware and notified.

## 2023-09-13 NOTE — TELEPHONE ENCOUNTER
Dr. Wild called in to speak with Dr. Stockton. He states that the patient is currently admitted. He would like to talk about the patients hyperthyroid.

## 2023-09-13 NOTE — PROGRESS NOTES
Received report, assumed pt care. Pt a&o x 2, VSS, Assessment completed. LR running at 125ml/hr. PIV CDI, no signs of infiltration. Pt wants t shower today. Will provide supplies. Resting comfortably in bed with call light, bedside table in reach. No c/o at this time. Side rails up x 2. Instructed to use call light when needing assistance, verbalized understanding. Bed alarm refused, pt oriented and steady. Bed in low position. Will continue to monitor.

## 2023-09-13 NOTE — PROGRESS NOTES
Hospital Medicine Daily Progress Note    Date of Service  9/12/2023    Chief Complaint  Latrice Andersen is a 68 y.o. female admitted 9/11/2023 with diarrhea    Hospital Course  68 y.o. female who presented 9/11/2023 with past medical history of breast cancer, lung cancer, hypertension, hyperlipidemia, is coming today complaining of diarrhea, patient finished immunotherapy for her lung cancer on Wednesday and after that she developed severe diarrhea that started with loose stool and then became watery diarrhea and lately she has been having bloody diarrhea, patient denies any ill contact, no fever no chills, no new type of food, denies any dizziness lightheadedness but she feels generalized weak, no focal weakness no vomiting but she has no nausea in she has decreased oral intake because she stated that anything that she ate produces diarrhea, patient has CT abdomen in the emergency room that did not show any acute findings, patient has severe metabolic acidosis due to diarrhea with bicarb of 12, patient at this time is going to be admitted, continue IV hydration, checking for C. difficile, started on p.o. bicarb, monitoring BMP, monitoring urine output, I have discussed case and plan of care with patient, ERP nurse staff request have been answered    Interval Problem Update  Evaluated at bedside  Still having multiple bouts of diarrhea but slightly improving  Acute renal failure resolved  Bicarbonate improving  9/5 TSH at 0.03 and free T4 3.65, oncology aware of this and discussed with endocrinology who recommended obtaining a thyroid scan  Patient unable to get thyroid scan, ordered while inpatient  Hyperthyroidism likely contributing to diarrhea  Continue IV hydration  Pending thyroid scan  Pending thyroid ultrasound  Labs on a.m.    I have discussed this patient's plan of care and discharge plan at IDT rounds today with Case Management, Nursing, Nursing leadership, and other members of the IDT  team.    Consultants/Specialty  None    Code Status  Full Code    Disposition  The patient is not medically cleared for discharge to home or a post-acute facility.  Anticipate discharge to: home with close outpatient follow-up    I have placed the appropriate orders for post-discharge needs.    Review of Systems  Review of Systems   Constitutional:  Positive for malaise/fatigue.   HENT: Negative.     Eyes: Negative.    Respiratory: Negative.     Cardiovascular: Negative.    Gastrointestinal:  Positive for diarrhea.   Genitourinary: Negative.    Musculoskeletal: Negative.    Skin: Negative.    Neurological:  Positive for weakness.   Endo/Heme/Allergies: Negative.    Psychiatric/Behavioral: Negative.          Physical Exam  Temp:  [36 °C (96.8 °F)-36.6 °C (97.8 °F)] 36 °C (96.8 °F)  Pulse:  [] 92  Resp:  [18-20] 20  BP: (103-139)/(54-64) 139/63  SpO2:  [94 %-96 %] 96 %    Physical Exam  Constitutional:       Appearance: Normal appearance.   HENT:      Head: Normocephalic and atraumatic.      Mouth/Throat:      Mouth: Mucous membranes are moist.   Eyes:      Extraocular Movements: Extraocular movements intact.      Pupils: Pupils are equal, round, and reactive to light.   Cardiovascular:      Rate and Rhythm: Normal rate and regular rhythm.      Pulses: Normal pulses.      Heart sounds: Normal heart sounds.   Pulmonary:      Effort: Pulmonary effort is normal.      Breath sounds: Normal breath sounds.   Abdominal:      General: Bowel sounds are normal.      Palpations: Abdomen is soft.   Musculoskeletal:         General: No swelling. Normal range of motion.      Cervical back: Normal range of motion and neck supple.   Skin:     General: Skin is warm.      Coloration: Skin is not jaundiced.   Neurological:      General: No focal deficit present.      Mental Status: She is alert and oriented to person, place, and time. Mental status is at baseline.      Cranial Nerves: No cranial nerve deficit.   Psychiatric:          Mood and Affect: Mood normal.         Behavior: Behavior normal.         Thought Content: Thought content normal.         Judgment: Judgment normal.         Fluids    Intake/Output Summary (Last 24 hours) at 9/12/2023 1937  Last data filed at 9/12/2023 1610  Gross per 24 hour   Intake 580 ml   Output --   Net 580 ml       Laboratory  Recent Labs     09/11/23  1150 09/12/23  0153   WBC 4.5* 2.5*   RBC 4.38 3.54*   HEMOGLOBIN 12.8 10.3*   HEMATOCRIT 39.8 30.7*   MCV 90.9 86.7   MCH 29.2 29.1   MCHC 32.2 33.6   RDW 50.1* 47.2   PLATELETCT 443 306   MPV 8.7* 8.6*     Recent Labs     09/11/23  1150 09/12/23  0153   SODIUM 130* 137   POTASSIUM 4.9 3.7   CHLORIDE 99 105   CO2 12* 17*   GLUCOSE 129* 99   BUN 49* 42*   CREATININE 2.07* 1.35   CALCIUM 9.7 9.1     Recent Labs     09/11/23  1150   APTT 24.5*   INR 1.03               Imaging  CT-ABDOMEN-PELVIS W/O   Final Result      1.  No evidence of bowel obstruction or perforation.   2.  No CT evidence for colitis or diverticulitis.   3.  Normal appendix.   4.  No renal stone or hydronephrosis.   5.  Probable RIGHT kidney cyst.   6.  Hyperdense cyst versus mass at the anterior LEFT mid kidney measuring 11 mm.  Consider follow-up nonemergent renal protocol CT or MRI.      DX-CHEST-PORTABLE (1 VIEW)   Final Result      1.  Interval decrease in size of lung nodule in the left midlung.      NM-THYROID UPTAKE 5HR SCAN    (Results Pending)   US-SOFT TISSUES OF HEAD - NECK    (Results Pending)        Assessment/Plan  * MARIVEL (acute kidney injury) (HCC)- (present on admission)  Assessment & Plan  Nuclear dehydration due to severe diarrhea  Continue IV fluids  Follow-up BMP    Hyponatremia- (present on admission)  Assessment & Plan  Due to dehydration  Due to diarrhea  Continue IV hydration  Follow-up BMP    Metabolic acidosis- (present on admission)  Assessment & Plan  Due to severe diarrhea    On p.o. bicarb  Monitoring BMP    Diarrhea- (present on admission)  Assessment &  Plan  Likely due to lung cancer treatment  Continue supportive treatment  Check for C. difficile  If C. difficile negative will start Imodium  Started on probiotics    Renal mass- (present on admission)  Assessment & Plan  Will need MRI as outpatient    NSCLC of left lung (HCC)- (present on admission)  Assessment & Plan  Follows with oncology  Close monitoring    Stage 3a chronic kidney disease (HCC)- (present on admission)  Assessment & Plan  [place] on ckd no acute diarrhea due to lung cancer treatment  Continue supportive treatment  Continue urine output monitoring  IV hydration  Follow-up BMP in a.m.    History of breast cancer- (present on admission)  Assessment & Plan  Follow with oncology as outpatient.     Essential hypertension- (present on admission)  Assessment & Plan  Metoprolol.     Anxiety disorder- (present on admission)  Assessment & Plan  effexor    Dyslipidemia- (present on admission)  Assessment & Plan  Continue statin.          VTE prophylaxis: Prophylactic Xarelto    I have performed a physical exam and reviewed and updated ROS and Plan today (9/12/2023). In review of yesterday's note (9/11/2023), there are no changes except as documented above.

## 2023-09-13 NOTE — CARE PLAN
The patient is Stable - Low risk of patient condition declining or worsening    Shift Goals  Clinical Goals: IV hydration, monitor bowel movement, shower  Patient Goals: IV hydration, shower  Family Goals: na    Progress made toward(s) clinical / shift goals:  IV fluids administered, pt bowel movement being monitored with patient participation.      Problem: Psychosocial  Goal: Patient's level of anxiety will decrease  Description: Target End Date:  1-3 days or as soon as patient condition allows    1.  Collaborate with patient and family/caregiver to identify triggers and develop strategies to cope with anxiety  2.  Implement stimuli reduction, calming techniques  3.  Pharmacologic management per provider order  4.  Encourage patient/family/care giver participation  5.  Collaborate with interdisciplinary team including Psychologist or Behavioral Health Team as needed  Outcome: Progressing  Goal: Patient's ability to verbalize feelings about condition will improve  Description: Target End Date:  Prior to discharge or change in level of care    1.  Discuss coping with medical condition and its effects  2.  Encourage patient participation in care  3.  Encourage acknowledgement of body changes and accompanying emotions  4.  Perform depression screening  Outcome: Progressing       Patient is not progressing towards the following goals: pt still having multiple bowel movements. 12 x BM today.      Problem: Gastrointestinal Irritability  Goal: Diarrhea will be absent or improved  Description: Target End Date:  Prior to discharge or change in level of care    1.  Assess for abdominal discomfort, pain, cramping, frequency, urgency, loose or liquid stools, and hyperactive bowel sensations.  2.  Evaluate pattern of defecation  3.  Administer antidiarrheal agents per order  4. Culture stool, per order  5.  Inquire about food intolerances, medications, change in eating pattern and current stressors  6.  Assess for fecal  impaction  7.  Assess hydration status  8.  Assess condition of perianal skin  9.  Loss of bowel elimination control can lead of feelings of decreased self esteem.  Examine the emotional impact of illness, hospitalization and/or accidents.  Outcome: Not Progressing

## 2023-09-13 NOTE — PROGRESS NOTES
[facility] Medicine Daily Progress Note    Date of Service  9/13/2023    Chief Complaint  Latrice Andersen is a 68 y.o. female admitted 9/11/2023 with diarrhea    Hospital Course  68 y.o. female who presented 9/11/2023 with past medical history of breast cancer, lung cancer, hypertension, hyperlipidemia, is coming today complaining of diarrhea, patient finished immunotherapy for her lung cancer on Wednesday and after that she developed severe diarrhea that started with loose stool and then became watery diarrhea and lately she has been having bloody diarrhea, patient denies any ill contact, no fever no chills, no new type of food, denies any dizziness lightheadedness but she feels generalized weak, no focal weakness no vomiting but she has no nausea in she has decreased oral intake because she stated that anything that she ate produces diarrhea, patient has CT abdomen in the emergency room that did not show any acute findings, patient has severe metabolic acidosis due to diarrhea with bicarb of 12, patient at this time is going to be admitted, continue IV hydration, checking for C. difficile, started on p.o. bicarb, monitoring BMP, monitoring urine output, I have discussed case and plan of care with patient, ERP nurse staff request have been answered    9/5 TSH at 0.03 and free T4 3.65.  Patient following with endocrinology in the outpatient setting.    Interval Problem Update  Evaluated at bedside  Still having multiple bouts of diarrhea around 14 hours  Kidney function better today  Metabolic acidosis resolved  Continue IV hydration  I discussed case with oncology who recommended initiating 50 mg of prednisone for immunotherapy induced colitis  Start prednisone  Labs on a.m.    I have discussed this patient's plan of care and discharge plan at IDT rounds today with Case Management, Nursing, Nursing leadership, and other members of the IDT team.    Consultants/Specialty  None    Code Status  Full  Code    Disposition  The patient is not medically cleared for discharge to home or a post-acute facility.  Anticipate discharge to: home with close outpatient follow-up    I have placed the appropriate orders for post-discharge needs.    Review of Systems  Review of Systems   Constitutional:  Positive for malaise/fatigue.   HENT: Negative.     Eyes: Negative.    Respiratory: Negative.     Cardiovascular: Negative.    Gastrointestinal:  Positive for diarrhea.   Genitourinary: Negative.    Musculoskeletal: Negative.    Skin: Negative.    Neurological:  Positive for weakness.   Endo/Heme/Allergies: Negative.    Psychiatric/Behavioral: Negative.          Physical Exam  Temp:  [36.1 °C (97 °F)-36.3 °C (97.4 °F)] 36.2 °C (97.2 °F)  Pulse:  [82-99] 91  Resp:  [16-20] 20  BP: (114-134)/(49-54) 134/49  SpO2:  [95 %-98 %] 98 %    Physical Exam  Constitutional:       Appearance: Normal appearance.   HENT:      Head: Normocephalic and atraumatic.      Mouth/Throat:      Mouth: Mucous membranes are moist.   Eyes:      Extraocular Movements: Extraocular movements intact.      Pupils: Pupils are equal, round, and reactive to light.   Cardiovascular:      Rate and Rhythm: Normal rate and regular rhythm.      Pulses: Normal pulses.      Heart sounds: Normal heart sounds.   Pulmonary:      Effort: Pulmonary effort is normal.      Breath sounds: Normal breath sounds.   Abdominal:      General: Bowel sounds are normal.      Palpations: Abdomen is soft.   Musculoskeletal:         General: No swelling. Normal range of motion.      Cervical back: Normal range of motion and neck supple.   Skin:     General: Skin is warm.      Coloration: Skin is not jaundiced.   Neurological:      General: No focal deficit present.      Mental Status: She is alert and oriented to person, place, and time. Mental status is at baseline.      Cranial Nerves: No cranial nerve deficit.   Psychiatric:         Mood and Affect: Mood normal.         Behavior:  Behavior normal.         Thought Content: Thought content normal.         Judgment: Judgment normal.         Fluids    Intake/Output Summary (Last 24 hours) at 9/13/2023 1708  Last data filed at 9/13/2023 1538  Gross per 24 hour   Intake 1120 ml   Output --   Net 1120 ml       Laboratory  Recent Labs     09/11/23  1150 09/12/23  0153 09/13/23  0921   WBC 4.5* 2.5* 2.0*   RBC 4.38 3.54* 2.89*   HEMOGLOBIN 12.8 10.3* 8.4*   HEMATOCRIT 39.8 30.7* 25.9*   MCV 90.9 86.7 89.6   MCH 29.2 29.1 29.1   MCHC 32.2 33.6 32.4   RDW 50.1* 47.2 48.4   PLATELETCT 443 306 214   MPV 8.7* 8.6* 8.8*     Recent Labs     09/11/23  1150 09/12/23  0153 09/13/23  0921   SODIUM 130* 137 140   POTASSIUM 4.9 3.7 3.6   CHLORIDE 99 105 107   CO2 12* 17* 25   GLUCOSE 129* 99 83   BUN 49* 42* 18   CREATININE 2.07* 1.35 0.73   CALCIUM 9.7 9.1 8.4*     Recent Labs     09/11/23  1150   APTT 24.5*   INR 1.03               Imaging  CT-ABDOMEN-PELVIS W/O   Final Result      1.  No evidence of bowel obstruction or perforation.   2.  No CT evidence for colitis or diverticulitis.   3.  Normal appendix.   4.  No renal stone or hydronephrosis.   5.  Probable RIGHT kidney cyst.   6.  Hyperdense cyst versus mass at the anterior LEFT mid kidney measuring 11 mm.  Consider follow-up nonemergent renal protocol CT or MRI.      DX-CHEST-PORTABLE (1 VIEW)   Final Result      1.  Interval decrease in size of lung nodule in the left midlung.      US-THYROID    (Results Pending)        Assessment/Plan  * Colitis- (present on admission)  Assessment & Plan  Immunotherapy induced colitis  Abdominal status post CT without signs of colitis but lactoferrin elevated  Patient on immunotherapy for lung cancer  Causing up to 14 bouts of diarrhea daily  I discussed with Oncology who recommended initiation of prednisone 50 mg daily for 7 days and then taper  Started prednisone   Monitor electrolytes while inpatient  Labs in a.m.    Diarrhea- (present on admission)  Assessment &  Plan  Acute diarrhea that started right after her last infusion on 9/6  Multifactorial and likely secondary to chemotherapy and immunotherapy  Thyroid function tests concerning for hypothyroidism, likely contributing  C. difficile negative  IV hydration  Labs in a.m.    Renal mass- (present on admission)  Assessment & Plan  Will need MRI as outpatient    NSCLC of left lung (HCC)- (present on admission)  Assessment & Plan  Follows with oncology  Close monitoring    Hyponatremia- (present on admission)  Assessment & Plan  Resolved    Metabolic acidosis- (present on admission)  Assessment & Plan  Resolved    MARIVEL (acute kidney injury) (HCC)- (present on admission)  Assessment & Plan  Resolved    Stage 3a chronic kidney disease (HCC)- (present on admission)  Assessment & Plan  Secondary to acute diarrhea  Resolved    History of breast cancer- (present on admission)  Assessment & Plan  Follow with oncology as outpatient.     Essential hypertension- (present on admission)  Assessment & Plan  Metoprolol.     Anxiety disorder- (present on admission)  Assessment & Plan  effexor    Dyslipidemia- (present on admission)  Assessment & Plan  Continue statin.        Greater than 51 minutes spent prepping to see patient (e.g. review of tests) obtaining and/or reviewing separately obtained history. Performing a medically appropriate examination and/ evaluation.  Counseling and educating the patient/family/caregiver.  Ordering medications, tests, or procedures.  Referring and communicating with other health care professionals.  Documenting clinical information in EPIC.  Independently interpreting results and communicating results to patient/family/caregiver.  Care coordination'    VTE prophylaxis: Prophylactic Heparin    I have performed a physical exam and reviewed and updated ROS and Plan today (9/13/2023). In review of yesterday's note (9/12/2023), there are no changes except as documented above.

## 2023-09-13 NOTE — CARE PLAN
The patient is Stable - Low risk of patient condition declining or worsening    Shift Goals  Clinical Goals: stool exam, iv hydration, monitor diarrhea  Patient Goals: comfort  Family Goals: na    Progress made toward(s) clinical / shift goals:  pt is on continuous iv hydration, diarrhea improved to 2x episode over the night, stool sample sent, pt remained stable, pain free and comfortable  Problem: Gastrointestinal Irritability  Goal: Diarrhea will be absent or improved  Outcome: Progressing       Patient is not progressing towards the following goals:

## 2023-09-14 ENCOUNTER — APPOINTMENT (OUTPATIENT)
Dept: HEMATOLOGY ONCOLOGY | Facility: MEDICAL CENTER | Age: 68
End: 2023-09-14
Attending: EMERGENCY MEDICINE
Payer: MEDICARE

## 2023-09-14 LAB
ANION GAP SERPL CALC-SCNC: 9 MMOL/L (ref 7–16)
BASOPHILS # BLD AUTO: 0 % (ref 0–1.8)
BASOPHILS # BLD: 0 K/UL (ref 0–0.12)
BUN SERPL-MCNC: 19 MG/DL (ref 8–22)
CALCIUM SERPL-MCNC: 8.7 MG/DL (ref 8.5–10.5)
CHLORIDE SERPL-SCNC: 109 MMOL/L (ref 96–112)
CO2 SERPL-SCNC: 22 MMOL/L (ref 20–33)
CREAT SERPL-MCNC: 0.78 MG/DL (ref 0.5–1.4)
EOSINOPHIL # BLD AUTO: 0 K/UL (ref 0–0.51)
EOSINOPHIL NFR BLD: 0 % (ref 0–6.9)
ERYTHROCYTE [DISTWIDTH] IN BLOOD BY AUTOMATED COUNT: 50 FL (ref 35.9–50)
GFR SERPLBLD CREATININE-BSD FMLA CKD-EPI: 83 ML/MIN/1.73 M 2
GLUCOSE SERPL-MCNC: 179 MG/DL (ref 65–99)
HCT VFR BLD AUTO: 28.5 % (ref 37–47)
HGB BLD-MCNC: 8.8 G/DL (ref 12–16)
IMM GRANULOCYTES # BLD AUTO: 0.01 K/UL (ref 0–0.11)
IMM GRANULOCYTES NFR BLD AUTO: 0.7 % (ref 0–0.9)
LYMPHOCYTES # BLD AUTO: 0.41 K/UL (ref 1–4.8)
LYMPHOCYTES NFR BLD: 28.7 % (ref 22–41)
MAGNESIUM SERPL-MCNC: 1.4 MG/DL (ref 1.5–2.5)
MCH RBC QN AUTO: 28.5 PG (ref 27–33)
MCHC RBC AUTO-ENTMCNC: 30.9 G/DL (ref 32.2–35.5)
MCV RBC AUTO: 92.2 FL (ref 81.4–97.8)
MONOCYTES # BLD AUTO: 0.07 K/UL (ref 0–0.85)
MONOCYTES NFR BLD AUTO: 4.9 % (ref 0–13.4)
NEUTROPHILS # BLD AUTO: 0.94 K/UL (ref 1.82–7.42)
NEUTROPHILS NFR BLD: 65.7 % (ref 44–72)
NRBC # BLD AUTO: 0 K/UL
NRBC BLD-RTO: 0 /100 WBC (ref 0–0.2)
PHOSPHATE SERPL-MCNC: 1.7 MG/DL (ref 2.5–4.5)
PLATELET # BLD AUTO: 187 K/UL (ref 164–446)
PMV BLD AUTO: 9 FL (ref 9–12.9)
POTASSIUM SERPL-SCNC: 4.3 MMOL/L (ref 3.6–5.5)
RBC # BLD AUTO: 3.09 M/UL (ref 4.2–5.4)
SODIUM SERPL-SCNC: 140 MMOL/L (ref 135–145)
WBC # BLD AUTO: 1.4 K/UL (ref 4.8–10.8)

## 2023-09-14 PROCEDURE — 700102 HCHG RX REV CODE 250 W/ 637 OVERRIDE(OP): Performed by: STUDENT IN AN ORGANIZED HEALTH CARE EDUCATION/TRAINING PROGRAM

## 2023-09-14 PROCEDURE — A9270 NON-COVERED ITEM OR SERVICE: HCPCS | Performed by: HOSPITALIST

## 2023-09-14 PROCEDURE — 99232 SBSQ HOSP IP/OBS MODERATE 35: CPT | Performed by: STUDENT IN AN ORGANIZED HEALTH CARE EDUCATION/TRAINING PROGRAM

## 2023-09-14 PROCEDURE — 36415 COLL VENOUS BLD VENIPUNCTURE: CPT

## 2023-09-14 PROCEDURE — 700111 HCHG RX REV CODE 636 W/ 250 OVERRIDE (IP): Performed by: STUDENT IN AN ORGANIZED HEALTH CARE EDUCATION/TRAINING PROGRAM

## 2023-09-14 PROCEDURE — 80048 BASIC METABOLIC PNL TOTAL CA: CPT

## 2023-09-14 PROCEDURE — 84100 ASSAY OF PHOSPHORUS: CPT

## 2023-09-14 PROCEDURE — 83735 ASSAY OF MAGNESIUM: CPT

## 2023-09-14 PROCEDURE — A9270 NON-COVERED ITEM OR SERVICE: HCPCS | Performed by: STUDENT IN AN ORGANIZED HEALTH CARE EDUCATION/TRAINING PROGRAM

## 2023-09-14 PROCEDURE — 700102 HCHG RX REV CODE 250 W/ 637 OVERRIDE(OP): Performed by: HOSPITALIST

## 2023-09-14 PROCEDURE — 770001 HCHG ROOM/CARE - MED/SURG/GYN PRIV*

## 2023-09-14 PROCEDURE — 85025 COMPLETE CBC W/AUTO DIFF WBC: CPT

## 2023-09-14 RX ORDER — MAGNESIUM SULFATE HEPTAHYDRATE 40 MG/ML
4 INJECTION, SOLUTION INTRAVENOUS ONCE
Status: COMPLETED | OUTPATIENT
Start: 2023-09-14 | End: 2023-09-14

## 2023-09-14 RX ADMIN — METOPROLOL SUCCINATE 12.5 MG: 25 TABLET, EXTENDED RELEASE ORAL at 05:54

## 2023-09-14 RX ADMIN — FOLIC ACID 1 MG: 1 TABLET ORAL at 05:54

## 2023-09-14 RX ADMIN — HEPARIN SODIUM 5000 UNITS: 5000 INJECTION, SOLUTION INTRAVENOUS; SUBCUTANEOUS at 05:54

## 2023-09-14 RX ADMIN — MAGNESIUM SULFATE HEPTAHYDRATE 4 G: 4 INJECTION, SOLUTION INTRAVENOUS at 08:45

## 2023-09-14 RX ADMIN — HEPARIN SODIUM 5000 UNITS: 5000 INJECTION, SOLUTION INTRAVENOUS; SUBCUTANEOUS at 13:51

## 2023-09-14 RX ADMIN — PREDNISONE 50 MG: 20 TABLET ORAL at 05:53

## 2023-09-14 RX ADMIN — DIBASIC SODIUM PHOSPHATE, MONOBASIC POTASSIUM PHOSPHATE AND MONOBASIC SODIUM PHOSPHATE 500 MG: 852; 155; 130 TABLET ORAL at 08:53

## 2023-09-14 RX ADMIN — VENLAFAXINE HYDROCHLORIDE 75 MG: 75 TABLET ORAL at 05:53

## 2023-09-14 RX ADMIN — DIBASIC SODIUM PHOSPHATE, MONOBASIC POTASSIUM PHOSPHATE AND MONOBASIC SODIUM PHOSPHATE 500 MG: 852; 155; 130 TABLET ORAL at 12:12

## 2023-09-14 ASSESSMENT — ENCOUNTER SYMPTOMS
HEARTBURN: 0
WEAKNESS: 1
DIARRHEA: 1
TINGLING: 0
TREMORS: 0
BRUISES/BLEEDS EASILY: 0
SENSORY CHANGE: 0
RESPIRATORY NEGATIVE: 1
PALPITATIONS: 0
PSYCHIATRIC NEGATIVE: 1
FOCAL WEAKNESS: 0
EYES NEGATIVE: 1
COUGH: 0
SPUTUM PRODUCTION: 0
ABDOMINAL PAIN: 0
NAUSEA: 0
CHILLS: 0
SORE THROAT: 0
ORTHOPNEA: 0
NECK PAIN: 0
DEPRESSION: 0
WEIGHT LOSS: 1
BLURRED VISION: 0
WHEEZING: 0
FEVER: 0
DIZZINESS: 0
HEADACHES: 0
VOMITING: 0
SHORTNESS OF BREATH: 0
CARDIOVASCULAR NEGATIVE: 1
MUSCULOSKELETAL NEGATIVE: 1
MEMORY LOSS: 1

## 2023-09-14 ASSESSMENT — PAIN DESCRIPTION - PAIN TYPE
TYPE: ACUTE PAIN

## 2023-09-14 NOTE — PROGRESS NOTES
Roe from Lab called with critical result of WBC:1.4 at 0206. Critical lab result read back to Roe.   Joselin Rodriguez notified of critical lab result at 0212.  No new orders made. Pt remains on neutropenic isolation

## 2023-09-14 NOTE — DISCHARGE PLANNING
HT/SCP TCN chart review completed. Collaborated with REGINA Cruz.  Current discharge considerations are for home with close outpatient f/u when medically cleared.  Per collaboration with REGINA, plan for possible discharge home tomorrow. TCN will continue to follow and collaborate with discharge planning team as additional post acute needs arise. Thank you.    Completed:  Choice obtained: None.  Patient states she is at her baseline level of function.   SCP with [facility] PCP.  Patient states she has a f/u appointment with Dr. Carias on 9/14/23.

## 2023-09-14 NOTE — PROGRESS NOTES
Consult Note: Hematology/Oncology      Primary Care:  Kermit Bergman M.D.          Chief Complaint   Patient presents with    Diarrhea       Patient reports diarrhea x 4 days with bright red blood noted. Patient also endorses vomiting as well.    Bloody Stools       X 4 days. Bright red blood         Current Treatment:      07/19/23: C1D1 neoadjuvant Carboplatin/Pemetrexed/Nivolumab  08/09/23: C2D1 neoadjuvant Carboplatin/Pemetrexed/Nivolumab  08/30/23: C3D1 neoadjuvant Carboplatin/Pemetrexed/Nivolumab delayed x1 week d/t creatinine clearance  09/05/23: C3D1 neoadjuvant Carboplatin/Pemetrexed/Nivolumab     Prior Treatment: None     Subjective:   History of Presenting Illness:  Latrice Andersen is a 67 y.o. female with a PMHx of Breast Cancer in 1995 (s/p RT and Chemo) with a new diagnosis of NSCLC Adenocarcinoma of the left lung.      Patient reports that last May 2022, she went to [place] and was feeling very sick, weak and had dysuria.  She came home and went to the ER, and was found to have a UTI.  She was found to be anemic, which prompted her to get a Colonoscopy/EGD.  She was told to get a CXR, but chose not to get it done at that time. She waited to Feb 2023,  which showed 21 mm density in or overlying the left, not visualized on the previous exam. This prompted her to get a CT CAP 3/2023, 2.7 x 2.5 cm subpleural lingular mass as well as  4 mm left lower lobe pulmonary nodule and 3 mm right lower lobe pulmonary nodule.     She had a PET scan 6/6/2023, which showed hypermetabolic mass in the left upper lobe mass consistent with malignancy, as well as an increase uptake in the L parasternal region vs internal mammary LN. No evidence of metastatic disease elsewhere.      6/12/23, biopsy of the ANSHUL mass was preformed which showed malignancy, pulmonary adenocarcinoma.     MRI brain on 6/20/23, shows an incidental meningioma, but a dural met cannot be excluded.       Sylvie is taking care of her 3 yo  granddaughter - Bill (her daughter in laws mother has her other grandbaby, Bassam).  She did office work prior.  She has 2 boys (Ace, Mary). She lives in [facility] [place] [facility] with Wander her , she has fair support.       She says she feels fine, washes the care, mows the lawn, does all of her housework.       She smoked 5 cig/day, and started when she was 17.  She has not smoked since last Thursday.  No alcohol. No IVDU.       Her grandfather, uncle, and cousin  of lung cancer.       Interval History    She is post Day 9 after chemo+IO.       Patient completed chemo and immunotherapy on .     She is having hyperthyroidism presumed 2/2 to IO.  She was not put on medication 2/2 to endocrines request.  She was unable to make her appt with endo 2/2 to hospitalization.  Thyroid US, thyroid labs and uptake scan were not preformed 2/2 to hospitalization.     She presented to the hospital with mutliple bouts of diarrhea (14 bouts/day).     She did see [facility] on 23, which showed rectal bleeding with bleeding internal hemorrhoids. Plan was to conduct a flexible sigmoidoscopy.  This has not happened yet.    Patient was started on steroids 50mg of prednisone on .  Since then she has only had 2 bouts of diarrhea.    She has a f/u with Dr. Ganser on .        Past Medical History:   Diagnosis Date    Allergy, unspecified not elsewhere classified     Breast cancer (HCC)     Chickenpox     Dental disorder     upper partial    Former smoker 2023    High cholesterol     History of breast cancer 2017    History of radiation therapy 2023    To left chest for breast cancer     Hyperlipidemia     Hypertension     Left upper lobe pulmonary nodule 2023    Tonsillitis         Past Surgical History:   Procedure Laterality Date    [place] BRONCHOSCOPY,DIAGNOSTIC N/A 2023    Procedure: FIBER OPTIC BRONCHOSCOPY WITH  WASH, BRUSH, BRONCHOALVEOLAR LAVAGE, BIOPSY AND FINE NEEDLE  ASPIRATION, ENDOBRONCHIAL ULTRASOUND & NAVIGATION, ROBOTICS;  Surgeon: Brooke Perrin M.D.;  Location: SURGERY [facility];  Service: Pulmonary Robotic    ENDOBRONCHIAL US ADD-ON N/A 2023    Procedure: ENDOBRONCHIAL ULTRASOUND (EBUS);  Surgeon: Brooke Perrin M.D.;  Location: SURGERY [facility];  Service: Pulmonary Robotic    MASTECTOMY      Left    WA CHEMOTHERAPY, UNSPECIFIED PROCEDURE      WA RADIATION THERAPY PLAN SIMPLE         Social History     Tobacco Use    Smoking status: Every Day     Current packs/day: 0.00     Average packs/day: 0.3 packs/day for 45.0 years (11.3 ttl pk-yrs)     Types: Cigarettes     Start date: 1977     Last attempt to quit: 2022     Years since quittin.2     Passive exposure: Current (Spouse)    Smokeless tobacco: Former     Quit date: 2022    Tobacco comments:     5 daily    Vaping Use    Vaping Use: Never used   Substance Use Topics    Alcohol use: No    Drug use: No        Family History   Problem Relation Age of Onset    Cancer Mother         breast cancer/Breast    Breast Cancer Mother     Hypertension Maternal Uncle     Cancer Maternal Uncle     Hypertension Maternal Grandmother     Hyperlipidemia Maternal Grandmother     Heart Disease Maternal Grandmother     Cancer Paternal Grandfather         Lung ca, smoker    Lung Cancer Maternal Grandfather     Alzheimer's Disease Maternal Aunt     Kidney Disease Maternal Aunt     Diabetes Neg Hx        Allergies   Allergen Reactions    Codeine Unspecified     Memory issues       Current Facility-Administered Medications   Medication Dose Route Frequency Provider Last Rate Last Admin    magnesium sulfate IVPB premix 4 g  4 g Intravenous Once Danial Dutta M.D. 25 mL/hr at 23 0845 4 g at 23 0845    phosphorus (K-Phos-Neutral) per tablet 500 mg  500 mg Oral TID Danial Dutta M.D.   500 mg at 23 0853    lactated ringers infusion   Intravenous Continuous  Danial Dutta M.D. 50 mL/hr at 09/13/23 1534 New Bag at 09/13/23 1534    predniSONE (Deltasone) tablet 50 mg  50 mg Oral DAILY Danial Dutta M.D.   50 mg at 09/14/23 0553    heparin injection 5,000 Units  5,000 Units Subcutaneous Q8HRS Danial Dutta M.D.   5,000 Units at 09/14/23 0554    folic acid (Folvite) tablet 1 mg  1 mg Oral DAILY Abdi Richter M.D.   1 mg at 09/14/23 0554    metoprolol SR (Toprol XL) tablet 12.5 mg  12.5 mg Oral DAILY Abdi Richter M.D.   12.5 mg at 09/14/23 0554    venlafaxine (Effexor) tablet 75 mg  75 mg Oral DAILY Abdi Richter M.D.   75 mg at 09/14/23 0553    acetaminophen (Tylenol) tablet 650 mg  650 mg Oral Q6HRS PRN Abdi Richter M.D.           Review of Systems   Constitutional:  Positive for weight loss. Negative for chills, fever and malaise/fatigue.   HENT:  Negative for congestion, ear pain, nosebleeds and sore throat.         Hoarseness    Eyes:  Negative for blurred vision.   Respiratory:  Negative for cough, sputum production, shortness of breath and wheezing.    Cardiovascular:  Negative for chest pain, palpitations, orthopnea and leg swelling.   Gastrointestinal:  Negative for abdominal pain, heartburn, nausea and vomiting.   Genitourinary:  Negative for dysuria, frequency and urgency.        Nocturia   Musculoskeletal:  Negative for neck pain.   Neurological:  Negative for dizziness, tingling, tremors, sensory change, focal weakness and headaches.   Endo/Heme/Allergies:  Does not bruise/bleed easily.   Psychiatric/Behavioral:  Positive for memory loss. Negative for depression and suicidal ideas.         Anxiety   All other systems reviewed and are negative.      Problem list, medications, and allergies reviewed by myself today in Epic.     Objective:     Vitals:    09/13/23 1520 09/13/23 1939 09/14/23 0504 09/14/23 0554   BP: 134/49 133/60 114/49 114/49   Pulse: 91 90 82 82   Resp: 20 19 18    Temp: 36.2 °C  (97.2 °F) 36.1 °C (97 °F) 36.4 °C (97.5 °F)    TempSrc: Temporal Temporal Temporal    SpO2: 98% 96% 96%    Weight:       Height:           DESC; KARNOFSKY SCALE WITH ECOG EQUIVALENT: 100, Fully active, able to carry on all pre-disease performed without restriction (ECOG equivalent 0)    DISTRESS LEVEL: mild distress    Physical Exam  Constitutional:       General: She is not in acute distress.     Appearance: Normal appearance. She is not ill-appearing.      Comments: Tearful throughout exam   HENT:      Head: Normocephalic and atraumatic.      Nose: Nose normal.      Mouth/Throat:      Mouth: Mucous membranes are moist.      Pharynx: No oropharyngeal exudate or posterior oropharyngeal erythema.   Eyes:      General: No scleral icterus.     Conjunctiva/sclera: Conjunctivae normal.      Pupils: Pupils are equal, round, and reactive to light.   Cardiovascular:      Rate and Rhythm: Normal rate and regular rhythm.      Pulses: Normal pulses.      Heart sounds: Normal heart sounds. No murmur heard.     No friction rub. No gallop.   Pulmonary:      Effort: Pulmonary effort is normal. No respiratory distress.      Breath sounds: Normal breath sounds. No stridor. No wheezing, rhonchi or rales.   Chest:      Chest wall: No tenderness.   Abdominal:      General: Abdomen is flat. Bowel sounds are normal. There is no distension.      Palpations: Abdomen is soft. There is no mass.      Tenderness: There is no abdominal tenderness. There is no guarding.   Musculoskeletal:         General: No swelling, tenderness or deformity. Normal range of motion.      Cervical back: Normal range of motion and neck supple. No rigidity or tenderness.      Right lower leg: No edema.      Left lower leg: No edema.   Skin:     General: Skin is warm and dry.      Coloration: Skin is not jaundiced or pale.      Findings: No bruising, erythema or rash.   Neurological:      General: No focal deficit present.      Mental Status: She is alert and  oriented to person, place, and time. Mental status is at baseline.      Sensory: No sensory deficit.      Motor: No weakness.      Coordination: Coordination normal.      Gait: Gait normal.   Psychiatric:         Mood and Affect: Mood normal.         Behavior: Behavior normal.         Thought Content: Thought content normal.         Judgment: Judgment normal.         Labs:   Most recent labs reviewed.  Cr is high with GRF of 58    Imaging:   Most recent images below have been independently reviewed by me.     6/20/23: Brain MRI   1.  There is an approximately 8 mm sized enhancing extra-axial lesion noted along the roof of the right orbit. This lesion likely represent an incidental meningioma. However the possibility of dural based metastasis cannot be excluded. Follow-up study is   recommended.  2.  Mild chronic microvascular ischemic disease.    06/06/2023 PET SCAN  1.  Intense increased activity corresponding to LEFT upper lobe mass consistent with malignancy.  2.  Focal uptake in the LEFT parasternal region which may indicate pleural versus internal mammary lymph node metastasis.  3.  No other evidence for metastatic disease.  Prior LEFT mastectomy.    Pathology:  FINAL DIAGNOSIS:     A. Fine needle aspiration, left upper lung lobe mass:          Positive for malignancy, pulmonary adenocarcinoma, nonmucinous           as sampled   B. Lung, left upper lobe mass, core biopsy:          Pulmonary adenocarcinoma, nonmucinous as sampled   C. Lung, left upper lobe mass, biopsy:          Pulmonary adenocarcinoma, nonmucinous as sampled   D. Bronchoalveolar lavage, left upper lobe:          Rare malignant cell cluster on the Thin Prep slide     Assessment/Plan:      Cancer Staging   NSCLC of left lung (HCC)  Staging form: Lung, AJCC 8th Edition  - Clinical stage from 6/29/2023: Stage IIIA (cT1c, cN2, cM0) - Signed by Nancy Campbell M.D. on 6/29/2023       Ms. Andersen is a 68 yo F with a new diagnosis of  adenocarcinoma of the ANSHUL (PDL1 1%,  TP53, NF1) who is s/p C3 of NAC+IO.     She has had significant issues with her NAC.  She developed MARIVEL and hyperthyroidism.  Now she is having severe diarrhea.    I am concerned that her diarrhea is 2/2 to nivolumab.     We discussed that technically this is severe diarrhea given that she has had more than 6 bowel movements about baseline per day.  And this has resulted in hospitalization.  She has completed her immunotherapy and I would be concerned to continue immunotherapy given this toxicity.    Plan  -Continue with steroids - roughly 1mg/kg/day  -Treat until symptoms improved to a grade 1 and then taper over 4 to 6 weeks  -If there is no response in 1 to 2 days he can continue steroids and consider adding infliximab or vedolizumab  -In cases where infliximab or vedolizumab is used, an attempt to taper steroids in <2-4 weeks should be   made to minimize the complication of infection        No follow-ups on file.     Any questions and concerns raised by the patient were addressed and answered. Patient denies any further questions.  Patient encouraged to call the office with any concerns or issues.     Nancy Campbell M.D.  Hematology/Oncology

## 2023-09-14 NOTE — CARE PLAN
The patient is Stable - Low risk of patient condition declining or worsening    Shift Goals  Clinical Goals: Monitor bowel movements, hydration  Patient Goals: diarrhea relief, shower  Family Goals: GABRIELA    Progress made toward(s) clinical / shift goals:  Pt has reported being free from diarrhea overnight, and has reported only one episode of diarrhea so far this shift. Pt has been maintained on fluids as appropriate and as ordered. Pt has verbalized a subsequent decrease in anxiety following improvement in clinical condition.     Problem: Psychosocial  Goal: Patient's level of anxiety will decrease  Outcome: Progressing  Goal: Patient's ability to verbalize feelings about condition will improve  Outcome: Progressing     Problem: Bowel Elimination  Goal: Establish and maintain regular bowel function  Outcome: Progressing     Patient is not progressing towards the following goals:

## 2023-09-14 NOTE — PROGRESS NOTES
[facility] Medicine Daily Progress Note    Date of Service  9/14/2023    Chief Complaint  Latrice Andersen is a 68 y.o. female admitted 9/11/2023 with diarrhea    Hospital Course  68 y.o. female who presented 9/11/2023 with past medical history of breast cancer, lung cancer, hypertension, hyperlipidemia, is coming today complaining of diarrhea, patient finished immunotherapy for her lung cancer on Wednesday and after that she developed severe diarrhea that started with loose stool and then became watery diarrhea and lately she has been having bloody diarrhea, patient denies any ill contact, no fever no chills, no new type of food, denies any dizziness lightheadedness but she feels generalized weak, no focal weakness no vomiting but she has no nausea in she has decreased oral intake because she stated that anything that she ate produces diarrhea, patient has CT abdomen in the emergency room that did not show any acute findings, patient has severe metabolic acidosis due to diarrhea with bicarb of 12, patient at this time is going to be admitted, continue IV hydration, checking for C. difficile, started on p.o. bicarb, monitoring BMP, monitoring urine output, I have discussed case and plan of care with patient, ERP nurse staff request have been answered    9/5 TSH at 0.03 and free T4 3.65.  Patient following with endocrinology in the outpatient setting.    Interval Problem Update  Evaluated at bedside  BM better today  Kidney function better today  Metabolic acidosis resolved  Continue IV hydration  I discussed case with oncology who recommended initiating 50 mg of prednisone for immunotherapy induced colitis  Electrolytes  Continue Prednisone  Labs on a.m.    I have discussed this patient's plan of care and discharge plan at IDT rounds today with Case Management, Nursing, Nursing leadership, and other members of the IDT team.    Consultants/Specialty  None    Code Status  Full Code    Disposition  The patient is  not medically cleared for discharge to home or a post-acute facility.  Anticipate discharge to: home with close outpatient follow-up    I have placed the appropriate orders for post-discharge needs.    Review of Systems  Review of Systems   Constitutional:  Positive for malaise/fatigue.   HENT: Negative.     Eyes: Negative.    Respiratory: Negative.     Cardiovascular: Negative.    Gastrointestinal:  Positive for diarrhea.   Genitourinary: Negative.    Musculoskeletal: Negative.    Skin: Negative.    Neurological:  Positive for weakness.   Endo/Heme/Allergies: Negative.    Psychiatric/Behavioral: Negative.          Physical Exam  Temp:  [36.1 °C (97 °F)-36.7 °C (98 °F)] 36.7 °C (98 °F)  Pulse:  [75-90] 75  Resp:  [18-19] 18  BP: (114-133)/(49-60) 120/50  SpO2:  [96 %] 96 %    Physical Exam  Constitutional:       Appearance: Normal appearance.   HENT:      Head: Normocephalic and atraumatic.      Mouth/Throat:      Mouth: Mucous membranes are moist.   Eyes:      Extraocular Movements: Extraocular movements intact.      Pupils: Pupils are equal, round, and reactive to light.   Cardiovascular:      Rate and Rhythm: Normal rate and regular rhythm.      Pulses: Normal pulses.      Heart sounds: Normal heart sounds.   Pulmonary:      Effort: Pulmonary effort is normal.      Breath sounds: Normal breath sounds.   Abdominal:      General: Bowel sounds are normal.      Palpations: Abdomen is soft.   Musculoskeletal:         General: No swelling. Normal range of motion.      Cervical back: Normal range of motion and neck supple.   Skin:     General: Skin is warm.      Coloration: Skin is not jaundiced.   Neurological:      General: No focal deficit present.      Mental Status: She is alert and oriented to person, place, and time. Mental status is at baseline.      Cranial Nerves: No cranial nerve deficit.   Psychiatric:         Mood and Affect: Mood normal.         Behavior: Behavior normal.         Thought Content: Thought  content normal.         Judgment: Judgment normal.         Fluids  No intake or output data in the 24 hours ending 09/14/23 1630    Laboratory  Recent Labs     09/12/23  0153 09/13/23 0921 09/14/23  0107   WBC 2.5* 2.0* 1.4*   RBC 3.54* 2.89* 3.09*   HEMOGLOBIN 10.3* 8.4* 8.8*   HEMATOCRIT 30.7* 25.9* 28.5*   MCV 86.7 89.6 92.2   MCH 29.1 29.1 28.5   MCHC 33.6 32.4 30.9*   RDW 47.2 48.4 50.0   PLATELETCT 306 214 187   MPV 8.6* 8.8* 9.0     Recent Labs     09/12/23  0153 09/13/23  0921 09/14/23  0107   SODIUM 137 140 140   POTASSIUM 3.7 3.6 4.3   CHLORIDE 105 107 109   CO2 17* 25 22   GLUCOSE 99 83 179*   BUN 42* 18 19   CREATININE 1.35 0.73 0.78   CALCIUM 9.1 8.4* 8.7                     Imaging  US-THYROID   Final Result         1.  Left thyroid nodule less than 1 cm, Ti-Rads 3, no follow up recommended at this time due to size less than 1.5 cm.      CT-ABDOMEN-PELVIS W/O   Final Result      1.  No evidence of bowel obstruction or perforation.   2.  No CT evidence for colitis or diverticulitis.   3.  Normal appendix.   4.  No renal stone or hydronephrosis.   5.  Probable RIGHT kidney cyst.   6.  Hyperdense cyst versus mass at the anterior LEFT mid kidney measuring 11 mm.  Consider follow-up nonemergent renal protocol CT or MRI.      DX-CHEST-PORTABLE (1 VIEW)   Final Result      1.  Interval decrease in size of lung nodule in the left midlung.           Assessment/Plan  * Colitis- (present on admission)  Assessment & Plan  Immunotherapy induced colitis  Abdominal status post CT without signs of colitis but lactoferrin elevated  Patient on immunotherapy for lung cancer  Causing up to 14 bouts of diarrhea daily  I discussed with Oncology who recommended initiation of prednisone 50 mg daily for 7 days and then taper  Started prednisone   Monitor electrolytes while inpatient  Labs in a.m.    Diarrhea- (present on admission)  Assessment & Plan  Acute diarrhea that started right after her last infusion on  9/6  Multifactorial and likely secondary to chemotherapy and immunotherapy  Thyroid function tests concerning for hypothyroidism, likely contributing  C. difficile negative  IV hydration  Labs in a.m.    Renal mass- (present on admission)  Assessment & Plan  Will need MRI as outpatient    NSCLC of left lung (HCC)- (present on admission)  Assessment & Plan  Follows with oncology  Close monitoring    Hyponatremia- (present on admission)  Assessment & Plan  Resolved    Metabolic acidosis- (present on admission)  Assessment & Plan  Resolved    MARIVEL (acute kidney injury) (HCC)- (present on admission)  Assessment & Plan  Resolved    Stage 3a chronic kidney disease (HCC)- (present on admission)  Assessment & Plan  Secondary to acute diarrhea  Resolved    History of breast cancer- (present on admission)  Assessment & Plan  Follow with oncology as outpatient.     Essential hypertension- (present on admission)  Assessment & Plan  Metoprolol.     Anxiety disorder- (present on admission)  Assessment & Plan  effexor    Dyslipidemia- (present on admission)  Assessment & Plan  Continue statin.        Greater than 51 minutes spent prepping to see patient (e.g. review of tests) obtaining and/or reviewing separately obtained history. Performing a medically appropriate examination and/ evaluation.  Counseling and educating the patient/family/caregiver.  Ordering medications, tests, or procedures.  Referring and communicating with other health care professionals.  Documenting clinical information in EPIC.  Independently interpreting results and communicating results to patient/family/caregiver.  Care coordination'    VTE prophylaxis: Prophylactic Heparin    I have performed a physical exam and reviewed and updated ROS and Plan today (9/14/2023). In review of yesterday's note (9/13/2023), there are no changes except as documented above.

## 2023-09-14 NOTE — PROGRESS NOTES
Assumed care of pt  at 0700. Report received from Night RN. Pt is A&O x 4. Patient stated that their pain is 0/10. Pt's pain comfort goal is 0/10. Pt educated on how to use the call light, pt verbalized understanding. Bed in lowest locked position, call light within reach, hourly rounding in place. Labs reviewed, orders reviewed, communication board updated. Pt declines any further needs at this time.

## 2023-09-14 NOTE — CARE PLAN
The patient is Stable - Low risk of patient condition declining or worsening    Shift Goals  Clinical Goals: imroved diarrhea, iv hydration  Patient Goals: diarrhea relief  Family Goals: na    Progress made toward(s) clinical / shift goals:  pt had snacks before sleeping time and had zero bm episode overnight.remained pain free and comfortable, so signs of infection    Problem: Gastrointestinal Irritability  Goal: Diarrhea will be absent or improved  Outcome: Progressing     Patient is not progressing towards the following goals:

## 2023-09-14 NOTE — ASSESSMENT & PLAN NOTE
Immunotherapy induced colitis  Abdominal status post CT without signs of colitis but lactoferrin elevated  Patient on immunotherapy for lung cancer  Causing up to 14 bouts of diarrhea daily  I discussed with Oncology who recommended initiation of prednisone 50 mg daily for 7 days and then taper  Started prednisone   Monitor electrolytes while inpatient  Labs in a.m.

## 2023-09-15 VITALS
HEART RATE: 72 BPM | DIASTOLIC BLOOD PRESSURE: 64 MMHG | TEMPERATURE: 97 F | HEIGHT: 64 IN | SYSTOLIC BLOOD PRESSURE: 142 MMHG | BODY MASS INDEX: 21.15 KG/M2 | RESPIRATION RATE: 17 BRPM | WEIGHT: 123.9 LBS | OXYGEN SATURATION: 94 %

## 2023-09-15 LAB
ALBUMIN SERPL BCP-MCNC: 2.8 G/DL (ref 3.2–4.9)
ALBUMIN/GLOB SERPL: 1.3 G/DL
ALP SERPL-CCNC: 72 U/L (ref 30–99)
ALT SERPL-CCNC: 44 U/L (ref 2–50)
ANION GAP SERPL CALC-SCNC: 10 MMOL/L (ref 7–16)
AST SERPL-CCNC: 48 U/L (ref 12–45)
BASOPHILS # BLD AUTO: 0.4 % (ref 0–1.8)
BASOPHILS # BLD: 0.01 K/UL (ref 0–0.12)
BILIRUB SERPL-MCNC: <0.2 MG/DL (ref 0.1–1.5)
BUN SERPL-MCNC: 22 MG/DL (ref 8–22)
CALCIUM ALBUM COR SERPL-MCNC: 9 MG/DL (ref 8.5–10.5)
CALCIUM SERPL-MCNC: 8 MG/DL (ref 8.5–10.5)
CHLORIDE SERPL-SCNC: 109 MMOL/L (ref 96–112)
CO2 SERPL-SCNC: 24 MMOL/L (ref 20–33)
CREAT SERPL-MCNC: 0.92 MG/DL (ref 0.5–1.4)
EOSINOPHIL # BLD AUTO: 0.09 K/UL (ref 0–0.51)
EOSINOPHIL NFR BLD: 3.2 % (ref 0–6.9)
ERYTHROCYTE [DISTWIDTH] IN BLOOD BY AUTOMATED COUNT: 48.3 FL (ref 35.9–50)
GFR SERPLBLD CREATININE-BSD FMLA CKD-EPI: 68 ML/MIN/1.73 M 2
GLOBULIN SER CALC-MCNC: 2.2 G/DL (ref 1.9–3.5)
GLUCOSE SERPL-MCNC: 113 MG/DL (ref 65–99)
HCT VFR BLD AUTO: 23.8 % (ref 37–47)
HGB BLD-MCNC: 7.7 G/DL (ref 12–16)
IMM GRANULOCYTES # BLD AUTO: 0 K/UL (ref 0–0.11)
IMM GRANULOCYTES NFR BLD AUTO: 0 % (ref 0–0.9)
LYMPHOCYTES # BLD AUTO: 1.6 K/UL (ref 1–4.8)
LYMPHOCYTES NFR BLD: 57.1 % (ref 22–41)
MAGNESIUM SERPL-MCNC: 1.6 MG/DL (ref 1.5–2.5)
MCH RBC QN AUTO: 28.9 PG (ref 27–33)
MCHC RBC AUTO-ENTMCNC: 32.4 G/DL (ref 32.2–35.5)
MCV RBC AUTO: 89.5 FL (ref 81.4–97.8)
MONOCYTES # BLD AUTO: 0.41 K/UL (ref 0–0.85)
MONOCYTES NFR BLD AUTO: 14.6 % (ref 0–13.4)
NEUTROPHILS # BLD AUTO: 0.69 K/UL (ref 1.82–7.42)
NEUTROPHILS NFR BLD: 24.7 % (ref 44–72)
NRBC # BLD AUTO: 0 K/UL
NRBC BLD-RTO: 0 /100 WBC (ref 0–0.2)
PHOSPHATE SERPL-MCNC: 2.1 MG/DL (ref 2.5–4.5)
PLATELET # BLD AUTO: 146 K/UL (ref 164–446)
PMV BLD AUTO: 9.1 FL (ref 9–12.9)
POTASSIUM SERPL-SCNC: 2.8 MMOL/L (ref 3.6–5.5)
POTASSIUM SERPL-SCNC: 4.1 MMOL/L (ref 3.6–5.5)
PROT SERPL-MCNC: 5 G/DL (ref 6–8.2)
RBC # BLD AUTO: 2.66 M/UL (ref 4.2–5.4)
SODIUM SERPL-SCNC: 143 MMOL/L (ref 135–145)
TSH RECEP AB SER-ACNC: 1.19 IU/L
TSI SER-ACNC: 0.16 IU/L
WBC # BLD AUTO: 2.8 K/UL (ref 4.8–10.8)

## 2023-09-15 PROCEDURE — 700102 HCHG RX REV CODE 250 W/ 637 OVERRIDE(OP): Performed by: HOSPITALIST

## 2023-09-15 PROCEDURE — A9270 NON-COVERED ITEM OR SERVICE: HCPCS | Performed by: STUDENT IN AN ORGANIZED HEALTH CARE EDUCATION/TRAINING PROGRAM

## 2023-09-15 PROCEDURE — 700102 HCHG RX REV CODE 250 W/ 637 OVERRIDE(OP): Performed by: STUDENT IN AN ORGANIZED HEALTH CARE EDUCATION/TRAINING PROGRAM

## 2023-09-15 PROCEDURE — A9270 NON-COVERED ITEM OR SERVICE: HCPCS | Mod: JZ

## 2023-09-15 PROCEDURE — 80053 COMPREHEN METABOLIC PANEL: CPT

## 2023-09-15 PROCEDURE — A9270 NON-COVERED ITEM OR SERVICE: HCPCS | Performed by: HOSPITALIST

## 2023-09-15 PROCEDURE — 85025 COMPLETE CBC W/AUTO DIFF WBC: CPT

## 2023-09-15 PROCEDURE — 700111 HCHG RX REV CODE 636 W/ 250 OVERRIDE (IP): Mod: JZ

## 2023-09-15 PROCEDURE — 99239 HOSP IP/OBS DSCHRG MGMT >30: CPT | Performed by: STUDENT IN AN ORGANIZED HEALTH CARE EDUCATION/TRAINING PROGRAM

## 2023-09-15 PROCEDURE — 700102 HCHG RX REV CODE 250 W/ 637 OVERRIDE(OP): Mod: JZ

## 2023-09-15 PROCEDURE — 83735 ASSAY OF MAGNESIUM: CPT

## 2023-09-15 PROCEDURE — 700111 HCHG RX REV CODE 636 W/ 250 OVERRIDE (IP): Performed by: STUDENT IN AN ORGANIZED HEALTH CARE EDUCATION/TRAINING PROGRAM

## 2023-09-15 PROCEDURE — 36415 COLL VENOUS BLD VENIPUNCTURE: CPT

## 2023-09-15 PROCEDURE — 84132 ASSAY OF SERUM POTASSIUM: CPT

## 2023-09-15 PROCEDURE — 700105 HCHG RX REV CODE 258: Performed by: STUDENT IN AN ORGANIZED HEALTH CARE EDUCATION/TRAINING PROGRAM

## 2023-09-15 PROCEDURE — 84100 ASSAY OF PHOSPHORUS: CPT

## 2023-09-15 RX ORDER — PREDNISONE 50 MG/1
50 TABLET ORAL DAILY
Qty: 4 TABLET | Refills: 0 | Status: SHIPPED | OUTPATIENT
Start: 2023-09-15 | End: 2023-09-15

## 2023-09-15 RX ORDER — POTASSIUM CHLORIDE 20 MEQ/1
40 TABLET, EXTENDED RELEASE ORAL EVERY 4 HOURS
Status: COMPLETED | OUTPATIENT
Start: 2023-09-15 | End: 2023-09-15

## 2023-09-15 RX ORDER — PREDNISONE 10 MG/1
TABLET ORAL
Qty: 105 TABLET | Refills: 0 | Status: SHIPPED | OUTPATIENT
Start: 2023-09-15 | End: 2023-10-20

## 2023-09-15 RX ORDER — MAGNESIUM SULFATE HEPTAHYDRATE 40 MG/ML
2 INJECTION, SOLUTION INTRAVENOUS ONCE
Status: COMPLETED | OUTPATIENT
Start: 2023-09-15 | End: 2023-09-15

## 2023-09-15 RX ADMIN — PREDNISONE 50 MG: 20 TABLET ORAL at 05:25

## 2023-09-15 RX ADMIN — METOPROLOL SUCCINATE 12.5 MG: 25 TABLET, EXTENDED RELEASE ORAL at 05:24

## 2023-09-15 RX ADMIN — POTASSIUM CHLORIDE 40 MEQ: 1500 TABLET, EXTENDED RELEASE ORAL at 09:58

## 2023-09-15 RX ADMIN — POTASSIUM CHLORIDE 40 MEQ: 1500 TABLET, EXTENDED RELEASE ORAL at 05:18

## 2023-09-15 RX ADMIN — MAGNESIUM SULFATE HEPTAHYDRATE 2 G: 2 INJECTION, SOLUTION INTRAVENOUS at 05:23

## 2023-09-15 RX ADMIN — VENLAFAXINE HYDROCHLORIDE 75 MG: 75 TABLET ORAL at 05:25

## 2023-09-15 RX ADMIN — DIBASIC SODIUM PHOSPHATE, MONOBASIC POTASSIUM PHOSPHATE AND MONOBASIC SODIUM PHOSPHATE 500 MG: 852; 155; 130 TABLET ORAL at 08:05

## 2023-09-15 RX ADMIN — SODIUM CHLORIDE, POTASSIUM CHLORIDE, SODIUM LACTATE AND CALCIUM CHLORIDE: 600; 310; 30; 20 INJECTION, SOLUTION INTRAVENOUS at 01:48

## 2023-09-15 RX ADMIN — FOLIC ACID 1 MG: 1 TABLET ORAL at 05:25

## 2023-09-15 ASSESSMENT — PAIN DESCRIPTION - PAIN TYPE: TYPE: ACUTE PAIN

## 2023-09-15 NOTE — CARE PLAN
The patient is Stable - Low risk of patient condition declining or worsening    Shift Goals  Clinical Goals: Monitor bowel movements  Patient Goals: Comfort and rest  Family Goals: GABRIELA    Progress made toward(s) clinical / shift goals: Pt stated she has had many bowel movements today but not problems with them, pt stated one episode of diarrhea during this shift. Pt has been on IV fluids to assist with hydration. Pt has stated a pain rating of 0/10 throughout shift and has declined interventions.     Problem: Psychosocial  Goal: Patient's level of anxiety will decrease  Outcome: Progressing  Goal: Patient's ability to verbalize feelings about condition will improve  Outcome: Progressing     Problem: Communication  Goal: The ability to communicate needs accurately and effectively will improve  Outcome: Progressing     Problem: Hemodynamics  Goal: Patient's hemodynamics, fluid balance and neurologic status will be stable or improve  Outcome: Progressing     Problem: Fluid Volume  Goal: Fluid volume balance will be maintained  Outcome: Progressing     Problem: Nutrition  Goal: Patient's nutritional and fluid intake will be adequate or improve  Outcome: Progressing     Problem: Bowel Elimination  Goal: Establish and maintain regular bowel function  Outcome: Progressing     Problem: Gastrointestinal Irritability  Goal: Diarrhea will be absent or improved  Outcome: Progressing     Problem: Mobility  Goal: Patient's capacity to carry out activities will improve  Outcome: Progressing     Problem: Self Care  Goal: Patient will have the ability to perform ADLs independently or with assistance (bathe, groom, dress, toilet and feed)  Outcome: Progressing     Problem: Fall Risk  Goal: Patient will remain free from falls  Outcome: Progressing       Patient is not progressing towards the following goals:

## 2023-09-15 NOTE — PROGRESS NOTES
NOC HOSPITALIST CROSS COVER    Notified by RN of potassium level of 2.8.  The patient's magnesium level was 1.6      #Hypokalemia  -Replete with K-dur 40 mEq PO every 4 hours x 2 doses  -Mag sulfate 2 g IV    ________________________________________________________________________________________  Electronically signed by:  TRISH Hugo, AGACNP-BC

## 2023-09-15 NOTE — DISCHARGE PLANNING
SCP TCN chart review completed. Collaborated with REGINA Cruz.  Current discharge considerations are for home with close outpatient f/u when medically cleared.  Per collaboration with REGINA, plan for possible discharge home tomorrow. TCN will continue to follow and collaborate with discharge planning team as additional post acute needs arise. Thank you.     Completed:  Choice obtained: None.  Patient states she is at her baseline level of function.   SCP with [facility] P[facility].  Patient states she has a f/u appointment with Dr. Carias on 9/14/23.

## 2023-09-15 NOTE — PROGRESS NOTES
Assumed care of patient at 1900. Received Report from day nurse. Patient A&Ox4, on RA, Reporting a pain level of 0/10. Call light within reach, belongings within reach, Fall precautions in place, bed in lowest position. Patient does not have any other needs at this time.     POC was discussed with patient. All questions were answered. Patient verbalized understanding.

## 2023-09-15 NOTE — DISCHARGE PLANNING
Case Management Discharge Planning    Admission Date: 9/11/2023  GMLOS: 3  ALOS: 2    6-Clicks ADL Score: 24  6-Clicks Mobility Score: 24      Anticipated Discharge Dispo: Discharge Disposition: Discharged to home/self care (01)    DME Needed: No    Action(s) Taken: Updated Provider/Nurse on Discharge Plan  Pt is discharging to home today. IMM given and signed. No CM needs identified.     Escalations Completed: None    Medically Clear: Yes    Next Steps: RN CM to continue to assist as needs arise.     Barriers to Discharge: Lab results

## 2023-09-15 NOTE — DISCHARGE SUMMARY
Discharge Summary    CHIEF COMPLAINT ON ADMISSION  Chief Complaint   Patient presents with    Diarrhea     Patient reports diarrhea x 4 days with bright red blood noted. Patient also endorses vomiting as well.    Bloody Stools     X 4 days. Bright red blood       Reason for Admission  Sent by      Admission Date  9/11/2023    CODE STATUS  Full Code    HPI & HOSPITAL COURSE  68 y.o. female with past medical history of Breast Cancer in 1995 (s/p RT and Chemo) with a new diagnosis of NSCLC Adenocarcinoma on antineoplastic therapy with nivolumab, pemetrexed and carboplatin was admitted on 9/11/2023 for acute renal failure secondary to acute diarrhea.  Patient having around 14 bouts of diarrhea.  She was started on aggressive IV hydration and  electrolytes were repleted.  Abdominal/pelvic CT normal.  Case was discussed with oncology and it was determined that her diarrhea is consistent with immunotherapy induced colitis.  Patient was started on prednisone and had marked improvement with diarrhea.  IV hydration was discontinued and patient maintain adequate blood pressure as well as electrolyte balance.  Stable patient with in chronic condition was discharged home on a 7-day prednisone regimen and was instructed to follow-up with her oncologist for prednisone taper.  All results and plan of action were discussed with the patient for she voiced understanding and agreement with the primary care team.  Patient was instructed to return to emergency department symptoms were to worsen.    Patient comes in and stable condition to home with close outpatient follow-up.    The patient met 2-midnight criteria for an inpatient stay at the time of discharge.    Discharge Date  9/15/2023    FOLLOW UP ITEMS POST DISCHARGE  Primary care provider for posthospital discharge care  Oncology to follow completion of steroid regimen    DISCHARGE DIAGNOSES  Principal Problem:    Colitis (POA: Yes)  Active Problems:    Diarrhea (POA: Yes)     NSCLC of left lung (HCC) (POA: Yes)    Renal mass (POA: Yes)    Dyslipidemia (POA: Yes)    Anxiety disorder (POA: Yes)    Essential hypertension (POA: Yes)    History of breast cancer (POA: Yes)    Stage 3a chronic kidney disease (HCC) (POA: Yes)    MARIVEL (acute kidney injury) (HCC) (POA: Yes)    Metabolic acidosis (POA: Yes)    Hyponatremia (POA: Yes)  Resolved Problems:    * No resolved hospital problems. *      FOLLOW UP  Future Appointments   Date Time Provider Department Center   9/18/2023  8:40 AM [address]   9/19/2023  9:00 AM ONC RN 1 [facility] None   9/19/2023 10:00 AM [address]   9/28/2023  8:30 AM Nancy Campbell M.D. [facility] None   10/4/2023  8:30 AM [facility] [place]   11/1/2023  [facility]     No follow-up provider specified.    MEDICATIONS ON DISCHARGE     Medication List        START taking these medications        Instructions   predniSONE 50 MG Tabs  Commonly known as: Deltasone   Take 1 Tablet by mouth every day for 4 days.  Dose: 50 mg            CONTINUE taking these medications        Instructions   fenofibrate micronized 134 MG capsule  Commonly known as: Lofibra   TAKE 1 CAPSULE BY MOUTH EVERY DAY  Dose: 134 mg     folic acid 1 MG Tabs  Commonly known as: Folvite   Take 1 Tablet by mouth every day.  Dose: 1 mg     lisinopril 10 MG Tabs  Commonly known as: Prinivil   Take 1 Tablet by mouth every day.  Dose: 10 mg     metoprolol SR 25 MG Tb24  Commonly known as: Toprol XL   Take 12.5 mg by mouth every day.  Dose: 12.5 mg     pravastatin 40 MG tablet  Commonly known as: Pravachol   Take 1 Tablet by mouth every day.  Dose: 40 mg     venlafaxine 75 MG Tabs  Commonly known as: Effexor   Take 1 Tablet by mouth every day.  Dose: 75 mg              Allergies  Allergies   Allergen Reactions    Codeine Unspecified     Memory issues       DIET  Orders Placed This Encounter   Procedures    Diet Order Diet:  Low Fiber(GI Soft); Miscellaneous modifications: (optional): Lactose Free, No Decaf, No Caffeine(for test)     Standing Status:   Standing     Number of Occurrences:   1     Order Specific Question:   Diet:     Answer:   Low Fiber(GI Soft) [2]     Order Specific Question:   Miscellaneous modifications: (optional)     Answer:   Lactose Free [5]     Order Specific Question:   Miscellaneous modifications: (optional)     Answer:   No Decaf, No Caffeine(for test) [11]       ACTIVITY  As tolerated.  Weight bearing as tolerated    CONSULTATIONS  Oncology    PROCEDURES  None    LABORATORY  Lab Results   Component Value Date    SODIUM 143 09/15/2023    POTASSIUM 4.1 09/15/2023    CHLORIDE 109 09/15/2023    CO2 24 09/15/2023    GLUCOSE 113 (H) 09/15/2023    BUN 22 09/15/2023    CREATININE 0.92 09/15/2023    CREATININE 0.7 08/21/2007        Lab Results   Component Value Date    WBC 2.8 (L) 09/15/2023    HEMOGLOBIN 7.7 (L) 09/15/2023    HEMATOCRIT 23.8 (L) 09/15/2023    PLATELETCT 146 (L) 09/15/2023        Total time of the discharge process exceeds 34 minutes.

## 2023-09-15 NOTE — PROGRESS NOTES
Pt refused bed alarm. Pt is independent and up self. Pt has a steady gait and does not require any assistive devices. Pt has no recent falls. Pt educated to call appropriately if she needs assistance.

## 2023-09-18 ENCOUNTER — APPOINTMENT (OUTPATIENT)
Dept: RADIOLOGY | Facility: MEDICAL CENTER | Age: 68
End: 2023-09-18
Attending: NURSE PRACTITIONER
Payer: MEDICARE

## 2023-09-18 ENCOUNTER — PATIENT OUTREACH (OUTPATIENT)
Dept: MEDICAL GROUP | Facility: PHYSICIAN GROUP | Age: 68
End: 2023-09-18
Payer: MEDICARE

## 2023-09-18 NOTE — PROGRESS NOTES
Transitional Care Management    This patient qualifies for a TCM visit, as they are being seen within the required time for CMS of 2 business days from discharge, a phone call by an RN is not required.  You can therefore see them as a TCM visit and charge appropriately.

## 2023-09-19 ENCOUNTER — TELEPHONE (OUTPATIENT)
Dept: ENDOCRINOLOGY | Facility: MEDICAL CENTER | Age: 68
End: 2023-09-19

## 2023-09-19 ENCOUNTER — HOSPITAL ENCOUNTER (OUTPATIENT)
Dept: HEMATOLOGY ONCOLOGY | Facility: MEDICAL CENTER | Age: 68
End: 2023-09-19
Attending: STUDENT IN AN ORGANIZED HEALTH CARE EDUCATION/TRAINING PROGRAM
Payer: MEDICARE

## 2023-09-19 ENCOUNTER — OFFICE VISIT (OUTPATIENT)
Dept: MEDICAL GROUP | Facility: PHYSICIAN GROUP | Age: 68
End: 2023-09-19
Payer: MEDICARE

## 2023-09-19 VITALS
TEMPERATURE: 98.9 F | BODY MASS INDEX: 20.66 KG/M2 | DIASTOLIC BLOOD PRESSURE: 60 MMHG | HEIGHT: 64 IN | HEART RATE: 86 BPM | SYSTOLIC BLOOD PRESSURE: 124 MMHG | WEIGHT: 121 LBS | OXYGEN SATURATION: 97 %

## 2023-09-19 VITALS
HEART RATE: 87 BPM | TEMPERATURE: 97.9 F | OXYGEN SATURATION: 97 % | RESPIRATION RATE: 18 BRPM | SYSTOLIC BLOOD PRESSURE: 126 MMHG | DIASTOLIC BLOOD PRESSURE: 55 MMHG

## 2023-09-19 VITALS
TEMPERATURE: 97.9 F | OXYGEN SATURATION: 97 % | WEIGHT: 120.59 LBS | HEART RATE: 92 BPM | RESPIRATION RATE: 17 BRPM | BODY MASS INDEX: 20.59 KG/M2 | HEIGHT: 64 IN | DIASTOLIC BLOOD PRESSURE: 52 MMHG | SYSTOLIC BLOOD PRESSURE: 128 MMHG

## 2023-09-19 DIAGNOSIS — Z79.899 ENCOUNTER FOR LONG-TERM (CURRENT) USE OF HIGH-RISK MEDICATION: ICD-10-CM

## 2023-09-19 DIAGNOSIS — N17.9 AKI (ACUTE KIDNEY INJURY) (HCC): ICD-10-CM

## 2023-09-19 DIAGNOSIS — R19.7 DIARRHEA DUE TO MALABSORPTION: ICD-10-CM

## 2023-09-19 DIAGNOSIS — C34.92 NSCLC OF LEFT LUNG (HCC): ICD-10-CM

## 2023-09-19 DIAGNOSIS — K52.9 COLITIS: ICD-10-CM

## 2023-09-19 DIAGNOSIS — K90.9 DIARRHEA DUE TO MALABSORPTION: ICD-10-CM

## 2023-09-19 PROCEDURE — 99213 OFFICE O/P EST LOW 20 MIN: CPT | Performed by: STUDENT IN AN ORGANIZED HEALTH CARE EDUCATION/TRAINING PROGRAM

## 2023-09-19 PROCEDURE — 700105 HCHG RX REV CODE 258: Performed by: NURSE PRACTITIONER

## 2023-09-19 PROCEDURE — 99212 OFFICE O/P EST SF 10 MIN: CPT | Performed by: STUDENT IN AN ORGANIZED HEALTH CARE EDUCATION/TRAINING PROGRAM

## 2023-09-19 PROCEDURE — 3078F DIAST BP <80 MM HG: CPT | Performed by: STUDENT IN AN ORGANIZED HEALTH CARE EDUCATION/TRAINING PROGRAM

## 2023-09-19 PROCEDURE — 99214 OFFICE O/P EST MOD 30 MIN: CPT | Performed by: STUDENT IN AN ORGANIZED HEALTH CARE EDUCATION/TRAINING PROGRAM

## 2023-09-19 PROCEDURE — 96360 HYDRATION IV INFUSION INIT: CPT

## 2023-09-19 PROCEDURE — 3074F SYST BP LT 130 MM HG: CPT | Performed by: STUDENT IN AN ORGANIZED HEALTH CARE EDUCATION/TRAINING PROGRAM

## 2023-09-19 RX ORDER — HYDROCORTISONE ACETATE 25 MG/1
SUPPOSITORY RECTAL
COMMUNITY
Start: 2023-08-31 | End: 2023-12-13

## 2023-09-19 RX ORDER — SODIUM CHLORIDE 9 MG/ML
1000 INJECTION, SOLUTION INTRAVENOUS ONCE
Status: COMPLETED | OUTPATIENT
Start: 2023-09-19 | End: 2023-09-19

## 2023-09-19 RX ADMIN — SODIUM CHLORIDE 1000 ML: 9 INJECTION, SOLUTION INTRAVENOUS at 14:31

## 2023-09-19 ASSESSMENT — ENCOUNTER SYMPTOMS
BRUISES/BLEEDS EASILY: 0
SHORTNESS OF BREATH: 0
WEIGHT LOSS: 1
SPUTUM PRODUCTION: 0
WHEEZING: 0
ABDOMINAL PAIN: 0
DIZZINESS: 0
DEPRESSION: 0
NAUSEA: 0
HEARTBURN: 0
TINGLING: 0
ORTHOPNEA: 0
VOMITING: 0
BLURRED VISION: 0
SORE THROAT: 0
SENSORY CHANGE: 0
FEVER: 0
TREMORS: 0
MEMORY LOSS: 1
COUGH: 0
NECK PAIN: 0
HEADACHES: 0
CHILLS: 0
PALPITATIONS: 0
FOCAL WEAKNESS: 0

## 2023-09-19 ASSESSMENT — FIBROSIS 4 INDEX
FIB4 SCORE: 3.37
FIB4 SCORE: 3.37

## 2023-09-19 ASSESSMENT — PAIN SCALES - GENERAL: PAINLEVEL: NO PAIN

## 2023-09-19 NOTE — PROGRESS NOTES
Patient presents to Medical Oncology for hydration.    PIV established PIV in left A/c 24g with brisk blood return.  Pt tolerated well. 1L NS infused over approximately 1 hour with no adverse reactions.  PIV removed with tip intact.  Gauze and tape applied as dressing. Patient released from clinic in no apparent distress.

## 2023-09-19 NOTE — ADDENDUM NOTE
Encounter addended by: Karthik Beckford on: 9/19/2023 1:56 PM   Actions taken: Charge Capture section accepted

## 2023-09-19 NOTE — TELEPHONE ENCOUNTER
Called patient to get scheduled with Dr. Stockton. Patient stated she will call back to get scheduled after seeing her oncology provider today with more information as to when we can get her scheduled.

## 2023-09-19 NOTE — PROGRESS NOTES
Consult Note: Hematology/Oncology      Primary Care:  Kermit Bergman M.D.          Chief Complaint   Patient presents with    Diarrhea       Patient reports diarrhea x 4 days with bright red blood noted. Patient also endorses vomiting as well.    Bloody Stools       X 4 days. Bright red blood         Current Treatment:      07/19/23: C1D1 neoadjuvant Carboplatin/Pemetrexed/Nivolumab  08/09/23: C2D1 neoadjuvant Carboplatin/Pemetrexed/Nivolumab  08/30/23: C3D1 neoadjuvant Carboplatin/Pemetrexed/Nivolumab delayed x1 week d/t creatinine clearance  09/05/23: C3D1 neoadjuvant Carboplatin/Pemetrexed/Nivolumab     Prior Treatment: None     Subjective:   History of Presenting Illness:  Latrice Andersen is a 67 y.o. female with a PMHx of Breast Cancer in 1995 (s/p RT and Chemo) with a new diagnosis of NSCLC Adenocarcinoma of the left lung.      Patient reports that last May 2022, she went to [facility] and was feeling very sick, weak and had dysuria.  She came home and went to the ER, and was found to have a UTI.  She was found to be anemic, which prompted her to get a Colonoscopy/EGD.  She was told to get a CXR, but chose not to get it done at that time. She waited to Feb 2023,  which showed 21 mm density in or overlying the left, not visualized on the previous exam. This prompted her to get a CT CAP 3/2023, 2.7 x 2.5 cm subpleural lingular mass as well as  4 mm left lower lobe pulmonary nodule and 3 mm right lower lobe pulmonary nodule.     She had a PET scan 6/6/2023, which showed hypermetabolic mass in the left upper lobe mass consistent with malignancy, as well as an increase uptake in the L parasternal region vs internal mammary LN. No evidence of metastatic disease elsewhere.      6/12/23, biopsy of the ANSHUL mass was preformed which showed malignancy, pulmonary adenocarcinoma.     MRI brain on 6/20/23, shows an incidental meningioma, but a dural met cannot be excluded.       Sylvie is taking care of her 3 yo  granddaughter - Bill (her daughter in laws mother has her other grandbaby, Bassam).  She did office work prior.  She has 2 boys (Ace, Mary). She lives in [place] with Wander her , she has fair support.       She says she feels fine, washes the care, mows the lawn, does all of her housework.       She smoked 5 cig/day, and started when she was 17.  She has not smoked since last Thursday.  No alcohol. No IVDU.       Her grandfather, uncle, and cousin  of lung cancer.      She did well on 3 cycles of NAC and IO, however after C3 she developed significant diarrhea and was admitted to the hospital.  She was started on steroids and diarrhea improved. She was d/c on a taper.    Additionally her TSH was low and an appt was made with endocrine.      Interval History    She is post Day 14 after chemo+IO.  Patient completed chemo and immunotherapy on .     She did see [facility] on 23, which showed rectal bleeding with bleeding internal hemorrhoids. Plan was to conduct a flexible sigmoidoscopy.  She is scheduled to see them again.    She was started on steroids 9/15/2023 and is currently on a steroid taper.     She is having hyperthyroidism presumed 2/2 to IO.  She was not put on medication 2/2 to endocrines request.  She was unable to make her appt with endo 2/2 to hospitalization.  Thyroid labs and uptake scan were not preformed 2/2 to hospitalization.    She has a f/u with Dr. Ganser next week       Past Medical History:   Diagnosis Date    Allergy, unspecified not elsewhere classified     Breast cancer (HCC)     Chickenpox     Dental disorder     upper partial    Former smoker 2023    High cholesterol     History of breast cancer 2017    History of radiation therapy 2023    To left chest for breast cancer     Hyperlipidemia     Hypertension     Left upper lobe pulmonary nodule 2023    Tonsillitis         Past Surgical History:   Procedure Laterality Date     NC BRONCHOSCOPY,DIAGNOSTIC N/A 2023    Procedure: FIBER OPTIC BRONCHOSCOPY WITH  WASH, BRUSH, BRONCHOALVEOLAR LAVAGE, BIOPSY AND FINE NEEDLE ASPIRATION, ENDOBRONCHIAL ULTRASOUND & NAVIGATION, ROBOTICS;  Surgeon: Brooke Perrin M.D.;  Location: [facility];  Service: Pulmonary Robotic    ENDOBRONCHIAL US ADD-ON N/A 2023    Procedure: ENDOBRONCHIAL ULTRASOUND (EBUS);  Surgeon: Brooke Perrin M.D.;  Location: [facility];  Service: Pulmonary Robotic    MASTECTOMY      Left    NC CHEMOTHERAPY, UNSPECIFIED PROCEDURE      NC RADIATION THERAPY PLAN SIMPLE         Social History     Tobacco Use    Smoking status: Every Day     Current packs/day: 0.00     Average packs/day: 0.3 packs/day for 45.0 years (11.3 ttl pk-yrs)     Types: Cigarettes     Start date: 1977     Last attempt to quit: 2022     Years since quittin.2     Passive exposure: Current (Spouse)    Smokeless tobacco: Former     Quit date: 2022    Tobacco comments:     5 daily    Vaping Use    Vaping Use: Never used   Substance Use Topics    Alcohol use: No    Drug use: No        Family History   Problem Relation Age of Onset    Cancer Mother         breast cancer/Breast    Breast Cancer Mother     Hypertension Maternal Uncle     Cancer Maternal Uncle     Hypertension Maternal Grandmother     Hyperlipidemia Maternal Grandmother     Heart Disease Maternal Grandmother     Cancer Paternal Grandfather         Lung ca, smoker    Lung Cancer Maternal Grandfather     Alzheimer's Disease Maternal Aunt     Kidney Disease Maternal Aunt     Diabetes Neg Hx        Allergies   Allergen Reactions    Codeine Unspecified     Memory issues       Current Outpatient Medications   Medication Sig Dispense Refill    hydrocortisone (ANUSOL-HC) 25 MG Suppos UNWRAP AND INSERT 1 SUPPOSITORY RECTALLY AND RETAIN ONCE DAILY AT BEDTIME FOR 10 DAYS THEN ONCE DAILY AS NEEDED      VITAMIN D PO 1 capsule every day by oral route.       predniSONE (DELTASONE) 10 MG Tab Take 5 Tablets by mouth every day for 7 days, THEN 4 Tablets every day for 7 days, THEN 3 Tablets every day for 7 days, THEN 2 Tablets every day for 7 days, THEN 1 Tablet every day for 7 days. 105 Tablet 0    metoprolol SR (TOPROL XL) 25 MG TABLET SR 24 HR Take 12.5 mg by mouth every day.      venlafaxine (EFFEXOR) 75 MG Tab Take 1 Tablet by mouth every day. 90 Tablet 0    folic acid (FOLVITE) 1 MG Tab Take 1 Tablet by mouth every day. 30 Tablet 3    pravastatin (PRAVACHOL) 40 MG tablet Take 1 Tablet by mouth every day. 100 Tablet 3    lisinopril (PRINIVIL) 10 MG Tab Take 1 Tablet by mouth every day. 100 Tablet 3    fenofibrate micronized (LOFIBRA) 134 MG capsule TAKE 1 CAPSULE BY MOUTH EVERY DAY 90 Capsule 3     No current facility-administered medications for this encounter.       Review of Systems   Constitutional:  Positive for weight loss. Negative for chills, fever and malaise/fatigue.   HENT:  Negative for congestion, ear pain, nosebleeds and sore throat.         Hoarseness    Eyes:  Negative for blurred vision.   Respiratory:  Negative for cough, sputum production, shortness of breath and wheezing.    Cardiovascular:  Negative for chest pain, palpitations, orthopnea and leg swelling.   Gastrointestinal:  Negative for abdominal pain, heartburn, nausea and vomiting.   Genitourinary:  Negative for dysuria, frequency and urgency.        Nocturia   Musculoskeletal:  Negative for neck pain.   Neurological:  Negative for dizziness, tingling, tremors, sensory change, focal weakness and headaches.   Endo/Heme/Allergies:  Does not bruise/bleed easily.   Psychiatric/Behavioral:  Positive for memory loss. Negative for depression and suicidal ideas.         Anxiety   All other systems reviewed and are negative.      Problem list, medications, and allergies reviewed by myself today in Epic.     Objective:     Vitals:    09/19/23 1327   BP: 128/52   BP Location: Right arm   Patient  "Position: Sitting   BP Cuff Size: Small adult   Pulse: 92   Resp: 17   Temp: 36.6 °C (97.9 °F)   TempSrc: Temporal   SpO2: 97%   Weight: 54.7 kg (120 lb 9.5 oz)   Height: 1.623 m (5' 3.9\")       DESC; KARNOFSKY SCALE WITH ECOG EQUIVALENT: 100, Fully active, able to carry on all pre-disease performed without restriction (ECOG equivalent 0)    DISTRESS LEVEL: mild distress    Physical Exam  Constitutional:       General: She is not in acute distress.     Appearance: Normal appearance. She is not ill-appearing.      Comments: dehyrated   HENT:      Head: Normocephalic and atraumatic.      Nose: Nose normal.      Mouth/Throat:      Mouth: Mucous membranes are moist.      Pharynx: No oropharyngeal exudate or posterior oropharyngeal erythema.   Eyes:      General: No scleral icterus.     Conjunctiva/sclera: Conjunctivae normal.      Pupils: Pupils are equal, round, and reactive to light.   Cardiovascular:      Rate and Rhythm: Normal rate and regular rhythm.      Pulses: Normal pulses.      Heart sounds: Normal heart sounds. No murmur heard.     No friction rub. No gallop.   Pulmonary:      Effort: Pulmonary effort is normal. No respiratory distress.      Breath sounds: Normal breath sounds. No stridor. No wheezing, rhonchi or rales.   Chest:      Chest wall: No tenderness.   Abdominal:      General: Abdomen is flat. Bowel sounds are normal. There is no distension.      Palpations: Abdomen is soft. There is no mass.      Tenderness: There is no abdominal tenderness. There is no guarding.   Musculoskeletal:         General: No swelling, tenderness or deformity. Normal range of motion.      Cervical back: Normal range of motion and neck supple. No rigidity or tenderness.      Right lower leg: No edema.      Left lower leg: No edema.   Skin:     General: Skin is warm and dry.      Coloration: Skin is not jaundiced or pale.      Findings: No bruising, erythema or rash.   Neurological:      General: No focal deficit present. "      Mental Status: She is alert and oriented to person, place, and time. Mental status is at baseline.      Sensory: No sensory deficit.      Motor: No weakness.      Coordination: Coordination normal.      Gait: Gait normal.   Psychiatric:         Mood and Affect: Mood normal.         Behavior: Behavior normal.         Thought Content: Thought content normal.         Judgment: Judgment normal.         Labs:   Most recent labs reviewed.  Cr is high with GRF of 58    Imaging:   Most recent images below have been independently reviewed by me.     6/20/23: Brain MRI   1.  There is an approximately 8 mm sized enhancing extra-axial lesion noted along the roof of the right orbit. This lesion likely represent an incidental meningioma. However the possibility of dural based metastasis cannot be excluded. Follow-up study is   recommended.  2.  Mild chronic microvascular ischemic disease.    06/06/2023 PET SCAN  1.  Intense increased activity corresponding to LEFT upper lobe mass consistent with malignancy.  2.  Focal uptake in the LEFT parasternal region which may indicate pleural versus internal mammary lymph node metastasis.  3.  No other evidence for metastatic disease.  Prior LEFT mastectomy.    Pathology:  FINAL DIAGNOSIS:     A. Fine needle aspiration, left upper lung lobe mass:          Positive for malignancy, pulmonary adenocarcinoma, nonmucinous           as sampled   B. Lung, left upper lobe mass, core biopsy:          Pulmonary adenocarcinoma, nonmucinous as sampled   C. Lung, left upper lobe mass, biopsy:          Pulmonary adenocarcinoma, nonmucinous as sampled   D. Bronchoalveolar lavage, left upper lobe:          Rare malignant cell cluster on the Thin Prep slide     Assessment/Plan:      Cancer Staging   NSCLC of left lung (HCC)  Staging form: Lung, AJCC 8th Edition  - Clinical stage from 6/29/2023: Stage IIIA (cT1c, cN2, cM0) - Signed by Nancy Campbell M.D. on 6/29/2023       Ms. Andersen is a 66 yo F  with a new diagnosis of adenocarcinoma of the ANSHUL (PDL1 1%,  TP53, NF1) who is s/p C3 of NAC+IO.     She has had significant issues with her NAC.  She developed MARIVEL and hyperthyroidism.  Now she is having severe diarrhea.    I am concerned that her diarrhea is 2/2 to nivolumab.     We discussed that technically this is severe diarrhea given that she has had more than 6 bowel movements about baseline per day.  And this has resulted in hospitalization.  She has completed her immunotherapy and I would be concerned to continue immunotherapy given this toxicity.    Plan  -CT scan next 9/27  -patient to see Dr. Ganser next week 9/27    Diarrhea  -improvement, down to 1-2 bouts of diarrhea a day and no blood  -Continue with steroids - 5 Tablets by mouth every day for 7 days, THEN 4 Tablets every day for 7 days, THEN 3 Tablets every day for 7 days, THEN 2 Tablets every day for 7 days, THEN 1 Tablet every day for 7 days    Hyperthyroidism  -US shows L thyroid nodule less than 1 cm  -needs to have NM thyroid uptake scan  -lab work completed  -per patient, endo will see her after the NM thyroid uptake    Dehydration  -patient will get hydration today      No follow-ups on file.     Any questions and concerns raised by the patient were addressed and answered. Patient denies any further questions.  Patient encouraged to call the office with any concerns or issues.     Nancy Campbell M.D.  Hematology/Oncology

## 2023-09-19 NOTE — Clinical Note
Patient needs f/u in 2-3 weeks (virtual appt) Can you please reschedule NM thyroid test that she missed inpatient please

## 2023-09-26 ENCOUNTER — TELEPHONE (OUTPATIENT)
Dept: ENDOCRINOLOGY | Facility: MEDICAL CENTER | Age: 68
End: 2023-09-26

## 2023-09-26 NOTE — DOCUMENTATION QUERY
"                                                                         [facility]                                                                       Query Response Note      PATIENT:               WINTER GAINES  ACCT #:                  [id number]  MRN:                     [id number]  :                      1955  ADMIT DATE:       2023 11:33 AM  DISCH DATE:        9/15/2023 2:54 PM  RESPONDING  PROVIDER #:        [id number]           QUERY TEXT:    Patient admitted with immunotherapy induced colitis. LABS: WBC 2.8, Absolute Neutrophils 0.69, Hemoglobin 7.7, Platelets 146.  Patient has lung cancer on nivolumab, pemetrexed, and carboplatin.    Please clarify the clinical relevance for the clinical/diagnostic findings stated below:    The patient's clinical indicators include:   Hem/Onc: \"new diagnosis of NSCLC Adenocarcinoma of the left lung ... post Day 9 after chemo+IO. Patient completed chemo and immunotherapy on .\"    9/15 Labs: WBC 2.8, absolute neutrophils 0.69, HGB 7.7,     Risk Factors: Lung cancer on nivolumab, pemetrexed, and carboplatin  Treatment: Serial labs, hem/onc consult    Thank You,  Shona Gatica RN  Clinical    Connect via [employer] Mobile or [email or web address]  Options provided:   -- Drug-induced Pancytopenia   -- Pancytopenia unspecified   -- Insignificant Findings   -- Other explanation, (please specify other explanation)   -- Unable to determine      Query created by: Shona Gatica on 2023 12:50 PM    RESPONSE TEXT:    Drug-induced Pancytopenia          Electronically signed by:  RENETTA JORDAN MD 2023 7:47 PM              "

## 2023-10-02 ENCOUNTER — TELEPHONE (OUTPATIENT)
Dept: HEMATOLOGY ONCOLOGY | Facility: MEDICAL CENTER | Age: 68
End: 2023-10-02
Payer: MEDICARE

## 2023-10-02 NOTE — TELEPHONE ENCOUNTER
LVM for patient to return my call at medical oncology. Patient missed her NP appointment with [facility] last week and Tanya BARRIOS was trying to have patient seen urgently and she is not not scheduled til 10/17/23. She wants to make sure everything is okay with the patient. Office phone number was left for patient.

## 2023-10-04 ENCOUNTER — APPOINTMENT (OUTPATIENT)
Dept: ONCOLOGY | Facility: MEDICAL CENTER | Age: 68
End: 2023-10-04
Payer: MEDICARE

## 2023-10-09 ENCOUNTER — TELEPHONE (OUTPATIENT)
Dept: ENDOCRINOLOGY | Facility: MEDICAL CENTER | Age: 68
End: 2023-10-09
Payer: MEDICARE

## 2023-10-09 ENCOUNTER — HOSPITAL ENCOUNTER (OUTPATIENT)
Dept: RADIOLOGY | Facility: MEDICAL CENTER | Age: 68
End: 2023-10-09
Attending: NURSE PRACTITIONER
Payer: MEDICARE

## 2023-10-09 DIAGNOSIS — E05.90 HYPERTHYROIDISM: ICD-10-CM

## 2023-10-09 PROCEDURE — 78014 THYROID IMAGING W/BLOOD FLOW: CPT

## 2023-10-09 NOTE — TELEPHONE ENCOUNTER
Left pt vm regarding appt on 10/17. Appt was booked as established pt instead of new patient, so it was only scheduled for 20 min instead of 40. Moved appt time from 2:00pm to 1:40. Let patient know of appointment time change

## 2023-10-11 ENCOUNTER — HOSPITAL ENCOUNTER (OUTPATIENT)
Dept: RADIOLOGY | Facility: MEDICAL CENTER | Age: 68
End: 2023-10-11
Attending: NURSE PRACTITIONER
Payer: MEDICARE

## 2023-10-11 DIAGNOSIS — C34.92 NSCLC OF LEFT LUNG (HCC): ICD-10-CM

## 2023-10-11 PROCEDURE — 71260 CT THORAX DX C+: CPT

## 2023-10-11 PROCEDURE — 700117 HCHG RX CONTRAST REV CODE 255: Performed by: NURSE PRACTITIONER

## 2023-10-11 RX ADMIN — IOHEXOL 50 ML: 240 INJECTION, SOLUTION INTRATHECAL; INTRAVASCULAR; INTRAVENOUS; ORAL at 11:27

## 2023-10-11 RX ADMIN — IOHEXOL 100 ML: 350 INJECTION, SOLUTION INTRAVENOUS at 11:27

## 2023-10-12 ENCOUNTER — HOSPITAL ENCOUNTER (OUTPATIENT)
Dept: HEMATOLOGY ONCOLOGY | Facility: MEDICAL CENTER | Age: 68
End: 2023-10-12
Attending: STUDENT IN AN ORGANIZED HEALTH CARE EDUCATION/TRAINING PROGRAM
Payer: MEDICARE

## 2023-10-12 VITALS
TEMPERATURE: 98.1 F | WEIGHT: 122.8 LBS | SYSTOLIC BLOOD PRESSURE: 142 MMHG | OXYGEN SATURATION: 98 % | HEIGHT: 64 IN | BODY MASS INDEX: 20.96 KG/M2 | HEART RATE: 86 BPM | DIASTOLIC BLOOD PRESSURE: 70 MMHG

## 2023-10-12 DIAGNOSIS — R19.7 DIARRHEA DUE TO MALABSORPTION: ICD-10-CM

## 2023-10-12 DIAGNOSIS — K52.9 COLITIS: ICD-10-CM

## 2023-10-12 DIAGNOSIS — K90.9 DIARRHEA DUE TO MALABSORPTION: ICD-10-CM

## 2023-10-12 PROCEDURE — 99214 OFFICE O/P EST MOD 30 MIN: CPT | Performed by: STUDENT IN AN ORGANIZED HEALTH CARE EDUCATION/TRAINING PROGRAM

## 2023-10-12 PROCEDURE — 99212 OFFICE O/P EST SF 10 MIN: CPT | Performed by: STUDENT IN AN ORGANIZED HEALTH CARE EDUCATION/TRAINING PROGRAM

## 2023-10-12 RX ORDER — PREDNISONE 1 MG/1
TABLET ORAL
Qty: 70 TABLET | Refills: 0 | Status: SHIPPED | OUTPATIENT
Start: 2023-10-12 | End: 2023-12-13

## 2023-10-12 ASSESSMENT — ENCOUNTER SYMPTOMS
FOCAL WEAKNESS: 0
SORE THROAT: 0
HEARTBURN: 0
BRUISES/BLEEDS EASILY: 0
NAUSEA: 0
SENSORY CHANGE: 0
FEVER: 0
DIZZINESS: 0
DEPRESSION: 0
PALPITATIONS: 0
NECK PAIN: 0
WEIGHT LOSS: 1
MEMORY LOSS: 1
VOMITING: 0
TINGLING: 0
ABDOMINAL PAIN: 0
BLURRED VISION: 0
ORTHOPNEA: 0
COUGH: 0
CHILLS: 0
HEADACHES: 0
TREMORS: 0
SPUTUM PRODUCTION: 0
WHEEZING: 0
SHORTNESS OF BREATH: 0

## 2023-10-12 ASSESSMENT — FIBROSIS 4 INDEX: FIB4 SCORE: 3.37

## 2023-10-12 NOTE — ADDENDUM NOTE
Encounter addended by: Karthik Beckford on: 10/12/2023 10:44 AM   Actions taken: Charge Capture section accepted

## 2023-10-15 DIAGNOSIS — E06.4 DRUG-INDUCED THYROIDITIS: ICD-10-CM

## 2023-10-16 NOTE — PROGRESS NOTES
New Patient Consult Note for Endocrinology  Referred by: IFEOMA Mills    Reason for consult:   Hyperthyroidism    HPI:  Latrice Andersen is a very pleasant 68 y.o. lady, who was referred by oncologist for evaluation of hyperthyroidism.     Hyperthyroidism:  - noted after starting immune therapy/Pembrolizumab by oncology in 6/2023  - first abnormal labs in 8/2023, got worse in 9/2023  - she denies classic hyperthyroid symptoms: weight loss, heat intolerance, sweating, insomnia, anxiety, emotional lability, palpitations, dyspnea on exertion,  tremors  - recently developed severe diarrhea, required hospitalization, was diagnosed with colitis  - denies SAMY symptoms: eye discomfort, periorbital edema, red eye, double vision, pain with eye movement  - denies neck discomfort, pain, dysphagia, dyspnea, reports new hoarseness  - denies  iodine, biotin supplementation, recent contrast use, recent URI  - had thyroid US, thyroid scan, TrAbs - as below    Lung cancer:  - new diagnosis of NSCLC Adenocarcinoma of the left lung  - was started on neoadjuvant therapy with Carboplatin/Pemetrexed/Nivolumab since 6/2023, had 3 courses/ cycles already  - while on medication developed severe colitis, hyperthyroidism  - medication is currently on hold    Breast cancer:  - first diagnosed in 1996, s/p RT and chemo  -  a new diagnosis of NSCLC Adenocarcinoma on antineoplastic therapy with nivolumab, pemetrexed and carboplatin was admitted on 9/11/2023 for acute renal failure secondary to acute diarrhea.     Recent hospitalization on 9/11/23 - 9/15/23:  - presented with having ~ 14 bouts of diarrhea, abdomina/pelvic CT wnl, presumably due to immune therapy induced colitis, given aggressive hydration, electrolyte replacement, started on steroid therapy with prednisone, currently on 20 mg/day       Past Medical History:  Patient Active Problem List    Diagnosis Date Noted    Colitis 09/13/2023    MARIVEL (acute kidney injury) (HCC)  2023    Renal mass 2023    Diarrhea 2023    Metabolic acidosis 2023    Hyponatremia 2023    NSCLC of left lung (HCC) 2023    Left upper lobe pulmonary nodule 2023    History of radiation therapy 2023    Former smoker 2023    Other specified anemias 2022    Other fatigue 2022    Dysuria 2022    Stage 3a chronic kidney disease (HCC) 2021    History of breast cancer 2017    Essential hypertension 2015    Status post mastectomy 2014    Anxiety disorder 2014    Dyslipidemia 2013     Past Surgical History:  Past Surgical History:   Procedure Laterality Date    KS BRONCHOSCOPY,DIAGNOSTIC N/A 2023    Procedure: FIBER OPTIC BRONCHOSCOPY WITH  WASH, BRUSH, BRONCHOALVEOLAR LAVAGE, BIOPSY AND FINE NEEDLE ASPIRATION, ENDOBRONCHIAL ULTRASOUND & NAVIGATION, ROBOTICS;  Surgeon: Brooke Perrin M.D.;  Location: SURGERY [facility];  Service: Pulmonary Robotic    ENDOBRONCHIAL US ADD-ON N/A 2023    Procedure: ENDOBRONCHIAL ULTRASOUND (EBUS);  Surgeon: Brooke Perrin M.D.;  Location: SURGERY [facility];  Service: Pulmonary Robotic    MASTECTOMY      Left    KS CHEMOTHERAPY, UNSPECIFIED PROCEDURE      KS RADIATION THERAPY PLAN SIMPLE       Allergies:  Codeine    Social History:  Social History     Socioeconomic History    Marital status:      Spouse name: Not on file    Number of children: Not on file    Years of education: Not on file    Highest education level: Not on file   Occupational History    Not on file   Tobacco Use    Smoking status: Every Day     Current packs/day: 0.00     Average packs/day: 0.3 packs/day for 45.0 years (11.3 ttl pk-yrs)     Types: Cigarettes     Start date: 1977     Last attempt to quit: 2022     Years since quittin.2     Passive exposure: Current (Spouse)    Smokeless tobacco: Former     Quit date: 2022    Tobacco comments:     5 daily    Vaping  Use    Vaping Use: Never used   Substance and Sexual Activity    Alcohol use: No    Drug use: No    Sexual activity: Not Currently   Other Topics Concern    Not on file   Social History Narrative    Not on file     Social Determinants of Health     Financial Resource Strain: Not on file   Food Insecurity: Not on file   Transportation Needs: Not on file   Physical Activity: Not on file   Stress: Not on file   Social Connections: Not on file   Intimate Partner Violence: Not on file   Housing Stability: Not on file     Family History:  Family History   Problem Relation Age of Onset    Cancer Mother         breast cancer/Breast    Breast Cancer Mother     Hypertension Maternal Uncle     Cancer Maternal Uncle     Hypertension Maternal Grandmother     Hyperlipidemia Maternal Grandmother     Heart Disease Maternal Grandmother     Cancer Paternal Grandfather         Lung ca, smoker    Lung Cancer Maternal Grandfather     Alzheimer's Disease Maternal Aunt     Kidney Disease Maternal Aunt     Diabetes Neg Hx        Medications:  Current Outpatient Medications:     predniSONE (DELTASONE) 1 MG Tab, 4mg for 7 days, 3 mg for 7 days, 2mg for 7 days, 1mg for 7 days, Disp: 70 Tablet, Rfl: 0    hydrocortisone (ANUSOL-HC) 25 MG Suppos, UNWRAP AND INSERT 1 SUPPOSITORY RECTALLY AND RETAIN ONCE DAILY AT BEDTIME FOR 10 DAYS THEN ONCE DAILY AS NEEDED, Disp: , Rfl:     VITAMIN D PO, 1 capsule every day by oral route., Disp: , Rfl:     predniSONE (DELTASONE) 10 MG Tab, Take 5 Tablets by mouth every day for 7 days, THEN 4 Tablets every day for 7 days, THEN 3 Tablets every day for 7 days, THEN 2 Tablets every day for 7 days, THEN 1 Tablet every day for 7 days., Disp: 105 Tablet, Rfl: 0    metoprolol SR (TOPROL XL) 25 MG TABLET SR 24 HR, Take 12.5 mg by mouth every day., Disp: , Rfl:     venlafaxine (EFFEXOR) 75 MG Tab, Take 1 Tablet by mouth every day., Disp: 90 Tablet, Rfl: 0    folic acid (FOLVITE) 1 MG Tab, Take 1 Tablet by mouth every  "day., Disp: 30 Tablet, Rfl: 3    pravastatin (PRAVACHOL) 40 MG tablet, Take 1 Tablet by mouth every day., Disp: 100 Tablet, Rfl: 3    lisinopril (PRINIVIL) 10 MG Tab, Take 1 Tablet by mouth every day., Disp: 100 Tablet, Rfl: 3    fenofibrate micronized (LOFIBRA) 134 MG capsule, TAKE 1 CAPSULE BY MOUTH EVERY DAY, Disp: 90 Capsule, Rfl: 3    Physical Examination:   Vital signs: BP (!) 153/74 (BP Location: Right arm, Patient Position: Sitting, BP Cuff Size: Adult)   Pulse 83   Ht 1.613 m (5' 3.5\")   Wt 56.2 kg (124 lb)   SpO2 95%   BMI 21.62 kg/m²   General: No distress, cooperative, well dressed and well nourished.   Eyes: No scleral icterus or discharge, no exophthalmos, chemosis, ptosis, conjunctival erythema.   ENMT: Normal on external inspection of nose, lips, No nasal drainage   Neck: No abnormal masses on inspection. Palpable thyroid gland, not enlarged, no palpable nodules.    Resp: Normal effort  CVS: Regular rate and rhythm.   Extremities: No edema bilateral extremities  Neuro: Alert and oriented. No tremor  Skin: No rash, No Ulcers  Psych: Normal mood and affect    Labs:  - reviewed      Imaging:  - reviewed  Thyroid US on 9/13/23:   COMPARISON:  None  FINDINGS:  The thyroid gland is heterogeneous.  Vascularity is normal.    The right lobe of the thyroid gland measures 1.05 cm x 4.42 cm x 1.14 cm. The contour and echogenicity are normal. 3.4 and 3.1 mm small thyroid nodules in the mid portion of the right thyroid.  The left lobe of the thyroid gland measures 0.88 cm x 4.25 cm x 1.26 cm. The contour and echogenicity are normal. There is 9.0 x 4.8 x 3.5 mm nodule in the mid left thyroid lobe with mixed echogenicity, hyperechoic, smooth margins with macrocalcifications which is wider than tall, total score of 3.  The isthmus measures 0.26 cm.  IMPRESSION:  1.  Left thyroid nodule less than 1 cm, Ti-Rads 3, no follow up recommended at this time due to size less than 1.5 cm.      Thyroid scan on " 10/9/23:  HISTORY/REASON FOR EXAM:  Hyperthyroidism.  COMPARISON: None  FINDINGS:  The planar scan demonstrates homogeneous labeling throughout the thyroid gland.  The five-hour uptake measurement equals 6.4% (normal 8-15%).  IMPRESSION:  Five-hour uptake is slightly lower than normal, nonspecific.  Homogeneous appearance of the thyroid gland.      Assessment and Plan:  1. Drug-induced thyroiditis  - patient has biochemical hyperthyroidism with evidence of thyroiditis as per thyroid scan (decreased thyroid uptake) and thyroid US (normal vascularity), no SAMY Sx, neg TrAbs  - has minimal clinical presentation, even we can see diarrhea with hyperthyroidism, the severity of it exceeds expected in hyperthyroidism, especially without any other symptoms  - agree that diarrhea is likely to be related to immune therapy induced colitis  - likely cause - recent immune therapy with nivolumab  - already started on prednisone for the colitis, but likely to be helpful for thyroiditis as well  - will repeat TFTs now (would expect improvement)  - discussed with the patient possible outcomes of thyroiditis - normalization of thyroid function vs development of hypothyroidism  - discontinuation of nivolumab will not affect course and outcomes of thyroiditis, does not need to be stopped because of development of drug- induced thyroiditis  Plan:  Continue current therapy with prednisone.   Repeat TFTs now.   Overall reassured the patient.   Nvolumab will not affect course and outcomes of thyroiditis, does not need to be stopped because of development of drug- induced thyroiditis    RTC: 5 days.     Total time (face-to-face and non-face-to face time):  45 min - discussion of diagnoses, treatment, prognosis, medical charts, lab, imaging review, documentation.      Plan reviewed with the patient and agreed with plan.  All questions answered to patient's satisfaction.  Thank you kindly for allowing me to participate in the care plan for this  patient.    Deb Stockton MD    CC:   Kermit Bergman M.D.

## 2023-10-17 ENCOUNTER — HOSPITAL ENCOUNTER (OUTPATIENT)
Dept: LAB | Facility: MEDICAL CENTER | Age: 68
End: 2023-10-17
Attending: STUDENT IN AN ORGANIZED HEALTH CARE EDUCATION/TRAINING PROGRAM
Payer: MEDICARE

## 2023-10-17 ENCOUNTER — OFFICE VISIT (OUTPATIENT)
Dept: ENDOCRINOLOGY | Facility: MEDICAL CENTER | Age: 68
End: 2023-10-17
Attending: STUDENT IN AN ORGANIZED HEALTH CARE EDUCATION/TRAINING PROGRAM
Payer: MEDICARE

## 2023-10-17 VITALS
WEIGHT: 124 LBS | HEIGHT: 64 IN | OXYGEN SATURATION: 95 % | SYSTOLIC BLOOD PRESSURE: 153 MMHG | HEART RATE: 83 BPM | DIASTOLIC BLOOD PRESSURE: 74 MMHG | BODY MASS INDEX: 21.17 KG/M2

## 2023-10-17 DIAGNOSIS — E06.4 DRUG-INDUCED THYROIDITIS: ICD-10-CM

## 2023-10-17 LAB
T3 SERPL-MCNC: 80.6 NG/DL (ref 60–181)
T4 FREE SERPL-MCNC: 0.8 NG/DL (ref 0.93–1.7)
TSH SERPL DL<=0.005 MIU/L-ACNC: 9.37 UIU/ML (ref 0.38–5.33)

## 2023-10-17 PROCEDURE — 84439 ASSAY OF FREE THYROXINE: CPT

## 2023-10-17 PROCEDURE — 3077F SYST BP >= 140 MM HG: CPT | Performed by: STUDENT IN AN ORGANIZED HEALTH CARE EDUCATION/TRAINING PROGRAM

## 2023-10-17 PROCEDURE — 84443 ASSAY THYROID STIM HORMONE: CPT

## 2023-10-17 PROCEDURE — 99204 OFFICE O/P NEW MOD 45 MIN: CPT | Performed by: STUDENT IN AN ORGANIZED HEALTH CARE EDUCATION/TRAINING PROGRAM

## 2023-10-17 PROCEDURE — 84480 ASSAY TRIIODOTHYRONINE (T3): CPT

## 2023-10-17 PROCEDURE — 99211 OFF/OP EST MAY X REQ PHY/QHP: CPT | Performed by: STUDENT IN AN ORGANIZED HEALTH CARE EDUCATION/TRAINING PROGRAM

## 2023-10-17 PROCEDURE — 36415 COLL VENOUS BLD VENIPUNCTURE: CPT

## 2023-10-17 PROCEDURE — 3078F DIAST BP <80 MM HG: CPT | Performed by: STUDENT IN AN ORGANIZED HEALTH CARE EDUCATION/TRAINING PROGRAM

## 2023-10-17 ASSESSMENT — FIBROSIS 4 INDEX: FIB4 SCORE: 3.37

## 2023-10-23 NOTE — PROGRESS NOTES
Follow up Endocrinology Visit  Initial consult/last visit on: 10/17/23  Referred by: EDWARD Mills.    Patient was presented for a telehealth consultation via secure and encrypted videoconferencing technology.    Location of Provider - office  Location of Patient - home  Patient Consent - yes  Platform used - Zoom -> due to technical issues we transitioned to phone visit  Total time - 20 min    Chief complaint:  Latrice Andersen is a very pleasant 68 y.o. lady, who is here for follow up for hyperthyroidism management    Interval history:   - patient is still struggling with diarrhea  - on prednisone taper  - reviewed recent labs    Hyperthyroidism:  - noted after starting immune therapy/Pembrolizumab by oncology in 6/2023  - first abnormal labs in 8/2023, got worse in 9/2023  - she denies classic hyperthyroid symptoms: weight loss, heat intolerance, sweating, insomnia, anxiety, emotional lability, palpitations, dyspnea on exertion,  tremors  - recently developed severe diarrhea, required hospitalization, was diagnosed with colitis  - denies SAMY symptoms: eye discomfort, periorbital edema, red eye, double vision, pain with eye movement  - denies neck discomfort, pain, dysphagia, dyspnea, reports new hoarseness  - denies  iodine, biotin supplementation, recent contrast use, recent URI  - had thyroid US, thyroid scan, TrAbs - as below    Lung cancer:  - new diagnosis of NSCLC Adenocarcinoma of the left lung  - was started on neoadjuvant therapy with Carboplatin/Pemetrexed/Nivolumab since 6/2023, had 3 courses/ cycles already  - while on medication developed severe colitis, hyperthyroidism  - medication is currently on hold    Breast cancer:  - first diagnosed in 1996, s/p RT and chemo  -  a new diagnosis of NSCLC Adenocarcinoma on antineoplastic therapy with nivolumab, pemetrexed and carboplatin was admitted on 9/11/2023 for acute renal failure secondary to acute diarrhea.     Recent hospitalization on  "9/11/23 - 9/15/23:  - presented with having ~ 14 bouts of diarrhea, abdomina/pelvic CT wnl, presumably due to immune therapy induced colitis, given aggressive hydration, electrolyte replacement, started on steroid therapy with prednisone, currently on 20 mg/day     Medications:  Current Outpatient Medications:     predniSONE (DELTASONE) 1 MG Tab, 4mg for 7 days, 3 mg for 7 days, 2mg for 7 days, 1mg for 7 days, Disp: 70 Tablet, Rfl: 0    hydrocortisone (ANUSOL-HC) 25 MG Suppos, UNWRAP AND INSERT 1 SUPPOSITORY RECTALLY AND RETAIN ONCE DAILY AT BEDTIME FOR 10 DAYS THEN ONCE DAILY AS NEEDED, Disp: , Rfl:     VITAMIN D PO, 1 capsule every day by oral route., Disp: , Rfl:     metoprolol SR (TOPROL XL) 25 MG TABLET SR 24 HR, Take 12.5 mg by mouth every day., Disp: , Rfl:     venlafaxine (EFFEXOR) 75 MG Tab, Take 1 Tablet by mouth every day., Disp: 90 Tablet, Rfl: 0    folic acid (FOLVITE) 1 MG Tab, Take 1 Tablet by mouth every day., Disp: 30 Tablet, Rfl: 3    pravastatin (PRAVACHOL) 40 MG tablet, Take 1 Tablet by mouth every day., Disp: 100 Tablet, Rfl: 3    lisinopril (PRINIVIL) 10 MG Tab, Take 1 Tablet by mouth every day., Disp: 100 Tablet, Rfl: 3    fenofibrate micronized (LOFIBRA) 134 MG capsule, TAKE 1 CAPSULE BY MOUTH EVERY DAY, Disp: 90 Capsule, Rfl: 3    Physical Examination:   Vital signs: Ht 1.6 m (5' 3\")   Wt 56.2 kg (124 lb)   BMI 21.97 kg/m²   General: No distress, cooperative  Neuro: Alert and oriented.   Psych: Normal mood and affect    Labs:  MOST RECENT LABS:  - reviewed      PRIOR PERTINENT LABS:  - reviewed      Imaging:  - reviewed  Thyroid US on 9/13/23:   COMPARISON:  None  FINDINGS:  The thyroid gland is heterogeneous.  Vascularity is normal.    The right lobe of the thyroid gland measures 1.05 cm x 4.42 cm x 1.14 cm. The contour and echogenicity are normal. 3.4 and 3.1 mm small thyroid nodules in the mid portion of the right thyroid.  The left lobe of the thyroid gland measures 0.88 cm x 4.25 cm x " 1.26 cm. The contour and echogenicity are normal. There is 9.0 x 4.8 x 3.5 mm nodule in the mid left thyroid lobe with mixed echogenicity, hyperechoic, smooth margins with macrocalcifications which is wider than tall, total score of 3.  The isthmus measures 0.26 cm.  IMPRESSION:  1.  Left thyroid nodule less than 1 cm, Ti-Rads 3, no follow up recommended at this time due to size less than 1.5 cm.      Thyroid scan on 10/9/23:  HISTORY/REASON FOR EXAM:  Hyperthyroidism.  COMPARISON: None  FINDINGS:  The planar scan demonstrates homogeneous labeling throughout the thyroid gland.  The five-hour uptake measurement equals 6.4% (normal 8-15%).  IMPRESSION:  Five-hour uptake is slightly lower than normal, nonspecific.  Homogeneous appearance of the thyroid gland.      Assessment and Plan:  # Drug-induced thyroiditis  # Hypothyroidism  - currently in hypothyroid state, asymptomatic  - on steroid taper for colitis  - most commonly hypothyroidism after immune therapy induced thyroiditis - permanent  - will start replacement therapy with LT4 with slightly lower dose than weight based calculated dose ~ 90 mcg/day    Prior notes:  - patient has biochemical hyperthyroidism with evidence of thyroiditis as per thyroid scan (decreased thyroid uptake) and thyroid US (normal vascularity), no SAMY Sx, neg TrAbs  - has minimal clinical presentation, even we can see diarrhea with hyperthyroidism, the severity of it exceeds expected in hyperthyroidism, especially without any other symptoms  - agree that diarrhea is likely to be related to immune therapy induced colitis  - likely cause - recent immune therapy with nivolumab  - already started on prednisone for the colitis, but likely to be helpful for thyroiditis as well  - will repeat TFTs now (would expect improvement)  - discussed with the patient possible outcomes of thyroiditis - normalization of thyroid function vs development of hypothyroidism  - discontinuation of nivolumab will not  affect course and outcomes of thyroiditis, does not need to be stopped because of development of drug- induced thyroiditis  Plan:  Taper prednisone as per GI.   Start Levothyroxine 50 mcg/day - on fasting state in am, at least 30 min before first meal.   Repeat TFTs in 6 weeks.   Overall reassured the patient.   Nivolumab will not affect course and outcomes of thyroiditis, does not need to be stopped because of development of drug- induced thyroiditis/hypothyroidism    RTC: 6 weeks    Total time (face-to-face and non-face-to face time):  20 min      Plan reviewed with the patient and agreed with plan.  All questions answered to patient's satisfaction.  Thank you kindly for allowing me to participate in the care plan for this patient.    Deb Stockton MD    CC:   Kermit Bergman M.D.

## 2023-10-24 ENCOUNTER — TELEMEDICINE (OUTPATIENT)
Dept: ENDOCRINOLOGY | Facility: MEDICAL CENTER | Age: 68
End: 2023-10-24
Attending: STUDENT IN AN ORGANIZED HEALTH CARE EDUCATION/TRAINING PROGRAM
Payer: MEDICARE

## 2023-10-24 VITALS — HEIGHT: 63 IN | BODY MASS INDEX: 21.97 KG/M2 | WEIGHT: 124 LBS

## 2023-10-24 DIAGNOSIS — E06.4 DRUG-INDUCED THYROIDITIS: ICD-10-CM

## 2023-10-24 DIAGNOSIS — E03.9 HYPOTHYROIDISM (ACQUIRED): ICD-10-CM

## 2023-10-24 PROCEDURE — 99213 OFFICE O/P EST LOW 20 MIN: CPT | Mod: 95 | Performed by: STUDENT IN AN ORGANIZED HEALTH CARE EDUCATION/TRAINING PROGRAM

## 2023-10-24 RX ORDER — LEVOTHYROXINE SODIUM 0.05 MG/1
50 TABLET ORAL
Qty: 30 TABLET | Refills: 5 | Status: SHIPPED | OUTPATIENT
Start: 2023-10-24

## 2023-10-24 ASSESSMENT — FIBROSIS 4 INDEX: FIB4 SCORE: 3.37

## 2023-10-31 ENCOUNTER — HOSPITAL ENCOUNTER (OUTPATIENT)
Facility: MEDICAL CENTER | Age: 68
End: 2023-10-31
Attending: SURGERY | Admitting: SURGERY
Payer: MEDICARE

## 2023-10-31 ENCOUNTER — APPOINTMENT (OUTPATIENT)
Dept: ADMISSIONS | Facility: MEDICAL CENTER | Age: 68
End: 2023-10-31
Attending: SURGERY
Payer: MEDICARE

## 2023-11-01 ENCOUNTER — APPOINTMENT (OUTPATIENT)
Dept: ONCOLOGY | Facility: MEDICAL CENTER | Age: 68
End: 2023-11-01
Attending: STUDENT IN AN ORGANIZED HEALTH CARE EDUCATION/TRAINING PROGRAM
Payer: MEDICARE

## 2023-11-13 DIAGNOSIS — Z79.899 HIGH RISK MEDICATION USE: ICD-10-CM

## 2023-11-13 DIAGNOSIS — C34.92 NSCLC OF LEFT LUNG (HCC): ICD-10-CM

## 2023-11-13 RX ORDER — FOLIC ACID 1 MG/1
1 TABLET ORAL DAILY
Qty: 30 TABLET | Refills: 0 | Status: SHIPPED | OUTPATIENT
Start: 2023-11-13 | End: 2023-12-13

## 2023-11-16 ENCOUNTER — TELEPHONE (OUTPATIENT)
Dept: SCHEDULING | Facility: IMAGING CENTER | Age: 68
End: 2023-11-16

## 2023-11-17 DIAGNOSIS — R19.7 DIARRHEA, UNSPECIFIED TYPE: ICD-10-CM

## 2023-11-17 RX ORDER — DIPHENOXYLATE HYDROCHLORIDE AND ATROPINE SULFATE 2.5; .025 MG/1; MG/1
1 TABLET ORAL 4 TIMES DAILY PRN
Qty: 30 TABLET | Refills: 0 | Status: SHIPPED | OUTPATIENT
Start: 2023-11-17 | End: 2023-11-24

## 2023-11-20 ENCOUNTER — HOSPITAL ENCOUNTER (OUTPATIENT)
Dept: HEMATOLOGY ONCOLOGY | Facility: MEDICAL CENTER | Age: 68
End: 2023-11-20
Attending: SURGERY | Admitting: SURGERY
Payer: MEDICARE

## 2023-12-01 DIAGNOSIS — E78.5 DYSLIPIDEMIA: ICD-10-CM

## 2023-12-01 RX ORDER — FENOFIBRATE 134 MG/1
134 CAPSULE ORAL DAILY
Qty: 90 CAPSULE | Refills: 0 | Status: SHIPPED | OUTPATIENT
Start: 2023-12-01 | End: 2024-03-14 | Stop reason: SDUPTHER

## 2023-12-11 ENCOUNTER — APPOINTMENT (OUTPATIENT)
Dept: ADMISSIONS | Facility: MEDICAL CENTER | Age: 68
End: 2023-12-11
Attending: INTERNAL MEDICINE
Payer: MEDICARE

## 2023-12-13 ENCOUNTER — PRE-ADMISSION TESTING (OUTPATIENT)
Dept: ADMISSIONS | Facility: MEDICAL CENTER | Age: 68
End: 2023-12-13
Attending: INTERNAL MEDICINE
Payer: MEDICARE

## 2023-12-13 ENCOUNTER — HOSPITAL ENCOUNTER (OUTPATIENT)
Dept: LAB | Facility: MEDICAL CENTER | Age: 68
End: 2023-12-13
Attending: INTERNAL MEDICINE
Payer: MEDICARE

## 2023-12-13 VITALS — HEIGHT: 63 IN | BODY MASS INDEX: 21.97 KG/M2

## 2023-12-13 DIAGNOSIS — Z79.899 HIGH RISK MEDICATION USE: ICD-10-CM

## 2023-12-13 DIAGNOSIS — Z01.812 PRE-OPERATIVE LABORATORY EXAMINATION: ICD-10-CM

## 2023-12-13 DIAGNOSIS — C34.92 NSCLC OF LEFT LUNG (HCC): ICD-10-CM

## 2023-12-13 LAB
ANION GAP SERPL CALC-SCNC: 10 MMOL/L (ref 7–16)
BUN SERPL-MCNC: 18 MG/DL (ref 8–22)
CALCIUM SERPL-MCNC: 8.8 MG/DL (ref 8.5–10.5)
CHLORIDE SERPL-SCNC: 109 MMOL/L (ref 96–112)
CO2 SERPL-SCNC: 23 MMOL/L (ref 20–33)
CREAT SERPL-MCNC: 1.34 MG/DL (ref 0.5–1.4)
ERYTHROCYTE [DISTWIDTH] IN BLOOD BY AUTOMATED COUNT: 49.8 FL (ref 35.9–50)
GFR SERPLBLD CREATININE-BSD FMLA CKD-EPI: 43 ML/MIN/1.73 M 2
GLUCOSE SERPL-MCNC: 77 MG/DL (ref 65–99)
HCT VFR BLD AUTO: 30.8 % (ref 37–47)
HGB BLD-MCNC: 9.2 G/DL (ref 12–16)
MCH RBC QN AUTO: 28.8 PG (ref 27–33)
MCHC RBC AUTO-ENTMCNC: 29.9 G/DL (ref 32.2–35.5)
MCV RBC AUTO: 96.3 FL (ref 81.4–97.8)
PLATELET # BLD AUTO: 430 K/UL (ref 164–446)
PMV BLD AUTO: 8.5 FL (ref 9–12.9)
POTASSIUM SERPL-SCNC: 4.2 MMOL/L (ref 3.6–5.5)
RBC # BLD AUTO: 3.2 M/UL (ref 4.2–5.4)
SODIUM SERPL-SCNC: 142 MMOL/L (ref 135–145)
WBC # BLD AUTO: 6.4 K/UL (ref 4.8–10.8)

## 2023-12-13 PROCEDURE — 85027 COMPLETE CBC AUTOMATED: CPT

## 2023-12-13 PROCEDURE — 36415 COLL VENOUS BLD VENIPUNCTURE: CPT

## 2023-12-13 PROCEDURE — 80048 BASIC METABOLIC PNL TOTAL CA: CPT

## 2023-12-13 RX ORDER — FOLIC ACID 1 MG/1
1 TABLET ORAL DAILY
Qty: 30 TABLET | Refills: 0 | Status: SHIPPED | OUTPATIENT
Start: 2023-12-13 | End: 2024-02-07

## 2023-12-13 NOTE — PREPROCEDURE INSTRUCTIONS
PreAdmit Telephone Appointment: Reviewed the Preparing for your procedure handout with patient over the phone. Patient instructed per pharmacy guidelines regarding taking, holding or contacting provider for instructions on regularly prescribed medications before surgery. Instructed to take the following medications the day of surgery with a sip of water per pharmacy medication guidelines:  Levothyroxine, metoprolol, prevastatin, effexor    Confirmed with patient where to check in morning of surgery. Handouts/Brochure/Video emailed to patient.    Pt going to [facility] on [address] for labs, 12/13.  Pt unable to come into [facility]  for EKG, due to chronic diarrhea.  Pt's EKG is dated 6/12/23, 3 days over 6 months.

## 2023-12-15 ENCOUNTER — ANESTHESIA EVENT (OUTPATIENT)
Dept: SURGERY | Facility: MEDICAL CENTER | Age: 68
End: 2023-12-15
Payer: MEDICARE

## 2023-12-15 ENCOUNTER — ANESTHESIA (OUTPATIENT)
Dept: SURGERY | Facility: MEDICAL CENTER | Age: 68
End: 2023-12-15
Payer: MEDICARE

## 2023-12-15 ENCOUNTER — HOSPITAL ENCOUNTER (OUTPATIENT)
Facility: MEDICAL CENTER | Age: 68
End: 2023-12-15
Attending: INTERNAL MEDICINE | Admitting: INTERNAL MEDICINE
Payer: MEDICARE

## 2023-12-15 VITALS
DIASTOLIC BLOOD PRESSURE: 64 MMHG | RESPIRATION RATE: 16 BRPM | SYSTOLIC BLOOD PRESSURE: 128 MMHG | BODY MASS INDEX: 21.84 KG/M2 | WEIGHT: 123.24 LBS | TEMPERATURE: 97.5 F | HEIGHT: 63 IN | HEART RATE: 95 BPM | OXYGEN SATURATION: 93 %

## 2023-12-15 DIAGNOSIS — K52.9 COLITIS: ICD-10-CM

## 2023-12-15 PROBLEM — K63.5 POLYP OF SIGMOID COLON: Status: ACTIVE | Noted: 2023-12-15

## 2023-12-15 LAB
CRP SERPL HS-MCNC: 0.72 MG/DL (ref 0–0.75)
EKG IMPRESSION: NORMAL
HBV CORE AB SERPL QL IA: NONREACTIVE
HBV SURFACE AG SER QL: NORMAL
PATHOLOGY CONSULT NOTE: NORMAL

## 2023-12-15 PROCEDURE — 88305 TISSUE EXAM BY PATHOLOGIST: CPT | Mod: 59

## 2023-12-15 PROCEDURE — 36415 COLL VENOUS BLD VENIPUNCTURE: CPT

## 2023-12-15 PROCEDURE — 93010 ELECTROCARDIOGRAM REPORT: CPT | Performed by: INTERNAL MEDICINE

## 2023-12-15 PROCEDURE — 700101 HCHG RX REV CODE 250: Performed by: ANESTHESIOLOGY

## 2023-12-15 PROCEDURE — 93005 ELECTROCARDIOGRAM TRACING: CPT | Performed by: INTERNAL MEDICINE

## 2023-12-15 PROCEDURE — 87340 HEPATITIS B SURFACE AG IA: CPT

## 2023-12-15 PROCEDURE — 160025 RECOVERY II MINUTES (STATS): Performed by: INTERNAL MEDICINE

## 2023-12-15 PROCEDURE — 700105 HCHG RX REV CODE 258: Performed by: INTERNAL MEDICINE

## 2023-12-15 PROCEDURE — 86480 TB TEST CELL IMMUN MEASURE: CPT

## 2023-12-15 PROCEDURE — 160048 HCHG OR STATISTICAL LEVEL 1-5: Performed by: INTERNAL MEDICINE

## 2023-12-15 PROCEDURE — 86140 C-REACTIVE PROTEIN: CPT

## 2023-12-15 PROCEDURE — 160046 HCHG PACU - 1ST 60 MINS PHASE II: Performed by: INTERNAL MEDICINE

## 2023-12-15 PROCEDURE — 160009 HCHG ANES TIME/MIN: Performed by: INTERNAL MEDICINE

## 2023-12-15 PROCEDURE — 160002 HCHG RECOVERY MINUTES (STAT): Performed by: INTERNAL MEDICINE

## 2023-12-15 PROCEDURE — 160027 HCHG SURGERY MINUTES - 1ST 30 MINS LEVEL 2: Performed by: INTERNAL MEDICINE

## 2023-12-15 PROCEDURE — 86704 HEP B CORE ANTIBODY TOTAL: CPT

## 2023-12-15 PROCEDURE — 700111 HCHG RX REV CODE 636 W/ 250 OVERRIDE (IP): Performed by: ANESTHESIOLOGY

## 2023-12-15 PROCEDURE — 160035 HCHG PACU - 1ST 60 MINS PHASE I: Performed by: INTERNAL MEDICINE

## 2023-12-15 RX ORDER — SODIUM CHLORIDE, SODIUM LACTATE, POTASSIUM CHLORIDE, CALCIUM CHLORIDE 600; 310; 30; 20 MG/100ML; MG/100ML; MG/100ML; MG/100ML
INJECTION, SOLUTION INTRAVENOUS CONTINUOUS
Status: DISCONTINUED | OUTPATIENT
Start: 2023-12-15 | End: 2023-12-15 | Stop reason: HOSPADM

## 2023-12-15 RX ORDER — HYDRALAZINE HYDROCHLORIDE 20 MG/ML
5 INJECTION INTRAMUSCULAR; INTRAVENOUS
Status: DISCONTINUED | OUTPATIENT
Start: 2023-12-15 | End: 2023-12-15 | Stop reason: HOSPADM

## 2023-12-15 RX ORDER — HALOPERIDOL 5 MG/ML
1 INJECTION INTRAMUSCULAR
Status: DISCONTINUED | OUTPATIENT
Start: 2023-12-15 | End: 2023-12-15 | Stop reason: HOSPADM

## 2023-12-15 RX ORDER — LIDOCAINE HYDROCHLORIDE 20 MG/ML
INJECTION, SOLUTION EPIDURAL; INFILTRATION; INTRACAUDAL; PERINEURAL PRN
Status: DISCONTINUED | OUTPATIENT
Start: 2023-12-15 | End: 2023-12-15 | Stop reason: SURG

## 2023-12-15 RX ORDER — ONDANSETRON 2 MG/ML
4 INJECTION INTRAMUSCULAR; INTRAVENOUS
Status: DISCONTINUED | OUTPATIENT
Start: 2023-12-15 | End: 2023-12-15 | Stop reason: HOSPADM

## 2023-12-15 RX ORDER — DIPHENHYDRAMINE HYDROCHLORIDE 50 MG/ML
12.5 INJECTION INTRAMUSCULAR; INTRAVENOUS
Status: DISCONTINUED | OUTPATIENT
Start: 2023-12-15 | End: 2023-12-15 | Stop reason: HOSPADM

## 2023-12-15 RX ADMIN — SODIUM CHLORIDE, POTASSIUM CHLORIDE, SODIUM LACTATE AND CALCIUM CHLORIDE: 600; 310; 30; 20 INJECTION, SOLUTION INTRAVENOUS at 10:41

## 2023-12-15 RX ADMIN — PROPOFOL 150 MCG/KG/MIN: 10 INJECTION, EMULSION INTRAVENOUS at 10:44

## 2023-12-15 RX ADMIN — PROPOFOL 50 MG: 10 INJECTION, EMULSION INTRAVENOUS at 10:43

## 2023-12-15 RX ADMIN — LIDOCAINE HYDROCHLORIDE 60 MG: 20 INJECTION, SOLUTION EPIDURAL; INFILTRATION; INTRACAUDAL at 10:43

## 2023-12-15 ASSESSMENT — PAIN SCALES - GENERAL: PAIN_LEVEL: 0

## 2023-12-15 ASSESSMENT — PAIN DESCRIPTION - PAIN TYPE: TYPE: ACUTE PAIN

## 2023-12-15 ASSESSMENT — FIBROSIS 4 INDEX: FIB4 SCORE: 1.14

## 2023-12-15 NOTE — OR SURGEON
Immediate Post OP Note    PreOp Diagnosis: Diarrhea, rectal bleeding      PostOp Diagnosis: Colitis, colon polyp      Procedure(s):  FLEXIBLE SIGMOIDOSCOPY with biopsies and cold snare polypectomy- Wound Class: None    Surgeon(s):  Francois Rollins M.D.    Anesthesiologist/Type of Anesthesia:  Anesthesiologist: Nik Kelsey M.D./General    Surgical Staff:  Circulator: Srini Josue R.N.  Endoscopy Technician: Debby Harrison; Adán Evans    Specimens removed if any:  ID Type Source Tests Collected by Time Destination   A : Biopsy, Left colon Other Other PATHOLOGY SPECIMEN Francois Rollins M.D. 12/15/2023 10:47 AM    B : Sigmoid colon polyp Other Other PATHOLOGY SPECIMEN Francois Rollins M.D. 12/15/2023 10:48 AM        Estimated Blood Loss: None    Findings:   Pancolitis to transverse colon at 70 cm consisting of edema, friability, granularity, erosions and loss of vascular pattern, Green score 2/moderate, suspected immunotherapy mediated colitis.  Biopsied  6 mm sigmoid polyp, removed with cold snare    Complications: None        12/15/2023 10:53 AM Francois Rollins M.D.

## 2023-12-15 NOTE — PROCEDURES
DATE OF PROCEDURE:  12/15/2023     PROCEDURE:  Flexible sigmoidoscopy with biopsies and cold snare polypectomy.     :  Francois Rollins MD     INDICATIONS:  The patient is a 68-year-old with history of lung cancer who   received immunotherapy until 09/2023.  She developed diarrhea and rectal   bleeding beginning in August.  She has had persistent symptoms up to 12 bowel   movements daily since August.  Denies associated abdominal pain.  Again, has   not been on immunotherapy since September.  Tried brief tapers of prednisone,   which she states did not help.     INFORMED CONSENT:  Written informed consent was obtained from the patient   after thorough explanation of indications, benefits, and risks of the   procedure, which included but was not limited to bleeding, infection,   perforation, adverse reaction to medications and missed lesions.     MEDICATIONS GIVEN:  Monitored anesthesia care with propofol.     Continuous telemetry, BP, pulse oximetry, and capnography were performed by   the anesthesiologist throughout the procedure.     Midsize flexible upper endoscope was used for the procedure.     FINDINGS:  The patient was placed in left lateral decubitus position.  After   anesthesia was achieved, digital rectal exam was performed and found to be   normal.  The endoscope was then inserted into the rectum and advanced to the   transverse colon at 70 cm.  The scope was then withdrawn with careful   inspection of mucosa.  She had evidence for diffuse pancolitis throughout the   extent of the colon visualized of moderate severity or Green score 2.  Colitis   consisted of edema, erythema, loss of vascular pattern, friability, erosions   with some smaller superficial ulcerations.  Biopsies were obtained.  She did   have a 6 mm sessile polyp in the sigmoid colon, removed by cold snare   polypectomy placed in bottle B.  Retroflexion was performed in the rectum   consistent with colitis, but no obvious  hemorrhoids.  The patient underwent 2   Fleet's enemas, the results of which were good.  The patient tolerated the   procedure well.     IMPRESSION:  1.  Colitis, moderate severity, suspect immunotherapy related colitis.    Biopsies obtained.  2.  Colon polyps, removed.     PLAN:  1.  Await biopsies.  2.  Stool studies.  3.  Check TB QuantiFERON hepatitis B studies.  4.  Prednisone 1 mg/kg.  5.  If no response to prednisone 1 mg/kg, then likely will need either   infliximab or vedolizumab infusions x3 for treatment of steroid refractory   immunotherapy related colitis.        ______________________________  MD ANNIE BUHCANAN/FRANTZ    DD:  12/15/2023 11:02  DT:  12/15/2023 12:24    Job#:  [id number]

## 2023-12-15 NOTE — OR NURSING
1234- Pt DC'd home via w/c to private vehicle.  VSS. Pt denied pain, nausea.  DC instructions given to pt and . Both verbalized understanding.  Pt given supplies to stool sample collection to be done at home. All questions answered.

## 2023-12-15 NOTE — ANESTHESIA POSTPROCEDURE EVALUATION
Patient: Latrice Andersen    Procedure Summary       Date: 12/15/23 Room / Location:  ENDOSCOPIC ULTRASOUND ROOM / SURGERY [facility]    Anesthesia Start: 1041 Anesthesia Stop: 1057    Procedure: FLEXIBLE SIGMOIDOSCOPY (Abdomen) Diagnosis: (RECTAL BLEEDING, colitis)    Surgeons: Francois Rollins M.D. Responsible Provider: Nik Kelsey M.D.    Anesthesia Type: MAC ASA Status: 2            Final Anesthesia Type: MAC  Last vitals  BP   Blood Pressure : 119/57    Temp   36.1 °C (97 °F)    Pulse   81   Resp   16    SpO2   94 %      Anesthesia Post Evaluation    Patient location during evaluation: PACU  Patient participation: complete - patient participated  Level of consciousness: awake and alert  Pain score: 0    Airway patency: patent  Anesthetic complications: no  Cardiovascular status: hemodynamically stable  Respiratory status: acceptable  Hydration status: euvolemic    PONV: none          No notable events documented.     Nurse Pain Score: 0 (NPRS)

## 2023-12-15 NOTE — ANESTHESIA PREPROCEDURE EVALUATION
Case: [id number] Date/Time: 12/15/23 1145    Procedure: FLEXIBLE SIGMOIDOSCOPY    Pre-op diagnosis: RECTAL BLEEDING    Location:  ENDOSCOPIC ULTRASOUND ROOM / SURGERY [facility]    Surgeons: Francois Rollins M.D.          Former smoker  Denies: MI/CHF/CVA/DM    Relevant Problems   CARDIAC   (positive) Essential hypertension         (positive) MARIVEL (acute kidney injury) (HCC)   (positive) Stage 3a chronic kidney disease (HCC)       Physical Exam    Airway   Mallampati: II  TM distance: >3 FB  Neck ROM: full       Cardiovascular - normal exam  Rhythm: regular  Rate: normal  (-) murmur     Dental - normal exam           Pulmonary - normal exam  Breath sounds clear to auscultation     Abdominal    Neurological - normal exam                   Anesthesia Plan    ASA 2       Plan - MAC               Induction: intravenous          Informed Consent:    Anesthetic plan and risks discussed with patient.    Use of blood products discussed with: patient whom consented to blood products.

## 2023-12-15 NOTE — ANESTHESIA TIME REPORT
Anesthesia Start and Stop Event Times       Date Time Event    12/15/2023 1028 Ready for Procedure     1041 Anesthesia Start     1057 Anesthesia Stop          Responsible Staff  12/15/23      Name Role Begin End    Nik Kelsey M.D. Anesth 1041 1057          Overtime Reason:  no overtime (within assigned shift)    Comments:

## 2023-12-15 NOTE — OR NURSING
1055- To PACU from [facility] via [place], sleeping, respirations spontaneous and non-labored via OPA. sleeping, respirations spontaneous and non-labored via mask at 8 L.     1110-Pt rouses. Pt denies pain or nausea. Dr. Rollins at the bedside.     1115- Pt drinking water and eating crackers.      1130- Lab at the bedside to draw blood. Patient met criteria for transfer to stage II.      1200- Pt transferred to Stage II via salvador with CNA. assist.  Patient states good pain control. Denies N/V, tolerating PO well.Report called to Annette ALEGRE.

## 2023-12-15 NOTE — DISCHARGE INSTRUCTIONS
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  Biopsied  6 mm sigmoid polyp, removed with cold snare      Pt will need to follow up with outpatient lab to get order for stool sample ordered by Dr. Rollins.    Pt will start prescription Prednisone 60 mg            What to Expect Post Anesthesia    Rest and take it easy for the first 24 hours.  A responsible adult is recommended to remain with you during that time.  It is normal to feel sleepy.  We encourage you to not do anything that requires balance, judgment or coordination.    FOR 24 HOURS DO NOT:  Drive, operate machinery or run household appliances.  Drink beer or alcoholic beverages.  Make important decisions or sign legal documents.    To avoid nausea, slowly advance diet as tolerated, avoiding spicy or greasy foods for the first day.  Add more substantial food to your diet according to your provider's instructions.   INCREASE FLUIDS AND FIBER TO AVOID CONSTIPATION.    MILD FLU-LIKE SYMPTOMS ARE NORMAL.  YOU MAY EXPERIENCE GENERALIZED MUSCLE ACHES, THROAT IRRITATION, HEADACHE AND/OR SOME NAUSEA.    If any questions arise, call your provider.  If your provider is not available, please feel free to call the [facility] at [phone].    MEDICATIONS: Resume taking daily medication.  Take prescribed pain medication with food.  If no medication is prescribed, you may take non-aspirin pain medication if needed.  PAIN MEDICATION CAN BE VERY CONSTIPATING.  Take a stool softener or laxative such as senokot, pericolace, or milk of magnesia if needed.    Diet    Resume your normal diet as tolerated.  A diet low in cholesterol, fat, and sodium is recommended for good health.     Colitis    Colitis is a condition in which the colon is inflamed. It can cause diarrhea, blood in the stool, and abdominal pain.  Colitis can last a short time (be acute), or it may last a long time (become chronic).  What are the causes?  This condition may be caused by:  Infections from viruses or bacteria.  A reaction to medicine.  Certain autoimmune diseases, such as Crohn's disease or ulcerative colitis.  Radiation treatment.  Decreased blood flow to the bowel (ischemia).  What are the signs or symptoms?  Symptoms of this condition include:  Diarrhea, blood in the stool, or black, tarry stool.  Pain in the joints or abdominal pain.  Fever or fatigue.  Vomiting.  Weight loss.  Bloating.  Having fewer bowel movements than usual.  A strong and sudden urge to have a bowel movement.  Feeling like the bowel is not empty after a bowel movement.  How is this diagnosed?  This condition may be diagnosed based on a stool test and a blood test. You may also have other tests, such as:  X-rays.  CT scan.  Colonoscopy.  Endoscopy.  Biopsy.  How is this treated?  Treatment for this condition depends on the cause. This condition may be treated with:  Steps to rest the bowel, such as not eating or drinking for a period of time.  Fluids that are given through an IV.  Medicine for pain and diarrhea.  Antibiotic medicines.  Cortisone medicines.  Surgery.  Follow these instructions at home:  Eating and drinking    Follow instructions from your health care provider about eating or drinking restrictions.  Drink enough fluid to keep your urine pale yellow.  Work with a dietitian to determine whether certain foods cause your condition to flare up.  Avoid foods or drinks that cause flare-ups.  Eat a well-balanced diet.  General instructions  If you were prescribed an antibiotic medicine, take it as told by your health care provider. Do not stop taking the antibiotic even if you start to feel better.  Take over-the-counter and prescription medicines only as told by your health care provider.  Keep all follow-up visits. This is important.  Contact a health care  provider if:  Your symptoms do not go away.  You develop new symptoms.  Get help right away if:  You have a fever that does not go away with treatment.  You develop chills.  You have extreme weakness, fainting, or dehydration.  You vomit repeatedly.  You develop severe pain in your abdomen.  You pass bloody or tarry stool.  Summary  Colitis is a condition in which the colon is inflamed. Colitis can last a short time (be acute), or it may last a long time (become chronic).  Treatment for this condition depends on the cause and may include resting the bowel, taking medicines, or having surgery.  If you were prescribed an antibiotic medicine, take it as told by your health care provider. Do not stop taking the antibiotic even if you start to feel better.  Get help right away if you develop severe pain in your abdomen.  Keep all follow-up visits. This is important.  This information is not intended to replace advice given to you by your health care provider. Make sure you discuss any questions you have with your health care provider.  Document Revised: 08/24/2021 Document Reviewed: 08/24/2021  [employer] Patient Education © 2023 [employer]

## 2023-12-18 LAB
GAMMA INTERFERON BACKGROUND BLD IA-ACNC: 0.08 IU/ML
M TB IFN-G BLD-IMP: NEGATIVE
M TB IFN-G CD4+ BCKGRND COR BLD-ACNC: -0.01 IU/ML
MITOGEN IGNF BCKGRD COR BLD-ACNC: 5.35 IU/ML
QFT TB2 - NIL TBQ2: 0 IU/ML

## 2023-12-28 ENCOUNTER — APPOINTMENT (OUTPATIENT)
Dept: ADMISSIONS | Facility: MEDICAL CENTER | Age: 68
DRG: 164 | End: 2023-12-28
Attending: SURGERY
Payer: MEDICARE

## 2024-01-04 ENCOUNTER — PRE-ADMISSION TESTING (OUTPATIENT)
Dept: ADMISSIONS | Facility: MEDICAL CENTER | Age: 69
DRG: 164 | End: 2024-01-04
Attending: SURGERY
Payer: MEDICARE

## 2024-01-04 RX ORDER — PREDNISONE 20 MG/1
60 TABLET ORAL DAILY
COMMUNITY

## 2024-01-04 RX ORDER — NICOTINE POLACRILEX 2 MG
GUM BUCCAL
COMMUNITY
End: 2024-01-09

## 2024-01-08 SDOH — ECONOMIC STABILITY: INCOME INSECURITY: IN THE LAST 12 MONTHS, WAS THERE A TIME WHEN YOU WERE NOT ABLE TO PAY THE MORTGAGE OR RENT ON TIME?: NO

## 2024-01-08 SDOH — HEALTH STABILITY: MENTAL HEALTH
STRESS IS WHEN SOMEONE FEELS TENSE, NERVOUS, ANXIOUS, OR CAN'T SLEEP AT NIGHT BECAUSE THEIR MIND IS TROUBLED. HOW STRESSED ARE YOU?: RATHER MUCH

## 2024-01-08 SDOH — HEALTH STABILITY: PHYSICAL HEALTH: ON AVERAGE, HOW MANY DAYS PER WEEK DO YOU ENGAGE IN MODERATE TO STRENUOUS EXERCISE (LIKE A BRISK WALK)?: 5 DAYS

## 2024-01-08 SDOH — ECONOMIC STABILITY: HOUSING INSECURITY: IN THE LAST 12 MONTHS, HOW MANY PLACES HAVE YOU LIVED?: 1

## 2024-01-08 SDOH — ECONOMIC STABILITY: TRANSPORTATION INSECURITY
IN THE PAST 12 MONTHS, HAS LACK OF RELIABLE TRANSPORTATION KEPT YOU FROM MEDICAL APPOINTMENTS, MEETINGS, WORK OR FROM GETTING THINGS NEEDED FOR DAILY LIVING?: NO

## 2024-01-08 SDOH — ECONOMIC STABILITY: HOUSING INSECURITY
IN THE LAST 12 MONTHS, WAS THERE A TIME WHEN YOU DID NOT HAVE A STEADY PLACE TO SLEEP OR SLEPT IN A SHELTER (INCLUDING NOW)?: NO

## 2024-01-08 SDOH — ECONOMIC STABILITY: INCOME INSECURITY: HOW HARD IS IT FOR YOU TO PAY FOR THE VERY BASICS LIKE FOOD, HOUSING, MEDICAL CARE, AND HEATING?: NOT VERY HARD

## 2024-01-08 SDOH — ECONOMIC STABILITY: FOOD INSECURITY: WITHIN THE PAST 12 MONTHS, YOU WORRIED THAT YOUR FOOD WOULD RUN OUT BEFORE YOU GOT MONEY TO BUY MORE.: NEVER TRUE

## 2024-01-08 SDOH — ECONOMIC STABILITY: FOOD INSECURITY: WITHIN THE PAST 12 MONTHS, THE FOOD YOU BOUGHT JUST DIDN'T LAST AND YOU DIDN'T HAVE MONEY TO GET MORE.: NEVER TRUE

## 2024-01-08 SDOH — HEALTH STABILITY: PHYSICAL HEALTH: ON AVERAGE, HOW MANY MINUTES DO YOU ENGAGE IN EXERCISE AT THIS LEVEL?: 20 MIN

## 2024-01-08 SDOH — ECONOMIC STABILITY: TRANSPORTATION INSECURITY
IN THE PAST 12 MONTHS, HAS THE LACK OF TRANSPORTATION KEPT YOU FROM MEDICAL APPOINTMENTS OR FROM GETTING MEDICATIONS?: NO

## 2024-01-08 SDOH — ECONOMIC STABILITY: TRANSPORTATION INSECURITY
IN THE PAST 12 MONTHS, HAS LACK OF TRANSPORTATION KEPT YOU FROM MEETINGS, WORK, OR FROM GETTING THINGS NEEDED FOR DAILY LIVING?: NO

## 2024-01-08 ASSESSMENT — LIFESTYLE VARIABLES
HOW OFTEN DO YOU HAVE SIX OR MORE DRINKS ON ONE OCCASION: NEVER
HOW OFTEN DO YOU HAVE A DRINK CONTAINING ALCOHOL: NEVER
AUDIT-C TOTAL SCORE: 0
HOW MANY STANDARD DRINKS CONTAINING ALCOHOL DO YOU HAVE ON A TYPICAL DAY: PATIENT DOES NOT DRINK
SKIP TO QUESTIONS 9-10: 1

## 2024-01-08 ASSESSMENT — SOCIAL DETERMINANTS OF HEALTH (SDOH)
HOW OFTEN DO YOU HAVE A DRINK CONTAINING ALCOHOL: NEVER
HOW OFTEN DO YOU HAVE SIX OR MORE DRINKS ON ONE OCCASION: NEVER
HOW OFTEN DO YOU ATTEND CHURCH OR RELIGIOUS SERVICES?: 1 TO 4 TIMES PER YEAR
IN A TYPICAL WEEK, HOW MANY TIMES DO YOU TALK ON THE PHONE WITH FAMILY, FRIENDS, OR NEIGHBORS?: MORE THAN THREE TIMES A WEEK
HOW OFTEN DO YOU GET TOGETHER WITH FRIENDS OR RELATIVES?: TWICE A WEEK
DO YOU BELONG TO ANY CLUBS OR ORGANIZATIONS SUCH AS CHURCH GROUPS UNIONS, FRATERNAL OR ATHLETIC GROUPS, OR SCHOOL GROUPS?: NO
DO YOU BELONG TO ANY CLUBS OR ORGANIZATIONS SUCH AS CHURCH GROUPS UNIONS, FRATERNAL OR ATHLETIC GROUPS, OR SCHOOL GROUPS?: NO
HOW OFTEN DO YOU ATTEND CHURCH OR RELIGIOUS SERVICES?: 1 TO 4 TIMES PER YEAR
HOW OFTEN DO YOU GET TOGETHER WITH FRIENDS OR RELATIVES?: TWICE A WEEK
HOW OFTEN DO YOU ATTENT MEETINGS OF THE CLUB OR ORGANIZATION YOU BELONG TO?: NEVER
HOW HARD IS IT FOR YOU TO PAY FOR THE VERY BASICS LIKE FOOD, HOUSING, MEDICAL CARE, AND HEATING?: NOT VERY HARD
IN A TYPICAL WEEK, HOW MANY TIMES DO YOU TALK ON THE PHONE WITH FAMILY, FRIENDS, OR NEIGHBORS?: MORE THAN THREE TIMES A WEEK
HOW OFTEN DO YOU ATTENT MEETINGS OF THE CLUB OR ORGANIZATION YOU BELONG TO?: NEVER
WITHIN THE PAST 12 MONTHS, YOU WORRIED THAT YOUR FOOD WOULD RUN OUT BEFORE YOU GOT THE MONEY TO BUY MORE: NEVER TRUE
HOW MANY DRINKS CONTAINING ALCOHOL DO YOU HAVE ON A TYPICAL DAY WHEN YOU ARE DRINKING: PATIENT DOES NOT DRINK

## 2024-01-09 ENCOUNTER — OFFICE VISIT (OUTPATIENT)
Dept: MEDICAL GROUP | Facility: PHYSICIAN GROUP | Age: 69
End: 2024-01-09
Payer: MEDICARE

## 2024-01-09 VITALS
HEIGHT: 63 IN | TEMPERATURE: 97.4 F | HEART RATE: 89 BPM | SYSTOLIC BLOOD PRESSURE: 126 MMHG | OXYGEN SATURATION: 99 % | WEIGHT: 127 LBS | DIASTOLIC BLOOD PRESSURE: 64 MMHG | BODY MASS INDEX: 22.5 KG/M2

## 2024-01-09 DIAGNOSIS — Z23 NEED FOR VACCINATION: ICD-10-CM

## 2024-01-09 DIAGNOSIS — N18.31 STAGE 3A CHRONIC KIDNEY DISEASE: ICD-10-CM

## 2024-01-09 DIAGNOSIS — I10 ESSENTIAL HYPERTENSION: ICD-10-CM

## 2024-01-09 DIAGNOSIS — E78.5 DYSLIPIDEMIA: ICD-10-CM

## 2024-01-09 DIAGNOSIS — C34.92 NSCLC OF LEFT LUNG (HCC): ICD-10-CM

## 2024-01-09 DIAGNOSIS — I70.0 ATHEROSCLEROSIS OF AORTA (HCC): ICD-10-CM

## 2024-01-09 PROBLEM — R19.7 DIARRHEA: Status: RESOLVED | Noted: 2023-09-11 | Resolved: 2024-01-09

## 2024-01-09 PROBLEM — E87.1 HYPONATREMIA: Status: RESOLVED | Noted: 2023-09-11 | Resolved: 2024-01-09

## 2024-01-09 PROBLEM — R91.1 LEFT UPPER LOBE PULMONARY NODULE: Status: RESOLVED | Noted: 2023-05-11 | Resolved: 2024-01-09

## 2024-01-09 PROBLEM — K52.1 DRUG-INDUCED COLITIS: Status: ACTIVE | Noted: 2023-09-13

## 2024-01-09 PROBLEM — N17.9 AKI (ACUTE KIDNEY INJURY) (HCC): Status: RESOLVED | Noted: 2023-09-11 | Resolved: 2024-01-09

## 2024-01-09 PROCEDURE — 90662 IIV NO PRSV INCREASED AG IM: CPT | Performed by: STUDENT IN AN ORGANIZED HEALTH CARE EDUCATION/TRAINING PROGRAM

## 2024-01-09 PROCEDURE — 99214 OFFICE O/P EST MOD 30 MIN: CPT | Mod: 25 | Performed by: STUDENT IN AN ORGANIZED HEALTH CARE EDUCATION/TRAINING PROGRAM

## 2024-01-09 PROCEDURE — 3078F DIAST BP <80 MM HG: CPT | Performed by: STUDENT IN AN ORGANIZED HEALTH CARE EDUCATION/TRAINING PROGRAM

## 2024-01-09 PROCEDURE — 3074F SYST BP LT 130 MM HG: CPT | Performed by: STUDENT IN AN ORGANIZED HEALTH CARE EDUCATION/TRAINING PROGRAM

## 2024-01-09 PROCEDURE — G0008 ADMIN INFLUENZA VIRUS VAC: HCPCS | Performed by: STUDENT IN AN ORGANIZED HEALTH CARE EDUCATION/TRAINING PROGRAM

## 2024-01-09 ASSESSMENT — PATIENT HEALTH QUESTIONNAIRE - PHQ9
CLINICAL INTERPRETATION OF PHQ2 SCORE: 3
5. POOR APPETITE OR OVEREATING: 2 - MORE THAN HALF THE DAYS
SUM OF ALL RESPONSES TO PHQ QUESTIONS 1-9: 7

## 2024-01-09 ASSESSMENT — ENCOUNTER SYMPTOMS
CHILLS: 0
BLURRED VISION: 0
ABDOMINAL PAIN: 0
SHORTNESS OF BREATH: 0
HEADACHES: 0
NAUSEA: 0
VOMITING: 0
FEVER: 0
DIZZINESS: 0
PALPITATIONS: 0
DIARRHEA: 0

## 2024-01-09 ASSESSMENT — FIBROSIS 4 INDEX: FIB4 SCORE: 1.14

## 2024-01-09 NOTE — PROGRESS NOTES
Subjective:     CC:    Chief Complaint   Patient presents with    Establish Care    Vaginal Itching     September on going         HISTORY OF THE PRESENT ILLNESS: Patient is a 68 y.o. female. This pleasant patient is here today to establish care and discuss chronic conditions. Prior PCP was Dr. Bergman.    Problem   Atherosclerosis of Aorta (Hcc)   Drug-Induced Colitis   Nsclc of Left Lung (Hcc)    Diagnosed 06/2023.  Former cigarette smoker.  She was undergoing chemotherapy/immunotherapy.  This was discontinued after the medication seemed to cause significant diarrhea and drug-induced colitis.  She is on a prednisone taper prescribed by GI and her diarrhea has resolved.  She is following with oncology, Dr. Roselia Campbell.  She is scheduled for surgery 1/30 for left upper lobectomy.       History of Radiation Therapy   Stage 3a Chronic Kidney Disease (Hcc)    This is a chronic condition.  Managed by nephrology.  Patient tries to stay hydrated and avoid NSAIDs.     History of Breast Cancer    Diagnosed ~1996  Patient underwent left mastectomy followed by chemotherapy and radiation at time of onset.     Essential Hypertension    This is a chronic condition.  Current Meds: Lisinopril 10 mg daily, metoprolol 25 mg daily  Side effects: None  Associated symptoms: denies chest pain, shortness of breath, headaches, dizziness/lightheadedness        Dyslipidemia    Chronic in nature.  Patient is on pravastatin 40 mg daily  She is tolerating this medication well with no side effects.    Lab Results   Component Value Date/Time    CHOLSTRLTOT 127 04/21/2021 1625    TRIGLYCERIDE 85 04/21/2021 1625    HDL 39 (A) 04/21/2021 1625    LDL 71 04/21/2021 1625            Past Medical History: Diagnoses of NSCLC of left lung (HCC), Essential hypertension, Dyslipidemia, Stage 3a chronic kidney disease (HCC), Atherosclerosis of aorta (HCC), and Need for vaccination were pertinent to this visit.    Current Outpatient Medications    Medication Sig    predniSONE (DELTASONE) 20 MG Tab Take 20 mg by mouth 3 times a day.    folic acid (FOLVITE) 1 MG Tab TAKE 1 TABLET BY MOUTH ONCE DAILY    fenofibrate micronized (LOFIBRA) 134 MG capsule Take 1 Capsule by mouth every day.    levothyroxine (SYNTHROID) 50 MCG Tab Take 1 Tablet by mouth every morning on an empty stomach.    VITAMIN D PO Take 5,000 Units by mouth every day.    metoprolol SR (TOPROL XL) 25 MG TABLET SR 24 HR Take 25 mg by mouth every day.    venlafaxine (EFFEXOR) 75 MG Tab Take 1 Tablet by mouth every day.    pravastatin (PRAVACHOL) 40 MG tablet Take 1 Tablet by mouth every day.    lisinopril (PRINIVIL) 10 MG Tab Take 1 Tablet by mouth every day.    multivitamin Tab Take 1 Tablet by mouth every day. (Patient not taking: Reported on 2024)        Social History     Socioeconomic History    Marital status:     Highest education level: Some college, no degree   Tobacco Use    Smoking status: Former     Current packs/day: 0.00     Average packs/day: 0.3 packs/day for 45.0 years (11.3 ttl pk-yrs)     Types: Cigarettes     Start date: 1977     Quit date: 2022     Years since quittin.5     Passive exposure: Current (Spouse)    Smokeless tobacco: Former     Quit date: 2022    Tobacco comments:     5 daily    Vaping Use    Vaping Use: Never used   Substance and Sexual Activity    Alcohol use: No    Drug use: No    Sexual activity: Not Currently     Social Determinants of Health     Financial Resource Strain: Low Risk  (2024)    Overall Financial Resource Strain (CARDIA)     Difficulty of Paying Living Expenses: Not very hard   Food Insecurity: No Food Insecurity (2024)    Hunger Vital Sign     Worried About Running Out of Food in the Last Year: Never true     Ran Out of Food in the Last Year: Never true   Transportation Needs: No Transportation Needs (2024)    PRAPARE - Transportation     Lack of Transportation (Medical): No     Lack of Transportation  (Non-Medical): No   Physical Activity: Insufficiently Active (1/8/2024)    Exercise Vital Sign     Days of Exercise per Week: 5 days     Minutes of Exercise per Session: 20 min   Stress: Stress Concern Present (1/8/2024)    [employer] [place] [employer] - Occupational Stress Questionnaire     Feeling of Stress : Rather much   Social Connections: Moderately Integrated (1/8/2024)    Social Connection and Isolation Panel [NHANES]     Frequency of Communication with Friends and Family: More than three times a week     Frequency of Social Gatherings with Friends and Family: Twice a week     Attends Anabaptist Services: 1 to 4 times per year     Active Member of Clubs or Organizations: No     Attends Club or Organization Meetings: Never     Marital Status:    Housing Stability: Low Risk  (1/8/2024)    Housing Stability Vital Sign     Unable to Pay for Housing in the Last Year: No     Number of Places Lived in the Last Year: 1     Unstable Housing in the Last Year: No       Family History   Problem Relation Age of Onset    Cancer Mother         breast cancer/Breast    Breast Cancer Mother     Hypertension Maternal Uncle     Cancer Maternal Uncle     Hypertension Maternal Grandmother     Hyperlipidemia Maternal Grandmother     Heart Disease Maternal Grandmother     Cancer Paternal Grandfather         Lung ca, smoker    Lung Cancer Maternal Grandfather     Alzheimer's Disease Maternal Aunt     Kidney Disease Maternal Aunt     Diabetes Neg Hx          Health Maintenance: Completed      ROS:   Review of Systems   Constitutional:  Negative for chills and fever.   Eyes:  Negative for blurred vision.   Respiratory:  Negative for shortness of breath.    Cardiovascular:  Negative for chest pain and palpitations.   Gastrointestinal:  Negative for abdominal pain, diarrhea, nausea and vomiting.   Genitourinary:  Negative for dysuria.   Neurological:  Negative for dizziness and headaches.       Objective:     Exam: BP  "126/64 (BP Location: Left arm, Patient Position: Sitting)   Pulse 89   Temp 36.3 °C (97.4 °F) (Temporal)   Ht 1.6 m (5' 3\")   Wt 57.6 kg (127 lb)   SpO2 99%  Body mass index is 22.5 kg/m².    Physical Exam  Constitutional:       General: She is not in acute distress.  HENT:      Right Ear: External ear normal.      Left Ear: External ear normal.      Mouth/Throat:      Mouth: Mucous membranes are moist.   Eyes:      Extraocular Movements: Extraocular movements intact.      Conjunctiva/sclera: Conjunctivae normal.   Cardiovascular:      Rate and Rhythm: Normal rate and regular rhythm.      Heart sounds: No murmur heard.     No friction rub. No gallop.   Pulmonary:      Effort: Pulmonary effort is normal.      Breath sounds: No wheezing, rhonchi or rales.   Musculoskeletal:      Cervical back: Normal range of motion and neck supple.      Right lower leg: No edema.      Left lower leg: No edema.   Lymphadenopathy:      Cervical: No cervical adenopathy.   Skin:     General: Skin is warm and dry.   Neurological:      Mental Status: She is alert.   Psychiatric:         Mood and Affect: Mood normal.           Assessment & Plan:     68 y.o. female with the following -    1. NSCLC of left lung (HCC)  New diagnosis June 2023.  Patient is scheduled for left upper lobectomy at the end of this month.  She is following with oncology.  She is requesting a referral to cancer care specialists, I will place referral for her.  - Referral to Hematology Oncology    2. Essential hypertension  Chronic, controlled.  Blood pressure is at goal today.  Continue current regimen.    3. Dyslipidemia  Chronic, controlled. She is on a statin for primary prevention and tolerating it well. The last cholesterol panel was within normal limits. Continue current dosing.  - Lipid Profile; Future    4. Stage 3a chronic kidney disease (HCC)  Chronic, stable.  Follows with nephrology for management.  Continue adequate hydration, avoid nephrotoxic " agents.  Continue follow-up as directed.    5. Atherosclerosis of aorta (HCC)  See HCC form    6. Need for vaccination  - INFLUENZA VACCINE, HIGH DOSE (65+ ONLY)      HCC Gap Form    Diagnosis: I70.0 - Atherosclerosis of aorta (HCC)  Assessment and plan: Chronic, stable. Noted incidentally on CT of the abdomen/pelvis 10/11/2023. Continue with current defined treatment plan: Continue pravastatin 40 mg daily. Follow-up at least annually.  Last edited 01/09/24 15:58 PST by Beata White P.A.-C.           Return in about 3 months (around 4/9/2024) for follow up.    Please note that this dictation was created using voice recognition software. I have made every reasonable attempt to correct obvious errors, but I expect that there are errors of grammar and possibly content that I did not discover before finalizing the note.

## 2024-01-15 DIAGNOSIS — F41.8 OTHER SPECIFIED ANXIETY DISORDERS: ICD-10-CM

## 2024-01-15 RX ORDER — VENLAFAXINE 75 MG/1
75 TABLET ORAL DAILY
Qty: 90 TABLET | Refills: 0 | Status: SHIPPED | OUTPATIENT
Start: 2024-01-15

## 2024-01-19 ENCOUNTER — HOSPITAL ENCOUNTER (OUTPATIENT)
Dept: RADIOLOGY | Facility: MEDICAL CENTER | Age: 69
End: 2024-01-19
Attending: SURGERY
Payer: MEDICARE

## 2024-01-19 DIAGNOSIS — C34.90 MALIGNANT NEOPLASM OF BRONCHUS AND LUNG (HCC): ICD-10-CM

## 2024-01-19 PROCEDURE — 700117 HCHG RX CONTRAST REV CODE 255: Performed by: SURGERY

## 2024-01-19 PROCEDURE — 71260 CT THORAX DX C+: CPT

## 2024-01-19 RX ADMIN — IOHEXOL 75 ML: 350 INJECTION, SOLUTION INTRAVENOUS at 15:45

## 2024-01-30 ENCOUNTER — ANESTHESIA EVENT (OUTPATIENT)
Dept: SURGERY | Facility: MEDICAL CENTER | Age: 69
DRG: 164 | End: 2024-01-30
Payer: MEDICARE

## 2024-01-30 ENCOUNTER — HOSPITAL ENCOUNTER (INPATIENT)
Facility: MEDICAL CENTER | Age: 69
LOS: 3 days | DRG: 164 | End: 2024-02-02
Attending: SURGERY | Admitting: SURGERY
Payer: MEDICARE

## 2024-01-30 ENCOUNTER — ANESTHESIA (OUTPATIENT)
Dept: SURGERY | Facility: MEDICAL CENTER | Age: 69
DRG: 164 | End: 2024-01-30
Payer: MEDICARE

## 2024-01-30 ENCOUNTER — APPOINTMENT (OUTPATIENT)
Dept: RADIOLOGY | Facility: MEDICAL CENTER | Age: 69
DRG: 164 | End: 2024-01-30
Attending: PHYSICIAN ASSISTANT
Payer: MEDICARE

## 2024-01-30 DIAGNOSIS — J43.9 PULMONARY EMPHYSEMA, UNSPECIFIED EMPHYSEMA TYPE (HCC): ICD-10-CM

## 2024-01-30 DIAGNOSIS — Z87.891 FORMER SMOKER: ICD-10-CM

## 2024-01-30 DIAGNOSIS — C34.92 NSCLC OF LEFT LUNG (HCC): ICD-10-CM

## 2024-01-30 LAB
ABO GROUP BLD: NORMAL
ANION GAP SERPL CALC-SCNC: 10 MMOL/L (ref 7–16)
BLD GP AB SCN SERPL QL: NORMAL
BUN SERPL-MCNC: 25 MG/DL (ref 8–22)
CALCIUM SERPL-MCNC: 9.4 MG/DL (ref 8.5–10.5)
CHLORIDE SERPL-SCNC: 107 MMOL/L (ref 96–112)
CO2 SERPL-SCNC: 22 MMOL/L (ref 20–33)
CREAT SERPL-MCNC: 1.14 MG/DL (ref 0.5–1.4)
ERYTHROCYTE [DISTWIDTH] IN BLOOD BY AUTOMATED COUNT: 50.5 FL (ref 35.9–50)
GFR SERPLBLD CREATININE-BSD FMLA CKD-EPI: 52 ML/MIN/1.73 M 2
GLUCOSE SERPL-MCNC: 94 MG/DL (ref 65–99)
HCT VFR BLD AUTO: 38.3 % (ref 37–47)
HGB BLD-MCNC: 11.8 G/DL (ref 12–16)
INR PPP: 0.95 (ref 0.87–1.13)
MCH RBC QN AUTO: 27.9 PG (ref 27–33)
MCHC RBC AUTO-ENTMCNC: 30.8 G/DL (ref 32.2–35.5)
MCV RBC AUTO: 90.5 FL (ref 81.4–97.8)
PATHOLOGY CONSULT NOTE: NORMAL
PLATELET # BLD AUTO: 315 K/UL (ref 164–446)
PMV BLD AUTO: 8.5 FL (ref 9–12.9)
POTASSIUM SERPL-SCNC: 4.3 MMOL/L (ref 3.6–5.5)
PROTHROMBIN TIME: 12.8 SEC (ref 12–14.6)
RBC # BLD AUTO: 4.23 M/UL (ref 4.2–5.4)
RH BLD: NORMAL
SODIUM SERPL-SCNC: 139 MMOL/L (ref 135–145)
WBC # BLD AUTO: 8.7 K/UL (ref 4.8–10.8)

## 2024-01-30 PROCEDURE — 86901 BLOOD TYPING SEROLOGIC RH(D): CPT

## 2024-01-30 PROCEDURE — 36415 COLL VENOUS BLD VENIPUNCTURE: CPT

## 2024-01-30 PROCEDURE — 0BTG4ZZ RESECTION OF LEFT UPPER LUNG LOBE, PERCUTANEOUS ENDOSCOPIC APPROACH: ICD-10-PCS | Performed by: SURGERY

## 2024-01-30 PROCEDURE — 07B74ZX EXCISION OF THORAX LYMPHATIC, PERCUTANEOUS ENDOSCOPIC APPROACH, DIAGNOSTIC: ICD-10-PCS | Performed by: SURGERY

## 2024-01-30 PROCEDURE — A9270 NON-COVERED ITEM OR SERVICE: HCPCS | Performed by: PHYSICIAN ASSISTANT

## 2024-01-30 PROCEDURE — 85610 PROTHROMBIN TIME: CPT

## 2024-01-30 PROCEDURE — 160029 HCHG SURGERY MINUTES - 1ST 30 MINS LEVEL 4: Performed by: SURGERY

## 2024-01-30 PROCEDURE — 88309 TISSUE EXAM BY PATHOLOGIST: CPT

## 2024-01-30 PROCEDURE — 07BD4ZX EXCISION OF AORTIC LYMPHATIC, PERCUTANEOUS ENDOSCOPIC APPROACH, DIAGNOSTIC: ICD-10-PCS | Performed by: SURGERY

## 2024-01-30 PROCEDURE — 700105 HCHG RX REV CODE 258: Performed by: SURGERY

## 2024-01-30 PROCEDURE — 700102 HCHG RX REV CODE 250 W/ 637 OVERRIDE(OP): Performed by: ANESTHESIOLOGY

## 2024-01-30 PROCEDURE — 71045 X-RAY EXAM CHEST 1 VIEW: CPT

## 2024-01-30 PROCEDURE — 160048 HCHG OR STATISTICAL LEVEL 1-5: Performed by: SURGERY

## 2024-01-30 PROCEDURE — 80048 BASIC METABOLIC PNL TOTAL CA: CPT

## 2024-01-30 PROCEDURE — 700111 HCHG RX REV CODE 636 W/ 250 OVERRIDE (IP): Performed by: PHYSICIAN ASSISTANT

## 2024-01-30 PROCEDURE — 700101 HCHG RX REV CODE 250: Performed by: ANESTHESIOLOGY

## 2024-01-30 PROCEDURE — 85027 COMPLETE CBC AUTOMATED: CPT

## 2024-01-30 PROCEDURE — 86850 RBC ANTIBODY SCREEN: CPT

## 2024-01-30 PROCEDURE — 160009 HCHG ANES TIME/MIN: Performed by: SURGERY

## 2024-01-30 PROCEDURE — 700105 HCHG RX REV CODE 258: Performed by: PHYSICIAN ASSISTANT

## 2024-01-30 PROCEDURE — 700101 HCHG RX REV CODE 250: Performed by: SURGERY

## 2024-01-30 PROCEDURE — 770001 HCHG ROOM/CARE - MED/SURG/GYN PRIV*

## 2024-01-30 PROCEDURE — 700102 HCHG RX REV CODE 250 W/ 637 OVERRIDE(OP): Performed by: PHYSICIAN ASSISTANT

## 2024-01-30 PROCEDURE — 110371 HCHG SHELL REV 272: Performed by: SURGERY

## 2024-01-30 PROCEDURE — A9270 NON-COVERED ITEM OR SERVICE: HCPCS | Performed by: ANESTHESIOLOGY

## 2024-01-30 PROCEDURE — 160002 HCHG RECOVERY MINUTES (STAT): Performed by: SURGERY

## 2024-01-30 PROCEDURE — 700111 HCHG RX REV CODE 636 W/ 250 OVERRIDE (IP): Performed by: ANESTHESIOLOGY

## 2024-01-30 PROCEDURE — 160035 HCHG PACU - 1ST 60 MINS PHASE I: Performed by: SURGERY

## 2024-01-30 PROCEDURE — 160041 HCHG SURGERY MINUTES - EA ADDL 1 MIN LEVEL 4: Performed by: SURGERY

## 2024-01-30 PROCEDURE — 86900 BLOOD TYPING SEROLOGIC ABO: CPT

## 2024-01-30 PROCEDURE — 88305 TISSUE EXAM BY PATHOLOGIST: CPT | Mod: 59

## 2024-01-30 RX ORDER — DEXAMETHASONE SODIUM PHOSPHATE 4 MG/ML
INJECTION, SOLUTION INTRA-ARTICULAR; INTRALESIONAL; INTRAMUSCULAR; INTRAVENOUS; SOFT TISSUE PRN
Status: DISCONTINUED | OUTPATIENT
Start: 2024-01-30 | End: 2024-01-30 | Stop reason: SURG

## 2024-01-30 RX ORDER — CEFAZOLIN SODIUM 1 G/3ML
INJECTION, POWDER, FOR SOLUTION INTRAMUSCULAR; INTRAVENOUS PRN
Status: DISCONTINUED | OUTPATIENT
Start: 2024-01-30 | End: 2024-01-30 | Stop reason: SURG

## 2024-01-30 RX ORDER — HALOPERIDOL 5 MG/ML
1 INJECTION INTRAMUSCULAR
Status: DISCONTINUED | OUTPATIENT
Start: 2024-01-30 | End: 2024-01-30 | Stop reason: HOSPADM

## 2024-01-30 RX ORDER — KETOROLAC TROMETHAMINE 15 MG/ML
15 INJECTION, SOLUTION INTRAMUSCULAR; INTRAVENOUS EVERY 6 HOURS
Status: DISCONTINUED | OUTPATIENT
Start: 2024-01-30 | End: 2024-02-02 | Stop reason: HOSPADM

## 2024-01-30 RX ORDER — HYDROMORPHONE HYDROCHLORIDE 1 MG/ML
0.2 INJECTION, SOLUTION INTRAMUSCULAR; INTRAVENOUS; SUBCUTANEOUS
Status: DISCONTINUED | OUTPATIENT
Start: 2024-01-30 | End: 2024-01-30 | Stop reason: HOSPADM

## 2024-01-30 RX ORDER — SODIUM CHLORIDE, SODIUM LACTATE, POTASSIUM CHLORIDE, CALCIUM CHLORIDE 600; 310; 30; 20 MG/100ML; MG/100ML; MG/100ML; MG/100ML
INJECTION, SOLUTION INTRAVENOUS CONTINUOUS
Status: DISCONTINUED | OUTPATIENT
Start: 2024-01-30 | End: 2024-02-02 | Stop reason: HOSPADM

## 2024-01-30 RX ORDER — ENALAPRILAT 1.25 MG/ML
2.5 INJECTION INTRAVENOUS EVERY 6 HOURS PRN
Status: DISCONTINUED | OUTPATIENT
Start: 2024-01-30 | End: 2024-02-02 | Stop reason: HOSPADM

## 2024-01-30 RX ORDER — ONDANSETRON 2 MG/ML
4 INJECTION INTRAMUSCULAR; INTRAVENOUS
Status: DISCONTINUED | OUTPATIENT
Start: 2024-01-30 | End: 2024-01-30 | Stop reason: HOSPADM

## 2024-01-30 RX ORDER — ONDANSETRON 2 MG/ML
INJECTION INTRAMUSCULAR; INTRAVENOUS PRN
Status: DISCONTINUED | OUTPATIENT
Start: 2024-01-30 | End: 2024-01-30 | Stop reason: SURG

## 2024-01-30 RX ORDER — METOPROLOL SUCCINATE 25 MG/1
25 TABLET, EXTENDED RELEASE ORAL DAILY
Status: DISCONTINUED | OUTPATIENT
Start: 2024-01-31 | End: 2024-02-02 | Stop reason: HOSPADM

## 2024-01-30 RX ORDER — FENOFIBRATE 134 MG/1
134 CAPSULE ORAL DAILY
Status: DISCONTINUED | OUTPATIENT
Start: 2024-01-31 | End: 2024-02-02 | Stop reason: HOSPADM

## 2024-01-30 RX ORDER — SODIUM CHLORIDE, SODIUM LACTATE, POTASSIUM CHLORIDE, CALCIUM CHLORIDE 600; 310; 30; 20 MG/100ML; MG/100ML; MG/100ML; MG/100ML
INJECTION, SOLUTION INTRAVENOUS CONTINUOUS
Status: ACTIVE | OUTPATIENT
Start: 2024-01-30 | End: 2024-01-30

## 2024-01-30 RX ORDER — ACETAMINOPHEN 500 MG
1000 TABLET ORAL ONCE
Status: COMPLETED | OUTPATIENT
Start: 2024-01-30 | End: 2024-01-30

## 2024-01-30 RX ORDER — HYDROMORPHONE HYDROCHLORIDE 1 MG/ML
0.1 INJECTION, SOLUTION INTRAMUSCULAR; INTRAVENOUS; SUBCUTANEOUS
Status: DISCONTINUED | OUTPATIENT
Start: 2024-01-30 | End: 2024-01-30 | Stop reason: HOSPADM

## 2024-01-30 RX ORDER — OXYCODONE HCL 5 MG/5 ML
5 SOLUTION, ORAL ORAL
Status: DISCONTINUED | OUTPATIENT
Start: 2024-01-30 | End: 2024-01-30 | Stop reason: HOSPADM

## 2024-01-30 RX ORDER — DIPHENHYDRAMINE HYDROCHLORIDE 50 MG/ML
25 INJECTION INTRAMUSCULAR; INTRAVENOUS EVERY 6 HOURS PRN
Status: DISCONTINUED | OUTPATIENT
Start: 2024-01-30 | End: 2024-02-02 | Stop reason: HOSPADM

## 2024-01-30 RX ORDER — DIPHENHYDRAMINE HCL 25 MG
25 TABLET ORAL EVERY 6 HOURS PRN
Status: DISCONTINUED | OUTPATIENT
Start: 2024-01-30 | End: 2024-02-02 | Stop reason: HOSPADM

## 2024-01-30 RX ORDER — DIPHENHYDRAMINE HYDROCHLORIDE 50 MG/ML
12.5 INJECTION INTRAMUSCULAR; INTRAVENOUS
Status: DISCONTINUED | OUTPATIENT
Start: 2024-01-30 | End: 2024-01-30 | Stop reason: HOSPADM

## 2024-01-30 RX ORDER — HYDRALAZINE HYDROCHLORIDE 20 MG/ML
10 INJECTION INTRAMUSCULAR; INTRAVENOUS EVERY 6 HOURS PRN
Status: DISCONTINUED | OUTPATIENT
Start: 2024-01-30 | End: 2024-02-02 | Stop reason: HOSPADM

## 2024-01-30 RX ORDER — SODIUM CHLORIDE, SODIUM LACTATE, POTASSIUM CHLORIDE, CALCIUM CHLORIDE 600; 310; 30; 20 MG/100ML; MG/100ML; MG/100ML; MG/100ML
INJECTION, SOLUTION INTRAVENOUS CONTINUOUS
Status: DISCONTINUED | OUTPATIENT
Start: 2024-01-30 | End: 2024-01-30 | Stop reason: HOSPADM

## 2024-01-30 RX ORDER — ENOXAPARIN SODIUM 100 MG/ML
40 INJECTION SUBCUTANEOUS DAILY
Status: DISCONTINUED | OUTPATIENT
Start: 2024-01-31 | End: 2024-02-02 | Stop reason: HOSPADM

## 2024-01-30 RX ORDER — MEPERIDINE HYDROCHLORIDE 25 MG/ML
6.25 INJECTION INTRAMUSCULAR; INTRAVENOUS; SUBCUTANEOUS
Status: DISCONTINUED | OUTPATIENT
Start: 2024-01-30 | End: 2024-01-30 | Stop reason: HOSPADM

## 2024-01-30 RX ORDER — BUPIVACAINE HYDROCHLORIDE AND EPINEPHRINE 5; 5 MG/ML; UG/ML
INJECTION, SOLUTION EPIDURAL; INTRACAUDAL; PERINEURAL
Status: DISCONTINUED | OUTPATIENT
Start: 2024-01-30 | End: 2024-01-30 | Stop reason: HOSPADM

## 2024-01-30 RX ORDER — CELECOXIB 200 MG/1
200 CAPSULE ORAL ONCE
Status: COMPLETED | OUTPATIENT
Start: 2024-01-30 | End: 2024-01-30

## 2024-01-30 RX ORDER — VENLAFAXINE 75 MG/1
75 TABLET ORAL DAILY
Status: DISCONTINUED | OUTPATIENT
Start: 2024-01-31 | End: 2024-02-02 | Stop reason: HOSPADM

## 2024-01-30 RX ORDER — LEVOTHYROXINE SODIUM 0.05 MG/1
50 TABLET ORAL
Status: DISCONTINUED | OUTPATIENT
Start: 2024-01-31 | End: 2024-02-02 | Stop reason: HOSPADM

## 2024-01-30 RX ORDER — LISINOPRIL 10 MG/1
10 TABLET ORAL DAILY
Status: DISCONTINUED | OUTPATIENT
Start: 2024-01-31 | End: 2024-02-02 | Stop reason: HOSPADM

## 2024-01-30 RX ORDER — IBUPROFEN 800 MG/1
800 TABLET ORAL 3 TIMES DAILY PRN
Status: DISCONTINUED | OUTPATIENT
Start: 2024-02-02 | End: 2024-02-02 | Stop reason: HOSPADM

## 2024-01-30 RX ORDER — PRAVASTATIN SODIUM 20 MG
40 TABLET ORAL DAILY
Status: DISCONTINUED | OUTPATIENT
Start: 2024-01-31 | End: 2024-02-02 | Stop reason: HOSPADM

## 2024-01-30 RX ORDER — HYDRALAZINE HYDROCHLORIDE 20 MG/ML
5 INJECTION INTRAMUSCULAR; INTRAVENOUS
Status: DISCONTINUED | OUTPATIENT
Start: 2024-01-30 | End: 2024-01-30 | Stop reason: HOSPADM

## 2024-01-30 RX ORDER — ONDANSETRON 4 MG/1
4 TABLET, ORALLY DISINTEGRATING ORAL EVERY 4 HOURS PRN
Status: DISCONTINUED | OUTPATIENT
Start: 2024-01-30 | End: 2024-02-02 | Stop reason: HOSPADM

## 2024-01-30 RX ORDER — HYDROMORPHONE HYDROCHLORIDE 1 MG/ML
0.4 INJECTION, SOLUTION INTRAMUSCULAR; INTRAVENOUS; SUBCUTANEOUS
Status: DISCONTINUED | OUTPATIENT
Start: 2024-01-30 | End: 2024-01-30 | Stop reason: HOSPADM

## 2024-01-30 RX ORDER — HYDROCODONE BITARTRATE AND ACETAMINOPHEN 5; 325 MG/1; MG/1
1-2 TABLET ORAL EVERY 4 HOURS PRN
Status: DISCONTINUED | OUTPATIENT
Start: 2024-01-30 | End: 2024-02-02 | Stop reason: HOSPADM

## 2024-01-30 RX ORDER — ONDANSETRON 2 MG/ML
4 INJECTION INTRAMUSCULAR; INTRAVENOUS EVERY 4 HOURS PRN
Status: DISCONTINUED | OUTPATIENT
Start: 2024-01-30 | End: 2024-02-02 | Stop reason: HOSPADM

## 2024-01-30 RX ORDER — LIDOCAINE HYDROCHLORIDE 40 MG/ML
SOLUTION TOPICAL PRN
Status: DISCONTINUED | OUTPATIENT
Start: 2024-01-30 | End: 2024-01-30 | Stop reason: SURG

## 2024-01-30 RX ORDER — OXYCODONE HCL 5 MG/5 ML
10 SOLUTION, ORAL ORAL
Status: DISCONTINUED | OUTPATIENT
Start: 2024-01-30 | End: 2024-01-30 | Stop reason: HOSPADM

## 2024-01-30 RX ORDER — HYDRALAZINE HYDROCHLORIDE 20 MG/ML
INJECTION INTRAMUSCULAR; INTRAVENOUS PRN
Status: DISCONTINUED | OUTPATIENT
Start: 2024-01-30 | End: 2024-01-30 | Stop reason: SURG

## 2024-01-30 RX ORDER — FAMOTIDINE 20 MG/1
10 TABLET, FILM COATED ORAL 2 TIMES DAILY
Status: DISCONTINUED | OUTPATIENT
Start: 2024-01-30 | End: 2024-02-02 | Stop reason: HOSPADM

## 2024-01-30 RX ORDER — HYDROMORPHONE HYDROCHLORIDE 2 MG/ML
INJECTION, SOLUTION INTRAMUSCULAR; INTRAVENOUS; SUBCUTANEOUS PRN
Status: DISCONTINUED | OUTPATIENT
Start: 2024-01-30 | End: 2024-01-30 | Stop reason: SURG

## 2024-01-30 RX ORDER — MORPHINE SULFATE 4 MG/ML
2 INJECTION INTRAVENOUS
Status: DISCONTINUED | OUTPATIENT
Start: 2024-01-30 | End: 2024-02-02 | Stop reason: HOSPADM

## 2024-01-30 RX ORDER — LABETALOL HYDROCHLORIDE 5 MG/ML
5 INJECTION, SOLUTION INTRAVENOUS
Status: DISCONTINUED | OUTPATIENT
Start: 2024-01-30 | End: 2024-01-30 | Stop reason: HOSPADM

## 2024-01-30 RX ADMIN — HYDRALAZINE HYDROCHLORIDE 10 MG: 20 INJECTION, SOLUTION INTRAMUSCULAR; INTRAVENOUS at 14:13

## 2024-01-30 RX ADMIN — SUGAMMADEX 200 MG: 100 INJECTION, SOLUTION INTRAVENOUS at 15:11

## 2024-01-30 RX ADMIN — FAMOTIDINE 10 MG: 20 TABLET, FILM COATED ORAL at 17:46

## 2024-01-30 RX ADMIN — PROPOFOL 50 MG: 10 INJECTION, EMULSION INTRAVENOUS at 13:58

## 2024-01-30 RX ADMIN — SODIUM CHLORIDE, POTASSIUM CHLORIDE, SODIUM LACTATE AND CALCIUM CHLORIDE: 600; 310; 30; 20 INJECTION, SOLUTION INTRAVENOUS at 13:36

## 2024-01-30 RX ADMIN — KETOROLAC TROMETHAMINE 15 MG: 15 INJECTION, SOLUTION INTRAMUSCULAR; INTRAVENOUS at 17:46

## 2024-01-30 RX ADMIN — ONDANSETRON 4 MG: 2 INJECTION INTRAMUSCULAR; INTRAVENOUS at 14:54

## 2024-01-30 RX ADMIN — CELECOXIB 200 MG: 200 CAPSULE ORAL at 12:57

## 2024-01-30 RX ADMIN — HYDROMORPHONE HYDROCHLORIDE 2 MG: 2 INJECTION INTRAMUSCULAR; INTRAVENOUS; SUBCUTANEOUS at 14:07

## 2024-01-30 RX ADMIN — PROPOFOL 200 MG: 10 INJECTION, EMULSION INTRAVENOUS at 13:42

## 2024-01-30 RX ADMIN — SODIUM CHLORIDE, POTASSIUM CHLORIDE, SODIUM LACTATE AND CALCIUM CHLORIDE: 600; 310; 30; 20 INJECTION, SOLUTION INTRAVENOUS at 17:48

## 2024-01-30 RX ADMIN — ACETAMINOPHEN 1000 MG: 500 TABLET ORAL at 12:57

## 2024-01-30 RX ADMIN — HYDRALAZINE HYDROCHLORIDE 10 MG: 20 INJECTION, SOLUTION INTRAMUSCULAR; INTRAVENOUS at 14:03

## 2024-01-30 RX ADMIN — CEFAZOLIN 2 G: 1 INJECTION, POWDER, FOR SOLUTION INTRAMUSCULAR; INTRAVENOUS at 13:49

## 2024-01-30 RX ADMIN — FENTANYL CITRATE 100 MCG: 50 INJECTION, SOLUTION INTRAMUSCULAR; INTRAVENOUS at 13:40

## 2024-01-30 RX ADMIN — LIDOCAINE HYDROCHLORIDE 4 ML: 40 SOLUTION TOPICAL at 13:43

## 2024-01-30 RX ADMIN — DEXAMETHASONE SODIUM PHOSPHATE 4 MG: 4 INJECTION INTRA-ARTICULAR; INTRALESIONAL; INTRAMUSCULAR; INTRAVENOUS; SOFT TISSUE at 13:58

## 2024-01-30 RX ADMIN — ROCURONIUM BROMIDE 50 MG: 10 INJECTION, SOLUTION INTRAVENOUS at 13:42

## 2024-01-30 ASSESSMENT — LIFESTYLE VARIABLES
TOTAL SCORE: 0
HAVE PEOPLE ANNOYED YOU BY CRITICIZING YOUR DRINKING: NO
ALCOHOL_USE: NO
CONSUMPTION TOTAL: NEGATIVE
TOTAL SCORE: 0
ON A TYPICAL DAY WHEN YOU DRINK ALCOHOL HOW MANY DRINKS DO YOU HAVE: 0
HAVE YOU EVER FELT YOU SHOULD CUT DOWN ON YOUR DRINKING: NO
DOES PATIENT WANT TO STOP DRINKING: NO
EVER HAD A DRINK FIRST THING IN THE MORNING TO STEADY YOUR NERVES TO GET RID OF A HANGOVER: NO
EVER FELT BAD OR GUILTY ABOUT YOUR DRINKING: NO
TOTAL SCORE: 0
HOW MANY TIMES IN THE PAST YEAR HAVE YOU HAD 5 OR MORE DRINKS IN A DAY: 0
AVERAGE NUMBER OF DAYS PER WEEK YOU HAVE A DRINK CONTAINING ALCOHOL: 0

## 2024-01-30 ASSESSMENT — PAIN DESCRIPTION - PAIN TYPE
TYPE: SURGICAL PAIN
TYPE: ACUTE PAIN;SURGICAL PAIN
TYPE: SURGICAL PAIN

## 2024-01-30 ASSESSMENT — COGNITIVE AND FUNCTIONAL STATUS - GENERAL
MOBILITY SCORE: 24
DAILY ACTIVITIY SCORE: 23
SUGGESTED CMS G CODE MODIFIER MOBILITY: CH
DRESSING REGULAR LOWER BODY CLOTHING: A LITTLE
SUGGESTED CMS G CODE MODIFIER DAILY ACTIVITY: CI

## 2024-01-30 ASSESSMENT — PATIENT HEALTH QUESTIONNAIRE - PHQ9
SUM OF ALL RESPONSES TO PHQ9 QUESTIONS 1 AND 2: 0
1. LITTLE INTEREST OR PLEASURE IN DOING THINGS: NOT AT ALL
2. FEELING DOWN, DEPRESSED, IRRITABLE, OR HOPELESS: NOT AT ALL

## 2024-01-30 ASSESSMENT — FIBROSIS 4 INDEX: FIB4 SCORE: 1.14

## 2024-01-30 NOTE — OR SURGEON
Immediate Post OP Note    PreOp Diagnosis: Left upper lobe lung cancer      PostOp Diagnosis: Same      Procedure(s):  THORACOSCOPY - Wound Class: Clean Contaminated  EXCISION, MASS, INTERNAL MAMMARY - Wound Class: Clean Contaminated  LOBECTOMY, UPPER - Wound Class: Clean Contaminated  LYMPH NODE DISSECTION - Wound Class: Clean Contaminated    Surgeon(s):  John H Ganser, M.D.    Anesthesiologist/Type of Anesthesia:  Anesthesiologist: Del Dean M.D./General    Surgical Staff:  Circulator: Yash Wang R.N.  Scrub Person: An Paulino Assist: ADI Blank    Specimens removed if any:  ID Type Source Tests Collected by Time Destination   A : Hilar node, station 10 Tissue Lymph Node PATHOLOGY SPECIMEN John H Ganser, M.D. 1/30/2024  2:05 PM    B : Aortopulmonary node, station 5 Tissue Lymph Node PATHOLOGY SPECIMEN John H Ganser, M.D. 1/30/2024  2:07 PM    C : Internal mammary tissue Tissue Chest PATHOLOGY SPECIMEN John H Ganser, M.D. 1/30/2024  2:12 PM    D : Subcarinal node, station 7 Tissue Lymph Node PATHOLOGY SPECIMEN John H Ganser, M.D. 1/30/2024  2:27 PM    E : Station 11 lymph node Tissue Lymph Node PATHOLOGY SPECIMEN John H Ganser, M.D. 1/30/2024  2:35 PM    F : Subaortic node, station 6 Tissue Lymph Node PATHOLOGY SPECIMEN John H Ganser, M.D. 1/30/2024  2:47 PM    G : Left upper lobe Tissue Lung PATHOLOGY SPECIMEN John H Ganser, M.D. 1/30/2024  2:55 PM        Estimated Blood Loss: 50 ml    Findings: No pleural effusion or nodules    Complications: 0        1/30/2024 3:13 PM John H Ganser, M.D.

## 2024-01-30 NOTE — ANESTHESIA PROCEDURE NOTES
Arterial Line    Performed by: Del Dean M.D.  Authorized by: Del Dean M.D.    Start Time:  1/30/2024 1:47 PM  End Time:  1/30/2024 1:49 PM  Localization: surface landmarks    Patient Location:  OR  Indication: continuous blood pressure monitoring        Catheter Size:  20 G  Seldinger Technique?: Yes    Laterality:  Right  Site:  Radial artery  Line Secured:  Antimicrobial disc, tape and transparent dressing  Events: patient tolerated procedure well with no complications

## 2024-01-30 NOTE — PROGRESS NOTES
Medication history reviewed with patient at bedside.   Med rec is complete  Allergies reviewed.   Patient has not had  any outpatient antibiotics in the last 30 days.   Anticoagulants: No      Kai Cuadra

## 2024-01-30 NOTE — ANESTHESIA PREPROCEDURE EVALUATION
" Case: [id number] Date/Time: 01/30/24 1257    Procedures:       THORACOSCOPY (Left: Chest)      EXCISION, MASS, INTERNAL MAMMARY (Left: Chest)      LOBECTOMY, UPPER (Left: Chest)      LYMPH NODE DISSECTION (Left: Chest)    Anesthesia type: General    Pre-op diagnosis: ADENOCARCINOMA LEFT LUNG    Location: [facility] OR 17 / SURGERY [facility]    Surgeons: John H Ganser, M.D.            Relevant Problems   CARDIAC   (positive) Atherosclerosis of aorta (HCC)   (positive) Essential hypertension         (positive) Stage 3a chronic kidney disease (HCC)      Other   (positive) Drug-induced colitis   (positive) Former smoker     BP (!) 167/84   Pulse 78   Temp 36.3 °C (97.3 °F) (Temporal)   Resp 18   Ht 1.613 m (5' 3.5\")   Wt 58.9 kg (129 lb 13.6 oz)   SpO2 99%   BMI 22.64 kg/m²     Physical Exam    Airway   Mallampati: II  TM distance: >3 FB  Neck ROM: full       Cardiovascular - normal exam  Rhythm: regular  Rate: normal  (-) murmur     Dental - normal exam           Pulmonary - normal exam  Breath sounds clear to auscultation     Abdominal    Neurological - normal exam                   Anesthesia Plan    ASA 3       Plan - general       Airway plan will be ETT          Induction: intravenous    Postoperative Plan: Postoperative administration of opioids is intended.    Pertinent diagnostic labs and testing reviewed    Informed Consent:    Anesthetic plan and risks discussed with patient.    Use of blood products discussed with: patient whom consented to blood products.           "

## 2024-01-30 NOTE — ANESTHESIA TIME REPORT
Anesthesia Start and Stop Event Times       Date Time Event    1/30/2024 1253 Ready for Procedure     1336 Anesthesia Start     1520 Anesthesia Stop          Responsible Staff  01/30/24      Name Role Begin End    Del Dean M.D. Anesth 1336 1520          Overtime Reason:  no overtime (within assigned shift)    Comments:

## 2024-01-30 NOTE — ANESTHESIA PROCEDURE NOTES
Airway    Date/Time: 1/30/2024 1:43 PM    Performed by: Del Dean M.D.  Authorized by: Del Dean M.D.    Location:  OR  Urgency:  Elective  Difficult Airway: No    Indications for Airway Management:  Anesthesia      Spontaneous Ventilation: absent    Sedation Level:  Deep  Preoxygenated: Yes    Patient Position:  Sniffing  Mask Difficulty Assessment:  0 - not attempted  Final Airway Type:  Endotracheal airway  Final Endotracheal Airway:  ETT - double lumen left with ONE LUNG VENTILATION  Cuffed: Yes    Technique Used for Successful ETT Placement:  Direct laryngoscopy    Insertion Site:  Oral  Blade Type:  Elisa  Laryngoscope Blade/Videolaryngoscope Blade Size:  3  ETT Double Lumen (fr):  37  Measured from:  Teeth  ETT to Teeth (cm):  29  Placement Verified by: auscultation and capnometry    Cormack-Lehane Classification:  Grade I - full view of glottis  Number of Attempts at Approach:  1

## 2024-01-30 NOTE — OR NURSING
Patient alert and oriented in pre-op. Vital signs stable. Patient educated on plan of care, oriented to call light. TV remote offered to patient.    PIV placed. Blood return noted, no pain associated with flush, no signs of infiltration. Fluids hooked up and running TKO

## 2024-01-31 ENCOUNTER — APPOINTMENT (OUTPATIENT)
Dept: RADIOLOGY | Facility: MEDICAL CENTER | Age: 69
DRG: 164 | End: 2024-01-31
Attending: PHYSICIAN ASSISTANT
Payer: MEDICARE

## 2024-01-31 LAB
ANION GAP SERPL CALC-SCNC: 11 MMOL/L (ref 7–16)
BUN SERPL-MCNC: 25 MG/DL (ref 8–22)
CALCIUM SERPL-MCNC: 8.6 MG/DL (ref 8.5–10.5)
CHLORIDE SERPL-SCNC: 106 MMOL/L (ref 96–112)
CO2 SERPL-SCNC: 22 MMOL/L (ref 20–33)
CREAT SERPL-MCNC: 1.05 MG/DL (ref 0.5–1.4)
ERYTHROCYTE [DISTWIDTH] IN BLOOD BY AUTOMATED COUNT: 53.9 FL (ref 35.9–50)
GFR SERPLBLD CREATININE-BSD FMLA CKD-EPI: 58 ML/MIN/1.73 M 2
GLUCOSE SERPL-MCNC: 72 MG/DL (ref 65–99)
HCT VFR BLD AUTO: 36.3 % (ref 37–47)
HGB BLD-MCNC: 10.9 G/DL (ref 12–16)
MCH RBC QN AUTO: 28 PG (ref 27–33)
MCHC RBC AUTO-ENTMCNC: 30 G/DL (ref 32.2–35.5)
MCV RBC AUTO: 93.3 FL (ref 81.4–97.8)
PLATELET # BLD AUTO: 282 K/UL (ref 164–446)
PMV BLD AUTO: 8.6 FL (ref 9–12.9)
POTASSIUM SERPL-SCNC: 5 MMOL/L (ref 3.6–5.5)
RBC # BLD AUTO: 3.89 M/UL (ref 4.2–5.4)
SODIUM SERPL-SCNC: 139 MMOL/L (ref 135–145)
WBC # BLD AUTO: 11.2 K/UL (ref 4.8–10.8)

## 2024-01-31 PROCEDURE — A9270 NON-COVERED ITEM OR SERVICE: HCPCS | Performed by: PHYSICIAN ASSISTANT

## 2024-01-31 PROCEDURE — 770001 HCHG ROOM/CARE - MED/SURG/GYN PRIV*

## 2024-01-31 PROCEDURE — 700102 HCHG RX REV CODE 250 W/ 637 OVERRIDE(OP): Performed by: PHYSICIAN ASSISTANT

## 2024-01-31 PROCEDURE — 85027 COMPLETE CBC AUTOMATED: CPT

## 2024-01-31 PROCEDURE — 51798 US URINE CAPACITY MEASURE: CPT

## 2024-01-31 PROCEDURE — 36415 COLL VENOUS BLD VENIPUNCTURE: CPT

## 2024-01-31 PROCEDURE — 700111 HCHG RX REV CODE 636 W/ 250 OVERRIDE (IP): Performed by: PHYSICIAN ASSISTANT

## 2024-01-31 PROCEDURE — 71045 X-RAY EXAM CHEST 1 VIEW: CPT

## 2024-01-31 PROCEDURE — 80048 BASIC METABOLIC PNL TOTAL CA: CPT

## 2024-01-31 RX ADMIN — LEVOTHYROXINE SODIUM 50 MCG: 0.05 TABLET ORAL at 05:21

## 2024-01-31 RX ADMIN — FAMOTIDINE 10 MG: 20 TABLET, FILM COATED ORAL at 05:20

## 2024-01-31 RX ADMIN — PREDNISONE 60 MG: 50 TABLET ORAL at 08:56

## 2024-01-31 RX ADMIN — PRAVASTATIN SODIUM 40 MG: 20 TABLET ORAL at 08:56

## 2024-01-31 RX ADMIN — LISINOPRIL 10 MG: 10 TABLET ORAL at 08:56

## 2024-01-31 RX ADMIN — VENLAFAXINE 75 MG: 75 TABLET ORAL at 08:57

## 2024-01-31 RX ADMIN — METOPROLOL SUCCINATE 25 MG: 25 TABLET, EXTENDED RELEASE ORAL at 08:56

## 2024-01-31 RX ADMIN — FAMOTIDINE 10 MG: 20 TABLET, FILM COATED ORAL at 16:52

## 2024-01-31 RX ADMIN — FENOFIBRATE 134 MG: 134 CAPSULE ORAL at 09:00

## 2024-01-31 RX ADMIN — KETOROLAC TROMETHAMINE 15 MG: 15 INJECTION, SOLUTION INTRAMUSCULAR; INTRAVENOUS at 13:09

## 2024-01-31 RX ADMIN — KETOROLAC TROMETHAMINE 15 MG: 15 INJECTION, SOLUTION INTRAMUSCULAR; INTRAVENOUS at 16:52

## 2024-01-31 ASSESSMENT — PAIN DESCRIPTION - PAIN TYPE
TYPE: ACUTE PAIN;SURGICAL PAIN
TYPE: SURGICAL PAIN

## 2024-01-31 NOTE — OR NURSING
Patient AAOx4, calm, denies pain post op. VSS, afebrile, 10L oxymask per ERAS 99% breathing even and unlabored. Left chest tube to -20cm H2O wall suction, 30ml sanguinous output. CXR complete in pacu. Left thoracoscopy incisions x3 with dermabond CARLOS CDI, ice applied. Patient tolerating water, no nausea. Patients spouse updated on status and plan of care. All belongings on gurney with patient.

## 2024-01-31 NOTE — CARE PLAN
The patient is Stable - Low risk of patient condition declining or worsening    Shift Goals  Clinical Goals: Ambulation x3/ IS  Patient Goals: Ambulation/ up to chiar for meals  Family Goals: GABRIELA    Progress made toward(s) clinical / shift goals:        Problem: Knowledge Deficit - Standard  Goal: Patient and family/care givers will demonstrate understanding of plan of care, disease process/condition, diagnostic tests and medications  Outcome: Progressing

## 2024-01-31 NOTE — OP REPORT
DATE OF SERVICE:  01/30/2024     PREOPERATIVE DIAGNOSIS:  Left upper lobe lung cancer.     POSTOPERATIVE DIAGNOSIS:  Left upper lobe lung cancer.     PROCEDURE:  Thoracoscopic left upper lobectomy with lymph node dissection and   biopsy internal mammary node.     SURGEON:  John H. Ganser, MD     ASSISTANT:  Javan Juarez PA-C     ANESTHESIA:  General.     ANESTHESIOLOGIST:  Del Dean MD     INDICATIONS:  The patient is a 68-year-old female diagnosed with left upper   lobe adenocarcinoma.  PET scan showed uptake in the left upper lobe mass and   focal uptake in the parasternal region that appeared to be adjacent to the   internal mammary artery suspicious for lymph node metastasis.  She underwent   neoadjuvant chemotherapy and immunotherapy with an excellent response and   marked reduction in size of the tumor and the internal mammary node no longer   visible.  Risks, benefits and alternatives to the above outlined procedures   were outlined in detail.  Questions answered.  She wished to proceed.     FINDINGS:  There was no pleural effusion or nodularity.  The mass was seen in   the lateral portion of the left upper lobe towards the lingula.  There was   some scarred appearing pleura around the internal mammary artery at the site   of previous PET abnormality and all of the tissue around this area was   removed, sparing the internal mammary artery.     DESCRIPTION OF PROCEDURE:  The patient identified, general anesthetic   administered with a double lumen endotracheal tube.  She was placed in right   lateral decubitus position.  Her left chest prepped and draped in the usual   sterile fashion.  Local anesthesia 0.5% Marcaine with epinephrine was injected   prior to making skin incision.  Small incision was made midaxillary line   approximately the sixth intercostal space and a 5-mm blunt trocar and camera   inserted.  Anterior and posterior upper chest 5 mm trocars were placed and a   12 mm trocar placed  anteriorly just above the diaphragm approximately eighth   intercostal space.  Above findings were noted.  Dissection was begun by   dividing the hilar pleura anteriorly.  Some station 10 lymph nodes were   harvested and sent as a separate specimen.  The hilar pleura was divided over   the apex of the hilum.  Station 5 and 6 nodes were dissected out and sent   separately.  The fissure was then opened and the pulmonary artery identified   there.  The fissure was opened posteriorly and then the pleura divided on the   hilum posteriorly up to where left off from the top.     Circumferential dissection was then carried around two lingular arteries and   lateral posterior artery and these were all divided with the Molino 45   powered stapler using a gray load and a single firing.  An additional   posterior arterial branch was circumferentially dissected and divided with the   vascular stapler.  The vein was then able to be circumferentially dissected   and was divided with the 35-mm vascular stapler with good control.  Additional   apical branch was then circumferentially dissected and divided with the   stapler with the vascular stapler.  Some nodes around the bronchus were   dissected out and sent with the station 10 lymph nodes.  The bronchus was then   divided with the stapler using a gold load.  Specimen was placed in   endoscopic bag, withdrawn through the 12-mm trocar site which had to be   enlarged to about 4 cm.  There were grossly clear margins on the specimen.     Our attention was then turned to the internal mammary area.  There was some   scarred pleura in this area at the site of CT and PET abnormality that had   been previously present.  The pleura around this area was divided and removed   along with all underlying tissue between it and the internal mammary artery   and all was sent for permanent histology.  Hemostasis was assured.     The staple lines and hilum were coated with aerosolized fibrin glue  using the   Vistaseal system.  A 19-Senegalese Oliver drain was passed through the midaxillary   line incision angled towards the apex and secured to skin with silk.    Accessory incision was closed at the fascial level with a running 2-0 Vicryl   and skin incisions with 4-0 Vicryl subcuticular sutures.  Dressings were   applied.  The tube attached to pleural suction device and the patient returned   to recovery room in stable condition.     An assistant was required in this case due to complexity, need to run the   camera, assist with retraction and closure.     Sponge and needle counts were correct x2 at the end of procedure.     ESTIMATED BLOOD LOSS:  Approximately 50 mL        ______________________________  JOHN H. GANSER, MD [facility]    DD:  01/30/2024 15:20  DT:  01/30/2024 16:40    Job#:  [id number]    CC:Nancy Campbell MD

## 2024-01-31 NOTE — PROGRESS NOTES
Report received from PACU RN, assumed care at 1714.  Pt is A0X4, and responds appropriately   Pt declines any SOB, chest pain, new onset of numbness/ tingling  Oxygen 99% on 10L oxymask  Pt declines pain at this time  Pt is voiding adequately and without hesitancy  Pt has + flatus, + bowel sounds, + BM PTA  Pt ambulates with a x1 assist and FWW  Pt is tolerating a regular diet, pt denies any nausea/vomiting  Left chest tube dressing CDI  Plan of care discussed, all questions answered. Explained importance of calling before getting OOB and pt verbalizes understanding. Explained importance of oral care. Call light is within reach, treaded slipper socks on, bed in lowest/ locked position, hourly rounding in place, all needs met at this time

## 2024-01-31 NOTE — CARE PLAN
Problem: Knowledge Deficit - Standard  Goal: Patient and family/care givers will demonstrate understanding of plan of care, disease process/condition, diagnostic tests and medications  Outcome: Progressing       The patient is Stable - Low risk of patient condition declining or worsening    Shift Goals  Clinical Goals: void, ambulation, rest  Patient Goals: rest  Family Goals: GABRIELA    Progress made toward(s) clinical / shift goals:  Pt voided, ambulated, and rested this shift.

## 2024-01-31 NOTE — ANESTHESIA POSTPROCEDURE EVALUATION
Patient: Latrice Andersen    Procedure Summary       Date: 01/30/24 Room / Location: [place] [address] / SURGERY [facility]    Anesthesia Start: 1336 Anesthesia Stop: 1520    Procedures:       THORACOSCOPY (Left: Chest)      EXCISION, MASS, INTERNAL MAMMARY (Left: Chest)      LOBECTOMY, UPPER (Left: Chest)      LYMPH NODE DISSECTION (Left: Chest) Diagnosis: (ADENOCARCINOMA LEFT LUNG)    Surgeons: John H Ganser, M.D. Responsible Provider: Del Dean M.D.    Anesthesia Type: general ASA Status: 3            Final Anesthesia Type: general  Last vitals  BP   Blood Pressure : 103/53    Temp   36 °C (96.8 °F)    Pulse   74   Resp   14    SpO2   98 %      Anesthesia Post Evaluation    Patient location during evaluation: PACU  Patient participation: complete - patient participated  Level of consciousness: awake and alert    Airway patency: patent  Anesthetic complications: no  Cardiovascular status: hemodynamically stable  Respiratory status: face mask    PONV: none          There were no known notable events for this encounter.     Nurse Pain Score: 0 (NPRS)

## 2024-01-31 NOTE — PROGRESS NOTES
Progress Note:    S: Doing well  Up to bathroom  IS not given to patient    O:  Recent Labs     01/30/24  1228 01/31/24  0445   WBC 8.7 11.2*   RBC 4.23 3.89*   HEMOGLOBIN 11.8* 10.9*   HEMATOCRIT 38.3 36.3*   MCV 90.5 93.3   MCH 27.9 28.0   MCHC 30.8* 30.0*   RDW 50.5* 53.9*   PLATELETCT 315 282   MPV 8.5* 8.6*     Recent Labs     01/30/24  1228 01/31/24  0445   SODIUM 139 139   POTASSIUM 4.3 5.0   CHLORIDE 107 106   CO2 22 22   GLUCOSE 94 72   BUN 25* 25*   CREATININE 1.14 1.05   CALCIUM 9.4 8.6     Recent Labs     01/30/24  1228   INR 0.95     Current Facility-Administered Medications   Medication Dose    fenofibrate micronized (Lofibra) capsule 134 mg  134 mg    levothyroxine (Synthroid) tablet 50 mcg  50 mcg    lisinopril (Prinivil) tablet 10 mg  10 mg    metoprolol SR (Toprol XL) tablet 25 mg  25 mg    pravastatin (Pravachol) tablet 40 mg  40 mg    predniSONE (Deltasone) tablet 60 mg  60 mg    venlafaxine (Effexor) tablet 75 mg  75 mg    lactated ringers infusion      enoxaparin (Lovenox) inj 40 mg  40 mg    Pharmacy Consult Request ...Pain Management Review 1 Each  1 Each    ketorolac (Toradol) 15 MG/ML injection 15 mg  15 mg    Followed by    [START ON 2/2/2024] ibuprofen (Motrin) tablet 800 mg  800 mg    ondansetron (Zofran) syringe/vial injection 4 mg  4 mg    diphenhydrAMINE (Benadryl) injection 25 mg  25 mg    diphenhydrAMINE (Benadryl) tablet/capsule 25 mg  25 mg    Or    diphenhydrAMINE (Benadryl) injection 25 mg  25 mg    benzocaine-menthol (Cepacol) lozenge 1 Lozenge  1 Lozenge    HYDROcodone-acetaminophen (Norco) 5-325 MG per tablet 1-2 Tablet  1-2 Tablet    morphine 4 MG/ML injection 2 mg  2 mg    enalaprilat (Vasotec) injection 2.5 mg 2 mL  2.5 mg    hydrALAZINE (Apresoline) injection 10 mg  10 mg    famotidine (Pepcid) tablet 10 mg  10 mg    Or    famotidine (Pepcid) injection 10 mg  10 mg    ondansetron (Zofran ODT) dispertab 4 mg  4 mg       PE:  /62   Pulse 75   Temp 36.6 °C (97.9 °F)  "(Temporal)   Resp 17   Ht 1.613 m (5' 3.5\")   Wt 58.9 kg (129 lb 13.6 oz)   SpO2 100%     Intake/Output Summary (Last 24 hours) at 1/31/2024 1213  Last data filed at 1/31/2024 1115  Gross per 24 hour   Intake 1658.72 ml   Output 250 ml   Net 1408.72 ml       Chest clear  Heart regular  Chest tube: Serous, no air leak    Rads:  DX-CHEST-PORTABLE (1 VIEW)   Final Result      No acute process.      DX-CHEST-LIMITED (1 VIEW)   Final Result         Interval placement of left chest tube. No appreciable pneumothorax.          A:   Active Hospital Problems    Diagnosis     NSCLC of left lung (HCC) [C34.92]      Diagnosed 06/2023.  Former cigarette smoker.  She was undergoing chemotherapy/immunotherapy.  This was discontinued after the medication seemed to cause significant diarrhea and drug-induced colitis.  She is on a prednisone taper prescribed by GI and her diarrhea has resolved.  She is following with oncology, Dr. Roselia Campbell.  She is scheduled for surgery 1/30 for left upper lobectomy.             P: Continue chest tube  Ambulate/IS  Await path  Likely discharge tomorrow    John Ganser M.D.  [facility]  "

## 2024-01-31 NOTE — PROGRESS NOTES
Bedside report received, assessment completed    A&O x  4, pt calls appropriately  Mobility: Up with SBA, HH  Fall Risk Assessment: n/a, bed alarm n/a, door notifications yes  Pain Assessment / Reassessment completed, medication provided per MAR  Diet: Regular, Tolerating   LDA:   IV Access: 20G RFA, CDI/ flushed/ SL/ Infusing   CT: LLQ to suction     GI/: + void, + flatus, 1/29 PTA BM  DVT Prophylaxis: lovenox, SCD on while in bed  James Score: 22, Interventions per flow sheet  Procedures:    - 1/30 Thoracoscopy   D/C Plan:    - CT management   - Home no additional needs     Reviewed plan of care with patient, bed in lowest position and locked, pt resting comfortably now, call light within reach, all needs met at this time. Interventions will be executed per plan of care

## 2024-01-31 NOTE — PROGRESS NOTES
4 Eyes Skin Assessment Completed by Jewell SMITH RN and SIS Torres.    Head WDL  Ears WDL  Nose WDL  Mouth WDL  Neck WDL  Breast/Chest left mastectomy site.   Shoulder Blades WDL  Spine WDL  (R) Arm/Elbow/Hand Bruising and Discoloration  (L) Arm/Elbow/Hand Bruising and Discoloration  Abdomen WDL  Groin WDL  Scrotum/Coccyx/Buttocks WDL  (R) Leg WDL  (L) Leg Redness to anterior calf from previous injury  (R) Heel/Foot/Toe Redness and Blanching  (L) Heel/Foot/Toe Redness and Blanching          Devices In Places Blood Pressure Cuff, Pulse Ox, SCD's, and Oxy Mask      Interventions In Place Pillows and Low Air Loss Mattress    Possible Skin Injury No    Pictures Uploaded Into Epic N/A  Wound Consult Placed N/A  RN Wound Prevention Protocol Ordered No

## 2024-01-31 NOTE — PROGRESS NOTES
Report received from RN, assumed care at 1845  Pt is A0X4, and responds appropriately   Pt declines any SOB, chest pain, new onset of numbness/ tingling  Pt declines any pain at this time  Pt has yet to void post surgery  Pt has - flatus, + bowel sounds, -BM, PTA  Pt ambulates with a x1 assist with a FWW  Pt is tolerating a regular diet, pt denies any nausea/vomiting  L chest tube to -20 suction, CDI  Plan of care discussed, all questions answered. Explained importance of calling before getting OOB and pt verbalizes understanding. Explained importance of oral care. Call light is within reach, treaded slipper socks on, bed in lowest/ locked position, hourly rounding in place, all needs met at this time

## 2024-01-31 NOTE — PROGRESS NOTES
Patient admitted to room T402 via transport in [facility] from PACU at 1714. Patient declines pain at this time. Pt medicated per MAR. Admission VSS. Pt oriented to room and use of call light. Call light within reach, bed locked and in lowest position. No further needs at this time.

## 2024-02-01 ENCOUNTER — APPOINTMENT (OUTPATIENT)
Dept: RADIOLOGY | Facility: MEDICAL CENTER | Age: 69
DRG: 164 | End: 2024-02-01
Attending: PHYSICIAN ASSISTANT
Payer: MEDICARE

## 2024-02-01 PROCEDURE — 770001 HCHG ROOM/CARE - MED/SURG/GYN PRIV*

## 2024-02-01 PROCEDURE — 700111 HCHG RX REV CODE 636 W/ 250 OVERRIDE (IP): Performed by: PHYSICIAN ASSISTANT

## 2024-02-01 PROCEDURE — 700102 HCHG RX REV CODE 250 W/ 637 OVERRIDE(OP): Performed by: PHYSICIAN ASSISTANT

## 2024-02-01 PROCEDURE — A9270 NON-COVERED ITEM OR SERVICE: HCPCS | Performed by: PHYSICIAN ASSISTANT

## 2024-02-01 PROCEDURE — 71045 X-RAY EXAM CHEST 1 VIEW: CPT

## 2024-02-01 RX ADMIN — PRAVASTATIN SODIUM 40 MG: 20 TABLET ORAL at 04:35

## 2024-02-01 RX ADMIN — PREDNISONE 60 MG: 50 TABLET ORAL at 04:30

## 2024-02-01 RX ADMIN — METOPROLOL SUCCINATE 25 MG: 25 TABLET, EXTENDED RELEASE ORAL at 04:32

## 2024-02-01 RX ADMIN — FENOFIBRATE 134 MG: 134 CAPSULE ORAL at 04:32

## 2024-02-01 RX ADMIN — FAMOTIDINE 10 MG: 20 TABLET, FILM COATED ORAL at 04:31

## 2024-02-01 RX ADMIN — LISINOPRIL 10 MG: 10 TABLET ORAL at 04:46

## 2024-02-01 RX ADMIN — FAMOTIDINE 10 MG: 20 TABLET, FILM COATED ORAL at 16:52

## 2024-02-01 RX ADMIN — VENLAFAXINE 75 MG: 75 TABLET ORAL at 04:34

## 2024-02-01 RX ADMIN — LEVOTHYROXINE SODIUM 50 MCG: 0.05 TABLET ORAL at 04:31

## 2024-02-01 ASSESSMENT — ENCOUNTER SYMPTOMS
VOMITING: 0
CHILLS: 0
NAUSEA: 0
SHORTNESS OF BREATH: 0
FEVER: 0

## 2024-02-01 ASSESSMENT — PAIN DESCRIPTION - PAIN TYPE
TYPE: SURGICAL PAIN

## 2024-02-01 NOTE — PROGRESS NOTES
Bedside report received.  Assessment complete.  A&O x 4. Patient calls appropriately.  Patient ambulates with standby assist. Patient has declines pain at this time. Education on multimodal interventions for pain control.  Denies N&V. Tolerating regular diet.  Skin per flowsheets.  + void, + flatus, + BM.  Patient denies SOB.  SCD's  off, patient ambulating.  Patient is pleasant and cooperative to plan of care.  Review plan with of care with patient. Call light and personal belongings within reach. Hourly rounding in place. All needs met at this time.

## 2024-02-01 NOTE — CARE PLAN
The patient is Stable - Low risk of patient condition declining or worsening    Shift Goals  Clinical Goals: ambulation, pulmonary hygiene  Patient Goals: rest  Family Goals: GABRIELA    Progress made toward(s) clinical / shift goals:  Patient able to ambulate hallway.  Educated on pulmonary hygiene and incentive use, able to pull up 1500.  Patient verbalized understanding on plan of care.        Problem: Knowledge Deficit - Standard  Goal: Patient and family/care givers will demonstrate understanding of plan of care, disease process/condition, diagnostic tests and medications  Description: Target End Date:  1-3 days or as soon as patient condition allows    Document in Patient Education    1.  Patient and family/caregiver oriented to unit, equipment, visitation policy and means for communicating concern  2.  Complete/review Learning Assessment  3.  Assess knowledge level of disease process/condition, treatment plan, diagnostic tests and medications  4.  Explain disease process/condition, treatment plan, diagnostic tests and medications  2/1/2024 1514 by Annie Noriega R.N.  Outcome: Progressing  2/1/2024 1257 by Annie Noriega R.N.  Outcome: Progressing

## 2024-02-01 NOTE — PROGRESS NOTES
Surgical Progress Note    Author: ADI Blank Date & Time created: 2024   8:39 AM     Interval Events:  s/p thoracoscopic left upper lobectomy with lymph node dissection and   biopsy internal mammary node - POD#2, doing well; tayler po; ambulatory; pain controlled; using IS;     Review of Systems   Constitutional:  Negative for chills and fever.   Respiratory:  Negative for shortness of breath.         + incisional/surgical pain   Gastrointestinal:  Negative for nausea and vomiting.   Skin:  Negative for itching and rash.     Hemodynamics:  Temp (24hrs), Av.6 °C (97.9 °F), Min:36.3 °C (97.3 °F), Max:36.9 °C (98.4 °F)  Temperature: 36.3 °C (97.3 °F)  Pulse  Av.4  Min: 57  Max: 85   Blood Pressure : (!) 141/73 (Rn notified )     Respiratory:    Respiration: 17, Pulse Oximetry: 96 %        RUL Breath Sounds: Clear, RML Breath Sounds: Clear, RLL Breath Sounds: Diminished, ANSHUL Breath Sounds: Clear, LLL Breath Sounds: Diminished  Neuro:  GCS       Fluids:    Intake/Output Summary (Last 24 hours) at 2024 0839  Last data filed at 2024 0708  Gross per 24 hour   Intake 480 ml   Output 224 ml   Net 256 ml        Current Diet Order   Procedures    Diet Order Diet: Regular     Physical Exam  Vitals and nursing note reviewed.   Constitutional:       General: She is not in acute distress.  Cardiovascular:      Rate and Rhythm: Normal rate.   Pulmonary:      Effort: Pulmonary effort is normal. No respiratory distress.      Comments: Chest tube in place, ~ 174ml serosang output /24 hrs (yesterday), 50ml output today, so far.  Small air leak seen  Wounds c/d/i  Abdominal:      Palpations: Abdomen is soft.   Skin:     General: Skin is warm and dry.   Neurological:      Mental Status: She is alert and oriented to person, place, and time.   Psychiatric:         Mood and Affect: Mood normal.     Labs:  No results found for this or any previous visit (from the past 24 hour(s)).  Medical Decision Making, by  Problem:  Active Hospital Problems    Diagnosis     NSCLC of left lung (HCC) [C34.92]      Diagnosed 06/2023.  Former cigarette smoker.  She was undergoing chemotherapy/immunotherapy.  This was discontinued after the medication seemed to cause significant diarrhea and drug-induced colitis.  She is on a prednisone taper prescribed by GI and her diarrhea has resolved.  She is following with oncology, Dr. Roselia Campbell.  She is scheduled for surgery 1/30 for left upper lobectomy.         Plan:  Small air leak seen today. Will place chest tube on water seal, obtain x-ray at 1200. If no ptx seen on CXR, will remove chest tube and send pt home later today.  Pt counseled.  Mobilize  Encourage frequent IS    Quality Measures:  Quality-Core Measures   Reviewed items::  Labs reviewed, Medications reviewed and Radiology images reviewed  Sherman catheter::  No Sherman  DVT prophylaxis pharmacological::  Enoxaparin (Lovenox)  DVT prophylaxis - mechanical:  SCDs  Ulcer Prophylaxis::  Yes      Discussed patient condition with RN, Patient, and Dr. Ganser    1430 hours:  *CXR shows small apical ptx. No air leak visible on water seal, but air leak recurs when chest tube on suction.   Will leave on water seal overnight and reassess tomorrow.

## 2024-02-01 NOTE — PROGRESS NOTES
Report received from RN, assumed care at 1845  Pt is A0X4, and responds appropriately   Pt declines any SOB, chest pain, new onset of numbness/ tingling  Chest Tube to 20 cm suction  Pt declines any pain at this time  Pt is voiding adequately and without hesitancy  Pt has + flatus, + bowel sounds, - BM, PTA  Pt ambulates with a standby assist   Pt is tolerating a regular diet, pt denies any nausea/vomiting  Plan of care discussed, all questions answered. Explained importance of calling before getting OOB and pt verbalizes understanding. Explained importance of oral care. Call light is within reach, treaded slipper socks on, bed in lowest/ locked position, hourly rounding in place, all needs met at this time

## 2024-02-02 ENCOUNTER — APPOINTMENT (OUTPATIENT)
Dept: RADIOLOGY | Facility: MEDICAL CENTER | Age: 69
DRG: 164 | End: 2024-02-02
Attending: PHYSICIAN ASSISTANT
Payer: MEDICARE

## 2024-02-02 VITALS
WEIGHT: 129.85 LBS | RESPIRATION RATE: 14 BRPM | HEART RATE: 63 BPM | TEMPERATURE: 97.9 F | SYSTOLIC BLOOD PRESSURE: 141 MMHG | OXYGEN SATURATION: 97 % | HEIGHT: 64 IN | BODY MASS INDEX: 22.17 KG/M2 | DIASTOLIC BLOOD PRESSURE: 78 MMHG

## 2024-02-02 PROCEDURE — 700111 HCHG RX REV CODE 636 W/ 250 OVERRIDE (IP): Performed by: PHYSICIAN ASSISTANT

## 2024-02-02 PROCEDURE — 700102 HCHG RX REV CODE 250 W/ 637 OVERRIDE(OP): Performed by: PHYSICIAN ASSISTANT

## 2024-02-02 PROCEDURE — A9270 NON-COVERED ITEM OR SERVICE: HCPCS | Performed by: PHYSICIAN ASSISTANT

## 2024-02-02 PROCEDURE — 71045 X-RAY EXAM CHEST 1 VIEW: CPT

## 2024-02-02 RX ADMIN — PRAVASTATIN SODIUM 40 MG: 20 TABLET ORAL at 04:11

## 2024-02-02 RX ADMIN — FENOFIBRATE 134 MG: 134 CAPSULE ORAL at 04:11

## 2024-02-02 RX ADMIN — LISINOPRIL 10 MG: 10 TABLET ORAL at 04:11

## 2024-02-02 RX ADMIN — LEVOTHYROXINE SODIUM 50 MCG: 0.05 TABLET ORAL at 04:11

## 2024-02-02 RX ADMIN — FAMOTIDINE 10 MG: 20 TABLET, FILM COATED ORAL at 04:09

## 2024-02-02 RX ADMIN — PREDNISONE 60 MG: 50 TABLET ORAL at 04:10

## 2024-02-02 RX ADMIN — VENLAFAXINE 75 MG: 75 TABLET ORAL at 04:10

## 2024-02-02 RX ADMIN — METOPROLOL SUCCINATE 25 MG: 25 TABLET, EXTENDED RELEASE ORAL at 04:09

## 2024-02-02 ASSESSMENT — ENCOUNTER SYMPTOMS
FEVER: 0
CHILLS: 0
VOMITING: 0
NAUSEA: 0
SHORTNESS OF BREATH: 0

## 2024-02-02 ASSESSMENT — PAIN DESCRIPTION - PAIN TYPE
TYPE: SURGICAL PAIN

## 2024-02-02 NOTE — PROGRESS NOTES
Report received from RN, assumed care at 1845  Pt is A0X4, and responds appropriately   Pt declines any SOB, chest pain, new onset of numbness/ tingling  Pt denies any pain at this time  L CT to Water Seal   Pt is voiding adequately and without hesitancy  Pt has + flatus, + bowel sounds, + BM on 2/1  Pt ambulates up self   Pt is tolerating a regular diet, pt denies any nausea/vomiting  Plan of care discussed, all questions answered. Explained importance of calling before getting OOB and pt verbalizes understanding. Explained importance of oral care. Call light is within reach, treaded slipper socks on, bed in lowest/ locked position, hourly rounding in place, all needs met at this time

## 2024-02-02 NOTE — PROGRESS NOTES
Surgical Progress Note    Author: ADI Blank Date & Time created: 2024   9:07 AM     Interval Events:  S/p thoracoscopic left upper lobectomy with lymph node dissection and   biopsy internal mammary node - POD#3, doing well; ct on H2O seal overnight, XCR shows interval decrease apical ptx; tayler po; ambulatory; pain controlled; using IS;     Review of Systems   Constitutional:  Negative for chills and fever.   Respiratory:  Negative for shortness of breath.    Gastrointestinal:  Negative for nausea and vomiting.   Skin:  Negative for itching and rash.     Hemodynamics:  Temp (24hrs), Av.5 °C (97.7 °F), Min:36.1 °C (97 °F), Max:36.9 °C (98.4 °F)  Temperature: 36.7 °C (98.1 °F)  Pulse  Av.7  Min: 57  Max: 85   Blood Pressure : (!) 154/84     Respiratory:    Respiration: 16, Pulse Oximetry: 95 %        RUL Breath Sounds: Clear, RML Breath Sounds: Clear, RLL Breath Sounds: Diminished, ANSHUL Breath Sounds: Clear, LLL Breath Sounds: Diminished  Neuro:  GCS       Fluids:    Intake/Output Summary (Last 24 hours) at 2024 0907  Last data filed at 2024 0737  Gross per 24 hour   Intake 240 ml   Output 60 ml   Net 180 ml        Current Diet Order   Procedures    Diet Order Diet: Regular     Physical Exam  Vitals and nursing note reviewed.   Constitutional:       General: She is not in acute distress.  Pulmonary:      Effort: Pulmonary effort is normal. No respiratory distress.      Comments: Chest tube in place, ~ 110ml serosang output/24 hrs  No airleak seen  Wounds c/d/i  CXR noted  Abdominal:      Palpations: Abdomen is soft.   Skin:     General: Skin is warm and dry.   Neurological:      Mental Status: She is alert and oriented to person, place, and time.   Psychiatric:         Mood and Affect: Mood normal.       Labs:  No results found for this or any previous visit (from the past 24 hour(s)).  Medical Decision Making, by Problem:  Active Hospital Problems    Diagnosis     NSCLC of left lung (HCC)  [C34.92]      Diagnosed 06/2023.  Former cigarette smoker.  She was undergoing chemotherapy/immunotherapy.  This was discontinued after the medication seemed to cause significant diarrhea and drug-induced colitis.  She is on a prednisone taper prescribed by GI and her diarrhea has resolved.  She is following with oncology, Dr. Roselia Campbell.  She is scheduled for surgery 1/30 for left upper lobectomy.         Plan:  1. Chest tube discontinued, Tegaderm bandage placed  2. Discharge home today when alert, comfortable, ambulatory, and tolerating PO well.  3. Pt counseled re: diet , activity, wound care, I.S., and home med's.  4. May shower tomorrow over Tegaderms.  5. Remove Tegaderms in 4 days.  6. No baths, hot tubs, soaks for 14 days.  7. No lifting >15 lbs for 1-2 wks  8. No driving for 4-5 days   9. F/U with Dr. Ganser in 1-2 weeks        CALL IF YOU:   (1) fevers greater than 101.0 degrees F.  (2) Unusual chest or leg pain, redness or swelling behind the knee or in the calf muscle.  (3) Drainage or fluid from incision that may be foul smelling, increased tenderness or soreness at the wound or the wound edges are no longer together, redness or swelling at the incision site.  (4) Severe, and/or progressively worsening shortness of breath.    (5) Please do not hesitate to call with any other questions. ([phone])    Quality Measures:  Quality-Core Measures   Reviewed items::  Medications reviewed  Sherman catheter::  No Sherman  DVT prophylaxis pharmacological::  Enoxaparin (Lovenox)  DVT prophylaxis - mechanical:  SCDs  Ulcer Prophylaxis::  Yes      Discussed patient condition with RN, Patient, and Dr. Ganser

## 2024-02-02 NOTE — PROGRESS NOTES
Bedside report received.  Assessment complete.  A&O x 4. Patient calls appropriately.  Patient ambulates with standby assist. Patient declines pain at this time. Education on multimodal interventions for pain control. Denies N&V. Tolerating regular diet.  Skin per flowsheets.  + void, + flatus, + BM.  Patient denies SOB.    SCD's  off, patient ambulating.  Patient is pleasant and cooperative to plan of care.  Review plan with of care with patient. Call light and personal belongings within reach. Hourly rounding in place. All needs met at this time

## 2024-02-02 NOTE — PROGRESS NOTES
Patient  transferring to discharge lounge at this time. Discharge RN  to provide discharge education, follow-up information and remove PIV.  Patient transporting via wheelchair. Belongings with patient. Meds to beds to be given at discharge lounge. Discharge RN updated.

## 2024-02-02 NOTE — PROGRESS NOTES
DC instructions reviewed. Discussed the importance of following up with providers. Discussed safe oxygen use. Oxygen concentrator with patient.

## 2024-02-02 NOTE — DISCHARGE INSTRUCTIONS
Discharge Instructions    Discharged to home by car with relative. Discharged via wheelchair, hospital escort: Yes.  Special equipment needed: Oxygen    Be sure to schedule a follow-up appointment with your primary care doctor or any specialists as instructed.     Discharge Plan:   Diet Plan: Discussed  Activity Level: Discussed  Confirmed Follow up Appointment: Patient to Call and Schedule Appointment  Confirmed Symptoms Management: Discussed  Influenza Vaccine Indication: Not indicated: Previously immunized this influenza season and > 8 years of age    I understand that a diet low in cholesterol, fat, and sodium is recommended for good health. Unless I have been given specific instructions below for another diet, I accept this instruction as my diet prescription.   Other diet: regular    Special Instructions: None    -Is this patient being discharged with medication to prevent blood clots?  No    Is patient discharged on Warfarin / Coumadin?   No          Home Oxygen Use, Adult  When a medical condition keeps you from getting enough oxygen, your health care provider may instruct you to take extra oxygen at home. Your health care provider will let you know:  When to take oxygen.  How long to take oxygen.  How quickly oxygen should be delivered (flow rate), in liters per minute (LPM or L/M).  Home oxygen can be given through:  A mask.  A nasal cannula. This is a device or tube that goes in the nostrils.  A transtracheal catheter. This is a small, thin tube placed in the windpipe (trachea).  A breathing tube (tracheostomy tube) that is surgically placed in the windpipe. This may be used in severe cases.  These devices are connected with tubing to an oxygen source, such as:  A tank. Tanks hold oxygen in gas form. They must be replaced when the oxygen is used up.  A liquid oxygen device. This holds oxygen in liquid form. Liquid oxygen is very cold. It must be replaced when the oxygen is used up.  An oxygen concentrator  machine. This filters oxygen in the room. There are two types of oxygen concentrator machines--stationary and portable.  A stationary oxygen concentrator machine plugs into the main electricity supply at your home. You must have a backup cylinder of oxygen in case the power goes out.  A portable oxygen concentrator machine is smaller in size and more lightweight. This machine uses battery supply and can be used outside the home.  Work with your health care provider to find equipment that works best for you and your lifestyle.  What are the risks?  Delivery of supplemental oxygen is generally safe. However, some risks include:  Fire. This can happen if the oxygen is exposed to a heat source, flame, or spark.  Injury to skin. This can happen if liquid oxygen touches your skin.  Damage to the lungs or other organs. This can happen from getting too little or too much oxygen.  Supplies needed:  To use oxygen, you will need:  A mask, nasal cannula, transtracheal catheter, or tracheostomy.  An oxygen tank, a liquid oxygen device, or an oxygen concentrator.  The tape that your health care provider recommends (optional).  Your health care provider may also recommend:  A humidifier to warm and moisten the oxygen delivered. This will depend on how much oxygen you need and the type of home oxygen device you use.  A pulse oximeter. This device measures the percentage of oxygen in your blood.  How to use oxygen  Your health care provider or a person from your medical device company will show you how to use your oxygen device. Follow his or her instructions. The instructions may look something like this:  Wash your hands with soap and water.  If you use an oxygen concentrator, make sure it is plugged in.  Place one end of the tube into the port on the tank, device, or machine.  Place the mask over your nose and mouth. Or, place the nasal cannula and secure it with tape if instructed. If you use a tracheostomy or transtracheal  "catheter, connect it to the oxygen source as directed.  Make sure the liter-flow setting on the machine is at the level prescribed by your health care provider.  Turn on the machine or adjust the knob on the tank or device to the correct liter-flow setting.  When you are done, turn off and unplug the machine, or turn the knob to OFF.  How to clean and care for the oxygen supplies  Nasal cannula  Clean it with a warm, wet cloth daily or as needed.  Wash it with a liquid soap once a week.  Rinse it thoroughly once or twice a week.  Air-dry it.  Replace it every 2-4 weeks.  If you have an infection, such as a cold or pneumonia, change the cannula when you get better.  Mask  Replace it every 2-4 weeks.  If you have an infection, such as a cold or pneumonia, change the mask when you get better.  Humidifier bottle  Wash the bottle between each refill:  Wash it with soap and warm water.  Rinse it thoroughly.  Clean it and its top with a disinfectant .  Air-dry it.  Make sure it is dry before you refill it.  Oxygen concentrator  Clean the air filter at least twice a week according to directions from your home medical equipment and service company.  Wipe down the cabinet every day. To do this:  Unplug the unit.  Wipe down the cabinet with a damp cloth.  Dry the cabinet.  Other equipment  Change any extra tubing every 1-3 months.  Follow instructions from your health care provider about taking care of any other equipment.  Safety tips  Fire safety tips    Keep your oxygen and oxygen supplies at least 6 ft (2 m) away from sources of heat, flames, and youssef at all times.  Do not allow smoking near your oxygen. Put up \"no smoking\" signs in your home. Avoid smoking areas when in public.  Do not use materials that can burn (are flammable) while you use oxygen. This includes:  Petroleum jelly.  Hair spray or other aerosol sprays.  Rubbing alcohol.  Hand .  When you go to a restaurant with portable oxygen, ask to be " seated in the non-smoking section.  Keep a fire extinguisher close by. Let your fire department know that you have oxygen in your home.  Test your home smoke detectors regularly.  Traveling  Secure your oxygen tank in the vehicle so that it does not move around. Follow instructions from your medical device company about how to safely secure your tank.  Make sure you have enough oxygen for the amount of time you will be away from home.  If you are planning to travel by public transportation (airplane, train, bus, or boat), contact the company to find out if it allows the use of an approved portable oxygen concentrator. You may also need documents from your health care provider and medical device company before you travel.  General safety tips  If you use an oxygen cylinder, make sure it is in a stand or secured to an object that will not move (fixed object).  If you use liquid oxygen, make sure its container is kept upright at all times.  If you use an oxygen concentrator:  Tell your electric company. Make sure you are given priority service in the event that your power goes out.  Avoid using extension cords if possible.  Follow these instructions at home:  Use oxygen only as told by your health care provider.  Do not use alcohol or other drugs that make you relax (sedating drugs) unless instructed. They can slow down your breathing rate and make it hard to get in enough oxygen.  Know how and when to order a refill of oxygen.  Always keep a spare tank of oxygen. Plan ahead for holidays when you may not be able to get a prescription filled.  Use water-based lubricants on your lips or nostrils. Do not use oil-based products like petroleum jelly.  To prevent skin irritation on your cheeks or behind your ears, tuck some gauze under the tubing.  Where to find more information  American Lung Association: www.lung.org/oxygen  Contact a health care provider if:  You get headaches often.  You have a lasting cough.  You are  restless or have anxiety.  You develop an illness that affects your breathing.  You cannot exercise at your regular level.  You have a fever.  You have persistent redness under your nose.  Get help right away if:  You are confused.  You are sleepy all the time.  You have blue lips or fingernails.  You have difficult or irregular breathing that is getting worse.  You are struggling to breathe.  These symptoms may represent a serious problem that is an emergency. Do not wait to see if the symptoms will go away. Get medical help right away. Call your local emergency services (911 in the [place] Do not drive yourself to the hospital.  Summary  Your health care provider or a person from your medical device company will show you how to use your oxygen device. Follow his or her instructions.  If you use an oxygen concentrator, make sure it is plugged in.  Make sure the liter-flow setting on the machine is at the level prescribed by your health care provider.  Use oxygen only as told by your health care provider.  Keep your oxygen and oxygen supplies at least 6 ft (2 m) away from sources of heat, flames, and youssef at all times.  This information is not intended to replace advice given to you by your health care provider. Make sure you discuss any questions you have with your health care provider.  Document Revised: 04/20/2023 Document Reviewed: 12/15/2020  [employer] Patient Education © 2023 [employer]        1. Chest tube discontinued, Tegaderm bandage placed  2. Discharge home in PM today when alert, comfortable, ambulatory, and tolerating PO well  3. Pt counseled re: diet , activity, wound care, I.S., and home med's  4. May shower tomorrow over Tegaderms.  5. Remove Tegaderms in 4 days.  6. No baths, hot tubs, soaks for 14 days.  7. No lifting >15 lbs for 1-2 wks  8. No driving for 4-5 days   9. F/U with Dr. Ganser in 1-2 weeks  10. Await path.      CALL IF YOU:   (1) fevers greater than 101.0 degrees F.  (2) Unusual  chest or leg pain, redness or swelling behind the knee or in the calf muscle.  (3) Drainage or fluid from incision that may be foul smelling, increased tenderness or soreness at the wound or the wound edges are no longer together, redness or swelling at the incision site.  (4) Severe, and/or progressively worsening shortness of breath.    (5) Please do not hesitate to call with any other questions. ([phone])

## 2024-02-02 NOTE — DISCHARGE PLANNING
0948  Per RN CM request.  Agency/Facility Name: [facility]  Sent Referral per Choice Form at: 0948 am    1042  DPA received faxed choice form(s). DPA placed choice in Pt Media file.     1044  Agency/Facility Name: [facility]  Spoke To: Amina  Outcome: DPA inquired about Pt oxygen referral. Per Amina getting Pt setup and delivered today. Per [facility] delivery will be between 4843-8639 pm.  RN CM notified.

## 2024-02-02 NOTE — CARE PLAN
The patient is Stable - Low risk of patient condition declining or worsening    Shift Goals  Clinical Goals: pulmonary hygiene, ambulation, CT mgt., discharge  Patient Goals: rest  Family Goals: GABRIELA    Progress made toward(s) clinical / shift goals:   patient verbalized understanding on importance of ambulation and pulmonary hygiene. Chest tube management. Patient verbalized understanding on discharge plan.      Patient is not progressing towards the following goals: N/A          Problem: Knowledge Deficit - Standard  Goal: Patient and family/care givers will demonstrate understanding of plan of care, disease process/condition, diagnostic tests and medications  Description: Target End Date:  1-3 days or as soon as patient condition allows    Document in Patient Education    1.  Patient and family/caregiver oriented to unit, equipment, visitation policy and means for communicating concern  2.  Complete/review Learning Assessment  3.  Assess knowledge level of disease process/condition, treatment plan, diagnostic tests and medications  4.  Explain disease process/condition, treatment plan, diagnostic tests and medications  Outcome: Progressing

## 2024-02-02 NOTE — CARE PLAN
Problem: Knowledge Deficit - Standard  Goal: Patient and family/care givers will demonstrate understanding of plan of care, disease process/condition, diagnostic tests and medications  Outcome: Progressing       The patient is Stable - Low risk of patient condition declining or worsening    Shift Goals  Clinical Goals: pulmonary hygiene, IS, ambulation  Patient Goals: rest  Family Goals: GABRIELA    Progress made toward(s) clinical / shift goals:  Pt using IS effectively q hour while awake. Pt ambulated multiple times this shift. Pt requiring 1-2 L of oxygen to maintain oxygen saturation >90% while ambulating and sleeping.

## 2024-02-02 NOTE — DISCHARGE PLANNING
"Case Management Discharge Planning    Admission Date: 1/30/2024  GMLOS: 3.7  ALOS: 3    6-Clicks ADL Score: 23  6-Clicks Mobility Score: 24      Anticipated Discharge Dispo: Discharge Disposition: Discharged to home/self care (01) with close OP follow up    DME Needed: Yes    DME Ordered: Yes    Action(s) Taken: Updated Provider/Nurse on Discharge Plan    This RN CM spoke with Pt at bedside. Per Pt she is discharging today to home with her .   Pt is so happy today, stating\" I am  cancer free and I just found out today\" .    Pt agreed with [facility]ek[facility] and [facility] for home oxygen.     [facility]erral was sent to [facility].    Pt has been accepted by [facility] for DME home oxygen, to be delivered at bedside this afternoon.      Escalations Completed: None    Medically Clear: Yes    Next Steps:   CM to continue to assist Pt with discharge as needed    Barriers to Discharge:   Pending delivery of oxygen at bedside    Is the patient up for discharge tomorrow: No        "

## 2024-02-02 NOTE — FACE TO FACE
"Face to Face Note  -  Durable Medical Equipment    CLEO Blank. - NPI: [id number]  I certify that this patient is under my care and that they had a durable medical equipment(DME)face to face encounter by myself that meets the physician DME face-to-face encounter requirements with this patient on:    Date of encounter:   Patient:                    MRN:                       YOB: 2024  Latrice Andersen  [id number]  1955     The encounter with the patient was in whole, or in part, for the following medical condition, which is the primary reason for durable medical equipment:  Post-Op Surgery and Other - lung cancer/emphysema, resected.    I certify that, based on my findings, the following durable medical equipment is medically necessary:    Oxygen   HOME O2 Saturation Measurements:(Values must be present for Home Oxygen orders)  Room air sat at rest: 88  Room air sat with amb: 86  With liters of O2: 3, O2 sat at rest with O2: 90  With Liters of O2: 3, O2 sat with amb with O2 : 96  Is the patient mobile?: Yes  If patient feels more short of breath, they can go up to 6 liters per minute and contact healthcare provider.    Supporting Symptoms: The patient requires supplemental oxygen, as the following interventions have been tried with limited or no improvement: \"Ambulation with oximetry and \"Incentive spirometry   and Nebulizer.    My Clinical findings support the need for the above equipment due to:  Hypoxia, Other - dyspnea on exertion  "

## 2024-02-05 NOTE — DISCHARGE SUMMARY
Discharge Summary    CHIEF COMPLAINT ON ADMISSION  Lung cancer    Reason for Admission  Left upper lobe lung cancer    Admission Date  1/30/2024    CODE STATUS  Prior    HPI & HOSPITAL COURSE  This is a 68 y.o. female here with left upper lobe lung cancer. She received neoadjuvant chemoimmunotherapy with a good response. 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 Internal mammary tissue negative. No complications. Small air lek that resolved and the chest tube was removed.      Therefore, she is discharged in good and stable condition to home with close outpatient follow-up.    The patient met 2-midnight criteria for an inpatient stay at the time of discharge.    Discharge Date  2/2/2024    FOLLOW UP ITEMS POST DISCHARGE  None    DISCHARGE DIAGNOSES  Active Problems:    NSCLC of left lung (HCC) (POA: Yes)      Overview: Diagnosed 06/2023.      Former cigarette smoker.      She was undergoing chemotherapy/immunotherapy.  This was discontinued       after the medication seemed to cause significant diarrhea and drug-induced       colitis.  She is on a prednisone taper prescribed by GI and her diarrhea       has resolved.      She is following with oncology, Dr. Roselia Campbell.      She is scheduled for surgery 1/30 for left upper lobectomy.        Resolved Problems:    * No resolved hospital problems. *      FOLLOW UP  Future Appointments   Date Time Provider Department Center   4/15/2024  1:00 PM Beata White P.A.-C. [facility] Ganser, M.D.  [address]  [place] [id number] [phone]    Schedule an appointment as soon as possible for a visit in 1 week(s)      Beata White P.A.-C.  [address]  [place] [id number] [phone]            MEDICATIONS ON DISCHARGE     Medication List        CONTINUE taking these medications        Instructions   fenofibrate micronized 134 MG capsule  Commonly known as:  Lofibra   Take 1 Capsule by mouth every day.  Dose: 134 mg     folic acid 1 MG Tabs  Commonly known as: Folvite   TAKE 1 TABLET BY MOUTH ONCE DAILY  Dose: 1 mg     levothyroxine 50 MCG Tabs  Commonly known as: Synthroid   Take 1 Tablet by mouth every morning on an empty stomach.  Dose: 50 mcg     lisinopril 10 MG Tabs  Commonly known as: Prinivil   Take 1 Tablet by mouth every day.  Dose: 10 mg     metoprolol SR 25 MG Tb24  Commonly known as: Toprol XL   Take 25 mg by mouth every day.  Dose: 25 mg     pravastatin 40 MG tablet  Commonly known as: Pravachol   Take 1 Tablet by mouth every day.  Dose: 40 mg     predniSONE 20 MG Tabs  Commonly known as: Deltasone   Take 60 mg by mouth every day. 3 tabs= 60mg  Dose: 60 mg     venlafaxine 75 MG Tabs  Commonly known as: Effexor   Take 1 Tablet by mouth every day.  Dose: 75 mg     VITAMIN D PO   Take 5,000 Units by mouth every day.  Dose: 5,000 Units              Allergies  Allergies   Allergen Reactions    Codeine Unspecified     Memory issues       DIET  No orders of the defined types were placed in this encounter.      ACTIVITY  As tolerated.  Weight bearing as tolerated    CONSULTATIONS  None    PROCEDURES  VATS left upper lobectomy, node dissection    LABORATORY  Lab Results   Component Value Date    SODIUM 139 01/31/2024    POTASSIUM 5.0 01/31/2024    CHLORIDE 106 01/31/2024    CO2 22 01/31/2024    GLUCOSE 72 01/31/2024    BUN 25 (H) 01/31/2024    CREATININE 1.05 01/31/2024    CREATININE 0.7 08/21/2007        Lab Results   Component Value Date    WBC 11.2 (H) 01/31/2024    HEMOGLOBIN 10.9 (L) 01/31/2024    HEMATOCRIT 36.3 (L) 01/31/2024    PLATELETCT 282 01/31/2024

## 2024-02-06 DIAGNOSIS — C34.92 NSCLC OF LEFT LUNG (HCC): ICD-10-CM

## 2024-02-06 DIAGNOSIS — Z79.899 HIGH RISK MEDICATION USE: ICD-10-CM

## 2024-02-07 RX ORDER — FOLIC ACID 1 MG/1
1 TABLET ORAL DAILY
Qty: 30 TABLET | Refills: 0 | Status: SHIPPED | OUTPATIENT
Start: 2024-02-07 | End: 2024-03-11

## 2024-02-14 ENCOUNTER — PATIENT MESSAGE (OUTPATIENT)
Dept: ONCOLOGY | Facility: MEDICAL CENTER | Age: 69
End: 2024-02-14

## 2024-02-14 ENCOUNTER — PATIENT OUTREACH (OUTPATIENT)
Dept: ONCOLOGY | Facility: MEDICAL CENTER | Age: 69
End: 2024-02-14
Payer: MEDICARE

## 2024-02-14 NOTE — LETTER
Nickolas LUNDBERG [facility]    [address]  [place] [address]  Phone: [phone] - Fax: [phone]              Address:  [place] [address]     Date: 02/14/24  Medical Record Number: [id number]    Dear Sylvie Andersen,    On behalf of your entire care team at [facility], I wish you a speedy recovery as you heal from your recent surgery.  I am sending this letter as a reminder of our available services. As Cancer Nurse Navigator, I can address any needs you may have with education, guidance and support.I am available to you and your family through your treatment at [facility].       I am available to address your needs during your journey with the following services:     Care Coordination  I can assist you in facilitating communication between your cancer care treatment team to ensure timely treatment and follow-up.  I can also assist with transition of care back to your primary care provider, or other specialist, as needed.  My goal is to bridge gaps for you throughout the course of your active treatment.       Education Services  Understanding the recommended treatment course by your physician is key. I can provide educational resources personalized to your cancer diagnosis to help you understand your diagnosis and treatment. Please let me know if you would like to receive information about your diagnosis and treatment plan.  I am here to help.     Support Services/Resource Information  [facility] we offer a full scope of support services.  I can assist you with referral information to:  Cancer Clinical Trials & Research  Nutrition counseling  Support groups  Complementary Therapies such as Mind-Body Techniques Meditation  Patient Financial Advocates    [facility], an [employer] affiliate office, our volunteers can assist you with accessing our [employer] library, support services information, head coverings and comfort  items  Community and national resources, included eligibility based jose assistance and pharmaceutical access programs, if you are in need of additional information.     [facility] offers services that include:  Behavioral Health  Genetic counseling & testing  Acupuncture  Lymphedema prevention/treatment program  Palliative care services.         I hope you have an excellent patient experience.  Please feel free to share with me your comments regarding the care you have received- we value your feedback.    Sincerely,     Angela Ceballos R.N.  Cancer Nurse Navigator    Office: [phone]   Main:  [phone]  Email:  [email or web address]

## 2024-02-16 ENCOUNTER — PATIENT MESSAGE (OUTPATIENT)
Dept: HEALTH INFORMATION MANAGEMENT | Facility: OTHER | Age: 69
End: 2024-02-16

## 2024-03-04 DIAGNOSIS — I10 ESSENTIAL HYPERTENSION: ICD-10-CM

## 2024-03-04 RX ORDER — LISINOPRIL 10 MG/1
10 TABLET ORAL DAILY
Qty: 100 TABLET | Refills: 3 | Status: SHIPPED
Start: 2024-03-04

## 2024-03-04 RX ORDER — LISINOPRIL 10 MG/1
10 TABLET ORAL DAILY
Qty: 100 TABLET | Refills: 3 | Status: SHIPPED | OUTPATIENT
Start: 2024-03-04 | End: 2024-03-04 | Stop reason: SDUPTHER

## 2024-03-07 DIAGNOSIS — Z79.899 HIGH RISK MEDICATION USE: ICD-10-CM

## 2024-03-07 DIAGNOSIS — C34.92 NSCLC OF LEFT LUNG (HCC): ICD-10-CM

## 2024-03-11 RX ORDER — FOLIC ACID 1 MG/1
1 TABLET ORAL DAILY
Qty: 30 TABLET | Refills: 0 | Status: SHIPPED | OUTPATIENT
Start: 2024-03-11

## 2024-03-14 DIAGNOSIS — E78.5 DYSLIPIDEMIA: ICD-10-CM

## 2024-03-15 RX ORDER — FENOFIBRATE 134 MG/1
134 CAPSULE ORAL DAILY
Qty: 90 CAPSULE | Refills: 0 | Status: SHIPPED | OUTPATIENT
Start: 2024-03-15

## 2024-03-15 NOTE — TELEPHONE ENCOUNTER
Received request via: Pharmacy    Was the patient seen in the last year in this department? Yes    Does the patient have an active prescription (recently filled or refills available) for medication(s) requested? No    Pharmacy Name: [facility] [place], [place] - [address]     Does the patient have [facility] and need 100 day supply (blood pressure, diabetes and cholesterol meds only)? Medication is not for cholesterol, blood pressure or diabetes

## 2024-04-01 ENCOUNTER — HOSPITAL ENCOUNTER (OUTPATIENT)
Dept: HEMATOLOGY ONCOLOGY | Facility: MEDICAL CENTER | Age: 69
End: 2024-04-01
Attending: STUDENT IN AN ORGANIZED HEALTH CARE EDUCATION/TRAINING PROGRAM
Payer: MEDICARE

## 2024-04-01 VITALS
WEIGHT: 135.25 LBS | DIASTOLIC BLOOD PRESSURE: 66 MMHG | OXYGEN SATURATION: 98 % | SYSTOLIC BLOOD PRESSURE: 130 MMHG | HEIGHT: 64 IN | HEART RATE: 104 BPM | BODY MASS INDEX: 23.09 KG/M2 | TEMPERATURE: 97.2 F

## 2024-04-01 DIAGNOSIS — C34.92 NSCLC OF LEFT LUNG (HCC): ICD-10-CM

## 2024-04-01 PROCEDURE — 99214 OFFICE O/P EST MOD 30 MIN: CPT | Performed by: STUDENT IN AN ORGANIZED HEALTH CARE EDUCATION/TRAINING PROGRAM

## 2024-04-01 PROCEDURE — 99212 OFFICE O/P EST SF 10 MIN: CPT | Performed by: STUDENT IN AN ORGANIZED HEALTH CARE EDUCATION/TRAINING PROGRAM

## 2024-04-01 ASSESSMENT — ENCOUNTER SYMPTOMS
SHORTNESS OF BREATH: 0
FEVER: 0
WEIGHT LOSS: 1
BLURRED VISION: 0
FOCAL WEAKNESS: 0
ORTHOPNEA: 0
COUGH: 0
NECK PAIN: 0
BRUISES/BLEEDS EASILY: 0
TREMORS: 0
SENSORY CHANGE: 0
HEARTBURN: 0
SORE THROAT: 0
HEADACHES: 0
ABDOMINAL PAIN: 0
TINGLING: 0
PALPITATIONS: 0
VOMITING: 0
DEPRESSION: 0
CHILLS: 0
MEMORY LOSS: 1
SPUTUM PRODUCTION: 0
DIZZINESS: 0
NAUSEA: 0
WHEEZING: 0

## 2024-04-01 ASSESSMENT — FIBROSIS 4 INDEX: FIB4 SCORE: 1.74

## 2024-04-01 NOTE — ADDENDUM NOTE
Encounter addended by: Karthik Beckford on: 4/1/2024 11:12 AM   Actions taken: Charge Capture section accepted

## 2024-04-01 NOTE — PROGRESS NOTES
Consult Note: Hematology/Oncology      Primary Care:  Kermit Bergman M.D.          Chief Complaint   Patient presents with    Diarrhea       Patient reports diarrhea x 4 days with bright red blood noted. Patient also endorses vomiting as well.    Bloody Stools       X 4 days. Bright red blood         Current Treatment:      07/19/23: C1D1 neoadjuvant Carboplatin/Pemetrexed/Nivolumab  08/09/23: C2D1 neoadjuvant Carboplatin/Pemetrexed/Nivolumab  08/30/23: C3D1 neoadjuvant Carboplatin/Pemetrexed/Nivolumab delayed x1 week d/t creatinine clearance  09/05/23: C3D1 neoadjuvant Carboplatin/Pemetrexed/Nivolumab    1/30/24: Lobectomy, ypT1 ypN0     Prior Treatment: None     Subjective:   History of Presenting Illness:  Latrice Andersen is a 67 y.o. female with a PMHx of Breast Cancer in 1995 (s/p RT and Chemo) with a new diagnosis of NSCLC Adenocarcinoma of the left lung.      Patient reports that last May 2022, she went to [place] and was feeling very sick, weak and had dysuria.  She came home and went to the ER, and was found to have a UTI.  She was found to be anemic, which prompted her to get a Colonoscopy/EGD.  She was told to get a CXR, but chose not to get it done at that time. She waited to Feb 2023,  which showed 21 mm density in or overlying the left, not visualized on the previous exam. This prompted her to get a CT CAP 3/2023, 2.7 x 2.5 cm subpleural lingular mass as well as  4 mm left lower lobe pulmonary nodule and 3 mm right lower lobe pulmonary nodule.     She had a PET scan 6/6/2023, which showed hypermetabolic mass in the left upper lobe mass consistent with malignancy, as well as an increase uptake in the L parasternal region vs internal mammary LN. No evidence of metastatic disease elsewhere.      6/12/23, biopsy of the ANSHUL mass was preformed which showed malignancy, pulmonary adenocarcinoma.     MRI brain on 6/20/23, shows an incidental meningioma, but a dural met cannot be excluded.        Sylvie is taking care of her 3 yo granddaughter - Bill (her daughter in laws mother has her other grandbaby, Bassam).  She did office work prior.  She has 2 boys (Ace, Mary). She lives in [facility] with Wander her , she has fair support.       She says she feels fine, washes the care, mows the lawn, does all of her housework.       She smoked 5 cig/day, and started when she was 17.  She has not smoked since last Thursday.  No alcohol. No IVDU.       Her grandfather, uncle, and cousin  of lung cancer.      She did well on 3 cycles of NAC and IO, however after C3 she developed significant diarrhea and was admitted to the hospital.  She was started on steroids and diarrhea improved. She was d/c on a taper.    Additionally her TSH was low and an appt was made with endocrine.     She underwent a back to me on 2024.    Pathology revealed ypT1 ypN0     Interval History    Patient has had 2 episodes of syncope in the past week after stopping steroids. She was 60mg Prednisone and stopped cold turkey. She was rx this for colitis.     She describes lightheadedness and feels uncoordinated.  She can't  distances very well.      She denies falls.           Past Medical History:   Diagnosis Date    Allergy, unspecified not elsewhere classified     Anxiety disorder 2014    Bowel habit changes 2023    Diarrhea bad    Breast cancer (HCC) 1995    Cancer (HCC) 2023    lung ca, no surgery,chemo/immune tx last tx     Chickenpox     Dental disorder     upper partial    Former smoker 2023    High cholesterol     History of breast cancer 2017    History of radiation therapy 2023    To left chest for breast cancer     Hyperlipidemia     Hypertension     Left upper lobe pulmonary nodule 2023    Renal disorder     CKD    Tonsillitis         Past Surgical History:   Procedure Laterality Date    DC THORACOSCOPY,DX NO BX Left 2024    Procedure: THORACOSCOPY;   Surgeon: John H Ganser, M.D.;  Location: [facility];  Service: General    MASS EXCISION GENERAL Left 2024    Procedure: EXCISION, MASS, INTERNAL MAMMARY;  Surgeon: John H Ganser, M.D.;  Location: [facility];  Service: General    LOBECTOMY Left 2024    Procedure: LOBECTOMY, UPPER;  Surgeon: John H Ganser, M.D.;  Location: [facility];  Service: General    NODE DISSECTION Left 2024    Procedure: LYMPH NODE DISSECTION;  Surgeon: John H Ganser, M.D.;  Location: [facility];  Service: General    WI SIGMOIDOSCOPY,DIAGNOSTIC  12/15/2023    Procedure: FLEXIBLE SIGMOIDOSCOPY;  Surgeon: Francois Rollins M.D.;  Location: [facility];  Service: Gastroenterology    WI BRONCHOSCOPY,DIAGNOSTIC N/A 2023    Procedure: FIBER OPTIC BRONCHOSCOPY WITH  WASH, BRUSH, BRONCHOALVEOLAR LAVAGE, BIOPSY AND FINE NEEDLE ASPIRATION, ENDOBRONCHIAL ULTRASOUND & NAVIGATION, ROBOTICS;  Surgeon: Brooke Perrin M.D.;  Location: [facility];  Service: Pulmonary Robotic    ENDOBRONCHIAL US ADD-ON N/A 2023    Procedure: ENDOBRONCHIAL ULTRASOUND (EBUS);  Surgeon: Brooke Perrin M.D.;  Location: [facility];  Service: Pulmonary Robotic    GYN SURGERY      Tubiligation    MASTECTOMY      Left    NO PERTINENT PAST SURGICAL HISTORY      Breast Cancer (Left Mastectomy)    OTHER  1986    Tubaligation    WI CHEMOTHERAPY, UNSPECIFIED PROCEDURE      WI RADIATION THERAPY PLAN SIMPLE         Social History     Tobacco Use    Smoking status: Former     Current packs/day: 0.00     Average packs/day: 0.3 packs/day for 45.0 years (11.3 ttl pk-yrs)     Types: Cigarettes     Start date: 1977     Quit date: 2022     Years since quittin.7     Passive exposure: Current (Spouse)    Smokeless tobacco: Former     Quit date: 2022    Tobacco comments:     5 daily    Vaping Use    Vaping Use: Never used   Substance Use Topics    Alcohol use: No    Drug  use: No        Family History   Problem Relation Age of Onset    Cancer Mother         breast cancer/Breast    Breast Cancer Mother     Hypertension Maternal Uncle     Cancer Maternal Uncle     Hypertension Maternal Grandmother     Hyperlipidemia Maternal Grandmother     Heart Disease Maternal Grandmother     Cancer Paternal Grandfather         Lung ca, smoker    Lung Cancer Maternal Grandfather     Alzheimer's Disease Maternal Aunt     Kidney Disease Maternal Aunt     Diabetes Neg Hx        Allergies   Allergen Reactions    Codeine Unspecified     Memory issues       Current Outpatient Medications   Medication Sig Dispense Refill    fenofibrate micronized (LOFIBRA) 134 MG capsule Take 1 Capsule by mouth every day. 90 Capsule 0    folic acid (FOLVITE) 1 MG Tab TAKE ONE TABLET BY MOUTH ONE TIME DAILY 30 Tablet 0    lisinopril (PRINIVIL) 10 MG Tab Take 1 Tablet by mouth every day. 100 Tablet 3    venlafaxine (EFFEXOR) 75 MG Tab Take 1 Tablet by mouth every day. 90 Tablet 0    levothyroxine (SYNTHROID) 50 MCG Tab Take 1 Tablet by mouth every morning on an empty stomach. 30 Tablet 5    VITAMIN D PO Take 5,000 Units by mouth every day.      metoprolol SR (TOPROL XL) 25 MG TABLET SR 24 HR Take 25 mg by mouth every day.      pravastatin (PRAVACHOL) 40 MG tablet Take 1 Tablet by mouth every day. 100 Tablet 3    predniSONE (DELTASONE) 20 MG Tab Take 60 mg by mouth every day. 3 tabs= 60mg (Patient not taking: Reported on 4/1/2024)       No current facility-administered medications for this encounter.       Review of Systems   Constitutional:  Positive for weight loss. Negative for chills, fever and malaise/fatigue.   HENT:  Negative for congestion, ear pain, nosebleeds and sore throat.         Hoarseness    Eyes:  Negative for blurred vision.   Respiratory:  Negative for cough, sputum production, shortness of breath and wheezing.    Cardiovascular:  Negative for chest pain, palpitations, orthopnea and leg swelling.  "  Gastrointestinal:  Negative for abdominal pain, heartburn, nausea and vomiting.   Genitourinary:  Negative for dysuria, frequency and urgency.        Nocturia   Musculoskeletal:  Negative for neck pain.   Neurological:  Negative for dizziness, tingling, tremors, sensory change, focal weakness and headaches.   Endo/Heme/Allergies:  Does not bruise/bleed easily.   Psychiatric/Behavioral:  Positive for memory loss. Negative for depression and suicidal ideas.         Anxiety   All other systems reviewed and are negative.      Problem list, medications, and allergies reviewed by myself today in Epic.     Objective:     Vitals:    04/01/24 1033   BP: 130/66   BP Location: Left arm   Patient Position: Sitting   BP Cuff Size: Adult   Pulse: (!) 104   Temp: 36.2 °C (97.2 °F)   TempSrc: Temporal   SpO2: 98%   Weight: 61.4 kg (135 lb 4 oz)   Height: 1.613 m (5' 3.5\")       DESC; KARNOFSKY SCALE WITH ECOG EQUIVALENT: 100, Fully active, able to carry on all pre-disease performed without restriction (ECOG equivalent 0)    DISTRESS LEVEL: mild distress    Physical Exam  Constitutional:       General: She is not in acute distress.     Appearance: Normal appearance. She is not ill-appearing.   HENT:      Head: Normocephalic and atraumatic.      Comments: Hernandez facies     Nose: Nose normal.      Mouth/Throat:      Mouth: Mucous membranes are moist.      Pharynx: No oropharyngeal exudate or posterior oropharyngeal erythema.   Eyes:      General: No scleral icterus.     Conjunctiva/sclera: Conjunctivae normal.      Pupils: Pupils are equal, round, and reactive to light.   Cardiovascular:      Rate and Rhythm: Normal rate and regular rhythm.      Pulses: Normal pulses.      Heart sounds: Normal heart sounds. No murmur heard.     No friction rub. No gallop.   Pulmonary:      Effort: Pulmonary effort is normal. No respiratory distress.      Breath sounds: Normal breath sounds. No stridor. No wheezing, rhonchi or rales.   Chest:      " Chest wall: No tenderness.   Abdominal:      General: Abdomen is flat. Bowel sounds are normal. There is no distension.      Palpations: Abdomen is soft. There is no mass.      Tenderness: There is no abdominal tenderness. There is no guarding.   Musculoskeletal:         General: No swelling, tenderness or deformity. Normal range of motion.      Cervical back: Normal range of motion and neck supple. No rigidity or tenderness.      Right lower leg: No edema.      Left lower leg: No edema.   Skin:     General: Skin is warm and dry.      Coloration: Skin is not jaundiced or pale.      Findings: Bruising and lesion present. No erythema or rash.   Neurological:      General: No focal deficit present.      Mental Status: She is alert and oriented to person, place, and time. Mental status is at baseline.      Sensory: No sensory deficit.      Motor: No weakness.      Coordination: Coordination normal.      Gait: Gait normal.   Psychiatric:         Mood and Affect: Mood normal.         Behavior: Behavior normal.         Thought Content: Thought content normal.         Judgment: Judgment normal.         Labs:   Most recent labs reviewed.  Cr is high with GRF of 58    Imaging:   Most recent images below have been independently reviewed by me.     10/11/23 CT CAP  1.  Positive treatment response with mildly decreased size of left upper lobe mass.  2.  A couple new tiny nonspecific micronodules in the right lung. These are nonspecific but of doubtful significance, possibly infectious/inflammatory. Attention on follow-up however to ensure resolution.  3.  Otherwise no adenopathy or other new metastatic disease within the chest, abdomen or pelvis.    6/20/23: Brain MRI   1.  There is an approximately 8 mm sized enhancing extra-axial lesion noted along the roof of the right orbit. This lesion likely represent an incidental meningioma. However the possibility of dural based metastasis cannot be excluded. Follow-up study is    recommended.  2.  Mild chronic microvascular ischemic disease.    06/06/2023 PET SCAN  1.  Intense increased activity corresponding to LEFT upper lobe mass consistent with malignancy.  2.  Focal uptake in the LEFT parasternal region which may indicate pleural versus internal mammary lymph node metastasis.  3.  No other evidence for metastatic disease.  Prior LEFT mastectomy.    Pathology:    1/30/24  FINAL DIAGNOSIS:     A. Hilar node, station 10:          Negative for malignancy in one lymph node (0/1)   B. Aortopulmonary node, station 5:          Benign adipose tissue, no lymphoid tissue identified   C. Internal mammary tissue:          Benign adipose tissue, no lymphoid tissue identified   D. Subcarinal node, station 7:          Negative for malignancy in one lymph node (0/1)   E. Station 11 lymph node:          Negative for malignancy in one lymph node (0/1)   F. Subaortic node, station 6:          Negative for malignancy in one lymph node (0/1)   G. Left upper lobe:          Invasive adenocarcinoma, 2.5 cm, lepidic-type with focal           necrosis but predominantly viable tumor (partial response)          Margins of resection are negative for carcinoma          Negative for metastatic carcinoma in four lymph nodes (0/4)          Background lung with emphysematous changes          See synoptic report for details     LUNG     SPECIMEN    Procedure:  Lobectomy     Specimen Laterality:  Left   TUMOR    Tumor Focality:  Single focus     Tumor Site:  Upper lobe of lung     Tumor Size       Total Tumor Size (size of entire tumor):  Greatest Dimension       (Centimeters) - 3.6 cm       Size of Invasive Component:  Greatest Dimension (Centimeters) - 2.5       cm     Histologic Type:  Invasive lepidic adenocarcinoma       Histologic Patterns Present:  Lepidic     Visceral Pleura Invasion:  Not identified     Direct Invasion of Adjacent Structures:  Not identified     Treatment Effect:  Present       Percentage of  Residual Viable Tumor:  70%       Percentage of Necrosis:  30%     Lymphovascular Invasion:  Not identified   MARGINS    Margin Status for Invasive Carcinoma:  All margins negative for     invasive carcinoma       Closest Margin(s) to Invasive Carcinoma:  Parenchymal       Distance from Invasive Carcinoma to Closest Margin:  Greater than -       1 cm     Margin Status for Non-Invasive Tumor:  All margins negative for     non-invasive tumor   REGIONAL LYMPH NODES     Lymph Node(s) from Prior Procedures:  No known prior lymph node     sampling performed     Regional Lymph Node Status:  All regional lymph nodes negative for     tumor       Number of Lymph Nodes Examined:  8         Francis Site(s) Examined:  7: Subcarinal, 6: Para-aortic (ascending         aorta or phrenic), 10L: Hilar, 11L: Interlobar, 12L: Lobar   PATHOLOGIC STAGE CLASSIFICATION (pTNM, AJCC 8th Edition)   Reporting of pT, pN, and (when applicable) pM categories is based on   information available to the pathologist at the time the report is   issued. As per the AJCC (Chapter 1, 8th Ed.) it is the managing   physician's responsibility to establish the final pathologic stage   based upon all pertinent information, including but potentially not   limited to this pathology report.     TNM Descriptors:  y (post-treatment)     pT Category:  pT1c     pN Category:  pN0   ADDITIONAL FINDINGS     Additional Findings:  Emphysema     6/12/23  FINAL DIAGNOSIS:     A. Fine needle aspiration, left upper lung lobe mass:          Positive for malignancy, pulmonary adenocarcinoma, nonmucinous           as sampled   B. Lung, left upper lobe mass, core biopsy:          Pulmonary adenocarcinoma, nonmucinous as sampled   C. Lung, left upper lobe mass, biopsy:          Pulmonary adenocarcinoma, nonmucinous as sampled   D. Bronchoalveolar lavage, left upper lobe:          Rare malignant cell cluster on the Thin Prep slide     Assessment/Plan:      Cancer Staging   NSCLC of  left lung (HCC)  Staging form: Lung, AJCC 8th Edition  - Clinical stage from 6/29/2023: Stage IIIA (cT1c, cN2, cM0) - Signed by Nancy Campbell M.D. on 6/29/2023       Ms. Andersen is a 68 yo F with a new diagnosis of adenocarcinoma of the ANSHUL (PDL1 1%,  TP53, NF1) who is s/p C3 of NAC+IO with G3 colitis and hyperthyrodisim 2/2 to IO.  She is s/p lobectomy and path reveals ypT1 ypN0. We will follow with surveillance    Plan  -we discussed that based on her path, she does not need to undergo adjuvant treatment.  Additionally, she had significant s/e from her NAC.  -CT scan in 3 months     Hyperthyroidism  -follows with endo    Adrenal insufficiency  -she has a rx waiting at [facility]  -she knows she has to pick this up ASAP  -she will need a taper  -she has f/u with PCP for taper      Any questions and concerns raised by the patient were addressed and answered. Patient denies any further questions.  Patient encouraged to call the office with any concerns or issues.     Nancy Campbell M.D.  Hematology/Oncology    30 minutes was spent on this visit

## 2024-04-07 DIAGNOSIS — Z79.899 HIGH RISK MEDICATION USE: ICD-10-CM

## 2024-04-07 DIAGNOSIS — C34.92 NSCLC OF LEFT LUNG (HCC): ICD-10-CM

## 2024-04-09 ENCOUNTER — HOSPITAL ENCOUNTER (OUTPATIENT)
Dept: LAB | Facility: MEDICAL CENTER | Age: 69
End: 2024-04-09
Attending: STUDENT IN AN ORGANIZED HEALTH CARE EDUCATION/TRAINING PROGRAM
Payer: MEDICARE

## 2024-04-09 DIAGNOSIS — E06.4 DRUG-INDUCED THYROIDITIS: ICD-10-CM

## 2024-04-09 DIAGNOSIS — E03.9 HYPOTHYROIDISM (ACQUIRED): ICD-10-CM

## 2024-04-09 DIAGNOSIS — E78.5 DYSLIPIDEMIA: ICD-10-CM

## 2024-04-09 LAB
CHOLEST SERPL-MCNC: 157 MG/DL (ref 100–199)
FASTING STATUS PATIENT QL REPORTED: NORMAL
HDLC SERPL-MCNC: 50 MG/DL
LDLC SERPL CALC-MCNC: 81 MG/DL
TRIGL SERPL-MCNC: 128 MG/DL (ref 0–149)

## 2024-04-09 PROCEDURE — 36415 COLL VENOUS BLD VENIPUNCTURE: CPT

## 2024-04-09 PROCEDURE — 80061 LIPID PANEL: CPT

## 2024-04-09 RX ORDER — LEVOTHYROXINE SODIUM 0.05 MG/1
50 TABLET ORAL
Qty: 30 TABLET | Refills: 0 | Status: SHIPPED | OUTPATIENT
Start: 2024-04-09 | End: 2024-04-15

## 2024-04-09 RX ORDER — FOLIC ACID 1 MG/1
1 TABLET ORAL DAILY
Qty: 30 TABLET | Refills: 0 | Status: SHIPPED | OUTPATIENT
Start: 2024-04-09

## 2024-04-14 DIAGNOSIS — E06.4 DRUG-INDUCED THYROIDITIS: ICD-10-CM

## 2024-04-14 DIAGNOSIS — E03.9 HYPOTHYROIDISM (ACQUIRED): ICD-10-CM

## 2024-04-15 ENCOUNTER — APPOINTMENT (OUTPATIENT)
Dept: MEDICAL GROUP | Facility: PHYSICIAN GROUP | Age: 69
End: 2024-04-15
Payer: MEDICARE

## 2024-04-15 DIAGNOSIS — F41.8 OTHER SPECIFIED ANXIETY DISORDERS: ICD-10-CM

## 2024-04-15 RX ORDER — LEVOTHYROXINE SODIUM 0.05 MG/1
50 TABLET ORAL
Qty: 30 TABLET | Refills: 0 | Status: SHIPPED | OUTPATIENT
Start: 2024-04-15

## 2024-04-15 RX ORDER — VENLAFAXINE 75 MG/1
75 TABLET ORAL DAILY
Qty: 90 TABLET | Refills: 1 | Status: SHIPPED | OUTPATIENT
Start: 2024-04-15

## 2024-04-15 NOTE — TELEPHONE ENCOUNTER
Received request via: Pharmacy    Was the patient seen in the last year in this department? Yes    Does the patient have an active prescription (recently filled or refills available) for medication(s) requested? No    [facility]    Does the patient have [facility] Plus and need 100 day supply (blood pressure, diabetes and cholesterol meds only)? Medication is not for cholesterol, blood pressure or diabetes

## 2024-04-16 ENCOUNTER — HOSPITAL ENCOUNTER (OUTPATIENT)
Dept: LAB | Facility: MEDICAL CENTER | Age: 69
End: 2024-04-16
Attending: STUDENT IN AN ORGANIZED HEALTH CARE EDUCATION/TRAINING PROGRAM
Payer: MEDICARE

## 2024-04-16 ENCOUNTER — OFFICE VISIT (OUTPATIENT)
Dept: MEDICAL GROUP | Facility: PHYSICIAN GROUP | Age: 69
End: 2024-04-16
Payer: MEDICARE

## 2024-04-16 ENCOUNTER — PATIENT OUTREACH (OUTPATIENT)
Dept: HEALTH INFORMATION MANAGEMENT | Facility: OTHER | Age: 69
End: 2024-04-16

## 2024-04-16 VITALS
HEIGHT: 63 IN | BODY MASS INDEX: 23.21 KG/M2 | DIASTOLIC BLOOD PRESSURE: 58 MMHG | TEMPERATURE: 96.8 F | SYSTOLIC BLOOD PRESSURE: 118 MMHG | HEART RATE: 103 BPM | WEIGHT: 131 LBS | OXYGEN SATURATION: 95 %

## 2024-04-16 DIAGNOSIS — R23.3 EASY BRUISING: ICD-10-CM

## 2024-04-16 DIAGNOSIS — E78.5 DYSLIPIDEMIA: ICD-10-CM

## 2024-04-16 DIAGNOSIS — I70.0 ATHEROSCLEROSIS OF AORTA (HCC): ICD-10-CM

## 2024-04-16 DIAGNOSIS — C34.92 NSCLC OF LEFT LUNG (HCC): ICD-10-CM

## 2024-04-16 DIAGNOSIS — E03.9 HYPOTHYROIDISM (ACQUIRED): ICD-10-CM

## 2024-04-16 DIAGNOSIS — Z23 NEED FOR VACCINATION: ICD-10-CM

## 2024-04-16 DIAGNOSIS — N18.31 STAGE 3A CHRONIC KIDNEY DISEASE: ICD-10-CM

## 2024-04-16 DIAGNOSIS — E06.4 DRUG-INDUCED THYROIDITIS: ICD-10-CM

## 2024-04-16 DIAGNOSIS — I10 ESSENTIAL HYPERTENSION: ICD-10-CM

## 2024-04-16 DIAGNOSIS — Z12.83 SKIN CANCER SCREENING: ICD-10-CM

## 2024-04-16 LAB
ERYTHROCYTE [DISTWIDTH] IN BLOOD BY AUTOMATED COUNT: 55.5 FL (ref 35.9–50)
HCT VFR BLD AUTO: 45.3 % (ref 37–47)
HGB BLD-MCNC: 14.1 G/DL (ref 12–16)
MCH RBC QN AUTO: 27.8 PG (ref 27–33)
MCHC RBC AUTO-ENTMCNC: 31.1 G/DL (ref 32.2–35.5)
MCV RBC AUTO: 89.3 FL (ref 81.4–97.8)
PLATELET # BLD AUTO: 472 K/UL (ref 164–446)
PMV BLD AUTO: 9.2 FL (ref 9–12.9)
RBC # BLD AUTO: 5.07 M/UL (ref 4.2–5.4)
T4 FREE SERPL-MCNC: 1.36 NG/DL (ref 0.93–1.7)
TSH SERPL DL<=0.005 MIU/L-ACNC: 0.66 UIU/ML (ref 0.38–5.33)
WBC # BLD AUTO: 12.5 K/UL (ref 4.8–10.8)

## 2024-04-16 PROCEDURE — 85027 COMPLETE CBC AUTOMATED: CPT

## 2024-04-16 PROCEDURE — 90677 PCV20 VACCINE IM: CPT | Performed by: STUDENT IN AN ORGANIZED HEALTH CARE EDUCATION/TRAINING PROGRAM

## 2024-04-16 PROCEDURE — G0009 ADMIN PNEUMOCOCCAL VACCINE: HCPCS | Performed by: STUDENT IN AN ORGANIZED HEALTH CARE EDUCATION/TRAINING PROGRAM

## 2024-04-16 PROCEDURE — 84439 ASSAY OF FREE THYROXINE: CPT

## 2024-04-16 PROCEDURE — 36415 COLL VENOUS BLD VENIPUNCTURE: CPT

## 2024-04-16 PROCEDURE — 99214 OFFICE O/P EST MOD 30 MIN: CPT | Mod: 25 | Performed by: STUDENT IN AN ORGANIZED HEALTH CARE EDUCATION/TRAINING PROGRAM

## 2024-04-16 PROCEDURE — 3074F SYST BP LT 130 MM HG: CPT | Performed by: STUDENT IN AN ORGANIZED HEALTH CARE EDUCATION/TRAINING PROGRAM

## 2024-04-16 PROCEDURE — 84443 ASSAY THYROID STIM HORMONE: CPT

## 2024-04-16 PROCEDURE — 3078F DIAST BP <80 MM HG: CPT | Performed by: STUDENT IN AN ORGANIZED HEALTH CARE EDUCATION/TRAINING PROGRAM

## 2024-04-16 ASSESSMENT — ENCOUNTER SYMPTOMS
DIZZINESS: 0
HEADACHES: 0
SHORTNESS OF BREATH: 0
CHILLS: 0
FEVER: 0

## 2024-04-16 ASSESSMENT — FIBROSIS 4 INDEX: FIB4 SCORE: 1.74

## 2024-04-16 NOTE — PROGRESS NOTES
I spoke to the patient this afternoon while she was in clinic for her PCP appointment. I spoke about personal care management and what we do. I provided her with our pamphlet and contact information should she choose to pursue enrollment.

## 2024-04-16 NOTE — PROGRESS NOTES
"Subjective:     CC:   Chief Complaint   Patient presents with    Follow-Up    Lab Results       HPI:   Latrice presents today with    Problem   Essential Hypertension    This is a chronic condition.  Current Meds: Lisinopril 10 mg daily, metoprolol 25 mg daily  Side effects: None  Associated symptoms: denies chest pain, shortness of breath, headaches, dizziness/lightheadedness         Dyslipidemia    Chronic in nature.  Patient is on pravastatin 40 mg daily and Lofibra 134 mg daily.  She is tolerating this medication well with no side effects.    Lab Results   Component Value Date/Time    CHOLSTRLTOT 157 04/09/2024 1359    TRIGLYCERIDE 128 04/09/2024 1359    HDL 50 04/09/2024 1359    LDL 81 04/09/2024 1359              Past medical, family, and social history reviewed by me in Epic chart today.   Medications and allergies reviewed in Epic chart by me today.       Health Maintenance: Completed    ROS:  Review of Systems   Constitutional:  Negative for chills and fever.   Respiratory:  Negative for shortness of breath.    Cardiovascular:  Negative for chest pain.   Neurological:  Negative for dizziness and headaches.       Objective:     Exam:  /58 (BP Location: Left arm, Patient Position: Sitting, BP Cuff Size: Adult long)   Pulse (!) 103   Temp 36 °C (96.8 °F) (Temporal)   Ht 1.6 m (5' 3\")   Wt 59.4 kg (131 lb)   SpO2 95%   BMI 23.21 kg/m²  Body mass index is 23.21 kg/m².    Physical Exam  Vitals reviewed.   Constitutional:       General: She is not in acute distress.  Eyes:      Extraocular Movements: Extraocular movements intact.   Cardiovascular:      Rate and Rhythm: Normal rate and regular rhythm.      Heart sounds: No murmur heard.     No friction rub. No gallop.   Pulmonary:      Effort: Pulmonary effort is normal.      Breath sounds: No wheezing, rhonchi or rales.   Musculoskeletal:      Cervical back: Neck supple.   Skin:     General: Skin is warm and dry.   Neurological:      Mental Status: She " is alert.   Psychiatric:         Mood and Affect: Mood normal.         Assessment & Plan:     68 y.o. female with the following -     1. Dyslipidemia  Chronic, controlled.  Continue current regimen.    2. Essential hypertension  Chronic, controlled.  Continue current regimen.    3. Easy bruising  Patient points out scattered bruises on her forearms which she believes to be due to her recent cancer treatment.  States that it is getting better.  We will repeat a CBC with her upcoming thyroid labs.  - CBC WITHOUT DIFFERENTIAL; Future    4. Need for vaccination  - Pneumococcal Conjugate Vaccine 20-Valent (6 wks+)    5. Skin cancer screening  - Referral to Dermatology      HCC Gap Form    Last edited 04/16/24 15:16 PDT by Beata White P.A.-C.               Return in about 4 months (around 8/16/2024) for medicare annual wellness.    Please note that this dictation was created using voice recognition software. I have made every reasonable attempt to correct obvious errors, but I expect that there are errors of grammar and possibly content that I did not discover before finalizing the note.

## 2024-04-24 ENCOUNTER — TELEPHONE (OUTPATIENT)
Dept: HEALTH INFORMATION MANAGEMENT | Facility: OTHER | Age: 69
End: 2024-04-24
Payer: MEDICARE

## 2024-04-25 ENCOUNTER — PATIENT OUTREACH (OUTPATIENT)
Dept: ONCOLOGY | Facility: MEDICAL CENTER | Age: 69
End: 2024-04-25
Payer: MEDICARE

## 2024-04-25 NOTE — PROGRESS NOTES
Follow up call placed to patient, left message.  Sent [employer] (powering [employer])hart message as well.

## 2024-04-29 ENCOUNTER — HOSPITAL ENCOUNTER (OUTPATIENT)
Dept: LAB | Facility: MEDICAL CENTER | Age: 69
End: 2024-04-29
Attending: STUDENT IN AN ORGANIZED HEALTH CARE EDUCATION/TRAINING PROGRAM
Payer: MEDICARE

## 2024-04-29 DIAGNOSIS — C34.92 NSCLC OF LEFT LUNG (HCC): ICD-10-CM

## 2024-04-29 LAB
ANION GAP SERPL CALC-SCNC: 11 MMOL/L (ref 7–16)
BUN SERPL-MCNC: 34 MG/DL (ref 8–22)
CALCIUM SERPL-MCNC: 10.2 MG/DL (ref 8.5–10.5)
CHLORIDE SERPL-SCNC: 108 MMOL/L (ref 96–112)
CO2 SERPL-SCNC: 23 MMOL/L (ref 20–33)
CREAT SERPL-MCNC: 1.32 MG/DL (ref 0.5–1.4)
FASTING STATUS PATIENT QL REPORTED: NORMAL
GFR SERPLBLD CREATININE-BSD FMLA CKD-EPI: 44 ML/MIN/1.73 M 2
GLUCOSE SERPL-MCNC: 154 MG/DL (ref 65–99)
POTASSIUM SERPL-SCNC: 5 MMOL/L (ref 3.6–5.5)
SODIUM SERPL-SCNC: 142 MMOL/L (ref 135–145)

## 2024-04-29 PROCEDURE — 80048 BASIC METABOLIC PNL TOTAL CA: CPT

## 2024-04-29 PROCEDURE — 36415 COLL VENOUS BLD VENIPUNCTURE: CPT

## 2024-05-01 ENCOUNTER — HOSPITAL ENCOUNTER (OUTPATIENT)
Dept: RADIOLOGY | Facility: MEDICAL CENTER | Age: 69
End: 2024-05-01
Attending: STUDENT IN AN ORGANIZED HEALTH CARE EDUCATION/TRAINING PROGRAM
Payer: MEDICARE

## 2024-05-01 DIAGNOSIS — C34.92 NSCLC OF LEFT LUNG (HCC): ICD-10-CM

## 2024-05-01 RX ADMIN — IOHEXOL 75 ML: 350 INJECTION, SOLUTION INTRAVENOUS at 10:05

## 2024-05-02 ENCOUNTER — APPOINTMENT (OUTPATIENT)
Dept: DERMATOLOGY | Facility: IMAGING CENTER | Age: 69
End: 2024-05-02
Payer: MEDICARE

## 2024-05-03 ENCOUNTER — APPOINTMENT (OUTPATIENT)
Dept: HEMATOLOGY ONCOLOGY | Facility: MEDICAL CENTER | Age: 69
End: 2024-05-03
Payer: MEDICARE

## 2024-05-03 VITALS
HEART RATE: 90 BPM | TEMPERATURE: 98.6 F | WEIGHT: 147.93 LBS | HEIGHT: 63 IN | BODY MASS INDEX: 26.21 KG/M2 | DIASTOLIC BLOOD PRESSURE: 80 MMHG | SYSTOLIC BLOOD PRESSURE: 132 MMHG | OXYGEN SATURATION: 94 %

## 2024-05-03 DIAGNOSIS — R91.1 PULMONARY NODULE: ICD-10-CM

## 2024-05-03 DIAGNOSIS — C34.92 NSCLC OF LEFT LUNG (HCC): ICD-10-CM

## 2024-05-03 PROCEDURE — 99213 OFFICE O/P EST LOW 20 MIN: CPT | Performed by: STUDENT IN AN ORGANIZED HEALTH CARE EDUCATION/TRAINING PROGRAM

## 2024-05-03 ASSESSMENT — ENCOUNTER SYMPTOMS
ORTHOPNEA: 0
CHILLS: 0
BRUISES/BLEEDS EASILY: 0
VOMITING: 0
COUGH: 0
SENSORY CHANGE: 0
NAUSEA: 0
NECK PAIN: 0
SPUTUM PRODUCTION: 0
SHORTNESS OF BREATH: 0
HEARTBURN: 0
DEPRESSION: 0
WEIGHT LOSS: 1
FEVER: 0
DIZZINESS: 0
TREMORS: 0
TINGLING: 0
MEMORY LOSS: 1
HEADACHES: 0
WHEEZING: 0
PALPITATIONS: 0
BLURRED VISION: 0
FOCAL WEAKNESS: 0
ABDOMINAL PAIN: 0
SORE THROAT: 0

## 2024-05-03 ASSESSMENT — FIBROSIS 4 INDEX: FIB4 SCORE: 1.04

## 2024-05-03 NOTE — PROGRESS NOTES
Consult Note: Hematology/Oncology      Primary Care:  Kermit Bergman M.D.          Chief Complaint   Patient presents with    Diarrhea       Patient reports diarrhea x 4 days with bright red blood noted. Patient also endorses vomiting as well.    Bloody Stools       X 4 days. Bright red blood         Current Treatment:      07/19/23: C1D1 neoadjuvant Carboplatin/Pemetrexed/Nivolumab  08/09/23: C2D1 neoadjuvant Carboplatin/Pemetrexed/Nivolumab  08/30/23: C3D1 neoadjuvant Carboplatin/Pemetrexed/Nivolumab delayed x1 week d/t creatinine clearance  09/05/23: C3D1 neoadjuvant Carboplatin/Pemetrexed/Nivolumab    1/30/24: Lobectomy, ypT1 ypN0     Prior Treatment: None     Subjective:   History of Presenting Illness:  Latrice Andersen is a 67 y.o. female with a PMHx of Breast Cancer in 1995 (s/p RT and Chemo) with a new diagnosis of NSCLC Adenocarcinoma of the left lung.      Patient reports that last May 2022, she went to [place] and was feeling very sick, weak and had dysuria.  She came home and went to the ER, and was found to have a UTI.  She was found to be anemic, which prompted her to get a Colonoscopy/EGD.  She was told to get a CXR, but chose not to get it done at that time. She waited to Feb 2023,  which showed 21 mm density in or overlying the left, not visualized on the previous exam. This prompted her to get a CT CAP 3/2023, 2.7 x 2.5 cm subpleural lingular mass as well as  4 mm left lower lobe pulmonary nodule and 3 mm right lower lobe pulmonary nodule.     She had a PET scan 6/6/2023, which showed hypermetabolic mass in the left upper lobe mass consistent with malignancy, as well as an increase uptake in the L parasternal region vs internal mammary LN. No evidence of metastatic disease elsewhere.      6/12/23, biopsy of the ANSHUL mass was preformed which showed malignancy, pulmonary adenocarcinoma.     MRI brain on 6/20/23, shows an incidental meningioma, but a dural met cannot be excluded.        Sylvie is taking care of her 5 yo granddaughter - Bill (her daughter in laws mother has her other grandbaby, Bassam).  She did office work prior.  She has 2 boys (Ace, Mary). She lives in [facility] with Wander her , she has fair support.       She says she feels fine, washes the care, mows the lawn, does all of her housework.       She smoked 5 cig/day, and started when she was 17.  She has not smoked since last Thursday.  No alcohol. No IVDU.       Her grandfather, uncle, and cousin  of lung cancer.      She did well on 3 cycles of NAC and IO, however after C3 she developed significant diarrhea and was admitted to the hospital.  She was started on steroids and diarrhea improved. She was d/c on a taper.    Additionally her TSH was low and an appt was made with endocrine.     She underwent a back to me on 2024.    Pathology revealed ypT1 ypN0     Interval History    Patient presents today to discuss the results from her most recent CT imaging.    She reports that she is still bothered by her moon facies 2/2 to steroids.    Otherwise she is doing well, without any complaints.          Past Medical History:   Diagnosis Date    Allergy, unspecified not elsewhere classified     Anxiety disorder 2014    Bowel habit changes 2023    Diarrhea bad    Breast cancer (HCC) 1995    Cancer (HCC) 2023    lung ca, no surgery,chemo/immune tx last tx     Chickenpox     Dental disorder     upper partial    Former smoker 2023    High cholesterol     History of breast cancer 2017    History of radiation therapy 2023    To left chest for breast cancer     Hyperlipidemia     Hypertension     Left upper lobe pulmonary nodule 2023    Renal disorder     CKD    Tonsillitis         Past Surgical History:   Procedure Laterality Date    KS THORACOSCOPY,DX NO BX Left 2024    Procedure: THORACOSCOPY;  Surgeon: John H Ganser, M.D.;  Location: SURGERY [facility];  Service:  General    MASS EXCISION GENERAL Left 2024    Procedure: EXCISION, MASS, INTERNAL MAMMARY;  Surgeon: John H Ganser, M.D.;  Location: SURGERY [facility];  Service: General    LOBECTOMY Left 2024    Procedure: LOBECTOMY, UPPER;  Surgeon: John H Ganser, M.D.;  Location: SURGERY [facility];  Service: General    NODE DISSECTION Left 2024    Procedure: LYMPH NODE DISSECTION;  Surgeon: John H Ganser, M.D.;  Location: SURGERY [facility];  Service: General    MS SIGMOIDOSCOPY,DIAGNOSTIC  12/15/2023    Procedure: FLEXIBLE SIGMOIDOSCOPY;  Surgeon: Francois Rollins M.D.;  Location: [facility];  Service: Gastroenterology    MS BRONCHOSCOPY,DIAGNOSTIC N/A 2023    Procedure: FIBER OPTIC BRONCHOSCOPY WITH  WASH, BRUSH, BRONCHOALVEOLAR LAVAGE, BIOPSY AND FINE NEEDLE ASPIRATION, ENDOBRONCHIAL ULTRASOUND & NAVIGATION, ROBOTICS;  Surgeon: Brooke Perrin M.D.;  Location: [facility];  Service: Pulmonary Robotic    ENDOBRONCHIAL US ADD-ON N/A 2023    Procedure: ENDOBRONCHIAL ULTRASOUND (EBUS);  Surgeon: Brooke Perrin M.D.;  Location: [facility];  Service: Pulmonary Robotic    GYN SURGERY      Tubiligation    MASTECTOMY      Left    NO PERTINENT PAST SURGICAL HISTORY      Breast Cancer (Left Mastectomy)    OTHER  1986    Tubaligation    MS CHEMOTHERAPY, UNSPECIFIED PROCEDURE      MS RADIATION THERAPY PLAN SIMPLE         Social History     Tobacco Use    Smoking status: Former     Current packs/day: 0.00     Average packs/day: 0.3 packs/day for 45.0 years (11.3 ttl pk-yrs)     Types: Cigarettes     Start date: 1977     Quit date: 2022     Years since quittin.8     Passive exposure: Current (Spouse)    Smokeless tobacco: Former     Quit date: 2022    Tobacco comments:     5 daily    Vaping Use    Vaping Use: Never used   Substance Use Topics    Alcohol use: No    Drug use: No        Family History   Problem Relation Age of Onset    Cancer  Mother         breast cancer/Breast    Breast Cancer Mother     Hypertension Maternal Uncle     Cancer Maternal Uncle     Hypertension Maternal Grandmother     Hyperlipidemia Maternal Grandmother     Heart Disease Maternal Grandmother     Cancer Paternal Grandfather         Lung ca, smoker    Lung Cancer Maternal Grandfather     Alzheimer's Disease Maternal Aunt     Kidney Disease Maternal Aunt     Diabetes Neg Hx        Allergies   Allergen Reactions    Codeine Unspecified     Memory issues       Current Outpatient Medications   Medication Sig Dispense Refill    levothyroxine (SYNTHROID) 50 MCG Tab Take 1 Tablet by mouth every morning on an empty stomach. Please make follow up appointment 30 Tablet 0    venlafaxine (EFFEXOR) 75 MG Tab TAKE ONE TABLET BY MOUTH ONE TIME DAILY 90 Tablet 1    folic acid (FOLVITE) 1 MG Tab TAKE ONE TABLET BY MOUTH ONE TIME DAILY 30 Tablet 0    fenofibrate micronized (LOFIBRA) 134 MG capsule Take 1 Capsule by mouth every day. 90 Capsule 0    lisinopril (PRINIVIL) 10 MG Tab Take 1 Tablet by mouth every day. 100 Tablet 3    predniSONE (DELTASONE) 20 MG Tab Take 60 mg by mouth every day. 3 tabs= 60mg      VITAMIN D PO Take 5,000 Units by mouth every day.      metoprolol SR (TOPROL XL) 25 MG TABLET SR 24 HR Take 25 mg by mouth every day.      pravastatin (PRAVACHOL) 40 MG tablet Take 1 Tablet by mouth every day. 100 Tablet 3     No current facility-administered medications for this encounter.       Review of Systems   Constitutional:  Positive for weight loss. Negative for chills, fever and malaise/fatigue.   HENT:  Negative for congestion, ear pain, nosebleeds and sore throat.         Hoarseness    Eyes:  Negative for blurred vision.   Respiratory:  Negative for cough, sputum production, shortness of breath and wheezing.    Cardiovascular:  Negative for chest pain, palpitations, orthopnea and leg swelling.   Gastrointestinal:  Negative for abdominal pain, heartburn, nausea and vomiting.  "  Genitourinary:  Negative for dysuria, frequency and urgency.        Nocturia   Musculoskeletal:  Negative for neck pain.   Neurological:  Negative for dizziness, tingling, tremors, sensory change, focal weakness and headaches.   Endo/Heme/Allergies:  Does not bruise/bleed easily.   Psychiatric/Behavioral:  Positive for memory loss. Negative for depression and suicidal ideas.         Anxiety   All other systems reviewed and are negative.      Problem list, medications, and allergies reviewed by myself today in Epic.     Objective:     Vitals:    05/03/24 1038   BP: 132/80   BP Location: Left arm   Patient Position: Sitting   BP Cuff Size: Adult   Pulse: 90   Temp: 37 °C (98.6 °F)   TempSrc: Temporal   SpO2: 94%   Weight: 67.1 kg (147 lb 14.9 oz)   Height: 1.6 m (5' 2.99\")       DESC; KARNOFSKY SCALE WITH ECOG EQUIVALENT: 100, Fully active, able to carry on all pre-disease performed without restriction (ECOG equivalent 0)    DISTRESS LEVEL: mild distress    Physical Exam  Constitutional:       General: She is not in acute distress.     Appearance: Normal appearance. She is not ill-appearing.   HENT:      Head: Normocephalic and atraumatic.      Comments: Hernandez facies     Nose: Nose normal.      Mouth/Throat:      Mouth: Mucous membranes are moist.      Pharynx: No oropharyngeal exudate or posterior oropharyngeal erythema.   Eyes:      General: No scleral icterus.     Conjunctiva/sclera: Conjunctivae normal.      Pupils: Pupils are equal, round, and reactive to light.   Cardiovascular:      Rate and Rhythm: Normal rate and regular rhythm.      Pulses: Normal pulses.      Heart sounds: Normal heart sounds. No murmur heard.     No friction rub. No gallop.   Pulmonary:      Effort: Pulmonary effort is normal. No respiratory distress.      Breath sounds: Normal breath sounds. No stridor. No wheezing, rhonchi or rales.   Chest:      Chest wall: No tenderness.   Abdominal:      General: Abdomen is flat. Bowel sounds are " normal. There is no distension.      Palpations: Abdomen is soft. There is no mass.      Tenderness: There is no abdominal tenderness. There is no guarding.   Musculoskeletal:         General: No swelling, tenderness or deformity. Normal range of motion.      Cervical back: Normal range of motion and neck supple. No rigidity or tenderness.      Right lower leg: No edema.      Left lower leg: No edema.   Skin:     General: Skin is warm and dry.      Coloration: Skin is not jaundiced or pale.      Findings: Bruising and lesion present. No erythema or rash.   Neurological:      General: No focal deficit present.      Mental Status: She is alert and oriented to person, place, and time. Mental status is at baseline.      Sensory: No sensory deficit.      Motor: No weakness.      Coordination: Coordination normal.      Gait: Gait normal.   Psychiatric:         Mood and Affect: Mood normal.         Behavior: Behavior normal.         Thought Content: Thought content normal.         Judgment: Judgment normal.         Labs:   Most recent labs reviewed.  Cr is high with GRF of 58    Imaging:   Most recent images below have been independently reviewed by me.     5/1/23 CT Chest    10/11/23 CT CAP  1.  Positive treatment response with mildly decreased size of left upper lobe mass.  2.  A couple new tiny nonspecific micronodules in the right lung. These are nonspecific but of doubtful significance, possibly infectious/inflammatory. Attention on follow-up however to ensure resolution.  3.  Otherwise no adenopathy or other new metastatic disease within the chest, abdomen or pelvis.    6/20/23: Brain MRI   1.  There is an approximately 8 mm sized enhancing extra-axial lesion noted along the roof of the right orbit. This lesion likely represent an incidental meningioma. However the possibility of dural based metastasis cannot be excluded. Follow-up study is   recommended.  2.  Mild chronic microvascular ischemic  disease.    06/06/2023 PET SCAN  1.  Intense increased activity corresponding to LEFT upper lobe mass consistent with malignancy.  2.  Focal uptake in the LEFT parasternal region which may indicate pleural versus internal mammary lymph node metastasis.  3.  No other evidence for metastatic disease.  Prior LEFT mastectomy.    Pathology:    1/30/24  FINAL DIAGNOSIS:     A. Hilar node, station 10:          Negative for malignancy in one lymph node (0/1)   B. Aortopulmonary node, station 5:          Benign adipose tissue, no lymphoid tissue identified   C. Internal mammary tissue:          Benign adipose tissue, no lymphoid tissue identified   D. Subcarinal node, station 7:          Negative for malignancy in one lymph node (0/1)   E. Station 11 lymph node:          Negative for malignancy in one lymph node (0/1)   F. Subaortic node, station 6:          Negative for malignancy in one lymph node (0/1)   G. Left upper lobe:          Invasive adenocarcinoma, 2.5 cm, lepidic-type with focal           necrosis but predominantly viable tumor (partial response)          Margins of resection are negative for carcinoma          Negative for metastatic carcinoma in four lymph nodes (0/4)          Background lung with emphysematous changes          See synoptic report for details     LUNG     SPECIMEN    Procedure:  Lobectomy     Specimen Laterality:  Left   TUMOR    Tumor Focality:  Single focus     Tumor Site:  Upper lobe of lung     Tumor Size       Total Tumor Size (size of entire tumor):  Greatest Dimension       (Centimeters) - 3.6 cm       Size of Invasive Component:  Greatest Dimension (Centimeters) - 2.5       cm     Histologic Type:  Invasive lepidic adenocarcinoma       Histologic Patterns Present:  Lepidic     Visceral Pleura Invasion:  Not identified     Direct Invasion of Adjacent Structures:  Not identified     Treatment Effect:  Present       Percentage of Residual Viable Tumor:  70%       Percentage of Necrosis:   30%     Lymphovascular Invasion:  Not identified   MARGINS    Margin Status for Invasive Carcinoma:  All margins negative for     invasive carcinoma       Closest Margin(s) to Invasive Carcinoma:  Parenchymal       Distance from Invasive Carcinoma to Closest Margin:  Greater than -       1 cm     Margin Status for Non-Invasive Tumor:  All margins negative for     non-invasive tumor   REGIONAL LYMPH NODES     Lymph Node(s) from Prior Procedures:  No known prior lymph node     sampling performed     Regional Lymph Node Status:  All regional lymph nodes negative for     tumor       Number of Lymph Nodes Examined:  8         Francis Site(s) Examined:  7: Subcarinal, 6: Para-aortic (ascending         aorta or phrenic), 10L: Hilar, 11L: Interlobar, 12L: Lobar   PATHOLOGIC STAGE CLASSIFICATION (pTNM, AJCC 8th Edition)   Reporting of pT, pN, and (when applicable) pM categories is based on   information available to the pathologist at the time the report is   issued. As per the AJCC (Chapter 1, 8th Ed.) it is the managing   physician's responsibility to establish the final pathologic stage   based upon all pertinent information, including but potentially not   limited to this pathology report.     TNM Descriptors:  y (post-treatment)     pT Category:  pT1c     pN Category:  pN0   ADDITIONAL FINDINGS     Additional Findings:  Emphysema     6/12/23  FINAL DIAGNOSIS:     A. Fine needle aspiration, left upper lung lobe mass:          Positive for malignancy, pulmonary adenocarcinoma, nonmucinous           as sampled   B. Lung, left upper lobe mass, core biopsy:          Pulmonary adenocarcinoma, nonmucinous as sampled   C. Lung, left upper lobe mass, biopsy:          Pulmonary adenocarcinoma, nonmucinous as sampled   D. Bronchoalveolar lavage, left upper lobe:          Rare malignant cell cluster on the Thin Prep slide     Assessment/Plan:      Cancer Staging   NSCLC of left lung (HCC)  Staging form: Lung, AJCC 8th Edition  -  Clinical stage from 6/29/2023: Stage IIIA (cT1c, cN2, cM0) - Signed by Nancy Campbell M.D. on 6/29/2023       Ms. Andersen is a 66 yo F with a new diagnosis of adenocarcinoma of the ANSHUL (PDL1 1%,  TP53, NF1) who is s/p C3 of NAC+IO with G3 colitis and hyperthyrodisim 2/2 to IO.  She is s/p lobectomy and path reveals ypT1 ypN0. We will follow with surveillance.    Today we reviewed the results of her most recent CT chest which shows resolved left lung nodule but a new 11 mm right middle lobe pulmonary nodule.    I discussed with the patient that I recommend a short interim scan about 6 weeks to 2 months.  Patient is very nervous about this and would like a biopsy.    Discussed with Pulmonology; recommended that since there is an airway near the mass they should be able to get to the lesion via Ion.     Plan  -Referral to Pulm    Hyperthyroidism  -follows with endo    Any questions and concerns raised by the patient were addressed and answered. Patient denies any further questions.  Patient encouraged to call the office with any concerns or issues.     Nancy Campbell M.D.  Hematology/Oncology    28 minutes was spent on this visit

## 2024-05-03 NOTE — PROGRESS NOTES
RML nodule  Referral from [facility]    Unable to paste screen shot.reviewed and accessible by ion

## 2024-05-06 NOTE — ADDENDUM NOTE
Encounter addended by: Radhika Solomon, Med Ass't on: 5/6/2024 8:08 AM   Actions taken: Charge Capture section accepted

## 2024-05-15 ENCOUNTER — OFFICE VISIT (OUTPATIENT)
Dept: DERMATOLOGY | Facility: IMAGING CENTER | Age: 69
End: 2024-05-15
Payer: MEDICARE

## 2024-05-15 DIAGNOSIS — L81.4 LENTIGINES: ICD-10-CM

## 2024-05-15 DIAGNOSIS — Z12.83 SKIN CANCER SCREENING: ICD-10-CM

## 2024-05-15 DIAGNOSIS — D18.01 CHERRY ANGIOMA: ICD-10-CM

## 2024-05-15 DIAGNOSIS — D22.9 MULTIPLE NEVI: ICD-10-CM

## 2024-05-15 DIAGNOSIS — L82.1 SK (SEBORRHEIC KERATOSIS): ICD-10-CM

## 2024-05-15 DIAGNOSIS — L82.1 STUCCO KERATOSES: ICD-10-CM

## 2024-05-15 DIAGNOSIS — D69.2 SOLAR PURPURA (HCC): ICD-10-CM

## 2024-05-15 PROCEDURE — 99212 OFFICE O/P EST SF 10 MIN: CPT | Performed by: NURSE PRACTITIONER

## 2024-05-19 DIAGNOSIS — E06.4 DRUG-INDUCED THYROIDITIS: ICD-10-CM

## 2024-05-19 DIAGNOSIS — C34.92 NSCLC OF LEFT LUNG (HCC): ICD-10-CM

## 2024-05-19 DIAGNOSIS — Z79.899 HIGH RISK MEDICATION USE: ICD-10-CM

## 2024-05-19 DIAGNOSIS — E03.9 HYPOTHYROIDISM (ACQUIRED): ICD-10-CM

## 2024-05-19 RX ORDER — LEVOTHYROXINE SODIUM 0.05 MG/1
50 TABLET ORAL
Qty: 30 TABLET | Refills: 0 | Status: SHIPPED | OUTPATIENT
Start: 2024-05-19

## 2024-05-19 RX ORDER — LEVOTHYROXINE SODIUM 0.05 MG/1
50 TABLET ORAL
Qty: 30 TABLET | Refills: 0 | Status: SHIPPED | OUTPATIENT
Start: 2024-05-19 | End: 2024-05-19

## 2024-05-20 ENCOUNTER — APPOINTMENT (OUTPATIENT)
Dept: HEMATOLOGY ONCOLOGY | Facility: MEDICAL CENTER | Age: 69
End: 2024-05-20
Payer: MEDICARE

## 2024-05-20 RX ORDER — FOLIC ACID 1 MG/1
1 TABLET ORAL DAILY
Qty: 30 TABLET | Refills: 0 | Status: SHIPPED | OUTPATIENT
Start: 2024-05-20

## 2024-05-21 ENCOUNTER — APPOINTMENT (OUTPATIENT)
Dept: ADMISSIONS | Facility: MEDICAL CENTER | Age: 69
End: 2024-05-21
Attending: INTERNAL MEDICINE
Payer: MEDICARE

## 2024-05-29 ENCOUNTER — PRE-ADMISSION TESTING (OUTPATIENT)
Dept: ADMISSIONS | Facility: MEDICAL CENTER | Age: 69
End: 2024-05-29
Attending: INTERNAL MEDICINE
Payer: MEDICARE

## 2024-05-29 VITALS — BODY MASS INDEX: 25 KG/M2 | HEIGHT: 65 IN

## 2024-05-29 RX ORDER — ACETAMINOPHEN 325 MG/1
650 TABLET ORAL EVERY 4 HOURS PRN
COMMUNITY

## 2024-05-29 NOTE — PREPROCEDURE INSTRUCTIONS
PreAdmit Telephone Appointment: Reviewed the Preparing for your procedure handout with patient over the phone. Patient instructed per pharmacy guidelines regarding taking, holding or contacting provider for instructions on regularly prescribed medications before surgery. Instructed to take the following medications the day of surgery with a sip of water per pharmacy medication guidelines:      Tylenol, levothyroxine, metoprolol, pravastatin, prednisone, effexor.    Confirmed with patient where to check in morning of surgery. Handouts/Brochure/Video emailed to patient.

## 2024-06-06 ENCOUNTER — APPOINTMENT (OUTPATIENT)
Dept: RADIOLOGY | Facility: MEDICAL CENTER | Age: 69
End: 2024-06-06
Attending: INTERNAL MEDICINE
Payer: MEDICARE

## 2024-06-06 ENCOUNTER — HOSPITAL ENCOUNTER (OUTPATIENT)
Facility: MEDICAL CENTER | Age: 69
End: 2024-06-06
Attending: INTERNAL MEDICINE | Admitting: INTERNAL MEDICINE
Payer: MEDICARE

## 2024-06-06 ENCOUNTER — ANESTHESIA (OUTPATIENT)
Dept: SURGERY | Facility: MEDICAL CENTER | Age: 69
End: 2024-06-06
Payer: MEDICARE

## 2024-06-06 ENCOUNTER — ANESTHESIA EVENT (OUTPATIENT)
Dept: SURGERY | Facility: MEDICAL CENTER | Age: 69
End: 2024-06-06
Payer: MEDICARE

## 2024-06-06 VITALS
RESPIRATION RATE: 18 BRPM | HEART RATE: 76 BPM | OXYGEN SATURATION: 97 % | SYSTOLIC BLOOD PRESSURE: 116 MMHG | WEIGHT: 134.26 LBS | HEIGHT: 64 IN | DIASTOLIC BLOOD PRESSURE: 69 MMHG | BODY MASS INDEX: 22.92 KG/M2 | TEMPERATURE: 97 F

## 2024-06-06 LAB
APPEARANCE FLD: NORMAL
BODY FLD TYPE: NORMAL
COLOR FLD: NORMAL
CSF COMMENTS 1658: NORMAL
PATHOLOGY CONSULT NOTE: NORMAL
PATHOLOGY CONSULT NOTE: NORMAL

## 2024-06-06 PROCEDURE — 87116 MYCOBACTERIA CULTURE: CPT

## 2024-06-06 PROCEDURE — 31627 NAVIGATIONAL BRONCHOSCOPY: CPT | Performed by: INTERNAL MEDICINE

## 2024-06-06 PROCEDURE — 31654 BRONCH EBUS IVNTJ PERPH LES: CPT | Performed by: INTERNAL MEDICINE

## 2024-06-06 PROCEDURE — 88305 TISSUE EXAM BY PATHOLOGIST: CPT | Mod: 59

## 2024-06-06 PROCEDURE — 71045 X-RAY EXAM CHEST 1 VIEW: CPT

## 2024-06-06 PROCEDURE — 31628 BRONCHOSCOPY/LUNG BX EACH: CPT | Performed by: INTERNAL MEDICINE

## 2024-06-06 PROCEDURE — 160046 HCHG PACU - 1ST 60 MINS PHASE II: Performed by: INTERNAL MEDICINE

## 2024-06-06 PROCEDURE — 88173 CYTOPATH EVAL FNA REPORT: CPT

## 2024-06-06 PROCEDURE — C1887 CATHETER, GUIDING: HCPCS | Performed by: INTERNAL MEDICINE

## 2024-06-06 PROCEDURE — 160048 HCHG OR STATISTICAL LEVEL 1-5: Performed by: INTERNAL MEDICINE

## 2024-06-06 PROCEDURE — 71045 X-RAY EXAM CHEST 1 VIEW: CPT | Mod: XU

## 2024-06-06 PROCEDURE — 87070 CULTURE OTHR SPECIMN AEROBIC: CPT

## 2024-06-06 PROCEDURE — 160025 RECOVERY II MINUTES (STATS): Performed by: INTERNAL MEDICINE

## 2024-06-06 PROCEDURE — 160031 HCHG SURGERY MINUTES - 1ST 30 MINS LEVEL 5: Performed by: INTERNAL MEDICINE

## 2024-06-06 PROCEDURE — 502714 HCHG ROBOTIC SURGERY SERVICES: Performed by: INTERNAL MEDICINE

## 2024-06-06 PROCEDURE — 87206 SMEAR FLUORESCENT/ACID STAI: CPT

## 2024-06-06 PROCEDURE — 71250 CT THORAX DX C-: CPT

## 2024-06-06 PROCEDURE — 700101 HCHG RX REV CODE 250: Performed by: ANESTHESIOLOGY

## 2024-06-06 PROCEDURE — C1889 IMPLANT/INSERT DEVICE, NOC: HCPCS | Performed by: INTERNAL MEDICINE

## 2024-06-06 PROCEDURE — 87015 SPECIMEN INFECT AGNT CONCNTJ: CPT

## 2024-06-06 PROCEDURE — 88172 CYTP DX EVAL FNA 1ST EA SITE: CPT

## 2024-06-06 PROCEDURE — 700105 HCHG RX REV CODE 258: Performed by: INTERNAL MEDICINE

## 2024-06-06 PROCEDURE — 160009 HCHG ANES TIME/MIN: Performed by: INTERNAL MEDICINE

## 2024-06-06 PROCEDURE — 160042 HCHG SURGERY MINUTES - EA ADDL 1 MIN LEVEL 5: Performed by: INTERNAL MEDICINE

## 2024-06-06 PROCEDURE — 160036 HCHG PACU - EA ADDL 30 MINS PHASE I: Performed by: INTERNAL MEDICINE

## 2024-06-06 PROCEDURE — 700111 HCHG RX REV CODE 636 W/ 250 OVERRIDE (IP): Performed by: ANESTHESIOLOGY

## 2024-06-06 PROCEDURE — 87205 SMEAR GRAM STAIN: CPT | Mod: 91

## 2024-06-06 PROCEDURE — 160035 HCHG PACU - 1ST 60 MINS PHASE I: Performed by: INTERNAL MEDICINE

## 2024-06-06 PROCEDURE — 88333 PATH CONSLTJ SURG CYTO XM 1: CPT

## 2024-06-06 PROCEDURE — 88112 CYTOPATH CELL ENHANCE TECH: CPT

## 2024-06-06 PROCEDURE — 31624 DX BRONCHOSCOPE/LAVAGE: CPT | Performed by: INTERNAL MEDICINE

## 2024-06-06 PROCEDURE — 87102 FUNGUS ISOLATION CULTURE: CPT

## 2024-06-06 PROCEDURE — 89240 UNLISTED MISC PATH TEST: CPT

## 2024-06-06 PROCEDURE — 160002 HCHG RECOVERY MINUTES (STAT): Performed by: INTERNAL MEDICINE

## 2024-06-06 PROCEDURE — 31629 BRONCHOSCOPY/NEEDLE BX EACH: CPT | Performed by: INTERNAL MEDICINE

## 2024-06-06 RX ORDER — ONDANSETRON 2 MG/ML
INJECTION INTRAMUSCULAR; INTRAVENOUS PRN
Status: DISCONTINUED | OUTPATIENT
Start: 2024-06-06 | End: 2024-06-06 | Stop reason: SURG

## 2024-06-06 RX ORDER — HALOPERIDOL 5 MG/ML
1 INJECTION INTRAMUSCULAR
Status: DISCONTINUED | OUTPATIENT
Start: 2024-06-06 | End: 2024-06-06 | Stop reason: HOSPADM

## 2024-06-06 RX ORDER — PHENYLEPHRINE HYDROCHLORIDE 10 MG/ML
INJECTION, SOLUTION INTRAMUSCULAR; INTRAVENOUS; SUBCUTANEOUS PRN
Status: DISCONTINUED | OUTPATIENT
Start: 2024-06-06 | End: 2024-06-06 | Stop reason: SURG

## 2024-06-06 RX ORDER — MIDAZOLAM HYDROCHLORIDE 1 MG/ML
INJECTION INTRAMUSCULAR; INTRAVENOUS PRN
Status: DISCONTINUED | OUTPATIENT
Start: 2024-06-06 | End: 2024-06-06 | Stop reason: SURG

## 2024-06-06 RX ORDER — HYDROMORPHONE HYDROCHLORIDE 1 MG/ML
0.2 INJECTION, SOLUTION INTRAMUSCULAR; INTRAVENOUS; SUBCUTANEOUS
Status: DISCONTINUED | OUTPATIENT
Start: 2024-06-06 | End: 2024-06-06 | Stop reason: HOSPADM

## 2024-06-06 RX ORDER — HYDRALAZINE HYDROCHLORIDE 20 MG/ML
5 INJECTION INTRAMUSCULAR; INTRAVENOUS
Status: DISCONTINUED | OUTPATIENT
Start: 2024-06-06 | End: 2024-06-06 | Stop reason: HOSPADM

## 2024-06-06 RX ORDER — DEXAMETHASONE SODIUM PHOSPHATE 4 MG/ML
INJECTION, SOLUTION INTRA-ARTICULAR; INTRALESIONAL; INTRAMUSCULAR; INTRAVENOUS; SOFT TISSUE PRN
Status: DISCONTINUED | OUTPATIENT
Start: 2024-06-06 | End: 2024-06-06 | Stop reason: SURG

## 2024-06-06 RX ORDER — EPHEDRINE SULFATE 50 MG/ML
5 INJECTION, SOLUTION INTRAVENOUS
Status: DISCONTINUED | OUTPATIENT
Start: 2024-06-06 | End: 2024-06-06 | Stop reason: HOSPADM

## 2024-06-06 RX ORDER — SODIUM CHLORIDE, SODIUM LACTATE, POTASSIUM CHLORIDE, CALCIUM CHLORIDE 600; 310; 30; 20 MG/100ML; MG/100ML; MG/100ML; MG/100ML
INJECTION, SOLUTION INTRAVENOUS CONTINUOUS
Status: ACTIVE | OUTPATIENT
Start: 2024-06-06 | End: 2024-06-06

## 2024-06-06 RX ORDER — SODIUM CHLORIDE, SODIUM LACTATE, POTASSIUM CHLORIDE, CALCIUM CHLORIDE 600; 310; 30; 20 MG/100ML; MG/100ML; MG/100ML; MG/100ML
INJECTION, SOLUTION INTRAVENOUS CONTINUOUS
Status: DISCONTINUED | OUTPATIENT
Start: 2024-06-06 | End: 2024-06-06 | Stop reason: HOSPADM

## 2024-06-06 RX ORDER — LABETALOL HYDROCHLORIDE 5 MG/ML
5 INJECTION, SOLUTION INTRAVENOUS
Status: DISCONTINUED | OUTPATIENT
Start: 2024-06-06 | End: 2024-06-06 | Stop reason: HOSPADM

## 2024-06-06 RX ORDER — LIDOCAINE HYDROCHLORIDE 20 MG/ML
INJECTION, SOLUTION EPIDURAL; INFILTRATION; INTRACAUDAL; PERINEURAL PRN
Status: DISCONTINUED | OUTPATIENT
Start: 2024-06-06 | End: 2024-06-06 | Stop reason: SURG

## 2024-06-06 RX ORDER — HYDROMORPHONE HYDROCHLORIDE 1 MG/ML
0.1 INJECTION, SOLUTION INTRAMUSCULAR; INTRAVENOUS; SUBCUTANEOUS
Status: DISCONTINUED | OUTPATIENT
Start: 2024-06-06 | End: 2024-06-06 | Stop reason: HOSPADM

## 2024-06-06 RX ORDER — DIPHENHYDRAMINE HYDROCHLORIDE 50 MG/ML
12.5 INJECTION INTRAMUSCULAR; INTRAVENOUS
Status: DISCONTINUED | OUTPATIENT
Start: 2024-06-06 | End: 2024-06-06 | Stop reason: HOSPADM

## 2024-06-06 RX ORDER — HYDROMORPHONE HYDROCHLORIDE 1 MG/ML
0.4 INJECTION, SOLUTION INTRAMUSCULAR; INTRAVENOUS; SUBCUTANEOUS
Status: DISCONTINUED | OUTPATIENT
Start: 2024-06-06 | End: 2024-06-06 | Stop reason: HOSPADM

## 2024-06-06 RX ORDER — ONDANSETRON 2 MG/ML
4 INJECTION INTRAMUSCULAR; INTRAVENOUS
Status: DISCONTINUED | OUTPATIENT
Start: 2024-06-06 | End: 2024-06-06 | Stop reason: HOSPADM

## 2024-06-06 RX ADMIN — PHENYLEPHRINE HYDROCHLORIDE 100 MCG: 10 INJECTION INTRAVENOUS at 13:43

## 2024-06-06 RX ADMIN — LIDOCAINE HYDROCHLORIDE 60 MG: 20 INJECTION, SOLUTION EPIDURAL; INFILTRATION; INTRACAUDAL at 12:59

## 2024-06-06 RX ADMIN — FENTANYL CITRATE 75 MCG: 50 INJECTION, SOLUTION INTRAMUSCULAR; INTRAVENOUS at 12:59

## 2024-06-06 RX ADMIN — FENTANYL CITRATE 25 MCG: 50 INJECTION, SOLUTION INTRAMUSCULAR; INTRAVENOUS at 13:10

## 2024-06-06 RX ADMIN — PROPOFOL 150 MG: 10 INJECTION, EMULSION INTRAVENOUS at 13:01

## 2024-06-06 RX ADMIN — DEXAMETHASONE SODIUM PHOSPHATE 8 MG: 4 INJECTION INTRA-ARTICULAR; INTRALESIONAL; INTRAMUSCULAR; INTRAVENOUS; SOFT TISSUE at 13:05

## 2024-06-06 RX ADMIN — PHENYLEPHRINE HYDROCHLORIDE 100 MCG: 10 INJECTION INTRAVENOUS at 13:26

## 2024-06-06 RX ADMIN — PHENYLEPHRINE HYDROCHLORIDE 100 MCG: 10 INJECTION INTRAVENOUS at 13:31

## 2024-06-06 RX ADMIN — SODIUM CHLORIDE, POTASSIUM CHLORIDE, SODIUM LACTATE AND CALCIUM CHLORIDE: 600; 310; 30; 20 INJECTION, SOLUTION INTRAVENOUS at 12:57

## 2024-06-06 RX ADMIN — ONDANSETRON 4 MG: 2 INJECTION INTRAMUSCULAR; INTRAVENOUS at 14:02

## 2024-06-06 RX ADMIN — MIDAZOLAM HYDROCHLORIDE 2 MG: 1 INJECTION, SOLUTION INTRAMUSCULAR; INTRAVENOUS at 12:57

## 2024-06-06 RX ADMIN — ROCURONIUM BROMIDE 40 MG: 10 INJECTION, SOLUTION INTRAVENOUS at 13:01

## 2024-06-06 RX ADMIN — SUGAMMADEX 200 MG: 100 INJECTION, SOLUTION INTRAVENOUS at 14:36

## 2024-06-06 RX ADMIN — PHENYLEPHRINE HYDROCHLORIDE 100 MCG: 10 INJECTION INTRAVENOUS at 13:36

## 2024-06-06 ASSESSMENT — ENCOUNTER SYMPTOMS
VOMITING: 0
FOCAL WEAKNESS: 0
SHORTNESS OF BREATH: 0
WHEEZING: 0
EYE DISCHARGE: 0
SENSORY CHANGE: 0
WEIGHT LOSS: 0
STRIDOR: 0
SPUTUM PRODUCTION: 0
NAUSEA: 0
MYALGIAS: 0
LOSS OF CONSCIOUSNESS: 0
ORTHOPNEA: 0
SORE THROAT: 0
HEADACHES: 0
CHILLS: 0
DIZZINESS: 0
DIARRHEA: 0
EYE PAIN: 0
PSYCHIATRIC NEGATIVE: 1
SINUS PAIN: 0
ABDOMINAL PAIN: 0
COUGH: 0
FEVER: 0

## 2024-06-06 ASSESSMENT — FIBROSIS 4 INDEX: FIB4 SCORE: 1.04

## 2024-06-06 NOTE — FLOWSHEET NOTE
06/06/24 1312   Bronchoscopy Procedure   Bronchoscopy Procedure Yes   Order placed in Epic? Yes   Start Time 1312   End Time 1435   Performing Physician Dr. Berrios   Procedure Monitoring Tech Time Bronchoscopy (60 Min)

## 2024-06-06 NOTE — ANESTHESIA POSTPROCEDURE EVALUATION
Patient: Latrice Andersen    Procedure Summary       Date: 06/06/24 Room / Location:  PROCEDURE ROOM / SURGERY [facility]    Anesthesia Start: 1257 Anesthesia Stop: 1453    Procedure: FIBER OPTIC BRONCHOSCOPY WITH BRONCHOALVEOLAR LAVAGE, BIOPSY AND FINE NEEDLE ASPIRATION AND NAVIGATION, ROBOTICS (Chest) Diagnosis: (pending lab results)    Surgeons: Nickolas Berrios M.D. Responsible Provider: Hieu Hutchins M.D.    Anesthesia Type: general ASA Status: 2            Final Anesthesia Type: general  Last vitals  BP   Blood Pressure : 125/52    Temp   (!) 35.8 °C (96.4 °F)    Pulse   67   Resp   20    SpO2   99 %      Anesthesia Post Evaluation    Patient location during evaluation: PACU  Patient participation: complete - patient participated  Level of consciousness: awake and alert    Airway patency: patent  Anesthetic complications: no  Cardiovascular status: hemodynamically stable  Respiratory status: acceptable  Hydration status: euvolemic    PONV: none          No notable events documented.     Nurse Pain Score: 0 (NPRS)

## 2024-06-06 NOTE — OR NURSING
1555 Received report from Carlos RN, assumed care of pt. Pt awake, denies nausea and pain with non-labored and spontaneous respirations via 1L NC, VSS. Replaced pt O2 sensor and titrated to room air.   1610 Pt able to maintain room air sats meets criteria for transfer to stage II.   1615 Report given to stage II RN.   1617 Pt transported to stage II.

## 2024-06-06 NOTE — ANESTHESIA TIME REPORT
Anesthesia Start and Stop Event Times       Date Time Event    6/6/2024 1255 Ready for Procedure     1257 Anesthesia Start     1453 Anesthesia Stop          Responsible Staff  06/06/24      Name Role Begin End    Hieu Hutchins M.D. Anesth 1257 1450          Overtime Reason:  no overtime (within assigned shift)    Comments:

## 2024-06-06 NOTE — ANESTHESIA PREPROCEDURE EVALUATION
Case: [id number] Date/Time: 06/06/24 1245    Procedure: FIBER OPTIC BRONCHOSCOPY WITH POSSIBLE WASH, BRUSH, BRONCHOALVEOLAR LAVAGE, BIOPSY AND FINE NEEDLE ASPIRATION AND NAVIGATION, ROBOTICS (Chest)    Anesthesia type: General    Pre-op diagnosis: PULMONARY NODULE    Location:  PROCEDURE ROOM / SURGERY [facility]    Surgeons: AMANDO Piña H&P:  PAST MEDICAL HISTORY:   68 y.o. female who presents for Procedure(s) (LRB):  FIBER OPTIC BRONCHOSCOPY WITH POSSIBLE WASH, BRUSH, BRONCHOALVEOLAR LAVAGE, BIOPSY AND FINE NEEDLE ASPIRATION AND NAVIGATION, ROBOTICS (N/A).  She has current and past medical problems significant for:    Patient denies history of previous MI, chest pain or shortness of breath  Able to climb 2 flights of stair without dyspnea or angina, > 4 METs     Patient denies history of complications with anesthesia    Anesthetic procedure and risks discussed with patient in detail.  Risks include but are not limited to PONV, pain, sore throat, damage to teeth/lips/gums, aspiration, positioning injury, allergic reaction, vocal cord injury, prolonged intubation, stroke, and/or cardiopulmonary problems up to and including death.  Patient indicates complete understanding. All patient/family questions were answered to full satisfaction and they agree to proceed as above planned.    Past Medical History:   Diagnosis Date    Allergy, unspecified not elsewhere classified     Anxiety disorder 04/29/2014    Bowel habit changes August 2023    Diarrhea bad    Breast cancer (HCC) 1995    breast cancer 1995     left mastectomy with lymph removal, chemo radiation    Cancer (HCC) June 2023    lung ca, partial left lobe removed chemo/immune tx last tx 9/23    Chickenpox     Dental disorder     upper partial    Former smoker 05/11/2023    High cholesterol     History of breast cancer 02/06/2017    History of radiation therapy 05/11/2023    To left chest for breast cancer     Hyperlipidemia      Hypertension     Left upper lobe pulmonary nodule 2023    Renal disorder     CKD    Tonsillitis        SMOKING/ALCOHOL/RECREATIONAL DRUG USE:  Social History     Tobacco Use    Smoking status: Former     Current packs/day: 0.00     Average packs/day: 0.3 packs/day for 45.0 years (11.3 ttl pk-yrs)     Types: Cigarettes     Start date: 1977     Quit date: 2022     Years since quittin.9     Passive exposure: Current (Spouse)    Smokeless tobacco: Former     Quit date: 2022    Tobacco comments:     5 daily    Vaping Use    Vaping status: Never Used   Substance Use Topics    Alcohol use: No    Drug use: No     Social History     Substance and Sexual Activity   Drug Use No       PAST SURGICAL HISTORY:  Past Surgical History:   Procedure Laterality Date    RI THORACOSCOPY,DX NO BX Left 2024    Procedure: THORACOSCOPY;  Surgeon: John H Ganser, M.D.;  Location: [facility];  Service: General    MASS EXCISION GENERAL Left 2024    Procedure: EXCISION, MASS, INTERNAL MAMMARY;  Surgeon: John H Ganser, M.D.;  Location: [facility];  Service: General    LOBECTOMY Left 2024    Procedure: LOBECTOMY, UPPER;  Surgeon: John H Ganser, M.D.;  Location: [facility];  Service: General    NODE DISSECTION Left 2024    Procedure: LYMPH NODE DISSECTION;  Surgeon: John H Ganser, M.D.;  Location: [facility];  Service: General    RI SIGMOIDOSCOPY,DIAGNOSTIC  12/15/2023    Procedure: FLEXIBLE SIGMOIDOSCOPY;  Surgeon: Francois Rollins M.D.;  Location: [facility];  Service: Gastroenterology    RI BRONCHOSCOPY,DIAGNOSTIC N/A 2023    Procedure: FIBER OPTIC BRONCHOSCOPY WITH  WASH, BRUSH, BRONCHOALVEOLAR LAVAGE, BIOPSY AND FINE NEEDLE ASPIRATION, ENDOBRONCHIAL ULTRASOUND & NAVIGATION, ROBOTICS;  Surgeon: Brooke Perrin M.D.;  Location: [facility];  Service: Pulmonary Robotic    ENDOBRONCHIAL US ADD-ON N/A 2023    Procedure:  "ENDOBRONCHIAL ULTRASOUND (EBUS);  Surgeon: Brooke Perrin M.D.;  Location: SURGERY [facility];  Service: Pulmonary Robotic    GYN SURGERY      Tubiligation    MASTECTOMY      Left    NO PERTINENT PAST SURGICAL HISTORY  1995    Breast Cancer (Left Mastectomy)    OTHER  1986    Tubaligation    AK CHEMOTHERAPY, UNSPECIFIED PROCEDURE      AK RADIATION THERAPY PLAN SIMPLE         ALLERGIES:   Allergies   Allergen Reactions    Codeine Unspecified     Memory issues       MEDICATIONS:  No current facility-administered medications on file prior to encounter.     Current Outpatient Medications on File Prior to Encounter   Medication Sig Dispense Refill    venlafaxine (EFFEXOR) 75 MG Tab TAKE ONE TABLET BY MOUTH ONE TIME DAILY (Patient taking differently: Take 75 mg by mouth every day. CONTINUE TAKING PRIOR TO SURGERY AND DAY OF SURGERY) 90 Tablet 1    fenofibrate micronized (LOFIBRA) 134 MG capsule Take 1 Capsule by mouth every day. (Patient taking differently: Take 134 mg by mouth every day. DO NOT TAKE 24 HOURS PRIOR TO SURGERY) 90 Capsule 0    lisinopril (PRINIVIL) 10 MG Tab Take 1 Tablet by mouth every day. (Patient taking differently: Take 10 mg by mouth every day. DO NOT TAKE 24 HOURS PRIOR TO SURGERY) 100 Tablet 3    predniSONE (DELTASONE) 20 MG Tab Take 10 mg by mouth every day. CONTINUE TAKING PRIOR TO SURGERY AND DAY OF SURGERY      metoprolol SR (TOPROL XL) 25 MG TABLET SR 24 HR Take 25 mg by mouth every day. CONTINUE TAKING PRIOR TO SURGERY AND DAY OF SURGERY      pravastatin (PRAVACHOL) 40 MG tablet Take 1 Tablet by mouth every day. (Patient taking differently: Take 40 mg by mouth every day. CONTINUE TAKING PRIOR TO SURGERY AND DAY OF SURGERY) 100 Tablet 3    VITAMIN D PO Take 5,000 Units by mouth every day. DO NOT TAKE 7 DAYS PRIOR TO SURGERY         LABS:  No results for input(s): \"WBC\", \"RBC\", \"HEMOGLOBIN\", \"HEMATOCRIT\", \"MCV\", \"MCH\", \"RDW\", \"PLATELETCT\", \"MPV\", \"NEUTSPOLYS\", \"LYMPHOCYTES\", " "\"MONOCYTES\", \"EOSINOPHILS\", \"BASOPHILS\", \"RBCMORPHOLO\" in the last 72 hours.   Lab Results   Component Value Date/Time    SODIUM 142 04/29/2024 1543    POTASSIUM 5.0 04/29/2024 1543    CHLORIDE 108 04/29/2024 1543    CO2 23 04/29/2024 1543    GLUCOSE 154 (H) 04/29/2024 1543    BUN 34 (H) 04/29/2024 1543    CALCIUM 10.2 04/29/2024 1543         PREVIOUS ANESTHETICS: See EMR  __________________________________________    Relevant Problems   CARDIAC   (positive) Atherosclerosis of aorta (HCC)   (positive) Essential hypertension         (positive) Stage 3a chronic kidney disease       Physical Exam    Airway   Mallampati: III  TM distance: >3 FB  Neck ROM: full       Cardiovascular - normal exam  Rhythm: regular  Rate: normal  (-) murmur     Dental - normal exam  (+) upper dentures           Pulmonary - normal exam  Breath sounds clear to auscultation     Abdominal    Neurological - normal exam                   Anesthesia Plan    ASA 2       Plan - general       Airway plan will be ETT          Induction: intravenous    Postoperative Plan: Postoperative administration of opioids is intended.    Pertinent diagnostic labs and testing reviewed    Informed Consent:    Anesthetic plan and risks discussed with patient.    Use of blood products discussed with: patient whom consented to blood products.           "

## 2024-06-06 NOTE — PROCEDURES
Bronchoscopy Procedure Note     Findings:   Expected post-lobectomy stump from ANSHUL lobectomy well healed  Technically challenging locating the RML lesion due to suboptimal EBUS and fluoroscopy image  Otherwise unremarkable inspection of the airways     Specimen:   A: FNA right middle lobe slide  B: FNA right middle lobe core  C: Transbronchial biopsies right middle lobe lesion  D: BAL right middle lobe    Location: [facility]     Date of Operation: 6/6/2024      Attending Physician Performing Procedure: Nickolas Berrios MD     Pre-op Diagnosis: History of adenocarcinoma of lung [Z85.118], Right middle lobe pulmonary nodule      Post-op Diagnosis: History of adenocarcinoma of lung [Z85.118], Right middle lobe pulmonary nodule      Anesthesia: General, administered by Dr Hutchins      Operation:   Diagnostic flexible fiberoptic bronchoscopy, with bronchoalveolar lavage, navigational bronchoscopy with fine needle aspiration and transbronchial biopsies and fluoroscopy and endobronchial radial ultrasound      Estimated Blood Loss: < 20cc     Complications: None     Indications and History:     The patient is a 68 y.o. female. The risks, benefits, complications, treatment options and expected outcomes were discussed with the patient. The possibilities of reaction to medication, pulmonary aspiration, perforation of a viscus, bleeding, failure to diagnose a condition and creating a complication requiring transfusion or operation were discussed with the patient who freely signed the consent.     Description of Procedure:     The patient was seen in the Pre-op area and interval H&P was verified. The patient was taken to the endoscopy suite, identified as Latrice Andersen and a Time Out was held and the above information confirmed. Following induction of general anesthesia and intubation, careful inspection of the tracheal lumen was accomplished with the bronchoscope. Breanna was noted to be  sharp. There were no secretions bilaterally. The right upper lobe, bronchus intermedius, middle lobe, and lower lobe showed normal segmental and subsegmental anatomy. No endobronchial lesions seen. Left upper lobe showed well healed stump, and lower lobe areas on the left side were similarly normal in their segmental and subsegmental anatomy with no lesions seen.     Robotic navigation bronchoscopy was then performed with Ion platform.  Partial/full registration was used.  Ion robotic catheter was used to engage the RML segment of right lung.  Target lesion is about 2 cm in diameter.   Under navigational guidance the ion robotic catheter was advanced to 1.0 cm away from the planned target. Fluoroscopy was used to confirm catheter positioning.    Radial EBUS was performed to confirm that the location of the nodule is eccentric. Additional features noted poor EBUS and fluoroscopy view    Transbronchial needle aspiration was performed with 21 gauge needle through the extended working channel catheter.  Total 3 samples were collected.  Samples sent for pathology.    Transbronchial biopsy was performed with gator forceps through the extended working channel catheter.  Total 8 samples were collected.  Samples sent for pathology    Bronchial alveolar lavage was performed the extended working channel catheter.  Instilled 30 cc of NS, suction returned with 30 cc of NS.  Samples sent for path and cultures.     The airway was subsequently cleared and hemostasis was ensured. A post-procedural CXR confirmed no pneumothorax. The patient was subsequently taken to the PACU in satisfactory condition.     Wander () was notified of the results of the procedure who will be driving patient home after recovery in PACU.    __________  Nickolas Berrios MD  Pulmonary and Critical Care Medicine  [facility]

## 2024-06-06 NOTE — DISCHARGE INSTRUCTIONS
If any questions arise, call your provider.  If your provider is not available, please feel free to call the Surgical Center at [phone].    MEDICATIONS: Resume taking daily medication.  Take prescribed pain medication with food.  If no medication is prescribed, you may take non-aspirin pain medication if needed.  PAIN MEDICATION CAN BE VERY CONSTIPATING.  Take a stool softener or laxative such as senokot, pericolace, or milk of magnesia if needed.      Last pain medication given: None      What to Expect Post Anesthesia    Rest and take it easy for the first 24 hours.  A responsible adult is recommended to remain with you during that time.  It is normal to feel sleepy.  We encourage you to not do anything that requires balance, judgment or coordination.    FOR 24 HOURS DO NOT:  Drive, operate machinery or run household appliances.  Drink beer or alcoholic beverages.  Make important decisions or sign legal documents.    To avoid nausea, slowly advance diet as tolerated, avoiding spicy or greasy foods for the first day.  Add more substantial food to your diet according to your provider's instructions.  Babies can be fed formula or breast milk as soon as they are hungry.  INCREASE FLUIDS AND FIBER TO AVOID CONSTIPATION.    MILD FLU-LIKE SYMPTOMS ARE NORMAL.  YOU MAY EXPERIENCE GENERALIZED MUSCLE ACHES, THROAT IRRITATION, HEADACHE AND/OR SOME NAUSEA.          Bronchoscopy Discharge Instructions  Home Care Instructions    ACTIVITY: Rest and take it easy for the first 24 hours.  A responsible adult is recommended to remain with you during that time.  It is normal to feel sleepy.  We encourage you to not do anything that requires balance, judgment or coordination.    The medicine you had during the bronchoscopy will make you sleepy.    FOR 24 HOURS DO NOT:  Drive, operate machinery or run household appliances.  Drink beer or alcoholic beverages.  Make important decisions or sign legal documents.  Engage in activity  that requires sharp judgment and reflexes for 24 hours    SPECIAL INSTRUCTIONS:     Bronchoscopy is a procedure to look inside your windpipe and bronchial tubes.  An anesthetic solution is sprayed in your throat to make it numb.    You may experience a mild sore throat, hoarseness, fever up to 101?F, and /or coughing up small amounts of blood immediately following your bronchoscopy, especially if a biopsy was performed.  The discomfort should subside in 24-48 hours.    Do not smoke for 6-8 hours after the procedure to decrease your risk of coughing and /or bleeding.    Do not drink fluids or eat until your gag reflex returns, for two hours after the bronchoscopy.  Otherwise you will not feel the food or fluid in your throat, and it may go down your windpipe and cause you to choke.    Take ice chips or slowly sip cool fluids to make sure your gag reflex has returned.  Avoid hot fluids from the microwave for several hours.    After 2 hours or when you get home you may take throat lozenges or gargle with salt water if your throat is sore.  Drink liquids to help dryness in your mouth and throat.    Resume your normal activities the following day.    MEDICATIONS: Resume taking daily medication as directed by your doctor.      A follow-up appointment should be arranged with your doctor in 1 week to get the results of the bronchoscopy and any tests done during the procedure; call to schedule.      You should CALL YOUR PHYSICIAN if you develop:  Fever greater than 101?F  You cough up more than a teaspoon of blood other than blood-tinged mucus  You have increasing amounts of bleeding from coughing after the bronchoscopy  You are wheezing  You develop any unusual signs or symptoms or have any questions                You should call 911 if you develop problems with breathing or chest pain.    If you are unable to contact your doctor or surgical center, you should go to the nearest emergency room or urgent care center.       Physician's telephone #: Dr. Berrios [phone]      If any questions arise, call your doctor.  If your doctor is not available, please feel free to call the Surgical Center at [phone].  The Center is open Monday through Friday from 7AM to 7PM.      You may receive a survey in the mail within the next two weeks and we ask that you take a few moments to complete the survey and return it to us.  Our goal is to provide you with very good care and we value your comments.

## 2024-06-06 NOTE — ANESTHESIA PROCEDURE NOTES
Airway    Date/Time: 6/6/2024 1:04 PM    Performed by: Hieu Hutchins M.D.  Authorized by: Hieu Hutchins M.D.    Location:  OR  Urgency:  Elective  Indications for Airway Management:  Anesthesia      Spontaneous Ventilation: absent    Sedation Level:  Deep  Preoxygenated: Yes    Patient Position:  Sniffing  Final Airway Type:  Endotracheal airway  Final Endotracheal Airway:  ETT  Cuffed: Yes    Technique Used for Successful ETT Placement:  Direct laryngoscopy    Insertion Site:  Oral  Blade Type:  Elisa  Laryngoscope Blade/Videolaryngoscope Blade Size:  3  ETT Size (mm):  8.0  Measured from:  Lips  ETT to Lips (cm):  21  Placement Verified by: auscultation and capnometry    Cormack-Lehane Classification:  Grade I - full view of glottis  Number of Attempts at Approach:  1   Airway placement was atraumatic x1 attempt

## 2024-06-06 NOTE — OR NURSING
1441: To PACU post FIBER OPTIC BRONCHOSCOPY WITH BRONCHOALVEOLAR LAVAGE, BIOPSY AND FINE NEEDLE ASPIRATION AND NAVIGATION, ROBOTICS. Pt is sleeping, breathing is spontaneous and unlabored.     1451: Pt awakens easily. Denies pain or discomfort. Skin temperature is low, pt says she does not feel cold and doesn't want warm blankets or heater. Back to sleep.    1457: Dr. Berrios at bedside talking w/ pt.    1500: Pt given warm blankets.    1540: Pt working w/ I.S.    1555: Report given to SEMAJ Cowart RN

## 2024-06-06 NOTE — CONSULTS
Pulmonary Consultation    Date of consult: 6/6/2024    Referring Physician  Nancy Campbell MD    Reason for Consultation  Right middle lobe pulmonary nodule    History of Presenting Illness  68 y.o. female who presented 6/6/2024 former smoker referred for bronchoscopy by her oncologist Dr. Nancy Campbell.  She has a distant history of breast cancer and a new diagnosis of non-small cell lung cancer of the left lung.  This is identified in 2023 is a 2.7 cm subpleural lingular mass for which he underwent biopsy in 6/2023 showing pulmonary adenocarcinoma.  She received 3 cycles of chemotherapy followed by lobectomy in 1/2024.  Nodes negative.  She has been under radiographic surveillance.  Repeat CT chest in 5/2024 demonstrated a new 11 mm ill-defined right middle lobe nodule.  For this she has been referred for a bronchoscopy with biopsy.    Code Status  Prior    Review of Systems  Review of Systems   Constitutional:  Negative for chills, fever and weight loss.   HENT:  Negative for congestion, sinus pain and sore throat.    Eyes:  Negative for pain and discharge.   Respiratory:  Negative for cough, sputum production, shortness of breath, wheezing and stridor.    Cardiovascular:  Negative for chest pain, orthopnea and leg swelling.   Gastrointestinal:  Negative for abdominal pain, diarrhea, nausea and vomiting.   Genitourinary:  Negative for dysuria, frequency and urgency.   Musculoskeletal:  Negative for myalgias.   Skin:  Negative for rash.   Neurological:  Negative for dizziness, sensory change, focal weakness, loss of consciousness and headaches.   Psychiatric/Behavioral: Negative.     All other systems reviewed and are negative.    Past Medical History   has a past medical history of Allergy, unspecified not elsewhere classified, Anxiety disorder (04/29/2014), Bowel habit changes (August 2023), Breast cancer (HCC) (1995), breast cancer 1995, Cancer (HCC) (June 2023), Chickenpox, Dental disorder, Former smoker  (05/11/2023), High cholesterol, History of breast cancer (02/06/2017), History of radiation therapy (05/11/2023), Hyperlipidemia, Hypertension, Left upper lobe pulmonary nodule (05/11/2023), Renal disorder, and Tonsillitis.    She has no past medical history of Acute nasopharyngitis, Anesthesia, Anginal syndrome (HCC), Arrhythmia, Arthritis, Asthma, Blood clotting disorder (HCC), Breath shortness, Bronchitis, Carcinoma in situ of respiratory system, Cataract, Congestive heart failure (HCC), Continuous ambulatory peritoneal dialysis status (HCC), Coughing blood, Diabetes (HCC), Dialysis patient (HCC), Disorder of thyroid, Emphysema of lung (HCC), Glaucoma, Gynecological disorder, Heart burn, Heart murmur, Heart valve disease, Hemorrhagic disorder (HCC), Hepatitis A, Hepatitis B, Hepatitis C, Hiatus hernia syndrome, Indigestion, Jaundice, Myocardial infarct (HCC), Pacemaker, Pain, Pneumonia, Pregnant, Rheumatic fever, Seizure (HCC), Sleep apnea, Snoring, Stroke (HCC), Tuberculosis, Urinary bladder disorder, or Urinary incontinence.    Surgical History   has a past surgical history that includes mastectomy; pr radiation therapy plan simple; pr chemotherapy, unspecified procedure; pr bronchoscopy,diagnostic (N/A, 06/12/2023); endobronchial us add-on (N/A, 06/12/2023); other (1986); pr sigmoidoscopy,diagnostic (12/15/2023); gyn surgery; no pertinent past surgical history (1995); pr thoracoscopy,dx no bx (Left, 1/30/2024); mass excision general (Left, 1/30/2024); lobectomy (Left, 1/30/2024); and node dissection (Left, 1/30/2024).    Family History  family history includes Alzheimer's Disease in her maternal aunt; Breast Cancer in her mother; Cancer in her maternal uncle, mother, and paternal grandfather; Heart Disease in her maternal grandmother; Hyperlipidemia in her maternal grandmother; Hypertension in her maternal grandmother and maternal uncle; Kidney Disease in her maternal aunt; Lung Cancer in her maternal  grandfather.    Social History   reports that she quit smoking about 23 months ago. Her smoking use included cigarettes. She started smoking about 46 years ago. She has a 11.3 pack-year smoking history. She has been exposed to tobacco smoke. She quit smokeless tobacco use about 23 months ago. She reports that she does not drink alcohol and does not use drugs.    Medications  Home Medications       Reviewed by Betty Arciniega R.N. (Registered Nurse) on 06/06/24 at 1111  Med List Status: Not Addressed     Medication Last Dose Status   acetaminophen (TYLENOL) 325 MG Tab  Active   fenofibrate micronized (LOFIBRA) 134 MG capsule 6/5/2024 Active   folic acid (FOLVITE) 1 MG Tab 5/30/2024 Active   levothyroxine (SYNTHROID) 50 MCG Tab 6/6/2024 Active   lisinopril (PRINIVIL) 10 MG Tab 6/5/2024 Active   metoprolol SR (TOPROL XL) 25 MG TABLET SR 24 HR 6/6/2024 Active   pravastatin (PRAVACHOL) 40 MG tablet 6/6/2024 Active   predniSONE (DELTASONE) 20 MG Tab 6/6/2024 Active   venlafaxine (EFFEXOR) 75 MG Tab 6/6/2024 Active   VITAMIN D PO 5/30/2024 Active                  Audit from Redirected Encounters    **Home medications have not yet been reviewed for this encounter**       Current Facility-Administered Medications   Medication Dose Route Frequency Provider Last Rate Last Admin    lidocaine (Xylocaine) 1 % injection 0.5 mL  0.5 mL Intradermal Once PRN Nickolas Berrios M.D.        lactated ringers infusion   Intravenous Continuous Nickolas Berrios M.D.           Allergies  Allergies   Allergen Reactions    Codeine Unspecified     Memory issues       Vital Signs last 24 hours  Temp:  [35.6 °C (96.1 °F)] 35.6 °C (96.1 °F)  Pulse:  [91] 91  Resp:  [18] 18  BP: (125)/(70) 125/70  SpO2:  [96 %] 96 %    Physical Exam  Physical Exam  Vitals and nursing note reviewed.   Constitutional:       General: She is not in acute distress.     Appearance: Normal appearance. She is well-developed. She is not diaphoretic.      Comments: Very  pleasant   Eyes:      General: No scleral icterus.        Right eye: No discharge.         Left eye: No discharge.      Conjunctiva/sclera: Conjunctivae normal.      Pupils: Pupils are equal, round, and reactive to light.   Neck:      Thyroid: No thyromegaly.      Vascular: No JVD.   Cardiovascular:      Rate and Rhythm: Normal rate and regular rhythm.      Heart sounds: Normal heart sounds. No murmur heard.     No gallop.   Pulmonary:      Effort: Pulmonary effort is normal. No respiratory distress.      Breath sounds: Normal breath sounds. No wheezing or rales.   Abdominal:      General: There is no distension.      Palpations: Abdomen is soft.      Tenderness: There is no abdominal tenderness. There is no guarding.   Musculoskeletal:         General: No tenderness.   Lymphadenopathy:      Cervical: No cervical adenopathy.   Skin:     General: Skin is warm.      Capillary Refill: Capillary refill takes less than 2 seconds.      Findings: No erythema or rash.   Neurological:      Mental Status: She is alert and oriented to person, place, and time.      Cranial Nerves: No cranial nerve deficit.      Sensory: No sensory deficit.      Motor: No abnormal muscle tone.   Psychiatric:         Behavior: Behavior normal.       Fluids  No intake or output data in the 24 hours ending 06/06/24 1216    Laboratory  No results found for this or any previous visit (from the past 48 hour(s)).    Imaging  DX-CHEST-LIMITED (1 VIEW)    (Results Pending)   DX-PORTABLE FLUORO > 1 HOUR    (Results Pending)   CT-CHEST (THORAX) W/O    (Results Pending)     Assessment/Plan    #Right middle lobe nodule  #History of ANSHUL adenocarcinoma s/p chemo/lobectomy  #History of smoking    Discussed about risks and benefits of undergoing bronchoscopy, including but not limited to bleeding, pneumothorax, respiratory failure requiring ventilatory support, infection, and possible death. Questions were encouraged and answered. Patient endorsed understanding  and agreed to proceed as planned.  __________  Nickolas Berrios MD  Pulmonary and Critical Care Medicine  [facility]

## 2024-06-07 LAB
FUNGUS SPEC FUNGUS STN: NORMAL
GRAM STN SPEC: NORMAL
MYCOBACTERIUM SPEC CULT: NORMAL
RHODAMINE-AURAMINE STN SPEC: NORMAL
RHODAMINE-AURAMINE STN SPEC: NORMAL
SIGNIFICANT IND 70042: NORMAL
SITE SITE: NORMAL
SOURCE SOURCE: NORMAL

## 2024-06-10 ENCOUNTER — TELEPHONE (OUTPATIENT)
Dept: ENDOCRINOLOGY | Facility: MEDICAL CENTER | Age: 69
End: 2024-06-10
Payer: MEDICARE

## 2024-06-10 NOTE — TELEPHONE ENCOUNTER
Pt left vm needing to get scheduled with Dr. Stockton. Called pt back and left vm to call the office.

## 2024-06-11 ENCOUNTER — TELEPHONE (OUTPATIENT)
Dept: ENDOCRINOLOGY | Facility: MEDICAL CENTER | Age: 69
End: 2024-06-11
Payer: MEDICARE

## 2024-06-11 ENCOUNTER — TELEPHONE (OUTPATIENT)
Dept: HEMATOLOGY ONCOLOGY | Facility: MEDICAL CENTER | Age: 69
End: 2024-06-11

## 2024-06-11 NOTE — TELEPHONE ENCOUNTER
PAR contacted patient to check her in for Virtual Visit.    Left voicemail message to contact clinic.

## 2024-06-13 ENCOUNTER — APPOINTMENT (OUTPATIENT)
Dept: ENDOCRINOLOGY | Facility: MEDICAL CENTER | Age: 69
End: 2024-06-13
Attending: STUDENT IN AN ORGANIZED HEALTH CARE EDUCATION/TRAINING PROGRAM
Payer: MEDICARE

## 2024-06-13 NOTE — PROGRESS NOTES
Follow up Endocrinology Visit  Initial consult on: 10/17/23  Last visit on: 10/24/23  Referred by: SEMAJ MillsPMEGA.    Patient was presented for a telehealth consultation via secure and encrypted videoconferencing technology.    Location of Provider - office  Location of Patient - home  Patient Consent - yes  Platform used - Zoom   Total time - min    Chief complaint:  Latrice Andersen is a very pleasant 68 y.o. lady, who is here for follow up for hyperthyroidism management    Interval history:   -   Prior notes:  10/24/23  - patient is still struggling with diarrhea  - on prednisone taper  - reviewed recent labs    Hyperthyroidism:  - noted after starting immune therapy/Pembrolizumab by oncology in 6/2023  - first abnormal labs in 8/2023, got worse in 9/2023  - she denies classic hyperthyroid symptoms: weight loss, heat intolerance, sweating, insomnia, anxiety, emotional lability, palpitations, dyspnea on exertion,  tremors  - recently developed severe diarrhea, required hospitalization, was diagnosed with colitis  - denies SAMY symptoms: eye discomfort, periorbital edema, red eye, double vision, pain with eye movement  - denies neck discomfort, pain, dysphagia, dyspnea, reports new hoarseness  - denies  iodine, biotin supplementation, recent contrast use, recent URI  - had thyroid US, thyroid scan, TrAbs - as below    Lung cancer:  - new diagnosis of NSCLC Adenocarcinoma of the left lung  - was started on neoadjuvant therapy with Carboplatin/Pemetrexed/Nivolumab since 6/2023, had 3 courses/ cycles already  - while on medication developed severe colitis, hyperthyroidism  - medication is currently on hold    Breast cancer:  - first diagnosed in 1996, s/p RT and chemo  -  a new diagnosis of NSCLC Adenocarcinoma on antineoplastic therapy with nivolumab, pemetrexed and carboplatin was admitted on 9/11/2023 for acute renal failure secondary to acute diarrhea.     Recent hospitalization on 9/11/23 -  9/15/23:  - presented with having ~ 14 bouts of diarrhea, abdomina/pelvic CT wnl, presumably due to immune therapy induced colitis, given aggressive hydration, electrolyte replacement, started on steroid therapy with prednisone, currently on 20 mg/day     Medications:  Current Outpatient Medications:     acetaminophen (TYLENOL) 325 MG Tab, Take 650 mg by mouth every four hours as needed. CAN TAKE ON DOS IF NEEDED, Disp: , Rfl:     folic acid (FOLVITE) 1 MG Tab, TAKE ONE TABLET BY MOUTH ONE TIME DAILY (Patient taking differently: Take 1 mg by mouth every day. DO NOT TAKE 7 DAYS PRIOR TO SURGERY-), Disp: 30 Tablet, Rfl: 0    levothyroxine (SYNTHROID) 50 MCG Tab, Take 1 Tablet by mouth every morning on an empty stomach. Please make a follow up appointment for future refills (Patient taking differently: Take 50 mcg by mouth every morning on an empty stomach. CONTINUE TAKING PRIOR TO SURGERY AND DAY OF SURGERY), Disp: 30 Tablet, Rfl: 0    venlafaxine (EFFEXOR) 75 MG Tab, TAKE ONE TABLET BY MOUTH ONE TIME DAILY (Patient taking differently: Take 75 mg by mouth every day. CONTINUE TAKING PRIOR TO SURGERY AND DAY OF SURGERY), Disp: 90 Tablet, Rfl: 1    fenofibrate micronized (LOFIBRA) 134 MG capsule, Take 1 Capsule by mouth every day. (Patient taking differently: Take 134 mg by mouth every day. DO NOT TAKE 24 HOURS PRIOR TO SURGERY), Disp: 90 Capsule, Rfl: 0    lisinopril (PRINIVIL) 10 MG Tab, Take 1 Tablet by mouth every day. (Patient taking differently: Take 10 mg by mouth every day. DO NOT TAKE 24 HOURS PRIOR TO SURGERY), Disp: 100 Tablet, Rfl: 3    predniSONE (DELTASONE) 20 MG Tab, Take 10 mg by mouth every day. CONTINUE TAKING PRIOR TO SURGERY AND DAY OF SURGERY, Disp: , Rfl:     VITAMIN D PO, Take 5,000 Units by mouth every day. DO NOT TAKE 7 DAYS PRIOR TO SURGERY, Disp: , Rfl:     metoprolol SR (TOPROL XL) 25 MG TABLET SR 24 HR, Take 25 mg by mouth every day. CONTINUE TAKING PRIOR TO SURGERY AND DAY OF SURGERY, Disp:  , Rfl:     pravastatin (PRAVACHOL) 40 MG tablet, Take 1 Tablet by mouth every day. (Patient taking differently: Take 40 mg by mouth every day. CONTINUE TAKING PRIOR TO SURGERY AND DAY OF SURGERY), Disp: 100 Tablet, Rfl: 3    Physical Examination:   Vital signs: There were no vitals taken for this visit.  General: No distress, cooperative  Neuro: Alert and oriented.   Psych: Normal mood and affect    Labs:  MOST RECENT LABS:  - reviewed      PRIOR PERTINENT LABS:  - reviewed      Imaging:  - reviewed  Thyroid US on 9/13/23:   COMPARISON:  None  FINDINGS:  The thyroid gland is heterogeneous.  Vascularity is normal.    The right lobe of the thyroid gland measures 1.05 cm x 4.42 cm x 1.14 cm. The contour and echogenicity are normal. 3.4 and 3.1 mm small thyroid nodules in the mid portion of the right thyroid.  The left lobe of the thyroid gland measures 0.88 cm x 4.25 cm x 1.26 cm. The contour and echogenicity are normal. There is 9.0 x 4.8 x 3.5 mm nodule in the mid left thyroid lobe with mixed echogenicity, hyperechoic, smooth margins with macrocalcifications which is wider than tall, total score of 3.  The isthmus measures 0.26 cm.  IMPRESSION:  1.  Left thyroid nodule less than 1 cm, Ti-Rads 3, no follow up recommended at this time due to size less than 1.5 cm.      Thyroid scan on 10/9/23:  HISTORY/REASON FOR EXAM:  Hyperthyroidism.  COMPARISON: None  FINDINGS:  The planar scan demonstrates homogeneous labeling throughout the thyroid gland.  The five-hour uptake measurement equals 6.4% (normal 8-15%).  IMPRESSION:  Five-hour uptake is slightly lower than normal, nonspecific.  Homogeneous appearance of the thyroid gland.      Assessment and Plan:  # Drug-induced thyroiditis  # Hypothyroidism  -  Prior notes:  - patient has biochemical hyperthyroidism with evidence of thyroiditis as per thyroid scan (decreased thyroid uptake) and thyroid US (normal vascularity), no SAMY Sx, neg TrAbs  - has minimal clinical  presentation, even we can see diarrhea with hyperthyroidism, the severity of it exceeds expected in hyperthyroidism, especially without any other symptoms  - agree that diarrhea is likely to be related to immune therapy induced colitis  - likely cause - recent immune therapy with nivolumab  - already started on prednisone for the colitis, but likely to be helpful for thyroiditis as well  - will repeat TFTs now (would expect improvement)  - discussed with the patient possible outcomes of thyroiditis - normalization of thyroid function vs development of hypothyroidism  - discontinuation of nivolumab will not affect course and outcomes of thyroiditis, does not need to be stopped because of development of drug- induced thyroiditis  10/24/23  - currently in hypothyroid state, asymptomatic  - on steroid taper for colitis  - most commonly hypothyroidism after immune therapy induced thyroiditis - permanent  - will start replacement therapy with LT4 with slightly lower dose than weight based calculated dose ~ 90 mcg/day  Plan:  Taper prednisone as per GI.   Start Levothyroxine 50 mcg/day - on fasting state in am, at least 30 min before first meal.   Repeat TFTs in 6 weeks.   Overall reassured the patient.   Nivolumab will not affect course and outcomes of thyroiditis, does not need to be stopped because of development of drug- induced thyroiditis/hypothyroidism    RTC: 6 weeks    Total time (face-to-face and non-face-to face time):  20 min    Plan reviewed with the patient and agreed with plan.  All questions answered to patient's satisfaction.  Thank you kindly for allowing me to participate in the care plan for this patient.    Deb Stockton MD    CC:   Kermit Bergman M.D.

## 2024-06-14 DIAGNOSIS — Z79.899 HIGH RISK MEDICATION USE: ICD-10-CM

## 2024-06-14 DIAGNOSIS — C34.92 NSCLC OF LEFT LUNG (HCC): ICD-10-CM

## 2024-06-14 RX ORDER — FOLIC ACID 1 MG/1
1 TABLET ORAL DAILY
Qty: 30 TABLET | Refills: 0 | Status: SHIPPED | OUTPATIENT
Start: 2024-06-14

## 2024-06-16 DIAGNOSIS — E78.5 DYSLIPIDEMIA: ICD-10-CM

## 2024-06-17 DIAGNOSIS — E03.9 HYPOTHYROIDISM (ACQUIRED): ICD-10-CM

## 2024-06-17 DIAGNOSIS — E06.4 DRUG-INDUCED THYROIDITIS: ICD-10-CM

## 2024-06-17 RX ORDER — LEVOTHYROXINE SODIUM 0.05 MG/1
50 TABLET ORAL
Qty: 30 TABLET | Refills: 0 | Status: SHIPPED | OUTPATIENT
Start: 2024-06-17

## 2024-06-17 NOTE — TELEPHONE ENCOUNTER
Received request via: Pharmacy    Was the patient seen in the last year in this department? Yes    Does the patient have an active prescription (recently filled or refills available) for medication(s) requested? No    Pharmacy Name:     Does the patient have detention Plus and need 100 day supply (blood pressure, diabetes and cholesterol meds only)? Yes, quantity updated to 100 days

## 2024-06-18 RX ORDER — FENOFIBRATE 134 MG/1
134 CAPSULE ORAL DAILY
Qty: 100 CAPSULE | Refills: 1 | Status: SHIPPED | OUTPATIENT
Start: 2024-06-18

## 2024-06-22 ASSESSMENT — ENCOUNTER SYMPTOMS
TREMORS: 0
NAUSEA: 0
SORE THROAT: 0
FOCAL WEAKNESS: 0
BRUISES/BLEEDS EASILY: 0
CHILLS: 0
MEMORY LOSS: 1
WHEEZING: 0
COUGH: 0
HEARTBURN: 0
SPUTUM PRODUCTION: 0
SHORTNESS OF BREATH: 0
DEPRESSION: 0
PALPITATIONS: 0
ORTHOPNEA: 0
BLURRED VISION: 0
NECK PAIN: 0
DIZZINESS: 0
FEVER: 0
SENSORY CHANGE: 0
HEADACHES: 0
WEIGHT LOSS: 1
TINGLING: 0
ABDOMINAL PAIN: 0
VOMITING: 0

## 2024-06-23 NOTE — PROGRESS NOTES
Consult Note: Hematology/Oncology      Primary Care:  Kermit Bergman M.D.          Chief Complaint   Patient presents with    Diarrhea       Patient reports diarrhea x 4 days with bright red blood noted. Patient also endorses vomiting as well.    Bloody Stools       X 4 days. Bright red blood         Current Treatment:      07/19/23: C1D1 neoadjuvant Carboplatin/Pemetrexed/Nivolumab  08/09/23: C2D1 neoadjuvant Carboplatin/Pemetrexed/Nivolumab  08/30/23: C3D1 neoadjuvant Carboplatin/Pemetrexed/Nivolumab delayed x1 week d/t creatinine clearance  09/05/23: C3D1 neoadjuvant Carboplatin/Pemetrexed/Nivolumab    1/30/24: Lobectomy, ypT1 ypN0     Prior Treatment: None     Subjective:   History of Presenting Illness:  Latrice Andersen is a 67 y.o. female with a PMHx of Breast Cancer in 1995 (s/p RT and Chemo) with a new diagnosis of NSCLC Adenocarcinoma of the left lung.      Patient reports that last May 2022, she went to [place] and was feeling very sick, weak and had dysuria.  She came home and went to the ER, and was found to have a UTI.  She was found to be anemic, which prompted her to get a Colonoscopy/EGD.  She was told to get a CXR, but chose not to get it done at that time. She waited to Feb 2023,  which showed 21 mm density in or overlying the left, not visualized on the previous exam. This prompted her to get a CT CAP 3/2023, 2.7 x 2.5 cm subpleural lingular mass as well as  4 mm left lower lobe pulmonary nodule and 3 mm right lower lobe pulmonary nodule.     She had a PET scan 6/6/2023, which showed hypermetabolic mass in the left upper lobe mass consistent with malignancy, as well as an increase uptake in the L parasternal region vs internal mammary LN. No evidence of metastatic disease elsewhere.      6/12/23, biopsy of the ANSHUL mass was preformed which showed malignancy, pulmonary adenocarcinoma.     MRI brain on 6/20/23, shows an incidental meningioma, but a dural met cannot be excluded.        Sylvie is taking care of her 3 yo granddaughter - Bill (her daughter in laws mother has her other grandbaby, Bassam).  She did office work prior.  She has 2 boys (Ace, Mary). She lives in [facility] with Wander her , she has fair support.       She says she feels fine, washes the care, mows the lawn, does all of her housework.       She smoked 5 cig/day, and started when she was 17.  She has not smoked since last Thursday.  No alcohol. No IVDU.       Her grandfather, uncle, and cousin  of lung cancer.      She did well on 3 cycles of NAC and IO, however after C3 she developed significant diarrhea and was admitted to the hospital.  She was started on steroids and diarrhea improved. She was d/c on a taper.    Additionally her TSH was low and an appt was made with endocrine.     She underwent a back to me on 2024.    Pathology revealed ypT1 ypN0     Interval History    Otherwise she is doing well, without any complaints.    She underwent a biopsy of her enlarged nodules on most recent CT scan.  Unfortunately FNA was insufficient but forcept biopsy showed no malignancy.      Past Medical History:   Diagnosis Date    Allergy, unspecified not elsewhere classified     Anxiety disorder 2014    Bowel habit changes 2023    Diarrhea bad    Breast cancer (HCC)     breast cancer      left mastectomy with lymph removal, chemo radiation    Cancer (HCC) 2023    lung ca, partial left lobe removed chemo/immune tx last tx     Chickenpox     Dental disorder     upper partial    Former smoker 2023    High cholesterol     History of breast cancer 2017    History of radiation therapy 2023    To left chest for breast cancer     Hyperlipidemia     Hypertension     Left upper lobe pulmonary nodule 2023    Renal disorder     CKD    Tonsillitis         Past Surgical History:   Procedure Laterality Date    IN BRONCHOSCOPY,DIAGNOSTIC N/A 2024    Procedure: FIBER  OPTIC BRONCHOSCOPY WITH BRONCHOALVEOLAR LAVAGE, BIOPSY AND FINE NEEDLE ASPIRATION AND NAVIGATION, ROBOTICS;  Surgeon: Nickolas Berrios M.D.;  Location: [facility];  Service: Pulmonary Robotic    PA THORACOSCOPY,DX NO BX Left 1/30/2024    Procedure: THORACOSCOPY;  Surgeon: John H Ganser, M.D.;  Location: [facility];  Service: General    MASS EXCISION GENERAL Left 1/30/2024    Procedure: EXCISION, MASS, INTERNAL MAMMARY;  Surgeon: John H Ganser, M.D.;  Location: [facility];  Service: General    LOBECTOMY Left 1/30/2024    Procedure: LOBECTOMY, UPPER;  Surgeon: John H Ganser, M.D.;  Location: [facility];  Service: General    NODE DISSECTION Left 1/30/2024    Procedure: LYMPH NODE DISSECTION;  Surgeon: John H Ganser, M.D.;  Location: [facility];  Service: General    PA SIGMOIDOSCOPY,DIAGNOSTIC  12/15/2023    Procedure: FLEXIBLE SIGMOIDOSCOPY;  Surgeon: Francois Rollins M.D.;  Location: [facility];  Service: Gastroenterology    PA BRONCHOSCOPY,DIAGNOSTIC N/A 06/12/2023    Procedure: FIBER OPTIC BRONCHOSCOPY WITH  WASH, BRUSH, BRONCHOALVEOLAR LAVAGE, BIOPSY AND FINE NEEDLE ASPIRATION, ENDOBRONCHIAL ULTRASOUND & NAVIGATION, ROBOTICS;  Surgeon: Brooke Perrin M.D.;  Location: [facility];  Service: Pulmonary Robotic    ENDOBRONCHIAL US ADD-ON N/A 06/12/2023    Procedure: ENDOBRONCHIAL ULTRASOUND (EBUS);  Surgeon: Brooke Perrin M.D.;  Location: [facility];  Service: Pulmonary Robotic    GYN SURGERY      Tubiligation    MASTECTOMY      Left    NO PERTINENT PAST SURGICAL HISTORY  1995    Breast Cancer (Left Mastectomy)    OTHER  1986    Tubaligation    PA CHEMOTHERAPY, UNSPECIFIED PROCEDURE      PA RADIATION THERAPY PLAN SIMPLE         Social History     Tobacco Use    Smoking status: Former     Current packs/day: 0.00     Average packs/day: 0.3 packs/day for 45.0 years (11.3 ttl pk-yrs)     Types: Cigarettes     Start date:  1977     Quit date: 2022     Years since quittin.9     Passive exposure: Current (Spouse)    Smokeless tobacco: Former     Quit date: 2022    Tobacco comments:     5 daily    Vaping Use    Vaping status: Never Used   Substance Use Topics    Alcohol use: No    Drug use: No        Family History   Problem Relation Age of Onset    Cancer Mother         breast cancer/Breast    Breast Cancer Mother     Hypertension Maternal Uncle     Cancer Maternal Uncle     Hypertension Maternal Grandmother     Hyperlipidemia Maternal Grandmother     Heart Disease Maternal Grandmother     Cancer Paternal Grandfather         Lung ca, smoker    Lung Cancer Maternal Grandfather     Alzheimer's Disease Maternal Aunt     Kidney Disease Maternal Aunt     Diabetes Neg Hx        Allergies   Allergen Reactions    Codeine Unspecified     Memory issues       Current Outpatient Medications   Medication Sig Dispense Refill    fenofibrate micronized (LOFIBRA) 134 MG capsule TAKE ONE CAPSULE BY MOUTH ONE TIME DAILY 100 Capsule 1    levothyroxine (SYNTHROID) 50 MCG Tab Take 1 Tablet by mouth every morning on an empty stomach. 30 Tablet 0    folic acid (FOLVITE) 1 MG Tab TAKE ONE TABLET BY MOUTH ONE TIME DAILY 30 Tablet 0    acetaminophen (TYLENOL) 325 MG Tab Take 650 mg by mouth every four hours as needed. CAN TAKE ON DOS IF NEEDED      venlafaxine (EFFEXOR) 75 MG Tab TAKE ONE TABLET BY MOUTH ONE TIME DAILY (Patient taking differently: Take 75 mg by mouth every day. CONTINUE TAKING PRIOR TO SURGERY AND DAY OF SURGERY) 90 Tablet 1    lisinopril (PRINIVIL) 10 MG Tab Take 1 Tablet by mouth every day. (Patient taking differently: Take 10 mg by mouth every day. DO NOT TAKE 24 HOURS PRIOR TO SURGERY) 100 Tablet 3    predniSONE (DELTASONE) 20 MG Tab Take 10 mg by mouth every day. CONTINUE TAKING PRIOR TO SURGERY AND DAY OF SURGERY      VITAMIN D PO Take 5,000 Units by mouth every day. DO NOT TAKE 7 DAYS PRIOR TO SURGERY      metoprolol SR  (TOPROL XL) 25 MG TABLET SR 24 HR Take 25 mg by mouth every day. CONTINUE TAKING PRIOR TO SURGERY AND DAY OF SURGERY      pravastatin (PRAVACHOL) 40 MG tablet Take 1 Tablet by mouth every day. (Patient taking differently: Take 40 mg by mouth every day. CONTINUE TAKING PRIOR TO SURGERY AND DAY OF SURGERY) 100 Tablet 3     No current facility-administered medications for this visit.       Review of Systems   Constitutional:  Positive for weight loss. Negative for chills, fever and malaise/fatigue.   HENT:  Negative for congestion, ear pain, nosebleeds and sore throat.         Hoarseness    Eyes:  Negative for blurred vision.   Respiratory:  Negative for cough, sputum production, shortness of breath and wheezing.    Cardiovascular:  Negative for chest pain, palpitations, orthopnea and leg swelling.   Gastrointestinal:  Negative for abdominal pain, heartburn, nausea and vomiting.   Genitourinary:  Negative for dysuria, frequency and urgency.        Nocturia   Musculoskeletal:  Negative for neck pain.   Neurological:  Negative for dizziness, tingling, tremors, sensory change, focal weakness and headaches.   Endo/Heme/Allergies:  Does not bruise/bleed easily.   Psychiatric/Behavioral:  Positive for memory loss. Negative for depression and suicidal ideas.         Anxiety   All other systems reviewed and are negative.      Problem list, medications, and allergies reviewed by myself today in Epic.     Objective:     There were no vitals filed for this visit.      DESC; KARNOFSKY SCALE WITH ECOG EQUIVALENT: 100, Fully active, able to carry on all pre-disease performed without restriction (ECOG equivalent 0)    DISTRESS LEVEL: mild distress    Physical Exam  Constitutional:       General: She is not in acute distress.     Appearance: Normal appearance. She is not ill-appearing.   HENT:      Head: Normocephalic and atraumatic.      Comments: Hernandez facies     Nose: Nose normal.      Mouth/Throat:      Mouth: Mucous membranes are  moist.      Pharynx: No oropharyngeal exudate or posterior oropharyngeal erythema.   Eyes:      General: No scleral icterus.     Conjunctiva/sclera: Conjunctivae normal.      Pupils: Pupils are equal, round, and reactive to light.   Cardiovascular:      Rate and Rhythm: Normal rate and regular rhythm.      Pulses: Normal pulses.      Heart sounds: Normal heart sounds. No murmur heard.     No friction rub. No gallop.   Pulmonary:      Effort: Pulmonary effort is normal. No respiratory distress.      Breath sounds: Normal breath sounds. No stridor. No wheezing, rhonchi or rales.   Chest:      Chest wall: No tenderness.   Abdominal:      General: Abdomen is flat. Bowel sounds are normal. There is no distension.      Palpations: Abdomen is soft. There is no mass.      Tenderness: There is no abdominal tenderness. There is no guarding.   Musculoskeletal:         General: No swelling, tenderness or deformity. Normal range of motion.      Cervical back: Normal range of motion and neck supple. No rigidity or tenderness.      Right lower leg: No edema.      Left lower leg: No edema.   Skin:     General: Skin is warm and dry.      Coloration: Skin is not jaundiced or pale.      Findings: Bruising and lesion present. No erythema or rash.   Neurological:      General: No focal deficit present.      Mental Status: She is alert and oriented to person, place, and time. Mental status is at baseline.      Sensory: No sensory deficit.      Motor: No weakness.      Coordination: Coordination normal.      Gait: Gait normal.   Psychiatric:         Mood and Affect: Mood normal.         Behavior: Behavior normal.         Thought Content: Thought content normal.         Judgment: Judgment normal.         Labs:   Most recent labs reviewed.  Cr is high with GRF of 58    Imaging:   Most recent images below have been independently reviewed by me.     5/1/23 CT Chest    10/11/23 CT CAP  1.  Positive treatment response with mildly decreased size  of left upper lobe mass.  2.  A couple new tiny nonspecific micronodules in the right lung. These are nonspecific but of doubtful significance, possibly infectious/inflammatory. Attention on follow-up however to ensure resolution.  3.  Otherwise no adenopathy or other new metastatic disease within the chest, abdomen or pelvis.    6/20/23: Brain MRI   1.  There is an approximately 8 mm sized enhancing extra-axial lesion noted along the roof of the right orbit. This lesion likely represent an incidental meningioma. However the possibility of dural based metastasis cannot be excluded. Follow-up study is   recommended.  2.  Mild chronic microvascular ischemic disease.    06/06/2023 PET SCAN  1.  Intense increased activity corresponding to LEFT upper lobe mass consistent with malignancy.  2.  Focal uptake in the LEFT parasternal region which may indicate pleural versus internal mammary lymph node metastasis.  3.  No other evidence for metastatic disease.  Prior LEFT mastectomy.    Pathology:    1/30/24  FINAL DIAGNOSIS:     A. Hilar node, station 10:          Negative for malignancy in one lymph node (0/1)   B. Aortopulmonary node, station 5:          Benign adipose tissue, no lymphoid tissue identified   C. Internal mammary tissue:          Benign adipose tissue, no lymphoid tissue identified   D. Subcarinal node, station 7:          Negative for malignancy in one lymph node (0/1)   E. Station 11 lymph node:          Negative for malignancy in one lymph node (0/1)   F. Subaortic node, station 6:          Negative for malignancy in one lymph node (0/1)   G. Left upper lobe:          Invasive adenocarcinoma, 2.5 cm, lepidic-type with focal           necrosis but predominantly viable tumor (partial response)          Margins of resection are negative for carcinoma          Negative for metastatic carcinoma in four lymph nodes (0/4)          Background lung with emphysematous changes          See synoptic report for details      LUNG     SPECIMEN    Procedure:  Lobectomy     Specimen Laterality:  Left   TUMOR    Tumor Focality:  Single focus     Tumor Site:  Upper lobe of lung     Tumor Size       Total Tumor Size (size of entire tumor):  Greatest Dimension       (Centimeters) - 3.6 cm       Size of Invasive Component:  Greatest Dimension (Centimeters) - 2.5       cm     Histologic Type:  Invasive lepidic adenocarcinoma       Histologic Patterns Present:  Lepidic     Visceral Pleura Invasion:  Not identified     Direct Invasion of Adjacent Structures:  Not identified     Treatment Effect:  Present       Percentage of Residual Viable Tumor:  70%       Percentage of Necrosis:  30%     Lymphovascular Invasion:  Not identified   MARGINS    Margin Status for Invasive Carcinoma:  All margins negative for     invasive carcinoma       Closest Margin(s) to Invasive Carcinoma:  Parenchymal       Distance from Invasive Carcinoma to Closest Margin:  Greater than -       1 cm     Margin Status for Non-Invasive Tumor:  All margins negative for     non-invasive tumor   REGIONAL LYMPH NODES     Lymph Node(s) from Prior Procedures:  No known prior lymph node     sampling performed     Regional Lymph Node Status:  All regional lymph nodes negative for     tumor       Number of Lymph Nodes Examined:  8         Francis Site(s) Examined:  7: Subcarinal, 6: Para-aortic (ascending         aorta or phrenic), 10L: Hilar, 11L: Interlobar, 12L: Lobar   PATHOLOGIC STAGE CLASSIFICATION (pTNM, AJCC 8th Edition)   Reporting of pT, pN, and (when applicable) pM categories is based on   information available to the pathologist at the time the report is   issued. As per the AJCC (Chapter 1, 8th Ed.) it is the managing   physician's responsibility to establish the final pathologic stage   based upon all pertinent information, including but potentially not   limited to this pathology report.     TNM Descriptors:  y (post-treatment)     pT Category:  pT1c     pN Category:   pN0   ADDITIONAL FINDINGS     Additional Findings:  Emphysema     6/12/23  FINAL DIAGNOSIS:     A. Fine needle aspiration, left upper lung lobe mass:          Positive for malignancy, pulmonary adenocarcinoma, nonmucinous           as sampled   B. Lung, left upper lobe mass, core biopsy:          Pulmonary adenocarcinoma, nonmucinous as sampled   C. Lung, left upper lobe mass, biopsy:          Pulmonary adenocarcinoma, nonmucinous as sampled   D. Bronchoalveolar lavage, left upper lobe:          Rare malignant cell cluster on the Thin Prep slide     Assessment/Plan:      Cancer Staging   NSCLC of left lung (HCC)  Staging form: Lung, AJCC 8th Edition  - Clinical stage from 6/29/2023: Stage IIIA (cT1c, cN2, cM0) - Signed by Nancy Campbell M.D. on 6/29/2023       Ms. Andersen is a 66 yo F with a new diagnosis of adenocarcinoma of the ANSHUL (PDL1 1%,  TP53, NF1) who is s/p C3 of NAC+IO with G3 colitis and hyperthyrodisim 2/2 to IO.  She is s/p lobectomy and path reveals ypT1 ypN0. We will follow with surveillance.    Today we reviewed the results of her most recent CT chest which shows resolved left lung nodule but a new 11 mm right middle lobe pulmonary nodule.    I discussed with the patient that I recommend a short interim scan about 6 weeks to 2 months.  Patient is very nervous about this and would like a biopsy.    Discussed with Pulmonology; recommended that since there is an airway near the mass they should be able to get to the lesion via Ion.     Plan  -Referral to Pulm    Hyperthyroidism  -follows with endo    Any questions and concerns raised by the patient were addressed and answered. Patient denies any further questions.  Patient encouraged to call the office with any concerns or issues.     Nancy Campbell M.D.  Hematology/Oncology    28 minutes was spent on this visit

## 2024-06-24 ENCOUNTER — APPOINTMENT (OUTPATIENT)
Dept: HEMATOLOGY ONCOLOGY | Facility: MEDICAL CENTER | Age: 69
End: 2024-06-24
Attending: STUDENT IN AN ORGANIZED HEALTH CARE EDUCATION/TRAINING PROGRAM
Payer: MEDICARE

## 2024-07-03 ENCOUNTER — PATIENT MESSAGE (OUTPATIENT)
Dept: RESEARCH | Facility: MEDICAL CENTER | Age: 69
End: 2024-07-03
Payer: MEDICARE

## 2024-07-04 DIAGNOSIS — E03.9 HYPOTHYROIDISM (ACQUIRED): ICD-10-CM

## 2024-07-08 ENCOUNTER — HOSPITAL ENCOUNTER (OUTPATIENT)
Dept: LAB | Facility: MEDICAL CENTER | Age: 69
End: 2024-07-08
Attending: STUDENT IN AN ORGANIZED HEALTH CARE EDUCATION/TRAINING PROGRAM
Payer: MEDICARE

## 2024-07-08 ENCOUNTER — OFFICE VISIT (OUTPATIENT)
Dept: ENDOCRINOLOGY | Facility: MEDICAL CENTER | Age: 69
End: 2024-07-08
Attending: STUDENT IN AN ORGANIZED HEALTH CARE EDUCATION/TRAINING PROGRAM
Payer: MEDICARE

## 2024-07-08 VITALS
DIASTOLIC BLOOD PRESSURE: 66 MMHG | HEIGHT: 65 IN | OXYGEN SATURATION: 94 % | BODY MASS INDEX: 22.67 KG/M2 | SYSTOLIC BLOOD PRESSURE: 138 MMHG | WEIGHT: 136.1 LBS | HEART RATE: 109 BPM

## 2024-07-08 DIAGNOSIS — E03.9 HYPOTHYROIDISM (ACQUIRED): ICD-10-CM

## 2024-07-08 DIAGNOSIS — E06.4 DRUG-INDUCED THYROIDITIS: ICD-10-CM

## 2024-07-08 LAB
T4 FREE SERPL-MCNC: 1.24 NG/DL (ref 0.93–1.7)
TSH SERPL DL<=0.005 MIU/L-ACNC: 2 UIU/ML (ref 0.38–5.33)

## 2024-07-08 PROCEDURE — 3078F DIAST BP <80 MM HG: CPT | Performed by: STUDENT IN AN ORGANIZED HEALTH CARE EDUCATION/TRAINING PROGRAM

## 2024-07-08 PROCEDURE — 3075F SYST BP GE 130 - 139MM HG: CPT | Performed by: STUDENT IN AN ORGANIZED HEALTH CARE EDUCATION/TRAINING PROGRAM

## 2024-07-08 PROCEDURE — 84443 ASSAY THYROID STIM HORMONE: CPT

## 2024-07-08 PROCEDURE — 99214 OFFICE O/P EST MOD 30 MIN: CPT | Performed by: STUDENT IN AN ORGANIZED HEALTH CARE EDUCATION/TRAINING PROGRAM

## 2024-07-08 PROCEDURE — 36415 COLL VENOUS BLD VENIPUNCTURE: CPT

## 2024-07-08 PROCEDURE — 99211 OFF/OP EST MAY X REQ PHY/QHP: CPT | Performed by: STUDENT IN AN ORGANIZED HEALTH CARE EDUCATION/TRAINING PROGRAM

## 2024-07-08 PROCEDURE — 84439 ASSAY OF FREE THYROXINE: CPT

## 2024-07-08 ASSESSMENT — FIBROSIS 4 INDEX: FIB4 SCORE: 1.04

## 2024-07-16 DIAGNOSIS — E06.4 DRUG-INDUCED THYROIDITIS: ICD-10-CM

## 2024-07-16 DIAGNOSIS — E03.9 HYPOTHYROIDISM (ACQUIRED): ICD-10-CM

## 2024-07-16 RX ORDER — LEVOTHYROXINE SODIUM 0.05 MG/1
TABLET ORAL
Qty: 90 TABLET | Refills: 0 | Status: SHIPPED | OUTPATIENT
Start: 2024-07-16

## 2024-07-23 ENCOUNTER — OFFICE VISIT (OUTPATIENT)
Dept: SLEEP MEDICINE | Facility: MEDICAL CENTER | Age: 69
End: 2024-07-23
Attending: INTERNAL MEDICINE
Payer: MEDICARE

## 2024-07-23 VITALS
DIASTOLIC BLOOD PRESSURE: 60 MMHG | HEIGHT: 64 IN | SYSTOLIC BLOOD PRESSURE: 130 MMHG | BODY MASS INDEX: 22.53 KG/M2 | OXYGEN SATURATION: 93 % | WEIGHT: 132 LBS | RESPIRATION RATE: 16 BRPM | HEART RATE: 92 BPM

## 2024-07-23 DIAGNOSIS — C34.12 MALIGNANT NEOPLASM OF UPPER LOBE OF LEFT LUNG (HCC): ICD-10-CM

## 2024-07-23 DIAGNOSIS — R91.1 LUNG NODULE: ICD-10-CM

## 2024-07-23 PROCEDURE — 3075F SYST BP GE 130 - 139MM HG: CPT | Performed by: INTERNAL MEDICINE

## 2024-07-23 PROCEDURE — 3078F DIAST BP <80 MM HG: CPT | Performed by: INTERNAL MEDICINE

## 2024-07-23 PROCEDURE — 99215 OFFICE O/P EST HI 40 MIN: CPT | Performed by: INTERNAL MEDICINE

## 2024-07-23 PROCEDURE — 99212 OFFICE O/P EST SF 10 MIN: CPT | Performed by: INTERNAL MEDICINE

## 2024-07-23 ASSESSMENT — ENCOUNTER SYMPTOMS
VOMITING: 0
PSYCHIATRIC NEGATIVE: 1
DIARRHEA: 0
EYE DISCHARGE: 0
STRIDOR: 0
DIZZINESS: 0
FOCAL WEAKNESS: 0
SORE THROAT: 0
EYE PAIN: 0
MYALGIAS: 0
ABDOMINAL PAIN: 0
WHEEZING: 0
SINUS PAIN: 0
NAUSEA: 0
COUGH: 0
HEADACHES: 0
CHILLS: 0
FEVER: 0
SHORTNESS OF BREATH: 0
ORTHOPNEA: 0
LOSS OF CONSCIOUSNESS: 0
WEIGHT LOSS: 0
SPUTUM PRODUCTION: 0
SENSORY CHANGE: 0

## 2024-07-23 ASSESSMENT — FIBROSIS 4 INDEX: FIB4 SCORE: 1.04

## 2024-07-29 ENCOUNTER — RESEARCH ENCOUNTER (OUTPATIENT)
Dept: RESEARCH | Facility: MEDICAL CENTER | Age: 69
End: 2024-07-29

## 2024-07-30 ENCOUNTER — APPOINTMENT (OUTPATIENT)
Dept: SLEEP MEDICINE | Facility: MEDICAL CENTER | Age: 69
End: 2024-07-30
Attending: INTERNAL MEDICINE
Payer: MEDICARE

## 2024-08-01 ENCOUNTER — NON-PROVIDER VISIT (OUTPATIENT)
Dept: SLEEP MEDICINE | Facility: MEDICAL CENTER | Age: 69
End: 2024-08-01
Attending: INTERNAL MEDICINE
Payer: MEDICARE

## 2024-08-01 VITALS — WEIGHT: 132 LBS | HEIGHT: 63 IN | BODY MASS INDEX: 23.39 KG/M2

## 2024-08-01 DIAGNOSIS — R91.1 LUNG NODULE: ICD-10-CM

## 2024-08-01 DIAGNOSIS — C34.12 MALIGNANT NEOPLASM OF UPPER LOBE OF LEFT LUNG (HCC): ICD-10-CM

## 2024-08-01 PROCEDURE — 94726 PLETHYSMOGRAPHY LUNG VOLUMES: CPT | Performed by: INTERNAL MEDICINE

## 2024-08-01 PROCEDURE — 94729 DIFFUSING CAPACITY: CPT | Mod: 26 | Performed by: INTERNAL MEDICINE

## 2024-08-01 PROCEDURE — 94726 PLETHYSMOGRAPHY LUNG VOLUMES: CPT | Mod: 26 | Performed by: INTERNAL MEDICINE

## 2024-08-01 PROCEDURE — 94060 EVALUATION OF WHEEZING: CPT | Mod: 26 | Performed by: INTERNAL MEDICINE

## 2024-08-01 PROCEDURE — 94060 EVALUATION OF WHEEZING: CPT | Performed by: INTERNAL MEDICINE

## 2024-08-01 PROCEDURE — 94729 DIFFUSING CAPACITY: CPT | Performed by: INTERNAL MEDICINE

## 2024-08-01 ASSESSMENT — FIBROSIS 4 INDEX: FIB4 SCORE: 1.06

## 2024-08-01 NOTE — PROCEDURES
Technician: Ioana Hadley RRT, CPFT  Good patient effort & cooperation.  The results of this test meet the ATS/ERS standards for acceptability & reproducibility.  Test was performed on the [employer]ista Power Body Plethysmograph-Elite DX system.  Predicted equations for Spirometry are GLI-2012, ITS for lung volumes, and GLI-2017 for DLCO.  The DLCO was uncorrected for Hgb.  A bronchodilator of Ventolin HFA -2puffs via spacer administered.  DLCO performed during dilation period.    Interpretation;   Acceptable reproducible  FEV1 1.85 L [85%], FVC 2.89 L] 103%], ratio 64%  Flow-volume loops consistent with small airway obstruction  TLC is 5.63 L] 114%]  DLCO 14.17 mL/min/mmHg [76%]    Impression  Mild obstruction with no response to bronchodilator.  Evidence of air trapping and mild reduction in gas transfer

## 2024-08-07 ENCOUNTER — HOSPITAL ENCOUNTER (OUTPATIENT)
Dept: RADIOLOGY | Facility: MEDICAL CENTER | Age: 69
End: 2024-08-07
Attending: INTERNAL MEDICINE
Payer: MEDICARE

## 2024-08-07 LAB — GLUCOSE BLD-MCNC: 72 MG/DL (ref 65–99)

## 2024-08-07 PROCEDURE — A9552 F18 FDG: HCPCS

## 2024-08-19 ENCOUNTER — PATIENT MESSAGE (OUTPATIENT)
Dept: SLEEP MEDICINE | Facility: MEDICAL CENTER | Age: 69
End: 2024-08-19
Payer: MEDICARE

## 2024-08-19 ENCOUNTER — TELEPHONE (OUTPATIENT)
Dept: SLEEP MEDICINE | Facility: MEDICAL CENTER | Age: 69
End: 2024-08-19
Payer: MEDICARE

## 2024-08-19 DIAGNOSIS — R91.1 LUNG NODULE: ICD-10-CM

## 2024-08-19 NOTE — TELEPHONE ENCOUNTER
Case presented at lung tumor board 8/15  Consensus/recommendation was rebiopsy: IR vs bronch  Called 8/16, VM left  Called 8/19, VM left, called , no answer    __________  Nickolas Berrios MD  Pulmonary and Critical Care Medicine  [facility]

## 2024-08-19 NOTE — TELEPHONE ENCOUNTER
----- Message from Physician Nickolas Berrios M.D. sent at 8/19/2024 10:40 AM PDT -----  Joaquim Morales and Precious  Can you please followup with the pt to see when she is available for me to call her to discuss plan of care?  Thank you

## 2024-08-20 NOTE — PROGRESS NOTES
Was able to get a hold of pt, discussed results of tumor board conference and recommendation for rebiopsy  Discussed risks/benefits of IR vs bronch vs VATS  Through shared decision making she opted for reattempt with bronch/EBUS    Message sent to procedure schedulers to facilitate scheduling urgently    1. Lung nodule  Bronchoscopy    CT-CHEST (THORAX) W/O

## 2024-08-21 ENCOUNTER — APPOINTMENT (OUTPATIENT)
Dept: ADMISSIONS | Facility: MEDICAL CENTER | Age: 69
End: 2024-08-21
Attending: STUDENT IN AN ORGANIZED HEALTH CARE EDUCATION/TRAINING PROGRAM
Payer: MEDICARE

## 2024-08-21 ENCOUNTER — HOSPITAL ENCOUNTER (OUTPATIENT)
Dept: LAB | Facility: MEDICAL CENTER | Age: 69
End: 2024-08-21
Attending: INTERNAL MEDICINE
Payer: MEDICARE

## 2024-08-21 DIAGNOSIS — I10 ESSENTIAL HYPERTENSION: ICD-10-CM

## 2024-08-21 DIAGNOSIS — N18.31 STAGE 3A CHRONIC KIDNEY DISEASE: ICD-10-CM

## 2024-08-21 LAB
ANION GAP SERPL CALC-SCNC: 12 MMOL/L (ref 7–16)
BUN SERPL-MCNC: 36 MG/DL (ref 8–22)
CALCIUM SERPL-MCNC: 11.8 MG/DL (ref 8.5–10.5)
CHLORIDE SERPL-SCNC: 105 MMOL/L (ref 96–112)
CO2 SERPL-SCNC: 21 MMOL/L (ref 20–33)
CREAT SERPL-MCNC: 1.67 MG/DL (ref 0.5–1.4)
ERYTHROCYTE [DISTWIDTH] IN BLOOD BY AUTOMATED COUNT: 49.6 FL (ref 35.9–50)
GFR SERPLBLD CREATININE-BSD FMLA CKD-EPI: 33 ML/MIN/1.73 M 2
GLUCOSE SERPL-MCNC: 94 MG/DL (ref 65–99)
HCT VFR BLD AUTO: 39.5 % (ref 37–47)
HGB BLD-MCNC: 12.1 G/DL (ref 12–16)
MCH RBC QN AUTO: 27.8 PG (ref 27–33)
MCHC RBC AUTO-ENTMCNC: 30.6 G/DL (ref 32.2–35.5)
MCV RBC AUTO: 90.8 FL (ref 81.4–97.8)
PLATELET # BLD AUTO: 367 K/UL (ref 164–446)
PMV BLD AUTO: 9.5 FL (ref 9–12.9)
POTASSIUM SERPL-SCNC: 5.2 MMOL/L (ref 3.6–5.5)
RBC # BLD AUTO: 4.35 M/UL (ref 4.2–5.4)
SODIUM SERPL-SCNC: 138 MMOL/L (ref 135–145)
WBC # BLD AUTO: 7.9 K/UL (ref 4.8–10.8)

## 2024-08-21 PROCEDURE — 36415 COLL VENOUS BLD VENIPUNCTURE: CPT

## 2024-08-21 PROCEDURE — 85027 COMPLETE CBC AUTOMATED: CPT

## 2024-08-21 PROCEDURE — 80048 BASIC METABOLIC PNL TOTAL CA: CPT

## 2024-08-22 ENCOUNTER — HOSPITAL ENCOUNTER (OUTPATIENT)
Dept: RADIOLOGY | Facility: MEDICAL CENTER | Age: 69
End: 2024-08-22
Attending: STUDENT IN AN ORGANIZED HEALTH CARE EDUCATION/TRAINING PROGRAM
Payer: MEDICARE

## 2024-08-22 DIAGNOSIS — Z12.31 ENCOUNTER FOR SCREENING MAMMOGRAM FOR MALIGNANT NEOPLASM OF BREAST: ICD-10-CM

## 2024-08-22 PROCEDURE — 77063 BREAST TOMOSYNTHESIS BI: CPT | Mod: 52

## 2024-08-28 ENCOUNTER — PRE-ADMISSION TESTING (OUTPATIENT)
Dept: ADMISSIONS | Facility: MEDICAL CENTER | Age: 69
End: 2024-08-28
Attending: STUDENT IN AN ORGANIZED HEALTH CARE EDUCATION/TRAINING PROGRAM
Payer: MEDICARE

## 2024-08-28 VITALS — HEIGHT: 64 IN | BODY MASS INDEX: 23.02 KG/M2

## 2024-08-28 DIAGNOSIS — Z01.810 PRE-OPERATIVE CARDIOVASCULAR EXAMINATION: ICD-10-CM

## 2024-08-29 ENCOUNTER — OFFICE VISIT (OUTPATIENT)
Dept: MEDICAL GROUP | Facility: PHYSICIAN GROUP | Age: 69
End: 2024-08-29
Payer: MEDICARE

## 2024-08-29 VITALS
BODY MASS INDEX: 23.74 KG/M2 | TEMPERATURE: 98.6 F | DIASTOLIC BLOOD PRESSURE: 56 MMHG | SYSTOLIC BLOOD PRESSURE: 118 MMHG | WEIGHT: 134 LBS | OXYGEN SATURATION: 98 % | HEIGHT: 63 IN | HEART RATE: 101 BPM

## 2024-08-29 DIAGNOSIS — N18.31 STAGE 3A CHRONIC KIDNEY DISEASE: ICD-10-CM

## 2024-08-29 DIAGNOSIS — L65.9 HAIR THINNING: ICD-10-CM

## 2024-08-29 DIAGNOSIS — E03.2 HYPOTHYROIDISM DUE TO MEDICATION: ICD-10-CM

## 2024-08-29 DIAGNOSIS — E83.52 HYPERCALCEMIA: ICD-10-CM

## 2024-08-29 ASSESSMENT — FIBROSIS 4 INDEX: FIB4 SCORE: 1.36

## 2024-08-30 ASSESSMENT — ENCOUNTER SYMPTOMS
FEVER: 0
HEADACHES: 0
ROS SKIN COMMENTS: HAIR THINNING
DIZZINESS: 0
CHILLS: 0
SHORTNESS OF BREATH: 0

## 2024-08-30 NOTE — PROGRESS NOTES
Verbal consent was acquired by the patient to use [employer] ambient listening note generation during this visit.    Subjective:     HPI:   History of Present Illness  The patient presents for evaluation of hair loss.    She mentions experiencing hair loss across her scalp. She has also noticed two small bumps on her scalp. These bumps are not itchy or painful. She first noticed them two weeks ago, around the same time her hair thinning began. She has been under the care of a dermatologist, Dr. Alfaro, and reports no dry areas or flakiness on her scalp. She has been taking Hair, Skin, and Nails vitamin vitamins regularly.  She does have hypothyroidism on levothyroxine 50 mcg daily, last TSH within normal limits 7/8/24.      She is following with pulmonology and oncology for lung cancer.  She is status post chemotherapy/immunotherapy and left upper lobectomy.  They are investigating a new right middle lobe lung nodule which is suspicious for malignancy. Bronchoscopy is scheduled for 09/29/2024.           Past medical, surgical, family, and social history were reviewed and updated.     Medications:    Current Outpatient Medications:     multivitamin Tab, Take 1 Tablet by mouth every day. MEDICATION INSTRUCTIONS FOR SURGERY/PROCEDURE SCHEDULED FOR 9/9/24  DO NOT TAKE 7 DAYS PRIOR TO SURGERY, Disp: , Rfl:     Multiple Vitamins-Minerals (HAIR SKIN & NAILS ADVANCED PO), Take  by mouth every day. MEDICATION INSTRUCTIONS FOR SURGERY/PROCEDURE SCHEDULED FOR 9/9/24  DO NOT TAKE 7 DAYS PRIOR TO SURGERY, Disp: , Rfl:     levothyroxine (SYNTHROID) 50 MCG Tab, Take 1 Tablet by mouth every morning on an empty stomach. Please make an appointment for any future refills. (Patient taking differently: Take 50 mcg by mouth every morning on an empty stomach. MEDICATION INSTRUCTIONS FOR SURGERY/PROCEDURE SCHEDULED FOR 9/9/24  OK TO CONTINUE TAKING PRIOR TO SURGERY AND DAY OF SURGERY), Disp: 90 Tablet, Rfl: 0    fenofibrate micronized  "(LOFIBRA) 134 MG capsule, TAKE ONE CAPSULE BY MOUTH ONE TIME DAILY (Patient taking differently: Take 134 mg by mouth every day. HOLD 24 HOURS PRIOR TO SURGERY), Disp: 100 Capsule, Rfl: 1    acetaminophen (TYLENOL) 325 MG Tab, Take 650 mg by mouth every four hours as needed. CAN TAKE ON DOS IF NEEDED, Disp: , Rfl:     venlafaxine (EFFEXOR) 75 MG Tab, TAKE ONE TABLET BY MOUTH ONE TIME DAILY (Patient taking differently: Take 75 mg by mouth every day. CONTINUE TAKING PRIOR TO SURGERY AND DAY OF SURGERY), Disp: 90 Tablet, Rfl: 1    lisinopril (PRINIVIL) 10 MG Tab, Take 1 Tablet by mouth every day. (Patient taking differently: Take 10 mg by mouth every day. DO NOT TAKE 24 HOURS PRIOR TO SURGERY), Disp: 100 Tablet, Rfl: 3    VITAMIN D PO, Take 5,000 Units by mouth every day. DO NOT TAKE 7 DAYS PRIOR TO SURGERY, Disp: , Rfl:     metoprolol SR (TOPROL XL) 25 MG TABLET SR 24 HR, Take 25 mg by mouth every day. CONTINUE TAKING PRIOR TO SURGERY AND DAY OF SURGERY, Disp: , Rfl:     pravastatin (PRAVACHOL) 40 MG tablet, Take 1 Tablet by mouth every day. (Patient taking differently: Take 40 mg by mouth every day. CONTINUE TAKING PRIOR TO SURGERY AND DAY OF SURGERY), Disp: 100 Tablet, Rfl: 3    Allergies:  Codeine      Health Maintenance: Completed    ROS:  Review of Systems   Constitutional:  Negative for chills and fever.   Respiratory:  Negative for shortness of breath.    Cardiovascular:  Negative for chest pain.   Skin:  Negative for itching and rash.        Hair thinning   Neurological:  Negative for dizziness and headaches.       Objective:     Exam:  /56 (BP Location: Left arm, Patient Position: Sitting, BP Cuff Size: Adult)   Pulse (!) 101   Temp 37 °C (98.6 °F) (Temporal)   Ht 1.6 m (5' 3\")   Wt 60.8 kg (134 lb)   SpO2 98%   BMI 23.74 kg/m²  Body mass index is 23.74 kg/m².    Physical Exam  Vitals reviewed.   Constitutional:       General: She is not in acute distress.  HENT:      Head: Atraumatic.   Eyes:      " Extraocular Movements: Extraocular movements intact.   Cardiovascular:      Rate and Rhythm: Normal rate and regular rhythm.      Heart sounds: No murmur heard.     No friction rub. No gallop.   Pulmonary:      Effort: Pulmonary effort is normal.      Breath sounds: No wheezing, rhonchi or rales.   Musculoskeletal:      Cervical back: Neck supple.   Skin:     General: Skin is warm and dry.      Comments: 2 small scabbed lesions are noted to the scalp  No other scalp rash  No specific areas of hair thinning   Neurological:      Mental Status: She is alert.   Psychiatric:         Mood and Affect: Mood normal.         Results    Results for orders placed or performed during the hospital encounter of 08/21/24   Basic Metabolic Panel   Result Value Ref Range    Sodium 138 135 - 145 mmol/L    Potassium 5.2 3.6 - 5.5 mmol/L    Chloride 105 96 - 112 mmol/L    Co2 21 20 - 33 mmol/L    Glucose 94 65 - 99 mg/dL    Bun 36 (H) 8 - 22 mg/dL    Creatinine 1.67 (H) 0.50 - 1.40 mg/dL    Calcium 11.8 (H) 8.5 - 10.5 mg/dL    Anion Gap 12.0 7.0 - 16.0   CBC WITHOUT DIFFERENTIAL   Result Value Ref Range    WBC 7.9 4.8 - 10.8 K/uL    RBC 4.35 4.20 - 5.40 M/uL    Hemoglobin 12.1 12.0 - 16.0 g/dL    Hematocrit 39.5 37.0 - 47.0 %    MCV 90.8 81.4 - 97.8 fL    MCH 27.8 27.0 - 33.0 pg    MCHC 30.6 (L) 32.2 - 35.5 g/dL    RDW 49.6 35.9 - 50.0 fL    Platelet Count 367 164 - 446 K/uL    MPV 9.5 9.0 - 12.9 fL   ESTIMATED GFR   Result Value Ref Range    GFR (CKD-EPI) 33 (A) >60 mL/min/1.73 m 2             Assessment & Plan:     1. Hair thinning        2. Hypothyroidism due to medication  TSH WITH REFLEX TO FT4      3. Stage 3a chronic kidney disease  Comp Metabolic Panel    MICROALBUMIN CREAT RATIO URINE      4. Hypercalcemia  Comp Metabolic Panel          Assessment & Plan  1. Hair thinning.  2. Hypothyroidism.  This is an acute problem per patient.  No specific abnormalities are noted on exam.  Will update her TSH.  Recommend making an  appointment with her dermatologist for further evaluation if symptoms are persistent.  She will continue her current dose of levothyroxine 50 mcg every morning.    3.  CKD  Chronic, with slight reduction in renal function.  Will continue to closely monitor.  Advised patient to stay hydrated.  Avoid NSAIDs.    4. Hypercalcemia.  Noted on recent labs, though corrected calcium was not checked.  Will repeat labs.          HCC Gap Form    Last edited 08/30/24 13:25 PDT by Beata White P.A.-C.             Please note that this dictation was created using voice recognition software. I have made every reasonable attempt to correct obvious errors, but I expect that there are errors of grammar and possibly content that I did not discover before finalizing the note.

## 2024-09-03 ENCOUNTER — APPOINTMENT (OUTPATIENT)
Dept: NEPHROLOGY | Facility: MEDICAL CENTER | Age: 69
End: 2024-09-03
Payer: MEDICARE

## 2024-09-03 VITALS
WEIGHT: 135 LBS | HEIGHT: 63 IN | OXYGEN SATURATION: 95 % | SYSTOLIC BLOOD PRESSURE: 132 MMHG | TEMPERATURE: 97.3 F | HEART RATE: 90 BPM | DIASTOLIC BLOOD PRESSURE: 60 MMHG | BODY MASS INDEX: 23.92 KG/M2

## 2024-09-03 DIAGNOSIS — N18.31 STAGE 3A CHRONIC KIDNEY DISEASE: ICD-10-CM

## 2024-09-03 DIAGNOSIS — C34.92 NSCLC OF LEFT LUNG (HCC): ICD-10-CM

## 2024-09-03 DIAGNOSIS — E83.52 HYPERCALCEMIA: ICD-10-CM

## 2024-09-03 DIAGNOSIS — I10 ESSENTIAL HYPERTENSION: ICD-10-CM

## 2024-09-03 PROCEDURE — 3078F DIAST BP <80 MM HG: CPT | Performed by: INTERNAL MEDICINE

## 2024-09-03 PROCEDURE — G2211 COMPLEX E/M VISIT ADD ON: HCPCS | Performed by: INTERNAL MEDICINE

## 2024-09-03 PROCEDURE — 99214 OFFICE O/P EST MOD 30 MIN: CPT | Performed by: INTERNAL MEDICINE

## 2024-09-03 PROCEDURE — 3075F SYST BP GE 130 - 139MM HG: CPT | Performed by: INTERNAL MEDICINE

## 2024-09-03 ASSESSMENT — ENCOUNTER SYMPTOMS
NAUSEA: 0
NERVOUS/ANXIOUS: 1
COUGH: 0
VOMITING: 0
SHORTNESS OF BREATH: 0
CHILLS: 0
ROS SKIN COMMENTS: HAIR LOSS
FEVER: 0
HYPERTENSION: 1

## 2024-09-03 ASSESSMENT — FIBROSIS 4 INDEX: FIB4 SCORE: 1.36

## 2024-09-03 NOTE — PROGRESS NOTES
"Subjective     Sylvie Andersen is a 69 y.o. female who presents with Chronic Kidney Disease and Hypertension            Patient was recently diagnosed with lung cancer, underwent a course of immunotherapy in November 2023, recently she has been evaluated for a new lung nodule, she is being scheduled for bronchoscopy and lung nodule biopsy later this month    Chronic Kidney Disease  This is a chronic problem. The current episode started more than 1 year ago. The problem occurs constantly. The problem has been waxing and waning. Pertinent negatives include no chest pain, chills, coughing, fever, nausea, urinary symptoms or vomiting. Exacerbated by: Hypercalcemia.   Hypertension  This is a chronic problem. The current episode started more than 1 year ago. The problem is unchanged. The problem is controlled. Pertinent negatives include no chest pain, malaise/fatigue, peripheral edema or shortness of breath. Past treatments include ACE inhibitors. The current treatment provides significant improvement. There are no compliance problems.  Hypertensive end-organ damage includes kidney disease. Identifiable causes of hypertension include chronic renal disease.       Review of Systems   Constitutional:  Negative for chills, fever and malaise/fatigue.   Respiratory:  Negative for cough and shortness of breath.    Cardiovascular:  Negative for chest pain and leg swelling.   Gastrointestinal:  Negative for nausea and vomiting.   Genitourinary:  Negative for dysuria, frequency and urgency.   Skin:         Hair loss   Psychiatric/Behavioral:  The patient is nervous/anxious.               Objective     /60 (BP Location: Right arm, Patient Position: Sitting, BP Cuff Size: Adult)   Pulse 90   Temp 36.3 °C (97.3 °F) (Temporal)   Ht 1.6 m (5' 3\")   Wt 61.2 kg (135 lb)   SpO2 95%   BMI 23.91 kg/m²      Physical Exam  Vitals and nursing note reviewed.   Constitutional:       General: She is not in acute distress.     " Appearance: Normal appearance. She is well-developed. She is not diaphoretic.   HENT:      Head: Normocephalic and atraumatic.      Right Ear: External ear normal.      Left Ear: External ear normal.      Nose: Nose normal.   Eyes:      General: No scleral icterus.        Right eye: No discharge.         Left eye: No discharge.      Conjunctiva/sclera: Conjunctivae normal.   Cardiovascular:      Rate and Rhythm: Normal rate and regular rhythm.      Heart sounds: No murmur heard.  Pulmonary:      Effort: Pulmonary effort is normal. No respiratory distress.      Breath sounds: Normal breath sounds.   Musculoskeletal:         General: No tenderness.      Right lower leg: No edema.      Left lower leg: No edema.   Skin:     General: Skin is warm and dry.      Findings: No erythema.   Neurological:      General: No focal deficit present.      Mental Status: She is alert and oriented to person, place, and time.      Cranial Nerves: No cranial nerve deficit.   Psychiatric:         Mood and Affect: Mood normal.         Behavior: Behavior normal.       Past Medical History:   Diagnosis Date    Allergy, unspecified not elsewhere classified     Anxiety disorder 04/29/2014    Bowel habit changes August 2023    Diarrhea bad    Breast cancer (HCC) 1995    breast cancer 1995     left mastectomy with lymph removal, chemo radiation    Cancer (HCC) June 2023    lung ca, partial left lobe removed chemo/immune tx last tx 9/23    Chickenpox     Dental disorder     upper partial    Former smoker 05/11/2023    High cholesterol     History of breast cancer 02/06/2017    History of radiation therapy 05/11/2023    To left chest for breast cancer     Hyperlipidemia     Hypertension     Left upper lobe pulmonary nodule 05/11/2023    Renal disorder     CKD    Tonsillitis      Social History     Socioeconomic History    Marital status:      Spouse name: Not on file    Number of children: Not on file    Years of education: Not on file     Highest education level: Some college, no degree   Occupational History    Not on file   Tobacco Use    Smoking status: Former     Current packs/day: 0.00     Average packs/day: 0.3 packs/day for 45.0 years (11.3 ttl pk-yrs)     Types: Cigarettes     Start date: 1977     Quit date: 2022     Years since quittin.1     Passive exposure: Current (Spouse)    Smokeless tobacco: Former     Quit date: 2022    Tobacco comments:     5 daily    Vaping Use    Vaping status: Never Used   Substance and Sexual Activity    Alcohol use: No    Drug use: No    Sexual activity: Not Currently   Other Topics Concern    Not on file   Social History Narrative    Not on file     Social Determinants of Health     Financial Resource Strain: Low Risk  (2024)    Overall Financial Resource Strain (CARDIA)     Difficulty of Paying Living Expenses: Not very hard   Food Insecurity: No Food Insecurity (2024)    Hunger Vital Sign     Worried About Running Out of Food in the Last Year: Never true     Ran Out of Food in the Last Year: Never true   Transportation Needs: No Transportation Needs (2024)    PRAPARE - Transportation     Lack of Transportation (Medical): No     Lack of Transportation (Non-Medical): No   Physical Activity: Sufficiently Active (2024)    Exercise Vital Sign     Days of Exercise per Week: 6 days     Minutes of Exercise per Session: 30 min   Stress: Stress Concern Present (2024)    [employer] - Occupational Stress Questionnaire     Feeling of Stress : To some extent   Social Connections: Moderately Integrated (2024)    Social Connection and Isolation Panel [NHANES]     Frequency of Communication with Friends and Family: More than three times a week     Frequency of Social Gatherings with Friends and Family: Patient declined     Attends [employer] Services: 1 to 4 times per year     Active Member of Clubs or Organizations: No     Attends Club or  Organization Meetings: Never     Marital Status:    Intimate Partner Violence: Not on file   Housing Stability: Low Risk  (1/8/2024)    Housing Stability Vital Sign     Unable to Pay for Housing in the Last Year: No     Number of Places Lived in the Last Year: 1     Unstable Housing in the Last Year: No     Family History   Problem Relation Age of Onset    Cancer Mother         breast cancer/Breast    Breast Cancer Mother     Hypertension Maternal Uncle     Cancer Maternal Uncle     Hypertension Maternal Grandmother     Hyperlipidemia Maternal Grandmother     Heart Disease Maternal Grandmother     Cancer Paternal Grandfather         Lung ca, smoker    Lung Cancer Maternal Grandfather     Alzheimer's Disease Maternal Aunt     Kidney Disease Maternal Aunt     Diabetes Neg Hx      Recent Labs     09/14/23  0107 09/15/23  0251 09/15/23  1229 01/31/24  0445 04/09/24  1359 04/29/24  1543 08/21/24  1025   ALBUMIN  --  2.8*  --   --   --   --   --    HDL  --   --   --   --  50  --   --    TRIGLYCERIDE  --   --   --   --  128  --   --    SODIUM 140 143   < > 139  --  142 138   POTASSIUM 4.3 2.8*   < > 5.0  --  5.0 5.2   CHLORIDE 109 109   < > 106  --  108 105   CO2 22 24   < > 22  --  23 21   BUN 19 22   < > 25*  --  34* 36*   CREATININE 0.78 0.92   < > 1.05  --  1.32 1.67*   PHOSPHORUS 1.7* 2.1*  --   --   --   --   --     < > = values in this interval not displayed.                             Assessment & Plan        Assessment & Plan  Stage 3a chronic kidney disease  Creatinine is worse, most likely prerenal component secondary to hypercalcemia  Patient was advised to discontinue vitamin D and calcium supplement, recheck labs after hydration  No uremic symptoms  Renal dose of medication  Avoid nephrotoxins  Continue same medication regimen  Patient was advised to call us if symptoms worsen      Essential hypertension    Controlled  Continue same medication regimen  Continue low-sodium  diet      Hypercalcemia  Patient was advised to discontinue vitamin D and other supplement  Recheck labs including PTH    NSCLC of left lung (HCC)

## 2024-09-03 NOTE — ASSESSMENT & PLAN NOTE
Creatinine is worse, most likely prerenal component secondary to hypercalcemia  Patient was advised to discontinue vitamin D and calcium supplement, recheck labs after hydration  No uremic symptoms  Renal dose of medication  Avoid nephrotoxins  Continue same medication regimen  Patient was advised to call us if symptoms worsen

## 2024-09-04 ENCOUNTER — APPOINTMENT (OUTPATIENT)
Dept: ADMISSIONS | Facility: MEDICAL CENTER | Age: 69
End: 2024-09-04
Attending: INTERNAL MEDICINE
Payer: MEDICARE

## 2024-09-04 DIAGNOSIS — Z01.810 PRE-OPERATIVE CARDIOVASCULAR EXAMINATION: ICD-10-CM

## 2024-09-04 LAB — EKG IMPRESSION: NORMAL

## 2024-09-04 PROCEDURE — 93005 ELECTROCARDIOGRAM TRACING: CPT

## 2024-09-04 PROCEDURE — 93010 ELECTROCARDIOGRAM REPORT: CPT | Performed by: INTERNAL MEDICINE

## 2024-09-09 ENCOUNTER — ANESTHESIA (OUTPATIENT)
Dept: SURGERY | Facility: MEDICAL CENTER | Age: 69
End: 2024-09-09
Payer: MEDICARE

## 2024-09-09 ENCOUNTER — APPOINTMENT (OUTPATIENT)
Dept: RADIOLOGY | Facility: MEDICAL CENTER | Age: 69
End: 2024-09-09
Attending: STUDENT IN AN ORGANIZED HEALTH CARE EDUCATION/TRAINING PROGRAM
Payer: MEDICARE

## 2024-09-09 ENCOUNTER — HOSPITAL ENCOUNTER (OUTPATIENT)
Facility: MEDICAL CENTER | Age: 69
End: 2024-09-09
Attending: STUDENT IN AN ORGANIZED HEALTH CARE EDUCATION/TRAINING PROGRAM | Admitting: STUDENT IN AN ORGANIZED HEALTH CARE EDUCATION/TRAINING PROGRAM
Payer: MEDICARE

## 2024-09-09 ENCOUNTER — APPOINTMENT (OUTPATIENT)
Dept: RADIOLOGY | Facility: MEDICAL CENTER | Age: 69
End: 2024-09-09
Attending: INTERNAL MEDICINE
Payer: MEDICARE

## 2024-09-09 VITALS
RESPIRATION RATE: 16 BRPM | TEMPERATURE: 96.6 F | WEIGHT: 133.05 LBS | SYSTOLIC BLOOD PRESSURE: 162 MMHG | OXYGEN SATURATION: 96 % | HEART RATE: 92 BPM | BODY MASS INDEX: 23.57 KG/M2 | HEIGHT: 63 IN | DIASTOLIC BLOOD PRESSURE: 79 MMHG

## 2024-09-09 PROCEDURE — 71250 CT THORAX DX C-: CPT

## 2024-09-09 ASSESSMENT — FIBROSIS 4 INDEX: FIB4 SCORE: 1.36

## 2024-09-09 NOTE — OR NURSING
1600 - Pt delayed r/t urgent case.  Physician at bedside, discussed reschedule with pt.  Pt to come back in the am.   called, IV removed, pt dressed and eating crackers/juice.

## 2024-09-10 ENCOUNTER — HOSPITAL ENCOUNTER (OUTPATIENT)
Facility: MEDICAL CENTER | Age: 69
End: 2024-09-10
Attending: STUDENT IN AN ORGANIZED HEALTH CARE EDUCATION/TRAINING PROGRAM | Admitting: STUDENT IN AN ORGANIZED HEALTH CARE EDUCATION/TRAINING PROGRAM
Payer: MEDICARE

## 2024-09-10 ENCOUNTER — APPOINTMENT (OUTPATIENT)
Dept: RADIOLOGY | Facility: MEDICAL CENTER | Age: 69
End: 2024-09-10
Attending: STUDENT IN AN ORGANIZED HEALTH CARE EDUCATION/TRAINING PROGRAM
Payer: MEDICARE

## 2024-09-10 ENCOUNTER — PATIENT MESSAGE (OUTPATIENT)
Dept: SLEEP MEDICINE | Facility: MEDICAL CENTER | Age: 69
End: 2024-09-10

## 2024-09-10 ENCOUNTER — ANESTHESIA EVENT (OUTPATIENT)
Dept: SURGERY | Facility: MEDICAL CENTER | Age: 69
End: 2024-09-10
Payer: MEDICARE

## 2024-09-10 ENCOUNTER — ANESTHESIA (OUTPATIENT)
Dept: SURGERY | Facility: MEDICAL CENTER | Age: 69
End: 2024-09-10
Payer: MEDICARE

## 2024-09-10 VITALS
RESPIRATION RATE: 16 BRPM | WEIGHT: 133.49 LBS | OXYGEN SATURATION: 90 % | DIASTOLIC BLOOD PRESSURE: 51 MMHG | TEMPERATURE: 97.3 F | HEIGHT: 63 IN | BODY MASS INDEX: 23.65 KG/M2 | SYSTOLIC BLOOD PRESSURE: 121 MMHG | HEART RATE: 87 BPM

## 2024-09-10 LAB
APPEARANCE FLD: NORMAL
BASOPHILS NFR FLD: 1 %
BODY FLD TYPE: NORMAL
CELLS FLD: 5
COLOR FLD: NORMAL
EOSINOPHIL NFR FLD: 3 %
FUNGUS SPEC FUNGUS STN: NORMAL
GRAM STN SPEC: NORMAL
LYMPHOCYTES NFR FLD: 11 %
MONOS+MACROS NFR FLD MANUAL: 11 %
NEUTROPHILS NFR FLD: 69 %
PATHOLOGY CONSULT NOTE: NORMAL
PATHOLOGY CONSULT NOTE: NORMAL
SIGNIFICANT IND 70042: NORMAL
SIGNIFICANT IND 70042: NORMAL
SITE SITE: NORMAL
SITE SITE: NORMAL
SOURCE SOURCE: NORMAL
SOURCE SOURCE: NORMAL

## 2024-09-10 PROCEDURE — 31645 BRNCHSC W/THER ASPIR 1ST: CPT | Performed by: STUDENT IN AN ORGANIZED HEALTH CARE EDUCATION/TRAINING PROGRAM

## 2024-09-10 PROCEDURE — 87102 FUNGUS ISOLATION CULTURE: CPT

## 2024-09-10 PROCEDURE — 31628 BRONCHOSCOPY/LUNG BX EACH: CPT | Performed by: STUDENT IN AN ORGANIZED HEALTH CARE EDUCATION/TRAINING PROGRAM

## 2024-09-10 PROCEDURE — 71045 X-RAY EXAM CHEST 1 VIEW: CPT

## 2024-09-10 PROCEDURE — 87070 CULTURE OTHR SPECIMN AEROBIC: CPT

## 2024-09-10 PROCEDURE — 700105 HCHG RX REV CODE 258: Performed by: STUDENT IN AN ORGANIZED HEALTH CARE EDUCATION/TRAINING PROGRAM

## 2024-09-10 PROCEDURE — 87015 SPECIMEN INFECT AGNT CONCNTJ: CPT

## 2024-09-10 PROCEDURE — 87281 PNEUMOCYSTIS CARINII AG IF: CPT

## 2024-09-10 PROCEDURE — 87205 SMEAR GRAM STAIN: CPT | Mod: 91

## 2024-09-10 PROCEDURE — 87116 MYCOBACTERIA CULTURE: CPT

## 2024-09-10 PROCEDURE — 87305 ASPERGILLUS AG IA: CPT

## 2024-09-10 PROCEDURE — 160046 HCHG PACU - 1ST 60 MINS PHASE II: Performed by: STUDENT IN AN ORGANIZED HEALTH CARE EDUCATION/TRAINING PROGRAM

## 2024-09-10 PROCEDURE — 160009 HCHG ANES TIME/MIN: Performed by: STUDENT IN AN ORGANIZED HEALTH CARE EDUCATION/TRAINING PROGRAM

## 2024-09-10 PROCEDURE — 89240 UNLISTED MISC PATH TEST: CPT

## 2024-09-10 PROCEDURE — 502714 HCHG ROBOTIC SURGERY SERVICES: Performed by: STUDENT IN AN ORGANIZED HEALTH CARE EDUCATION/TRAINING PROGRAM

## 2024-09-10 PROCEDURE — C1889 IMPLANT/INSERT DEVICE, NOC: HCPCS | Performed by: STUDENT IN AN ORGANIZED HEALTH CARE EDUCATION/TRAINING PROGRAM

## 2024-09-10 PROCEDURE — 87206 SMEAR FLUORESCENT/ACID STAI: CPT

## 2024-09-10 PROCEDURE — 88112 CYTOPATH CELL ENHANCE TECH: CPT

## 2024-09-10 PROCEDURE — 88305 TISSUE EXAM BY PATHOLOGIST: CPT

## 2024-09-10 PROCEDURE — 160025 RECOVERY II MINUTES (STATS): Performed by: STUDENT IN AN ORGANIZED HEALTH CARE EDUCATION/TRAINING PROGRAM

## 2024-09-10 PROCEDURE — 700111 HCHG RX REV CODE 636 W/ 250 OVERRIDE (IP): Mod: JZ | Performed by: STUDENT IN AN ORGANIZED HEALTH CARE EDUCATION/TRAINING PROGRAM

## 2024-09-10 PROCEDURE — 160036 HCHG PACU - EA ADDL 30 MINS PHASE I: Performed by: STUDENT IN AN ORGANIZED HEALTH CARE EDUCATION/TRAINING PROGRAM

## 2024-09-10 PROCEDURE — 700101 HCHG RX REV CODE 250: Performed by: STUDENT IN AN ORGANIZED HEALTH CARE EDUCATION/TRAINING PROGRAM

## 2024-09-10 PROCEDURE — 31624 DX BRONCHOSCOPE/LAVAGE: CPT | Performed by: STUDENT IN AN ORGANIZED HEALTH CARE EDUCATION/TRAINING PROGRAM

## 2024-09-10 PROCEDURE — 160031 HCHG SURGERY MINUTES - 1ST 30 MINS LEVEL 5: Performed by: STUDENT IN AN ORGANIZED HEALTH CARE EDUCATION/TRAINING PROGRAM

## 2024-09-10 PROCEDURE — 160035 HCHG PACU - 1ST 60 MINS PHASE I: Performed by: STUDENT IN AN ORGANIZED HEALTH CARE EDUCATION/TRAINING PROGRAM

## 2024-09-10 PROCEDURE — C1887 CATHETER, GUIDING: HCPCS | Performed by: STUDENT IN AN ORGANIZED HEALTH CARE EDUCATION/TRAINING PROGRAM

## 2024-09-10 PROCEDURE — 160042 HCHG SURGERY MINUTES - EA ADDL 1 MIN LEVEL 5: Performed by: STUDENT IN AN ORGANIZED HEALTH CARE EDUCATION/TRAINING PROGRAM

## 2024-09-10 PROCEDURE — 160002 HCHG RECOVERY MINUTES (STAT): Performed by: STUDENT IN AN ORGANIZED HEALTH CARE EDUCATION/TRAINING PROGRAM

## 2024-09-10 PROCEDURE — 160048 HCHG OR STATISTICAL LEVEL 1-5: Performed by: STUDENT IN AN ORGANIZED HEALTH CARE EDUCATION/TRAINING PROGRAM

## 2024-09-10 PROCEDURE — 88333 PATH CONSLTJ SURG CYTO XM 1: CPT

## 2024-09-10 RX ORDER — HALOPERIDOL 5 MG/ML
1 INJECTION INTRAMUSCULAR
Status: DISCONTINUED | OUTPATIENT
Start: 2024-09-10 | End: 2024-09-10 | Stop reason: HOSPADM

## 2024-09-10 RX ORDER — PHENYLEPHRINE HCL IN 0.9% NACL 1 MG/10 ML
SYRINGE (ML) INTRAVENOUS PRN
Status: DISCONTINUED | OUTPATIENT
Start: 2024-09-10 | End: 2024-09-10 | Stop reason: SURG

## 2024-09-10 RX ORDER — HYDRALAZINE HYDROCHLORIDE 20 MG/ML
5 INJECTION INTRAMUSCULAR; INTRAVENOUS
Status: DISCONTINUED | OUTPATIENT
Start: 2024-09-10 | End: 2024-09-10 | Stop reason: HOSPADM

## 2024-09-10 RX ORDER — LIDOCAINE HYDROCHLORIDE 20 MG/ML
INJECTION, SOLUTION EPIDURAL; INFILTRATION; INTRACAUDAL; PERINEURAL PRN
Status: DISCONTINUED | OUTPATIENT
Start: 2024-09-10 | End: 2024-09-10 | Stop reason: SURG

## 2024-09-10 RX ORDER — ALBUTEROL SULFATE 5 MG/ML
2.5 SOLUTION RESPIRATORY (INHALATION)
Status: DISCONTINUED | OUTPATIENT
Start: 2024-09-10 | End: 2024-09-10 | Stop reason: HOSPADM

## 2024-09-10 RX ORDER — ONDANSETRON 2 MG/ML
INJECTION INTRAMUSCULAR; INTRAVENOUS PRN
Status: DISCONTINUED | OUTPATIENT
Start: 2024-09-10 | End: 2024-09-10 | Stop reason: SURG

## 2024-09-10 RX ORDER — SODIUM CHLORIDE, SODIUM LACTATE, POTASSIUM CHLORIDE, CALCIUM CHLORIDE 600; 310; 30; 20 MG/100ML; MG/100ML; MG/100ML; MG/100ML
INJECTION, SOLUTION INTRAVENOUS CONTINUOUS
Status: DISCONTINUED | OUTPATIENT
Start: 2024-09-10 | End: 2024-09-10 | Stop reason: HOSPADM

## 2024-09-10 RX ORDER — SODIUM CHLORIDE, SODIUM LACTATE, POTASSIUM CHLORIDE, CALCIUM CHLORIDE 600; 310; 30; 20 MG/100ML; MG/100ML; MG/100ML; MG/100ML
INJECTION, SOLUTION INTRAVENOUS CONTINUOUS
Status: ACTIVE | OUTPATIENT
Start: 2024-09-10 | End: 2024-09-10

## 2024-09-10 RX ORDER — EPHEDRINE SULFATE 50 MG/ML
5 INJECTION, SOLUTION INTRAVENOUS
Status: DISCONTINUED | OUTPATIENT
Start: 2024-09-10 | End: 2024-09-10 | Stop reason: HOSPADM

## 2024-09-10 RX ORDER — DIPHENHYDRAMINE HYDROCHLORIDE 50 MG/ML
12.5 INJECTION INTRAMUSCULAR; INTRAVENOUS
Status: DISCONTINUED | OUTPATIENT
Start: 2024-09-10 | End: 2024-09-10 | Stop reason: HOSPADM

## 2024-09-10 RX ORDER — ONDANSETRON 2 MG/ML
4 INJECTION INTRAMUSCULAR; INTRAVENOUS
Status: DISCONTINUED | OUTPATIENT
Start: 2024-09-10 | End: 2024-09-10 | Stop reason: HOSPADM

## 2024-09-10 RX ORDER — ROCURONIUM BROMIDE 10 MG/ML
INJECTION, SOLUTION INTRAVENOUS PRN
Status: DISCONTINUED | OUTPATIENT
Start: 2024-09-10 | End: 2024-09-10 | Stop reason: SURG

## 2024-09-10 RX ORDER — OXYCODONE HCL 5 MG/5 ML
10 SOLUTION, ORAL ORAL
Status: DISCONTINUED | OUTPATIENT
Start: 2024-09-10 | End: 2024-09-10 | Stop reason: HOSPADM

## 2024-09-10 RX ORDER — OXYCODONE HCL 5 MG/5 ML
5 SOLUTION, ORAL ORAL
Status: DISCONTINUED | OUTPATIENT
Start: 2024-09-10 | End: 2024-09-10 | Stop reason: HOSPADM

## 2024-09-10 RX ORDER — DEXAMETHASONE SODIUM PHOSPHATE 4 MG/ML
INJECTION, SOLUTION INTRA-ARTICULAR; INTRALESIONAL; INTRAMUSCULAR; INTRAVENOUS; SOFT TISSUE PRN
Status: DISCONTINUED | OUTPATIENT
Start: 2024-09-10 | End: 2024-09-10 | Stop reason: SURG

## 2024-09-10 RX ORDER — SODIUM CHLORIDE, SODIUM LACTATE, POTASSIUM CHLORIDE, CALCIUM CHLORIDE 600; 310; 30; 20 MG/100ML; MG/100ML; MG/100ML; MG/100ML
INJECTION, SOLUTION INTRAVENOUS
Status: DISCONTINUED | OUTPATIENT
Start: 2024-09-10 | End: 2024-09-10 | Stop reason: SURG

## 2024-09-10 RX ADMIN — Medication 200 MCG: at 10:49

## 2024-09-10 RX ADMIN — SODIUM CHLORIDE, POTASSIUM CHLORIDE, SODIUM LACTATE AND CALCIUM CHLORIDE: 600; 310; 30; 20 INJECTION, SOLUTION INTRAVENOUS at 08:56

## 2024-09-10 RX ADMIN — LIDOCAINE HYDROCHLORIDE 50 MG: 20 INJECTION, SOLUTION EPIDURAL; INFILTRATION; INTRACAUDAL at 10:27

## 2024-09-10 RX ADMIN — PROPOFOL 100 MG: 10 INJECTION, EMULSION INTRAVENOUS at 10:27

## 2024-09-10 RX ADMIN — Medication 100 MCG: at 10:55

## 2024-09-10 RX ADMIN — Medication 150 MCG: at 10:56

## 2024-09-10 RX ADMIN — FENTANYL CITRATE 50 MCG: 50 INJECTION, SOLUTION INTRAMUSCULAR; INTRAVENOUS at 10:27

## 2024-09-10 RX ADMIN — SODIUM CHLORIDE, POTASSIUM CHLORIDE, SODIUM LACTATE AND CALCIUM CHLORIDE: 600; 310; 30; 20 INJECTION, SOLUTION INTRAVENOUS at 10:22

## 2024-09-10 RX ADMIN — ROCURONIUM BROMIDE 40 MG: 50 INJECTION, SOLUTION INTRAVENOUS at 10:27

## 2024-09-10 RX ADMIN — SUGAMMADEX 200 MG: 100 INJECTION, SOLUTION INTRAVENOUS at 11:04

## 2024-09-10 RX ADMIN — DEXAMETHASONE SODIUM PHOSPHATE 4 MG: 4 INJECTION INTRA-ARTICULAR; INTRALESIONAL; INTRAMUSCULAR; INTRAVENOUS; SOFT TISSUE at 10:29

## 2024-09-10 RX ADMIN — Medication 100 MCG: at 11:01

## 2024-09-10 RX ADMIN — ONDANSETRON 4 MG: 2 INJECTION INTRAMUSCULAR; INTRAVENOUS at 11:04

## 2024-09-10 RX ADMIN — LIDOCAINE HYDROCHLORIDE 0.5 ML: 10 INJECTION, SOLUTION EPIDURAL; INFILTRATION; INTRACAUDAL; PERINEURAL at 08:55

## 2024-09-10 ASSESSMENT — ENCOUNTER SYMPTOMS
WEAKNESS: 0
COUGH: 0
CHILLS: 0
EYE REDNESS: 0
FALLS: 0
VOMITING: 0
FEVER: 0
NAUSEA: 0

## 2024-09-10 ASSESSMENT — PAIN SCALES - GENERAL: PAIN_LEVEL: 2

## 2024-09-10 ASSESSMENT — FIBROSIS 4 INDEX: FIB4 SCORE: 1.36

## 2024-09-10 NOTE — ANESTHESIA PROCEDURE NOTES
Airway    Date/Time: 9/10/2024 10:28 AM    Performed by: Nik Gao D.O.  Authorized by: Nik Gao D.O.    Location:  OR  Urgency:  Elective  Difficult Airway: No    Indications for Airway Management:  Anesthesia      Spontaneous Ventilation: absent    Sedation Level:  Deep  Preoxygenated: Yes    Patient Position:  Sniffing  Mask Difficulty Assessment:  2 - vent by mask + OA or adjuvant +/- NMBA  Final Airway Type:  Endotracheal airway  Final Endotracheal Airway:  ETT  Cuffed: Yes    Technique Used for Successful ETT Placement:  Direct laryngoscopy    Insertion Site:  Oral  Blade Type:  Elisa  Laryngoscope Blade/Videolaryngoscope Blade Size:  3  ETT Size (mm):  8.0  Measured from:  Teeth  ETT to Teeth (cm):  21  Placement Verified by: auscultation and capnometry    Cormack-Lehane Classification:  Grade I - full view of glottis  Number of Attempts at Approach:  1

## 2024-09-10 NOTE — DISCHARGE INSTRUCTIONS
Bronchoscopy Discharge Instructions  Home Care Instructions    ACTIVITY: Rest and take it easy for the first 24 hours.  A responsible adult is recommended to remain with you during that time.  It is normal to feel sleepy.  We encourage you to not do anything that requires balance, judgment or coordination    MILD FLU-LIKE SYMPTOMS ARE NORMAL. YOU MAY EXPERIENCE GENERALIZED MUSCLE ACHES, THROAT IRRITATION, HEADACHE AND/OR SOME NAUSEA.    The medicine you had during the bronchoscopy will make you sleepy.    FOR 24 HOURS DO NOT:  Drive, operate machinery or run household appliances.  Drink beer or alcoholic beverages.  Make important decisions or sign legal documents.  Engage in activity that requires sharp judgment and reflexes for 24 hours    SPECIAL INSTRUCTIONS:     Bronchoscopy is a procedure to look inside your windpipe and bronchial tubes.  An anesthetic solution is sprayed in your throat to make it numb.    You may experience a mild sore throat, hoarseness, fever up to 101?F, and /or coughing up small amounts of blood immediately following your bronchoscopy, especially if a biopsy was performed.  The discomfort should subside in 24-48 hours.    Do not smoke for 6-8 hours after the procedure to decrease your risk of coughing and /or bleeding.    Do not drink fluids or eat until your gag reflex returns, for two hours after the bronchoscopy.  Otherwise you will not feel the food or fluid in your throat, and it may go down your windpipe and cause you to choke.    Take ice chips or slowly sip cool fluids to make sure your gag reflex has returned.  Avoid hot fluids from the microwave for several hours.    After 2 hours or when you get home you may take throat lozenges or gargle with salt water if your throat is sore.  Drink liquids to help dryness in your mouth and throat.    Resume your normal activities the following day.    MEDICATIONS: Resume taking daily medication as directed by your doctor.    A follow-up  appointment should be arranged with your doctor in 1 week to get the results of the bronchoscopy and any tests done during the procedure; call to schedule.      You should CALL YOUR PHYSICIAN if you develop:  Fever greater than 101?F  You cough up more than a teaspoon of blood other than blood-tinged mucus  You have increasing amounts of bleeding from coughing after the bronchoscopy  You are wheezing  You develop any unusual signs or symptoms or have any questions                You should call 911 if you develop problems with breathing or chest pain.    If you are unable to contact your doctor or surgical center, you should go to the nearest emergency room or urgent care center.  Physician's telephone #: [phone]     If any questions arise, call your doctor.  If your doctor is not available, please feel free to call the Surgical Center at [phone].  The Center is open Monday through Friday from 7AM to 7PM.  You can also call the [employer] HOTLINE open 24 hours/day, 7 days/week and speak to a nurse at [phone], or toll free at [phone].    You may receive a survey in the mail within the next two weeks and we ask that you take a few moments to complete the survey and return it to us.  Our goal is to provide you with very good care and we value your comments.    DIET: To avoid nausea, slowly advance diet as tolerated, avoiding spicy or greasy foods for the first day.  Add more substantial food to your diet according to your physician's instructions.  INCREASE FLUIDS AND FIBER TO AVOID CONSTIPATION.    FOLLOW-UP APPOINTMENT:  A follow-up appointment should be arranged with your doctor; call to schedule.    You should CALL YOUR PHYSICIAN if you develop:  Fever greater than 101 degrees F.  Pain not relieved by medication, or persistent nausea or vomiting.  Excessive bleeding (blood soaking through dressing) or unexpected drainage from the wound.  Extreme redness or swelling around the incision site,  drainage of pus or foul smelling drainage.  Inability to urinate or empty your bladder within 8 hours.  Problems with breathing or chest pain.    You should call 911 if you develop problems with breathing or chest pain.  If you are unable to contact your doctor or surgical center, you should go to the nearest emergency room or urgent care center.      If any questions arise, call your doctor.  If your doctor is not available, please feel free to call the Surgical Center at [phone].     A registered nurse may call you a few days after your surgery to see how you are doing after your procedure.    MEDICATIONS: Resume taking daily medication.  Take prescribed pain medication with food.  If no medication is prescribed, you may take non-aspirin pain medication if needed.  PAIN MEDICATION CAN BE VERY CONSTIPATING.  Take a stool softener or laxative such as senokot, pericolace, or milk of magnesia if needed.    If your physician has prescribed pain medication that includes Acetaminophen (Tylenol), do not take additional Acetaminophen (Tylenol) while taking the prescribed medication.

## 2024-09-10 NOTE — OR NURSING
1122 Pt arrived s/p bronchoscopy. Pt sleeping, respirations spontaneous, unlabored, OPA removed.     1137 Pt sleeping, occasionally coughing. Pt denies pain or nausea.     1152 Pt more awake, has no complaints. Dr Alonso messaged inquiring about chest x ray order.    1200 Order for chest x ray placed and performed.     1207 No changes.     1215 Pt's  updated.     1222 Pt instructed to DB&C. Pt instructed in use of inspirometer.     1237 RA O2 saturation maintaining at least 90%.    1245 Report to discharge [facility].

## 2024-09-10 NOTE — PROCEDURES
Bronchoscopy Procedure Note     Findings:   RML mass     Specimen:   Biopsy touchprep and core from RML mass  BAL from RML    Location: [facility] Endoscopy Suite     Date of Operation:TD@     Attending Physician Performing Procedure: Marie Alonso MD     Pre-op Diagnosis: lung mass     Post-op Diagnosis: lung mass     Anesthesia: General     Operation:   Diagnostic flexible fiberoptic bronchoscopy, with bronchoalveolar lavage, navigational bronchoscopy with fine needle aspiration and transbronchial biopsies and fluoroscopy and endobronchial linear and radial ultrasound      Estimated Blood Loss: 5ml      Complications: None     Indications and History:     The patient is a 69 y.o. female. The risks, benefits, complications, treatment options and expected outcomes were discussed with the patient. The possibilities of reaction to medication, pulmonary aspiration, perforation of a viscus, bleeding, failure to diagnose a condition and creating a complication requiring transfusion or operation were discussed with the patient who freely signed the consent.     Description of Procedure:     The patient was seen in the Pre-op area and interval H&P was verified. The patient was taken to the endoscopy suite, identified as Latrice Andersen and a Time Out was held and the above information confirmed. Following induction of general anesthesia and intubation, careful inspection of the tracheal lumen was accomplished with the bronchoscope. Breanna was noted to be normal. There were mild secretions bilaterally. The right upper lobe, bronchus intermedius, middle lobe, and lower lobe showed normal segmental and subsegmental anatomy. No endobronchial lesions seen. Left upper lobe proper, lingual, and lower lobe areas on the left side were similarly normal in their segmental and subsegmental anatomy with no endobronchial lesions seen.     Robotic navigation bronchoscopy was then performed with [employer] platform.   "Partial registration was used.  Ion robotic catheter was used to engage the medial segment of right middle lobe lung.  Target lesion is about 2.5 cm in diameter.   Under navigational guidance the ion robotic catheter was advanced to 0.5 cm away from the planned target. Fluoroscopy was used to confirm catheter positioning.    Radial EBUS was performed to confirm that the location of the nodule is eccentric. Poor REBUS signal     Transbronchial biopsy was performed with alligator forceps through the extended working channel catheter.  Total 12 samples were collected.  Samples sent for pathology    Bronchial alveolar lavage was performed the extended working channel catheter.  Instilled 10 cc of NS, suction returned with 6 cc of NS.  Samples sent for cultures, cell count, pathology.    FCO findings:   \"FCO A TP1: Mixed inflammation and numerous macrophages  FCO A TP2: Scattered inflammation and blood. AN 9/10/24\"        The airway was subsequently cleared and hemostasis was ensured. A post-procedural CXR confirmed no pneumothorax. The patient was subsequently taken to the PACU in satisfactory condition.         Marie Alonso MD  Pulmonary and Critical Care Medicine  [facility]      "

## 2024-09-10 NOTE — ANESTHESIA POSTPROCEDURE EVALUATION
Patient: Latrice Andersen    Procedure Summary       Date: 09/10/24 Room / Location:  PROCEDURE ROOM / SURGERY [facility]    Anesthesia Start: 1022 Anesthesia Stop: 1125    Procedures:       BRONCHOSCOPY, ROBOT-ASSISTED - FIBER OPTIC BRONCHOSCOPY WITH  WASH, BRUSH, BRONCHOALVEOLAR LAVAGE, BIOPSY AND FINE NEEDLE ASPIRATION, ENDOBRONCHIAL ULTRASOUND AND NAVIGATION, ROBOTICS      ENDOBRONCHIAL ULTRASOUND (EBUS) Diagnosis:       Lung mass      (lung mass)    Surgeons: Marie Alonso M.D. Responsible Provider: Nik Gao D.O.    Anesthesia Type: general ASA Status: 2            Final Anesthesia Type: general  Last vitals  BP   Blood Pressure : (!) 150/67    Temp   35.9 °C (96.6 °F)    Pulse   83   Resp   18    SpO2   93 %      Anesthesia Post Evaluation    Patient location during evaluation: PACU  Patient participation: complete - patient participated  Level of consciousness: awake and alert  Pain score: 2    Airway patency: patent  Anesthetic complications: no  Cardiovascular status: hemodynamically stable  Respiratory status: acceptable  Hydration status: euvolemic    PONV: none          No notable events documented.     Nurse Pain Score: 0 (NPRS)

## 2024-09-10 NOTE — ANESTHESIA PREPROCEDURE EVALUATION
Case: [id number] Date/Time: 09/10/24 0945    Procedures:       BRONCHOSCOPY, ROBOT-ASSISTED - FIBER OPTIC BRONCHOSCOPY WITH POSSIBLE WASH, BRUSH, BRONCHOALVEOLAR LAVAGE, BIOPSY AND FINE NEEDLE ASPIRATION, ENDOBRONCHIAL ULTRASOUND AND NAVIGATION, ROBOTICS      ENDOBRONCHIAL ULTRASOUND (EBUS)    Location:  PROCEDURE ROOM / SURGERY [facility]    Surgeons: Marie Alonso M.D.            Relevant Problems   CARDIAC   (positive) Atherosclerosis of aorta (HCC)   (positive) Essential hypertension         (positive) Stage 3a chronic kidney disease      ENDO   (positive) Hypothyroidism due to medication       Physical Exam    Airway   Mallampati: II  TM distance: >3 FB  Neck ROM: full       Cardiovascular - normal exam  Rhythm: regular  Rate: normal  (-) murmur     Dental - normal exam           Pulmonary - normal exam  Breath sounds clear to auscultation     Abdominal    Neurological - normal exam                   Anesthesia Plan    ASA 2       Plan - general       Airway plan will be ETT          Induction: intravenous    Postoperative Plan: Postoperative administration of opioids is intended.    Pertinent diagnostic labs and testing reviewed    Informed Consent:    Anesthetic plan and risks discussed with patient.    Use of blood products discussed with: patient whom consented to blood products.

## 2024-09-10 NOTE — ANESTHESIA TIME REPORT
Anesthesia Start and Stop Event Times       Date Time Event    9/10/2024 0856 Ready for Procedure     1022 Anesthesia Start     1125 Anesthesia Stop          Responsible Staff  09/10/24      Name Role Begin End    Nik Gao D.O. Anesth 1022 1125          Overtime Reason:  no overtime (within assigned shift)    Comments:

## 2024-09-10 NOTE — FLOWSHEET NOTE
09/10/24 1035   Bronchoscopy Procedure   Bronchoscopy Procedure Yes   Order placed in Epic? Yes   Start Time 1035   End Time 1106   Performing Physician Dr Alonso   Procedure Monitoring Tech Time Bronchoscopy (60 Min)

## 2024-09-10 NOTE — OR NURSING
0812 PT TO PRE OP TO ASSUME CARE.    0921 Patient allergies and NPO status verified, home medication reconciliation completed and belongings secured. Patient verbalizes understanding of pain scale, expected course of stay and plan of care. Surgical site verified with patient. IV access established.

## 2024-09-10 NOTE — H&P
"Critical Care History & Physical Note    Date of Service  9/10/2024    Primary Care Physician  Beata White P.A.-C.    Consultants  None     Code Status  Prior    Chief Complaint  No chief complaint on file.      History of Presenting Illness  \"Latrice Andersen is a very pleasant 68 y.o. female who returns to the pulmonary clinic for follow-up of bronchoscopy results.  She has a distant history of breast cancer and a new diagnosis of non-small cell lung cancer of the left lung for which she underwent went neoadjuvant chemotherapy and definitive lobectomy in 1/2024.  Nodes were negative.  Pathologic stage [id number].  She was under radiographic surveillance, repeat CT chest in 5/2024 demonstrated a new 11 mm ill-defined right middle lobe nodule for which she underwent a bronchoscopy in 6/2024.  Procedure was technically challenging due to suboptimal EBUS and fluoroscopy image.  Biopsy results demonstrated benign respiratory mucosa.  No alveolar or lesional tissue noted.  BAL results no growth today.     Since her procedure she has been symptomatically doing well.  She denies any respiratory symptoms.  No cough or weight loss.  She has recovered well from her lobectomy and is experiencing no shortness of breath.\" Dr. Berrios 7/23/24    Her biopsy was nondiagnostic and case was discussed in tumor board with plan for repeat bronch with biopsy.        Review of Systems  Review of Systems   Constitutional:  Positive for malaise/fatigue. Negative for chills and fever.   Eyes:  Negative for redness.   Respiratory:  Negative for cough.    Cardiovascular:  Negative for chest pain.   Gastrointestinal:  Negative for nausea and vomiting.   Musculoskeletal:  Negative for falls.   Neurological:  Negative for weakness.       Past Medical History   has a past medical history of Allergy, unspecified not elsewhere classified, Anxiety disorder (04/29/2014), Bowel habit changes (August 2023), Breast cancer (HCC) (1995), breast cancer " 1995, Cancer (HCC) (June 2023), Chickenpox, Dental disorder, Former smoker (05/11/2023), High cholesterol, History of breast cancer (02/06/2017), History of radiation therapy (05/11/2023), Hyperlipidemia, Hypertension, Left upper lobe pulmonary nodule (05/11/2023), Renal disorder, and Tonsillitis.    Surgical History   has a past surgical history that includes mastectomy; pr radiation therapy plan simple; pr chemotherapy, unspecified procedure; pr bronchoscopy,diagnostic (N/A, 06/12/2023); endobronchial us add-on (N/A, 06/12/2023); other (1986); pr sigmoidoscopy,diagnostic (12/15/2023); gyn surgery; no pertinent past surgical history (1995); pr thoracoscopy,dx no bx (Left, 1/30/2024); mass excision general (Left, 1/30/2024); lobectomy (Left, 1/30/2024); node dissection (Left, 1/30/2024); and pr bronchoscopy,diagnostic (N/A, 6/6/2024).     Family History  family history includes Alzheimer's Disease in her maternal aunt; Breast Cancer in her mother; Cancer in her maternal uncle, mother, and paternal grandfather; Heart Disease in her maternal grandmother; Hyperlipidemia in her maternal grandmother; Hypertension in her maternal grandmother and maternal uncle; Kidney Disease in her maternal aunt; Lung Cancer in her maternal grandfather.       Social History   reports that she quit smoking about 2 years ago. Her smoking use included cigarettes. She started smoking about 47 years ago. She has a 11.3 pack-year smoking history. She has been exposed to tobacco smoke. She quit smokeless tobacco use about 2 years ago. She reports that she does not drink alcohol and does not use drugs.    Allergies  Allergies   Allergen Reactions    Codeine Unspecified     Memory issues       Medications  Prior to Admission Medications   Prescriptions Last Dose Informant Patient Reported? Taking?   Multiple Vitamins-Minerals (HAIR SKIN & NAILS ADVANCED PO) 9/2/2024  Yes No   Sig: Take  by mouth every day. MEDICATION INSTRUCTIONS FOR  SURGERY/PROCEDURE SCHEDULED FOR 9/9/24   DO NOT TAKE 7 DAYS PRIOR TO SURGERY   VITAMIN D PO 9/2/2024 Patient Yes No   Sig: Take 5,000 Units by mouth every day. DO NOT TAKE 7 DAYS PRIOR TO SURGERY   acetaminophen (TYLENOL) 325 MG Tab 9/9/2024 at 0700  Yes Yes   Sig: Take 650 mg by mouth every four hours as needed. CAN TAKE ON DOS IF NEEDED   fenofibrate micronized (LOFIBRA) 134 MG capsule 9/10/2024 at 0700  No Yes   Sig: TAKE ONE CAPSULE BY MOUTH ONE TIME DAILY   Patient taking differently: Take 134 mg by mouth every day. HOLD 24 HOURS PRIOR TO SURGERY   levothyroxine (SYNTHROID) 50 MCG Tab 9/10/2024 at 0700  No Yes   Sig: Take 1 Tablet by mouth every morning on an empty stomach. Please make an appointment for any future refills.   Patient taking differently: Take 50 mcg by mouth every morning on an empty stomach. MEDICATION INSTRUCTIONS FOR SURGERY/PROCEDURE SCHEDULED FOR 9/9/24   OK TO CONTINUE TAKING PRIOR TO SURGERY AND DAY OF SURGERY   lisinopril (PRINIVIL) 10 MG Tab 9/9/2024 at 0700  No Yes   Sig: Take 1 Tablet by mouth every day.   Patient taking differently: Take 10 mg by mouth every day. DO NOT TAKE 24 HOURS PRIOR TO SURGERY   metoprolol SR (TOPROL XL) 25 MG TABLET SR 24 HR 9/10/2024 at 0700 Patient Yes Yes   Sig: Take 25 mg by mouth every day. CONTINUE TAKING PRIOR TO SURGERY AND DAY OF SURGERY   multivitamin Tab 9/2/2024  Yes No   Sig: Take 1 Tablet by mouth every day. MEDICATION INSTRUCTIONS FOR SURGERY/PROCEDURE SCHEDULED FOR 9/9/24   DO NOT TAKE 7 DAYS PRIOR TO SURGERY   pravastatin (PRAVACHOL) 40 MG tablet 9/10/2024 at 0700 Patient No Yes   Sig: Take 1 Tablet by mouth every day.   Patient taking differently: Take 40 mg by mouth every day. CONTINUE TAKING PRIOR TO SURGERY AND DAY OF SURGERY   venlafaxine (EFFEXOR) 75 MG Tab 9/10/2024 at 0700  No Yes   Sig: TAKE ONE TABLET BY MOUTH ONE TIME DAILY   Patient taking differently: Take 75 mg by mouth every day. CONTINUE TAKING PRIOR TO SURGERY AND DAY OF  "SURGERY      Facility-Administered Medications: None       Physical Exam  Temp:  [35.9 °C (96.6 °F)] 35.9 °C (96.6 °F)  Pulse:  [83-92] 83  Resp:  [16-18] 18  BP: (150-162)/(67-79) 150/67  SpO2:  [93 %-96 %] 93 %  Blood Pressure : (!) 150/67   Temperature: 35.9 °C (96.6 °F)   Pulse: 83   Respiration: 18   Pulse Oximetry: 93 %       Physical Exam  Vitals and nursing note reviewed.   Constitutional:       General: She is not in acute distress.  HENT:      Head: Normocephalic.      Mouth/Throat:      Mouth: Mucous membranes are moist.   Eyes:      Conjunctiva/sclera: Conjunctivae normal.   Cardiovascular:      Rate and Rhythm: Normal rate and regular rhythm.   Pulmonary:      Effort: Pulmonary effort is normal. No respiratory distress.   Abdominal:      Palpations: Abdomen is soft.   Musculoskeletal:         General: No deformity.   Skin:     General: Skin is warm and dry.   Neurological:      Mental Status: She is alert. Mental status is at baseline.   Psychiatric:         Mood and Affect: Mood normal.       Laboratory:          No results for input(s): \"ALTSGPT\", \"ASTSGOT\", \"ALKPHOSPHAT\", \"TBILIRUBIN\", \"DBILIRUBIN\", \"GAMMAGT\", \"AMYLASE\", \"LIPASE\", \"ALB\", \"PREALBUMIN\", \"GLUCOSE\" in the last 72 hours.      No results for input(s): \"NTPROBNP\" in the last 72 hours.      No results for input(s): \"TROPONINT\" in the last 72 hours.    Imaging:  No orders to display       Assessment/Plan:    Lung nodule    - plan for Ion navigational bronch with biopsy  - f/u path results      Marie Alonso MD  Pulmonary and Critical Care Medicine  [facility]    "

## 2024-09-10 NOTE — OR NURSING
1250 Pt arrived from PACU post   BRONCHOSCOPY, ROBOT-ASSISTED - FIBER OPTIC BRONCHOSCOPY WITH  WASH, BRUSH, BRONCHOALVEOLAR LAVAGE, BIOPSY AND FINE NEEDLE ASPIRATION, ENDOBRONCHIAL ULTRASOUND AND NAVIGATION, ROBOTICS N/A General   ENDOBRONCHIAL ULTRASOUND (EBUS)      , report received. Pt states pain is tollerable and denies nausea at this time. Pt dressing @ BS with assistance.    1340 Discharge instructions and medications gone over with Pt and ride. All questions answered. Pt meets DC criteria. PIV DC'd. Pt transported to POV via WC in stable condition.

## 2024-09-11 ENCOUNTER — HOSPITAL ENCOUNTER (OUTPATIENT)
Dept: LAB | Facility: MEDICAL CENTER | Age: 69
End: 2024-09-11
Attending: STUDENT IN AN ORGANIZED HEALTH CARE EDUCATION/TRAINING PROGRAM
Payer: MEDICARE

## 2024-09-11 DIAGNOSIS — E03.2 HYPOTHYROIDISM DUE TO MEDICATION: ICD-10-CM

## 2024-09-11 DIAGNOSIS — E83.52 HYPERCALCEMIA: ICD-10-CM

## 2024-09-11 DIAGNOSIS — N18.31 STAGE 3A CHRONIC KIDNEY DISEASE: ICD-10-CM

## 2024-09-11 LAB
ALBUMIN SERPL BCP-MCNC: 3.7 G/DL (ref 3.2–4.9)
ALBUMIN/GLOB SERPL: 1.3 G/DL
ALP SERPL-CCNC: 64 U/L (ref 30–99)
ALT SERPL-CCNC: 18 U/L (ref 2–50)
ANION GAP SERPL CALC-SCNC: 12 MMOL/L (ref 7–16)
AST SERPL-CCNC: 22 U/L (ref 12–45)
BILIRUB SERPL-MCNC: <0.2 MG/DL (ref 0.1–1.5)
BUN SERPL-MCNC: 36 MG/DL (ref 8–22)
CALCIUM ALBUM COR SERPL-MCNC: 10.1 MG/DL (ref 8.5–10.5)
CALCIUM SERPL-MCNC: 9.9 MG/DL (ref 8.5–10.5)
CHLORIDE SERPL-SCNC: 109 MMOL/L (ref 96–112)
CO2 SERPL-SCNC: 22 MMOL/L (ref 20–33)
CREAT SERPL-MCNC: 1.22 MG/DL (ref 0.5–1.4)
CREAT UR-MCNC: 64.91 MG/DL
GFR SERPLBLD CREATININE-BSD FMLA CKD-EPI: 48 ML/MIN/1.73 M 2
GLOBULIN SER CALC-MCNC: 2.8 G/DL (ref 1.9–3.5)
GLUCOSE SERPL-MCNC: 80 MG/DL (ref 65–99)
MICROALBUMIN UR-MCNC: 1.3 MG/DL
MICROALBUMIN/CREAT UR: 20 MG/G (ref 0–30)
MYCOBACTERIUM SPEC CULT: NORMAL
POTASSIUM SERPL-SCNC: 4.2 MMOL/L (ref 3.6–5.5)
PROT SERPL-MCNC: 6.5 G/DL (ref 6–8.2)
RHODAMINE-AURAMINE STN SPEC: NORMAL
RHODAMINE-AURAMINE STN SPEC: NORMAL
SIGNIFICANT IND 70042: NORMAL
SIGNIFICANT IND 70042: NORMAL
SITE SITE: NORMAL
SITE SITE: NORMAL
SODIUM SERPL-SCNC: 143 MMOL/L (ref 135–145)
SOURCE SOURCE: NORMAL
SOURCE SOURCE: NORMAL
TSH SERPL DL<=0.005 MIU/L-ACNC: 1 UIU/ML (ref 0.38–5.33)

## 2024-09-11 PROCEDURE — 84443 ASSAY THYROID STIM HORMONE: CPT

## 2024-09-11 PROCEDURE — 80053 COMPREHEN METABOLIC PANEL: CPT

## 2024-09-11 PROCEDURE — 82043 UR ALBUMIN QUANTITATIVE: CPT

## 2024-09-11 PROCEDURE — 36415 COLL VENOUS BLD VENIPUNCTURE: CPT

## 2024-09-11 PROCEDURE — 82570 ASSAY OF URINE CREATININE: CPT

## 2024-09-12 ENCOUNTER — PATIENT MESSAGE (OUTPATIENT)
Dept: SLEEP MEDICINE | Facility: MEDICAL CENTER | Age: 69
End: 2024-09-12
Payer: MEDICARE

## 2024-09-12 LAB
BACTERIA SPEC RESP CULT: NORMAL
GRAM STN SPEC: NORMAL
SIGNIFICANT IND 70042: NORMAL
SITE SITE: NORMAL
SOURCE SOURCE: NORMAL
TEST NAME 95000: NORMAL

## 2024-09-18 ENCOUNTER — TELEPHONE (OUTPATIENT)
Dept: HEMATOLOGY ONCOLOGY | Facility: MEDICAL CENTER | Age: 69
End: 2024-09-18
Payer: MEDICARE

## 2024-09-19 DIAGNOSIS — R91.1 LUNG NODULE: ICD-10-CM

## 2024-09-19 LAB
GALACTOMANNAN AG BAL QL: POSITIVE
GALACTOMANNAN AG SERPL-ACNC: 0.86

## 2024-09-19 NOTE — PROGRESS NOTES
Path was non-diagnostic on my repeat bronch w/biopsy (last biopsied 6/2024 that was non-diagnostic and this nodule grew on imaging).   Discussed case in tumor board and they recommended CT guided biopsy of that more peripheral portion of the tumor (can see PET for most PET avid region). Also discussed that if CT biopsy path is also unremarkable, then can repeat imaging and consider wedge resection based on imaging.     Discussed above recommendation with patient and ordered CT guided biopsy with IR. Patient understands and is agreeable with plan.       Marie Alonso MD  Pulmonary and Critical Care Medicine  On license of [facility]

## 2024-09-24 ENCOUNTER — HOSPITAL ENCOUNTER (OUTPATIENT)
Dept: HEMATOLOGY ONCOLOGY | Facility: MEDICAL CENTER | Age: 69
End: 2024-09-24
Attending: INTERNAL MEDICINE
Payer: MEDICARE

## 2024-09-24 VITALS
OXYGEN SATURATION: 96 % | BODY MASS INDEX: 23.04 KG/M2 | HEIGHT: 63 IN | WEIGHT: 130 LBS | HEART RATE: 83 BPM | DIASTOLIC BLOOD PRESSURE: 58 MMHG | SYSTOLIC BLOOD PRESSURE: 102 MMHG | TEMPERATURE: 98.7 F

## 2024-09-24 DIAGNOSIS — C34.92 NSCLC OF LEFT LUNG (HCC): ICD-10-CM

## 2024-09-24 PROCEDURE — 99212 OFFICE O/P EST SF 10 MIN: CPT | Performed by: INTERNAL MEDICINE

## 2024-09-24 PROCEDURE — 99214 OFFICE O/P EST MOD 30 MIN: CPT | Performed by: INTERNAL MEDICINE

## 2024-09-24 ASSESSMENT — ENCOUNTER SYMPTOMS
SPUTUM PRODUCTION: 0
HEARTBURN: 0
SENSORY CHANGE: 0
TINGLING: 0
FOCAL WEAKNESS: 0
WHEEZING: 0
BRUISES/BLEEDS EASILY: 0
NECK PAIN: 0
ORTHOPNEA: 0
DIZZINESS: 0
PALPITATIONS: 0
SHORTNESS OF BREATH: 0
BLURRED VISION: 0
HEADACHES: 0
FEVER: 0
COUGH: 0
VOMITING: 0
WEIGHT LOSS: 0
MEMORY LOSS: 0
NAUSEA: 0
DEPRESSION: 0
ABDOMINAL PAIN: 0
TREMORS: 0
SORE THROAT: 0
CHILLS: 0

## 2024-09-24 ASSESSMENT — PAIN SCALES - GENERAL: PAINLEVEL: NO PAIN

## 2024-09-24 ASSESSMENT — FIBROSIS 4 INDEX: FIB4 SCORE: 0.97

## 2024-09-24 NOTE — PROGRESS NOTES
Consult:  Hematology/Oncology      Referring Physician: See chart  Primary Care:  Beata White P.A.-C.    Diagnosis: Lung cancer    Chief Complaint:  Lung Cancer (Transfer of care Dr. Campbell / Follow up )      History of Presenting Illness:  Latrice Andersen is a 69 y.o. female who on June 6, 2023 had an abnormal PET scan revealed a left upper lobe mass consistent with malignancy.  June 12, 2023 patient had a left upper lobe biopsy positive for malignancy for pulmonary adenocarcinoma.  Patient then received neoadjuvant chemo immunotherapy.  Had difficulties with colitis secondary to immunotherapy.  Ultimately had a resection performed and is now currently receiving surveillance imaging.  She had no further immunotherapy post resection as a result of her complications presurgery.  Recently she had an abnormal area on scanning and has had bronchoscopy with negative biopsies.  She is currently scheduled to have additional CT-guided biopsy and potential resection by thoracic surgery.      Past Medical History:   Diagnosis Date    Allergy, unspecified not elsewhere classified     Anxiety disorder 04/29/2014    Bowel habit changes August 2023    Diarrhea bad    Breast cancer (HCC) 1995    breast cancer 1995     left mastectomy with lymph removal, chemo radiation    Cancer (HCC) June 2023    lung ca, partial left lobe removed chemo/immune tx last tx 9/23    Chickenpox     Dental disorder     upper partial    Former smoker 05/11/2023    High cholesterol     History of breast cancer 02/06/2017    History of radiation therapy 05/11/2023    To left chest for breast cancer     Hyperlipidemia     Hypertension     Left upper lobe pulmonary nodule 05/11/2023    Renal disorder     CKD    Tonsillitis        Past Surgical History:   Procedure Laterality Date    MO BRONCHOSCOPY,DIAGNOSTIC N/A 9/10/2024    Procedure: BRONCHOSCOPY, ROBOT-ASSISTED - FIBER OPTIC BRONCHOSCOPY WITH  WASH, BRUSH, BRONCHOALVEOLAR LAVAGE, BIOPSY AND  FINE NEEDLE ASPIRATION, ENDOBRONCHIAL ULTRASOUND AND NAVIGATION, ROBOTICS;  Surgeon: Marie Alonso M.D.;  Location: [facility];  Service: Pulmonary Robotic    ENDOBRONCHIAL US ADD-ON N/A 9/10/2024    Procedure: ENDOBRONCHIAL ULTRASOUND (EBUS);  Surgeon: Marie Alonso M.D.;  Location: [facility];  Service: Pulmonary Robotic    KY BRONCHOSCOPY,DIAGNOSTIC N/A 6/6/2024    Procedure: FIBER OPTIC BRONCHOSCOPY WITH BRONCHOALVEOLAR LAVAGE, BIOPSY AND FINE NEEDLE ASPIRATION AND NAVIGATION, ROBOTICS;  Surgeon: Nickolas Berrios M.D.;  Location: [facility];  Service: Pulmonary Robotic    KY THORACOSCOPY,DX NO BX Left 1/30/2024    Procedure: THORACOSCOPY;  Surgeon: John H Ganser, M.D.;  Location: [facility];  Service: General    MASS EXCISION GENERAL Left 1/30/2024    Procedure: EXCISION, MASS, INTERNAL MAMMARY;  Surgeon: John H Ganser, M.D.;  Location: [facility];  Service: General    LOBECTOMY Left 1/30/2024    Procedure: LOBECTOMY, UPPER;  Surgeon: John H Ganser, M.D.;  Location: [facility];  Service: General    NODE DISSECTION Left 1/30/2024    Procedure: LYMPH NODE DISSECTION;  Surgeon: John H Ganser, M.D.;  Location: [facility];  Service: General    KY SIGMOIDOSCOPY,DIAGNOSTIC  12/15/2023    Procedure: FLEXIBLE SIGMOIDOSCOPY;  Surgeon: Francois Rollins M.D.;  Location: [facility];  Service: Gastroenterology    KY BRONCHOSCOPY,DIAGNOSTIC N/A 06/12/2023    Procedure: FIBER OPTIC BRONCHOSCOPY WITH  WASH, BRUSH, BRONCHOALVEOLAR LAVAGE, BIOPSY AND FINE NEEDLE ASPIRATION, ENDOBRONCHIAL ULTRASOUND & NAVIGATION, ROBOTICS;  Surgeon: Brooke Perrin M.D.;  Location: [facility];  Service: Pulmonary Robotic    ENDOBRONCHIAL US ADD-ON N/A 06/12/2023    Procedure: ENDOBRONCHIAL ULTRASOUND (EBUS);  Surgeon: Brooke Perrin M.D.;  Location: [facility];  Service: Pulmonary Robotic    GYN SURGERY       Tubiligation    MASTECTOMY      Left    NO PERTINENT PAST SURGICAL HISTORY      Breast Cancer (Left Mastectomy)    OTHER  1986    Tubaligation    KY CHEMOTHERAPY, UNSPECIFIED PROCEDURE      KY RADIATION THERAPY PLAN SIMPLE         Social History     Tobacco Use    Smoking status: Former     Current packs/day: 0.00     Average packs/day: 0.3 packs/day for 45.0 years (11.3 ttl pk-yrs)     Types: Cigarettes     Start date: 1977     Quit date: 2022     Years since quittin.2     Passive exposure: Current (Spouse)    Smokeless tobacco: Former     Quit date: 2022    Tobacco comments:     5 daily    Vaping Use    Vaping status: Never Used   Substance Use Topics    Alcohol use: No    Drug use: No        Family History   Problem Relation Age of Onset    Cancer Mother         breast cancer/Breast    Breast Cancer Mother     Hypertension Maternal Uncle     Cancer Maternal Uncle     Hypertension Maternal Grandmother     Hyperlipidemia Maternal Grandmother     Heart Disease Maternal Grandmother     Cancer Paternal Grandfather         Lung ca, smoker    Lung Cancer Maternal Grandfather     Alzheimer's Disease Maternal Aunt     Kidney Disease Maternal Aunt     Diabetes Neg Hx        Allergies as of 2024 - Reviewed 2024   Allergen Reaction Noted    Codeine Unspecified 2016         Current Outpatient Medications:     multivitamin Tab, Take 1 Tablet by mouth every day. MEDICATION INSTRUCTIONS FOR SURGERY/PROCEDURE SCHEDULED FOR 24  DO NOT TAKE 7 DAYS PRIOR TO SURGERY, Disp: , Rfl:     Multiple Vitamins-Minerals (HAIR SKIN & NAILS ADVANCED PO), Take  by mouth every day. MEDICATION INSTRUCTIONS FOR SURGERY/PROCEDURE SCHEDULED FOR 24  DO NOT TAKE 7 DAYS PRIOR TO SURGERY, Disp: , Rfl:     levothyroxine (SYNTHROID) 50 MCG Tab, Take 1 Tablet by mouth every morning on an empty stomach. Please make an appointment for any future refills. (Patient taking differently: Take 50 mcg by mouth every  morning on an empty stomach. MEDICATION INSTRUCTIONS FOR SURGERY/PROCEDURE SCHEDULED FOR 9/9/24  OK TO CONTINUE TAKING PRIOR TO SURGERY AND DAY OF SURGERY), Disp: 90 Tablet, Rfl: 0    fenofibrate micronized (LOFIBRA) 134 MG capsule, TAKE ONE CAPSULE BY MOUTH ONE TIME DAILY (Patient taking differently: Take 134 mg by mouth every day. HOLD 24 HOURS PRIOR TO SURGERY), Disp: 100 Capsule, Rfl: 1    acetaminophen (TYLENOL) 325 MG Tab, Take 650 mg by mouth every four hours as needed. CAN TAKE ON DOS IF NEEDED, Disp: , Rfl:     venlafaxine (EFFEXOR) 75 MG Tab, TAKE ONE TABLET BY MOUTH ONE TIME DAILY (Patient taking differently: Take 75 mg by mouth every day. CONTINUE TAKING PRIOR TO SURGERY AND DAY OF SURGERY), Disp: 90 Tablet, Rfl: 1    lisinopril (PRINIVIL) 10 MG Tab, Take 1 Tablet by mouth every day. (Patient taking differently: Take 10 mg by mouth every day. DO NOT TAKE 24 HOURS PRIOR TO SURGERY), Disp: 100 Tablet, Rfl: 3    VITAMIN D PO, Take 5,000 Units by mouth every day. DO NOT TAKE 7 DAYS PRIOR TO SURGERY, Disp: , Rfl:     metoprolol SR (TOPROL XL) 25 MG TABLET SR 24 HR, Take 25 mg by mouth every day. CONTINUE TAKING PRIOR TO SURGERY AND DAY OF SURGERY, Disp: , Rfl:     pravastatin (PRAVACHOL) 40 MG tablet, Take 1 Tablet by mouth every day. (Patient taking differently: Take 40 mg by mouth every day. CONTINUE TAKING PRIOR TO SURGERY AND DAY OF SURGERY), Disp: 100 Tablet, Rfl: 3    Review of Systems:  Review of Systems   Constitutional:  Negative for chills, fever, malaise/fatigue and weight loss.   HENT:  Negative for congestion, ear pain, nosebleeds and sore throat.    Eyes:  Negative for blurred vision.   Respiratory:  Negative for cough, sputum production, shortness of breath and wheezing.    Cardiovascular:  Negative for chest pain, palpitations, orthopnea and leg swelling.   Gastrointestinal:  Negative for abdominal pain, heartburn, nausea and vomiting.   Genitourinary:  Negative for dysuria, frequency and  "urgency.   Musculoskeletal:  Negative for neck pain.   Neurological:  Negative for dizziness, tingling, tremors, sensory change, focal weakness and headaches.   Endo/Heme/Allergies:  Does not bruise/bleed easily.   Psychiatric/Behavioral:  Negative for depression, memory loss and suicidal ideas.    All other systems reviewed and are negative.         Physical Exam:  Vitals:    09/24/24 1251   BP: 102/58   BP Location: Left arm   Patient Position: Sitting   BP Cuff Size: Adult   Pulse: 83   Temp: 37.1 °C (98.7 °F)   TempSrc: Temporal   SpO2: 96%   Weight: 59 kg (130 lb)   Height: 1.6 m (5' 2.99\")       DESC; KARNOFSKY SCALE WITH ECOG EQUIVALENT: 100, Fully active, able to carry on all pre-disease performed without restriction (ECOG equivalent 0)    DISTRESS LEVEL: no apparent distress    Physical Exam  Constitutional:       General: She is not in acute distress.  HENT:      Head: Normocephalic.   Eyes:      Conjunctiva/sclera: Conjunctivae normal.   Cardiovascular:      Rate and Rhythm: Normal rate and regular rhythm.      Heart sounds: Normal heart sounds.   Pulmonary:      Effort: Pulmonary effort is normal.      Breath sounds: Normal breath sounds.   Abdominal:      General: Bowel sounds are normal.      Palpations: Abdomen is soft.   Musculoskeletal:         General: Normal range of motion.   Lymphadenopathy:      Cervical: No cervical adenopathy.   Skin:     General: Skin is dry.   Neurological:      General: No focal deficit present.      Mental Status: She is oriented to person, place, and time.   Psychiatric:         Thought Content: Thought content normal.          Depression Screening    Little interest or pleasure in doing things?      Feeling down, depressed , or hopeless?     Trouble falling or staying asleep, or sleeping too much?      Feeling tired or having little energy?      Poor appetite or overeating?      Feeling bad about yourself - or that you are a failure or have let yourself or your family " down?     Trouble concentrating on things, such as reading the newspaper or watching television?     Moving or speaking so slowly that other people could have noticed.  Or the opposite - being so fidgety or restless that you have been moving around a lot more than usual?      Thoughts that you would be better off dead, or of hurting yourself?      Patient Health Questionnaire Score:       If depressive symptoms identified deferred to follow up visit unless specifically addressed in assesment and plan.    Labs:  Hospital Outpatient Visit on 09/11/2024   Component Date Value Ref Range Status    Sodium 09/11/2024 143  135 - 145 mmol/L Final    Potassium 09/11/2024 4.2  3.6 - 5.5 mmol/L Final    Chloride 09/11/2024 109  96 - 112 mmol/L Final    Co2 09/11/2024 22  20 - 33 mmol/L Final    Anion Gap 09/11/2024 12.0  7.0 - 16.0 Final    Glucose 09/11/2024 80  65 - 99 mg/dL Final    Bun 09/11/2024 36 (H)  8 - 22 mg/dL Final    Creatinine 09/11/2024 1.22  0.50 - 1.40 mg/dL Final    Calcium 09/11/2024 9.9  8.5 - 10.5 mg/dL Final    Correct Calcium 09/11/2024 10.1  8.5 - 10.5 mg/dL Final    AST(SGOT) 09/11/2024 22  12 - 45 U/L Final    ALT(SGPT) 09/11/2024 18  2 - 50 U/L Final    Alkaline Phosphatase 09/11/2024 64  30 - 99 U/L Final    Total Bilirubin 09/11/2024 <0.2  0.1 - 1.5 mg/dL Final    Albumin 09/11/2024 3.7  3.2 - 4.9 g/dL Final    Total Protein 09/11/2024 6.5  6.0 - 8.2 g/dL Final    Globulin 09/11/2024 2.8  1.9 - 3.5 g/dL Final    A-G Ratio 09/11/2024 1.3  g/dL Final    TSH 09/11/2024 0.998  0.380 - 5.330 uIU/mL Final    Comment: The 2011 American Thyroid Association (ALISSA) guidelines  recommended that the interpretation of thyroid function in  pregnancy be based on trimester specific reference ranges.    1st Trimester  0.100-2.500 mIU/L  2nd Trimester  0.200-3.000 mIU/L  3rd Trimester  0.300-3.500 mIU/L    These established reference ranges have not been validated  at [facility].      Creatinine, Urine  09/11/2024 64.91  mg/dL Final    Microalbumin, Urine Random 09/11/2024 1.3  mg/dL Final    Micro Alb Creat Ratio 09/11/2024 20  0 - 30 mg/g Final    GFR (CKD-EPI) 09/11/2024 48 (A)  >60 mL/min/1.73 m 2 Final    Comment: Estimated Glomerular Filtration Rate is calculated using  race neutral CKD-EPI 2021 equation per NKF-ASN recommendations.     Admission on 09/10/2024, Discharged on 09/10/2024   Component Date Value Ref Range Status    Pathology Request 09/10/2024 Sent to Histo   Final    Pathology Request 09/10/2024 Sent to Histo   Final    Significant Indicator 09/10/2024 NEG   Preliminary    Source 09/10/2024 RESP   Preliminary    Site 09/10/2024 BAL - RML   Preliminary    Culture Result 09/10/2024 Culture in progress.   Preliminary    AFB Smear Results 09/10/2024 No acid fast bacilli seen.   Final    Significant Indicator 09/10/2024 NEG   Preliminary    Source 09/10/2024 RESP   Preliminary    Site 09/10/2024 BAL - RML   Preliminary    Culture Result 09/10/2024 No fungal growth to date.   Preliminary    Fungal Smear Results 09/10/2024 No fungal elements seen.   Final    Miscellaneous Lab Result 09/10/2024 SEE NOTE   Final    Comment: Test name                     Result Flag Units  RefIntvl  -------------------------------------------------------------  Pneumocystis source:             BAL  Pneumocystis jirovecii by DFA  Negative             Negative  Performed By: [employer]  [address] [place] [id number]  : Ruslan Mendez MD, PhD  CLIA Number: [id number]      Fluid Type 09/10/2024 SEE BELOW   Final    Bronchial Lavage    Color-Body Fluid 09/10/2024 Red   Final    Character-Body Fluid 09/10/2024 Bloody   Final    Polys 09/10/2024 69  % Final    Lymphs 09/10/2024 11  % Final    Eosinophils - CSF 09/10/2024 3  % Final    Fluid Basophil 09/10/2024 1  % Final    Fl Mono Macrophages 09/10/2024 11  % Final    Fl Lining Cells 09/10/2024 5   Final    Significant Indicator  09/10/2024 NEG   Final    Source 09/10/2024 RESP   Final    Site 09/10/2024 BAL - RML   Final    Culture Result 09/10/2024 No growth at 48 hours.   Final    Gram Stain Result 09/10/2024    Final                    Value:Few WBCs.  No organisms seen.      Asp. Galacto. Index 09/10/2024 0.86   Final    Asp. Galacto. Ag, BAL 09/10/2024 Positive (A)  Negative Final    Comment: INTERPRETIVE INFORMATION: Aspergillus Galactomannan EIA, Broncho  A BAL galactomannan index of greater than or equal to 0.5  is considered positive.  This result should be interpreted in the  context of patient history, clinical signs/symptoms, and other  routine diagnostic tests (e.g., culture, histologic examination of  biopsy material, and radiographic imaging).  Performed By: [employer]  [address] [place] [address]  : Ruslan Mendez MD, PhD  CLIA Number: [id number]      Significant Indicator 09/10/2024 NEG   Final    Source 09/10/2024 RESP   Final    Site 09/10/2024 BAL - RML   Final    Fungal Smear Results 09/10/2024 No fungal elements seen.   Final    Significant Indicator 09/10/2024 .   Final    Source 09/10/2024 RESP   Final    Site 09/10/2024 BAL - RML   Final    Gram Stain Result 09/10/2024    Final                    Value:Few WBCs.  No organisms seen.      Significant Indicator 09/10/2024 NEG   Final    Source 09/10/2024 RESP   Final    Site 09/10/2024 BAL - RML   Final    AFB Smear Results 09/10/2024 No acid fast bacilli seen.   Final   Appointment on 2024   Component Date Value Ref Range Status    Report 2024    Final                    Value:[facility]    Test Date:  2024  Pt Name:    WINTER GAINES             Department: [facility]  MRN:        [id number]                      Room:  Gender:     Female                       Technician: NIGEL  :        1955                   Requested By:ERIKA CARBAJAL  Order #:    [id number]                    Reading MD:  Viral Damon MD    Measurements  Intervals                                Axis  Rate:       87                           P:          84  AR:         144                          QRS:        58  QRSD:       88                           T:          57  QT:         362  QTc:        435    Interpretive Statements  Sinus rhythm  Compared to ECG 12/15/2023 09:57:52  NO SIGNIFICANT CHANGES  Electronically Signed On 09- 19:46:42 PDT by Viral Damon MD         Imaging:   All listed images below have been independently reviewed by me. I agree with the findings as summarized below:    DX-PORTABLE FLUOROSCOPY < 1 HOUR Reason For Exam: BRONCH    Result Date: 9/10/2024  9/10/2024 10:38 AM HISTORY/REASON FOR EXAM:  ION bronch TECHNIQUE/EXAM DESCRIPTION AND NUMBER OF VIEWS: Portable fluoroscopy for less than one hour in OR. FINDINGS: The portable fluoroscopy unit was obligated to the procedure for less than one hour. Actual fluoro time was 1 minute 1 second. Fluoroscopy dose(DAP): 0.9243 Gy*cm^2     Portable fluoroscopy utilized for 1 minute 1 second. INTERPRETING LOCATION: [address] [facility], [address]    DX-CHEST-LIMITED (1 VIEW)    Result Date: 9/10/2024  9/10/2024 10:38 AM HISTORY/REASON FOR EXAM:  ION bronch TECHNIQUE/EXAM DESCRIPTION AND NUMBER OF VIEWS: Three portable views of the chest. Digitized Intraoperative Radiograph FINDINGS: Fluoroscopic time: 1 minute 1 second. Fluoroscopy dose(DAP): 0.9243 Gy*cm^2     Digitized intraoperative radiograph is submitted for review. This examination is not for diagnostic purpose but for guidance during a surgical procedure. Please see the patient's chart for full procedural details. INTERPRETING LOCATION: [address] [facility], [address]    DX-CHEST-LIMITED (1 VIEW)    Result Date: 9/10/2024  9/10/2024 11:59 AM HISTORY/REASON FOR EXAM:  s/p bronch w/biopsy TECHNIQUE/EXAM DESCRIPTION AND NUMBER OF VIEWS: Single portable view of the chest. COMPARISON: 6/6/2024 FINDINGS:  Airspace opacity in the right mid and lower lung No pleural effusion. No pneumothorax. Stable cardiopericardial silhouette.     No appreciable pneumothorax.    CT-CHEST (THORAX) W/O    Result Date: 9/9/2024 9/9/2024 1:49 PM HISTORY/REASON FOR EXAM:  bronchoscopy preprocedure planning. TECHNIQUE/EXAM DESCRIPTION: CT scan of the chest without contrast. ION protocol was utilized. Noncontrast helical scanning of the chest was obtained from 2 cm above the lung apices to 2 cm below the costophrenic angles. Coronal and sagittal MPR reconstructions were performed for radiologist interpretation. Low dose optimization technique was utilized for this CT exam including automated exposure control and adjustment of the mA and/or kV according to patient size. COMPARISON: PET/CT 8/7/2024, CT chest 6/6/2024 FINDINGS: Lungs: Scarring again seen in the medial RIGHT middle lobe.  Ill-defined parenchymal opacity within the RIGHT middle lobe superiorly abutting the minor fissure measuring 3.5 x 1.4 cm, increased from prior.  Postoperative change in the medial LEFT lung adjacent the hilum.  Clustered small nodules in the posterolateral RIGHT lower lobe measuring up to 3 mm, stable. Mediastinum/Elsie: No significant adenopathy. Pleura: No pleural effusion. Cardiac: Heart normal in size without pericardial effusion. There is coronary artery calcification. Vascular: Unremarkable. Soft tissues: Prior LEFT mastectomy. Bones: No acute or destructive process.     1.  Interval increase in size of irregular parenchymal opacity in the RIGHT middle lobe, concerning for neoplasm. 2.  Stable cluster of small nodules in the RIGHT lower lobe. 3.  No adenopathy demonstrated. 4.  Postoperative changes as described. Fleischner Society pulmonary nodule recommendations: Not Applicable        Pathology:  Lung adenocarcinoma    Assessment & Plan:  Assessment & Plan  NSCLC of left lung (HCC)    Orders:    CBC WITH DIFFERENTIAL; Future    Comp Metabolic Panel;  Future        NSCLC of left lung (HCC)  Staging form: Lung, AJCC 8th Edition  - Clinical stage from 6/29/2023: Stage IIIA (cT1c, cN2, cM0) - Signed by Nancy Campbell M.D. on 6/29/2023       Stage I (ypT1 ypN0) left upper lobe lung adenocarcinoma, PD-L1 1%, T p53, NF1 negative.  Status post 3 cycles of chemoimmunotherapy patient had significant colitis secondary to immunotherapy.  Status post lobectomy for resection.  Currently under surveillance imaging.  Recently had an abnormal region that has been biopsied by pulmonary and negative.  Potentially will be considered for resection by thoracic surgery per the patient.  Plan to see patient back in 2 months to review care and determine optimal surveillance interval.  OTC analgesics for cancer pain.  Maintain nutrition by mouth.        Any questions and concerns raised by the patient were answered to the best of my ability. Thank you for allowing me to participate in the care for this patient. Please feel free to contact me for any questions or concerns.     Jose Kay M.D.

## 2024-09-27 ENCOUNTER — APPOINTMENT (OUTPATIENT)
Dept: ADMISSIONS | Facility: MEDICAL CENTER | Age: 69
End: 2024-09-27
Attending: STUDENT IN AN ORGANIZED HEALTH CARE EDUCATION/TRAINING PROGRAM
Payer: MEDICARE

## 2024-10-03 ENCOUNTER — APPOINTMENT (OUTPATIENT)
Dept: ADMISSIONS | Facility: MEDICAL CENTER | Age: 69
End: 2024-10-03
Attending: STUDENT IN AN ORGANIZED HEALTH CARE EDUCATION/TRAINING PROGRAM
Payer: MEDICARE

## 2024-10-09 ENCOUNTER — APPOINTMENT (OUTPATIENT)
Dept: ADMISSIONS | Facility: MEDICAL CENTER | Age: 69
End: 2024-10-09
Attending: STUDENT IN AN ORGANIZED HEALTH CARE EDUCATION/TRAINING PROGRAM
Payer: MEDICARE

## 2024-10-09 DIAGNOSIS — Z01.812 PRE-OPERATIVE LABORATORY EXAMINATION: ICD-10-CM

## 2024-10-09 LAB
ERYTHROCYTE [DISTWIDTH] IN BLOOD BY AUTOMATED COUNT: 48 FL (ref 35.9–50)
HCT VFR BLD AUTO: 42.9 % (ref 37–47)
HGB BLD-MCNC: 13.2 G/DL (ref 12–16)
INR PPP: 1.06 (ref 0.87–1.13)
MCH RBC QN AUTO: 27.3 PG (ref 27–33)
MCHC RBC AUTO-ENTMCNC: 30.8 G/DL (ref 32.2–35.5)
MCV RBC AUTO: 88.6 FL (ref 81.4–97.8)
PLATELET # BLD AUTO: 395 K/UL (ref 164–446)
PMV BLD AUTO: 9 FL (ref 9–12.9)
PROTHROMBIN TIME: 13.8 SEC (ref 12–14.6)
RBC # BLD AUTO: 4.84 M/UL (ref 4.2–5.4)
WBC # BLD AUTO: 8.7 K/UL (ref 4.8–10.8)

## 2024-10-09 PROCEDURE — 85610 PROTHROMBIN TIME: CPT

## 2024-10-09 PROCEDURE — 85027 COMPLETE CBC AUTOMATED: CPT

## 2024-10-09 PROCEDURE — 36415 COLL VENOUS BLD VENIPUNCTURE: CPT

## 2024-10-22 ENCOUNTER — HOSPITAL ENCOUNTER (OUTPATIENT)
Facility: MEDICAL CENTER | Age: 69
End: 2024-10-22
Attending: STUDENT IN AN ORGANIZED HEALTH CARE EDUCATION/TRAINING PROGRAM | Admitting: STUDENT IN AN ORGANIZED HEALTH CARE EDUCATION/TRAINING PROGRAM
Payer: MEDICARE

## 2024-10-22 ENCOUNTER — APPOINTMENT (OUTPATIENT)
Dept: RADIOLOGY | Facility: MEDICAL CENTER | Age: 69
End: 2024-10-22
Attending: STUDENT IN AN ORGANIZED HEALTH CARE EDUCATION/TRAINING PROGRAM
Payer: MEDICARE

## 2024-10-22 VITALS
HEART RATE: 86 BPM | SYSTOLIC BLOOD PRESSURE: 115 MMHG | OXYGEN SATURATION: 100 % | RESPIRATION RATE: 105 BRPM | TEMPERATURE: 97.1 F | BODY MASS INDEX: 23.67 KG/M2 | DIASTOLIC BLOOD PRESSURE: 56 MMHG | HEIGHT: 63 IN | WEIGHT: 133.6 LBS

## 2024-10-22 DIAGNOSIS — R91.1 LUNG NODULE: ICD-10-CM

## 2024-10-22 PROCEDURE — 77012 CT SCAN FOR NEEDLE BIOPSY: CPT

## 2024-10-22 RX ORDER — SODIUM CHLORIDE 9 MG/ML
500 INJECTION, SOLUTION INTRAVENOUS
Status: DISCONTINUED | OUTPATIENT
Start: 2024-10-22 | End: 2024-10-22 | Stop reason: HOSPADM

## 2024-10-22 RX ORDER — LIDOCAINE HYDROCHLORIDE 10 MG/ML
INJECTION, SOLUTION EPIDURAL; INFILTRATION; INTRACAUDAL; PERINEURAL
Status: COMPLETED
Start: 2024-10-22 | End: 2024-10-22

## 2024-10-22 RX ORDER — MIDAZOLAM HYDROCHLORIDE 1 MG/ML
.5-2 INJECTION INTRAMUSCULAR; INTRAVENOUS PRN
Status: DISCONTINUED | OUTPATIENT
Start: 2024-10-22 | End: 2024-10-22 | Stop reason: HOSPADM

## 2024-10-22 RX ORDER — ONDANSETRON 2 MG/ML
4 INJECTION INTRAMUSCULAR; INTRAVENOUS PRN
Status: DISCONTINUED | OUTPATIENT
Start: 2024-10-22 | End: 2024-10-22 | Stop reason: HOSPADM

## 2024-10-22 RX ORDER — MIDAZOLAM HYDROCHLORIDE 1 MG/ML
INJECTION INTRAMUSCULAR; INTRAVENOUS
Status: COMPLETED
Start: 2024-10-22 | End: 2024-10-22

## 2024-10-22 ASSESSMENT — FIBROSIS 4 INDEX: FIB4 SCORE: 0.91

## 2024-10-24 ENCOUNTER — PATIENT MESSAGE (OUTPATIENT)
Dept: SLEEP MEDICINE | Facility: MEDICAL CENTER | Age: 69
End: 2024-10-24
Payer: MEDICARE

## 2024-10-24 DIAGNOSIS — R91.1 LUNG NODULE: ICD-10-CM

## 2024-11-12 DIAGNOSIS — F41.8 OTHER SPECIFIED ANXIETY DISORDERS: ICD-10-CM

## 2024-11-13 RX ORDER — VENLAFAXINE 75 MG/1
75 TABLET ORAL DAILY
Qty: 90 TABLET | Refills: 1 | Status: SHIPPED | OUTPATIENT
Start: 2024-11-13

## 2024-11-13 NOTE — TELEPHONE ENCOUNTER
Received request via: Pharmacy    Was the patient seen in the last year in this department? Yes    Does the patient have an active prescription (recently filled or refills available) for medication(s) requested? No    Pharmacy Name: [facility] [place], [place] - [address]     Does the patient have assisted Plus and need 100-day supply? (This applies to ALL medications) Yes, quantity updated to 100 days

## 2024-11-18 DIAGNOSIS — E78.5 DYSLIPIDEMIA: ICD-10-CM

## 2024-11-18 NOTE — TELEPHONE ENCOUNTER
Received request via: Pharmacy    Was the patient seen in the last year in this department? Yes    Does the patient have an active prescription (recently filled or refills available) for medication(s) requested? No    Pharmacy Name: [facility]    Does the patient have halfway Plus and need 100-day supply? (This applies to ALL medications) Yes, quantity updated to 100 days

## 2024-11-19 RX ORDER — PRAVASTATIN SODIUM 40 MG
40 TABLET ORAL DAILY
Qty: 100 TABLET | Refills: 2 | Status: SHIPPED | OUTPATIENT
Start: 2024-11-19

## 2024-11-19 SDOH — ECONOMIC STABILITY: INCOME INSECURITY: IN THE LAST 12 MONTHS, WAS THERE A TIME WHEN YOU WERE NOT ABLE TO PAY THE MORTGAGE OR RENT ON TIME?: NO

## 2024-11-19 SDOH — HEALTH STABILITY: PHYSICAL HEALTH: ON AVERAGE, HOW MANY MINUTES DO YOU ENGAGE IN EXERCISE AT THIS LEVEL?: PATIENT DECLINED

## 2024-11-19 SDOH — ECONOMIC STABILITY: FOOD INSECURITY: WITHIN THE PAST 12 MONTHS, THE FOOD YOU BOUGHT JUST DIDN'T LAST AND YOU DIDN'T HAVE MONEY TO GET MORE.: NEVER TRUE

## 2024-11-19 SDOH — ECONOMIC STABILITY: FOOD INSECURITY: WITHIN THE PAST 12 MONTHS, YOU WORRIED THAT YOUR FOOD WOULD RUN OUT BEFORE YOU GOT MONEY TO BUY MORE.: NEVER TRUE

## 2024-11-19 SDOH — HEALTH STABILITY: MENTAL HEALTH
STRESS IS WHEN SOMEONE FEELS TENSE, NERVOUS, ANXIOUS, OR CAN'T SLEEP AT NIGHT BECAUSE THEIR MIND IS TROUBLED. HOW STRESSED ARE YOU?: TO SOME EXTENT

## 2024-11-19 SDOH — ECONOMIC STABILITY: INCOME INSECURITY: HOW HARD IS IT FOR YOU TO PAY FOR THE VERY BASICS LIKE FOOD, HOUSING, MEDICAL CARE, AND HEATING?: NOT HARD AT ALL

## 2024-11-19 SDOH — HEALTH STABILITY: PHYSICAL HEALTH: ON AVERAGE, HOW MANY DAYS PER WEEK DO YOU ENGAGE IN MODERATE TO STRENUOUS EXERCISE (LIKE A BRISK WALK)?: 5 DAYS

## 2024-11-19 ASSESSMENT — SOCIAL DETERMINANTS OF HEALTH (SDOH)
HOW OFTEN DO YOU HAVE SIX OR MORE DRINKS ON ONE OCCASION: NEVER
IN A TYPICAL WEEK, HOW MANY TIMES DO YOU TALK ON THE PHONE WITH FAMILY, FRIENDS, OR NEIGHBORS?: MORE THAN THREE TIMES A WEEK
HOW OFTEN DO YOU ATTENT MEETINGS OF THE CLUB OR ORGANIZATION YOU BELONG TO?: NEVER
HOW OFTEN DO YOU GET TOGETHER WITH FRIENDS OR RELATIVES?: ONCE A WEEK
WITHIN THE PAST 12 MONTHS, YOU WORRIED THAT YOUR FOOD WOULD RUN OUT BEFORE YOU GOT THE MONEY TO BUY MORE: NEVER TRUE
IN A TYPICAL WEEK, HOW MANY TIMES DO YOU TALK ON THE PHONE WITH FAMILY, FRIENDS, OR NEIGHBORS?: MORE THAN THREE TIMES A WEEK
IN THE PAST 12 MONTHS, HAS THE ELECTRIC, GAS, OIL, OR WATER COMPANY THREATENED TO SHUT OFF SERVICE IN YOUR HOME?: NO
HOW OFTEN DO YOU ATTENT MEETINGS OF THE CLUB OR ORGANIZATION YOU BELONG TO?: NEVER
DO YOU BELONG TO ANY CLUBS OR ORGANIZATIONS SUCH AS CHURCH GROUPS UNIONS, FRATERNAL OR ATHLETIC GROUPS, OR SCHOOL GROUPS?: NO
HOW OFTEN DO YOU ATTEND CHURCH OR RELIGIOUS SERVICES?: 1 TO 4 TIMES PER YEAR
DO YOU BELONG TO ANY CLUBS OR ORGANIZATIONS SUCH AS CHURCH GROUPS UNIONS, FRATERNAL OR ATHLETIC GROUPS, OR SCHOOL GROUPS?: NO
HOW OFTEN DO YOU ATTEND CHURCH OR RELIGIOUS SERVICES?: 1 TO 4 TIMES PER YEAR
HOW MANY DRINKS CONTAINING ALCOHOL DO YOU HAVE ON A TYPICAL DAY WHEN YOU ARE DRINKING: PATIENT DOES NOT DRINK
IN THE PAST 12 MONTHS, HAS THE ELECTRIC, GAS, OIL, OR WATER COMPANY THREATENED TO SHUT OFF SERVICE IN YOUR HOME?: NO
HOW OFTEN DO YOU ATTENT MEETINGS OF THE CLUB OR ORGANIZATION YOU BELONG TO?: NEVER
HOW OFTEN DO YOU GET TOGETHER WITH FRIENDS OR RELATIVES?: ONCE A WEEK
HOW OFTEN DO YOU ATTEND CHURCH OR RELIGIOUS SERVICES?: 1 TO 4 TIMES PER YEAR
HOW OFTEN DO YOU GET TOGETHER WITH FRIENDS OR RELATIVES?: ONCE A WEEK
HOW HARD IS IT FOR YOU TO PAY FOR THE VERY BASICS LIKE FOOD, HOUSING, MEDICAL CARE, AND HEATING?: NOT HARD AT ALL
HOW OFTEN DO YOU HAVE A DRINK CONTAINING ALCOHOL: NEVER
IN A TYPICAL WEEK, HOW MANY TIMES DO YOU TALK ON THE PHONE WITH FAMILY, FRIENDS, OR NEIGHBORS?: MORE THAN THREE TIMES A WEEK
DO YOU BELONG TO ANY CLUBS OR ORGANIZATIONS SUCH AS CHURCH GROUPS UNIONS, FRATERNAL OR ATHLETIC GROUPS, OR SCHOOL GROUPS?: NO

## 2024-11-19 ASSESSMENT — LIFESTYLE VARIABLES
HOW OFTEN DO YOU HAVE SIX OR MORE DRINKS ON ONE OCCASION: NEVER
HOW OFTEN DO YOU HAVE SIX OR MORE DRINKS ON ONE OCCASION: NEVER
HOW OFTEN DO YOU HAVE A DRINK CONTAINING ALCOHOL: NEVER
SKIP TO QUESTIONS 9-10: 1
SKIP TO QUESTIONS 9-10: 1
AUDIT-C TOTAL SCORE: 0
HOW MANY STANDARD DRINKS CONTAINING ALCOHOL DO YOU HAVE ON A TYPICAL DAY: PATIENT DOES NOT DRINK
HOW MANY STANDARD DRINKS CONTAINING ALCOHOL DO YOU HAVE ON A TYPICAL DAY: PATIENT DOES NOT DRINK
AUDIT-C TOTAL SCORE: 0
HOW OFTEN DO YOU HAVE A DRINK CONTAINING ALCOHOL: NEVER

## 2024-11-22 ENCOUNTER — APPOINTMENT (OUTPATIENT)
Dept: MEDICAL GROUP | Facility: PHYSICIAN GROUP | Age: 69
End: 2024-11-22
Payer: MEDICARE

## 2024-11-22 SDOH — HEALTH STABILITY: PHYSICAL HEALTH: ON AVERAGE, HOW MANY MINUTES DO YOU ENGAGE IN EXERCISE AT THIS LEVEL?: PATIENT DECLINED

## 2024-11-22 SDOH — HEALTH STABILITY: PHYSICAL HEALTH: ON AVERAGE, HOW MANY DAYS PER WEEK DO YOU ENGAGE IN MODERATE TO STRENUOUS EXERCISE (LIKE A BRISK WALK)?: 5 DAYS

## 2024-11-22 ASSESSMENT — SOCIAL DETERMINANTS OF HEALTH (SDOH)
WITHIN THE PAST 12 MONTHS, YOU WORRIED THAT YOUR FOOD WOULD RUN OUT BEFORE YOU GOT THE MONEY TO BUY MORE: NEVER TRUE
HOW HARD IS IT FOR YOU TO PAY FOR THE VERY BASICS LIKE FOOD, HOUSING, MEDICAL CARE, AND HEATING?: NOT HARD AT ALL
HOW OFTEN DO YOU ATTEND CHURCH OR RELIGIOUS SERVICES?: 1 TO 4 TIMES PER YEAR
IN A TYPICAL WEEK, HOW MANY TIMES DO YOU TALK ON THE PHONE WITH FAMILY, FRIENDS, OR NEIGHBORS?: MORE THAN THREE TIMES A WEEK
HOW OFTEN DO YOU HAVE SIX OR MORE DRINKS ON ONE OCCASION: NEVER
HOW OFTEN DO YOU ATTENT MEETINGS OF THE CLUB OR ORGANIZATION YOU BELONG TO?: NEVER
HOW OFTEN DO YOU HAVE A DRINK CONTAINING ALCOHOL: NEVER
HOW MANY DRINKS CONTAINING ALCOHOL DO YOU HAVE ON A TYPICAL DAY WHEN YOU ARE DRINKING: PATIENT DOES NOT DRINK
DO YOU BELONG TO ANY CLUBS OR ORGANIZATIONS SUCH AS CHURCH GROUPS UNIONS, FRATERNAL OR ATHLETIC GROUPS, OR SCHOOL GROUPS?: NO
HOW OFTEN DO YOU GET TOGETHER WITH FRIENDS OR RELATIVES?: ONCE A WEEK

## 2024-11-25 ENCOUNTER — APPOINTMENT (OUTPATIENT)
Dept: HEMATOLOGY ONCOLOGY | Facility: MEDICAL CENTER | Age: 69
End: 2024-11-25
Attending: INTERNAL MEDICINE
Payer: MEDICARE

## 2024-12-02 ENCOUNTER — HOSPITAL ENCOUNTER (OUTPATIENT)
Dept: LAB | Facility: MEDICAL CENTER | Age: 69
End: 2024-12-02
Attending: INTERNAL MEDICINE
Payer: MEDICARE

## 2024-12-02 ENCOUNTER — HOSPITAL ENCOUNTER (OUTPATIENT)
Dept: RADIOLOGY | Facility: MEDICAL CENTER | Age: 69
End: 2024-12-02
Attending: INTERNAL MEDICINE
Payer: MEDICARE

## 2024-12-02 DIAGNOSIS — C34.92 NSCLC OF LEFT LUNG (HCC): ICD-10-CM

## 2024-12-02 DIAGNOSIS — I10 ESSENTIAL HYPERTENSION: ICD-10-CM

## 2024-12-02 DIAGNOSIS — N18.31 STAGE 3A CHRONIC KIDNEY DISEASE: ICD-10-CM

## 2024-12-02 DIAGNOSIS — E83.52 HYPERCALCEMIA: ICD-10-CM

## 2024-12-02 DIAGNOSIS — R91.1 LUNG NODULE: ICD-10-CM

## 2024-12-02 LAB
25(OH)D3 SERPL-MCNC: 45 NG/ML (ref 30–100)
ALBUMIN SERPL BCP-MCNC: 4.1 G/DL (ref 3.2–4.9)
ALBUMIN/GLOB SERPL: 1.4 G/DL
ALP SERPL-CCNC: 60 U/L (ref 30–99)
ALT SERPL-CCNC: 30 U/L (ref 2–50)
ANION GAP SERPL CALC-SCNC: 10 MMOL/L (ref 7–16)
ANION GAP SERPL CALC-SCNC: 11 MMOL/L (ref 7–16)
AST SERPL-CCNC: 42 U/L (ref 12–45)
BASOPHILS # BLD AUTO: 0 % (ref 0–1.8)
BASOPHILS # BLD: 0 K/UL (ref 0–0.12)
BILIRUB SERPL-MCNC: 0.3 MG/DL (ref 0.1–1.5)
BUN SERPL-MCNC: 21 MG/DL (ref 8–22)
BUN SERPL-MCNC: 21 MG/DL (ref 8–22)
CALCIUM ALBUM COR SERPL-MCNC: 10.1 MG/DL (ref 8.5–10.5)
CALCIUM SERPL-MCNC: 10.2 MG/DL (ref 8.5–10.5)
CALCIUM SERPL-MCNC: 9.9 MG/DL (ref 8.5–10.5)
CHLORIDE SERPL-SCNC: 106 MMOL/L (ref 96–112)
CHLORIDE SERPL-SCNC: 106 MMOL/L (ref 96–112)
CO2 SERPL-SCNC: 23 MMOL/L (ref 20–33)
CO2 SERPL-SCNC: 23 MMOL/L (ref 20–33)
CREAT SERPL-MCNC: 1.28 MG/DL (ref 0.5–1.4)
CREAT SERPL-MCNC: 1.34 MG/DL (ref 0.5–1.4)
EOSINOPHIL # BLD AUTO: 0.27 K/UL (ref 0–0.51)
EOSINOPHIL NFR BLD: 3.5 % (ref 0–6.9)
ERYTHROCYTE [DISTWIDTH] IN BLOOD BY AUTOMATED COUNT: 48.5 FL (ref 35.9–50)
ERYTHROCYTE [DISTWIDTH] IN BLOOD BY AUTOMATED COUNT: 49.1 FL (ref 35.9–50)
GFR SERPLBLD CREATININE-BSD FMLA CKD-EPI: 43 ML/MIN/1.73 M 2
GFR SERPLBLD CREATININE-BSD FMLA CKD-EPI: 45 ML/MIN/1.73 M 2
GLOBULIN SER CALC-MCNC: 3 G/DL (ref 1.9–3.5)
GLUCOSE SERPL-MCNC: 90 MG/DL (ref 65–99)
GLUCOSE SERPL-MCNC: 90 MG/DL (ref 65–99)
HCT VFR BLD AUTO: 43.1 % (ref 37–47)
HCT VFR BLD AUTO: 45.4 % (ref 37–47)
HGB BLD-MCNC: 13.7 G/DL (ref 12–16)
HGB BLD-MCNC: 14.3 G/DL (ref 12–16)
LYMPHOCYTES # BLD AUTO: 3.05 K/UL (ref 1–4.8)
LYMPHOCYTES NFR BLD: 39.1 % (ref 22–41)
MANUAL DIFF BLD: NORMAL
MCH RBC QN AUTO: 28 PG (ref 27–33)
MCH RBC QN AUTO: 28.5 PG (ref 27–33)
MCHC RBC AUTO-ENTMCNC: 31.5 G/DL (ref 32.2–35.5)
MCHC RBC AUTO-ENTMCNC: 31.8 G/DL (ref 32.2–35.5)
MCV RBC AUTO: 89 FL (ref 81.4–97.8)
MCV RBC AUTO: 89.8 FL (ref 81.4–97.8)
MONOCYTES # BLD AUTO: 0.61 K/UL (ref 0–0.85)
MONOCYTES NFR BLD AUTO: 7.8 % (ref 0–13.4)
MORPHOLOGY BLD-IMP: NORMAL
NEUTROPHILS # BLD AUTO: 3.87 K/UL (ref 1.82–7.42)
NEUTROPHILS NFR BLD: 49.6 % (ref 44–72)
NRBC # BLD AUTO: 0 K/UL
NRBC BLD-RTO: 0 /100 WBC (ref 0–0.2)
PLATELET # BLD AUTO: 452 K/UL (ref 164–446)
PLATELET # BLD AUTO: 488 K/UL (ref 164–446)
PLATELET BLD QL SMEAR: NORMAL
PMV BLD AUTO: 9.5 FL (ref 9–12.9)
PMV BLD AUTO: 9.6 FL (ref 9–12.9)
POTASSIUM SERPL-SCNC: 4.3 MMOL/L (ref 3.6–5.5)
POTASSIUM SERPL-SCNC: 4.4 MMOL/L (ref 3.6–5.5)
PROT SERPL-MCNC: 7.1 G/DL (ref 6–8.2)
PTH-INTACT SERPL-MCNC: 35 PG/ML (ref 14–72)
RBC # BLD AUTO: 4.8 M/UL (ref 4.2–5.4)
RBC # BLD AUTO: 5.1 M/UL (ref 4.2–5.4)
RBC BLD AUTO: NORMAL
SODIUM SERPL-SCNC: 139 MMOL/L (ref 135–145)
SODIUM SERPL-SCNC: 140 MMOL/L (ref 135–145)
WBC # BLD AUTO: 7.8 K/UL (ref 4.8–10.8)
WBC # BLD AUTO: 8.6 K/UL (ref 4.8–10.8)

## 2024-12-02 PROCEDURE — 80048 BASIC METABOLIC PNL TOTAL CA: CPT

## 2024-12-02 PROCEDURE — 82570 ASSAY OF URINE CREATININE: CPT

## 2024-12-02 PROCEDURE — 71250 CT THORAX DX C-: CPT

## 2024-12-02 PROCEDURE — 83970 ASSAY OF PARATHORMONE: CPT

## 2024-12-02 PROCEDURE — 82306 VITAMIN D 25 HYDROXY: CPT

## 2024-12-02 PROCEDURE — 85027 COMPLETE CBC AUTOMATED: CPT | Mod: 91

## 2024-12-02 PROCEDURE — 82043 UR ALBUMIN QUANTITATIVE: CPT

## 2024-12-02 PROCEDURE — 85007 BL SMEAR W/DIFF WBC COUNT: CPT

## 2024-12-02 PROCEDURE — 80053 COMPREHEN METABOLIC PANEL: CPT

## 2024-12-02 PROCEDURE — 85027 COMPLETE CBC AUTOMATED: CPT

## 2024-12-02 PROCEDURE — 36415 COLL VENOUS BLD VENIPUNCTURE: CPT

## 2024-12-03 ENCOUNTER — HOSPITAL ENCOUNTER (OUTPATIENT)
Dept: HEMATOLOGY ONCOLOGY | Facility: MEDICAL CENTER | Age: 69
End: 2024-12-03
Attending: INTERNAL MEDICINE
Payer: MEDICARE

## 2024-12-03 VITALS
DIASTOLIC BLOOD PRESSURE: 64 MMHG | HEIGHT: 63 IN | TEMPERATURE: 96.9 F | BODY MASS INDEX: 22.5 KG/M2 | HEART RATE: 85 BPM | OXYGEN SATURATION: 98 % | SYSTOLIC BLOOD PRESSURE: 118 MMHG | WEIGHT: 127 LBS

## 2024-12-03 DIAGNOSIS — C34.92 NSCLC OF LEFT LUNG (HCC): ICD-10-CM

## 2024-12-03 LAB
CREAT UR-MCNC: 128.51 MG/DL
MICROALBUMIN UR-MCNC: 11.3 MG/DL
MICROALBUMIN/CREAT UR: 88 MG/G (ref 0–30)

## 2024-12-03 PROCEDURE — 99212 OFFICE O/P EST SF 10 MIN: CPT | Performed by: INTERNAL MEDICINE

## 2024-12-03 PROCEDURE — 99214 OFFICE O/P EST MOD 30 MIN: CPT | Performed by: INTERNAL MEDICINE

## 2024-12-03 ASSESSMENT — ENCOUNTER SYMPTOMS
SHORTNESS OF BREATH: 0
VOMITING: 0
BLURRED VISION: 0
MEMORY LOSS: 0
CHILLS: 0
ABDOMINAL PAIN: 0
FOCAL WEAKNESS: 0
BRUISES/BLEEDS EASILY: 0
HEADACHES: 0
SORE THROAT: 0
SENSORY CHANGE: 0
TINGLING: 0
TREMORS: 0
COUGH: 0
WHEEZING: 0
ORTHOPNEA: 0
DEPRESSION: 0
WEIGHT LOSS: 0
FEVER: 0
PALPITATIONS: 0
SPUTUM PRODUCTION: 0
NECK PAIN: 0
DIZZINESS: 0
NAUSEA: 0
HEARTBURN: 0

## 2024-12-03 ASSESSMENT — PAIN SCALES - GENERAL: PAINLEVEL_OUTOF10: NO PAIN

## 2024-12-03 ASSESSMENT — FIBROSIS 4 INDEX: FIB4 SCORE: 1.08

## 2024-12-03 NOTE — PROGRESS NOTES
Follow Up Note:  Hematology/Oncology      Primary Care:  Beata White P.A.-C.    Diagnosis:     Chief Complaint: [unfilled]    Interval History:  Patient is here for follow up visit.  Latrice Andersen is a 69 y.o. female who on June 6, 2023 had an abnormal PET scan revealed a left upper lobe mass consistent with malignancy.  June 12, 2023 patient had a left upper lobe biopsy positive for malignancy for pulmonary adenocarcinoma.  Patient then received neoadjuvant chemo immunotherapy.  Had difficulties with colitis secondary to immunotherapy.  Ultimately had a resection performed and is now currently receiving surveillance imaging.  She had no further immunotherapy post resection as a result of her complications presurgery.  Recently she had an abnormal area on scanning and has had bronchoscopy with negative biopsies.  She is currently scheduled to have additional CT-guided biopsy and potential resection by thoracic surgery.     Treatment History:     Allergies as of 12/03/2024 - Reviewed 12/03/2024   Allergen Reaction Noted    Codeine Unspecified 01/27/2016         Current Outpatient Medications:     pravastatin (PRAVACHOL) 40 MG tablet, Take 1 Tablet by mouth every day., Disp: 100 Tablet, Rfl: 2    venlafaxine (EFFEXOR) 75 MG Tab, TAKE ONE TABLET BY MOUTH ONE TIME DAILY, Disp: 90 Tablet, Rfl: 1    multivitamin Tab, Take 1 Tablet by mouth every day. MEDICATION INSTRUCTIONS FOR SURGERY/PROCEDURE SCHEDULED FOR 9/9/24  DO NOT TAKE 7 DAYS PRIOR TO SURGERY, Disp: , Rfl:     Multiple Vitamins-Minerals (HAIR SKIN & NAILS ADVANCED PO), Take  by mouth every day. MEDICATION INSTRUCTIONS FOR SURGERY/PROCEDURE SCHEDULED FOR 9/9/24  DO NOT TAKE 7 DAYS PRIOR TO SURGERY, Disp: , Rfl:     levothyroxine (SYNTHROID) 50 MCG Tab, Take 1 Tablet by mouth every morning on an empty stomach. Please make an appointment for any future refills. (Patient taking differently: Take 50 mcg by mouth every morning on an empty stomach.),  "Disp: 90 Tablet, Rfl: 0    fenofibrate micronized (LOFIBRA) 134 MG capsule, TAKE ONE CAPSULE BY MOUTH ONE TIME DAILY, Disp: 100 Capsule, Rfl: 1    acetaminophen (TYLENOL) 325 MG Tab, Take 650 mg by mouth every four hours as needed., Disp: , Rfl:     lisinopril (PRINIVIL) 10 MG Tab, Take 1 Tablet by mouth every day., Disp: 100 Tablet, Rfl: 3    VITAMIN D PO, Take 5,000 Units by mouth every day. DO NOT TAKE 7 DAYS PRIOR TO SURGERY, Disp: , Rfl:     metoprolol SR (TOPROL XL) 25 MG TABLET SR 24 HR, Take 25 mg by mouth every day., Disp: , Rfl:       Review of Systems:  Review of Systems   Constitutional:  Negative for chills, fever, malaise/fatigue and weight loss.   HENT:  Negative for congestion, ear pain, nosebleeds and sore throat.    Eyes:  Negative for blurred vision.   Respiratory:  Negative for cough, sputum production, shortness of breath and wheezing.    Cardiovascular:  Negative for chest pain, palpitations, orthopnea and leg swelling.   Gastrointestinal:  Negative for abdominal pain, heartburn, nausea and vomiting.   Genitourinary:  Negative for dysuria, frequency and urgency.   Musculoskeletal:  Negative for neck pain.   Neurological:  Negative for dizziness, tingling, tremors, sensory change, focal weakness and headaches.   Endo/Heme/Allergies:  Does not bruise/bleed easily.   Psychiatric/Behavioral:  Negative for depression, memory loss and suicidal ideas.    All other systems reviewed and are negative.        Physical Exam:  Vitals:    12/03/24 1415   BP: 118/64   BP Location: Left arm   Patient Position: Sitting   BP Cuff Size: Adult   Pulse: 85   Temp: 36.1 °C (96.9 °F)   TempSrc: Temporal   SpO2: 98%   Weight: 57.6 kg (127 lb)   Height: 1.6 m (5' 2.99\")               Physical Exam  Constitutional:       General: She is not in acute distress.  HENT:      Head: Normocephalic.   Eyes:      Conjunctiva/sclera: Conjunctivae normal.   Cardiovascular:      Rate and Rhythm: Normal rate and regular rhythm.      " Heart sounds: Normal heart sounds.   Pulmonary:      Effort: Pulmonary effort is normal.      Breath sounds: Normal breath sounds.   Abdominal:      General: Bowel sounds are normal.      Palpations: Abdomen is soft.   Musculoskeletal:         General: Normal range of motion.   Lymphadenopathy:      Cervical: No cervical adenopathy.   Skin:     General: Skin is dry.   Neurological:      General: No focal deficit present.      Mental Status: She is oriented to person, place, and time.   Psychiatric:         Thought Content: Thought content normal.           Labs:       Imaging:     All listed images below have been independently reviewed by me. I agree with the findings as summarized below:    CT-CHEST (THORAX) W/O    Result Date: 12/3/2024  12/2/2024 1:04 PM HISTORY/REASON FOR EXAM:  followup lesion in right upper lobe- bronchoscopy x 2 nondiagnostic, PET avid. Smaller in size at time of CT guided biopsy; TECHNIQUE/EXAM DESCRIPTION: CT scan of the chest without contrast. Noncontrast helical scanning of the chest was obtained from the lung apices through the adrenal glands. Low dose optimization technique was utilized for this CT exam including automated exposure control and adjustment of the mA and/or kV according to patient size. Lack of intravenous contrast limits solid organ and vascular evaluation. COMPARISON: Chest CT 9/9/2024 and additional. PET CT 8/7/2024. FINDINGS: Lungs: Decreased, now 2.8 x 1.1 cm irregular nodule in the right middle lobe. As stable 3 mm subpleural nodule left lower lobe image 100. Stable 5 mm right middle lobe nodule image 109. Stable tiny cluster of micronodules in the right lower lobe. Seen on  image 83 series 2. Mediastinum/Elsie: No adenopathy. Pleura: No pleural effusion. Cardiac: Heart normal in size There is coronary artery calcification. Vascular: Aorta is nonaneurysmal. Soft tissues: Previous left mastectomy. No axillary adenopathy. Upper abdomen: Limited visualized portion of  the upper abdomen demonstrates no acute finding Bones: No acute process or concerning osseous lesion.     1. Decrease, now 2.8 x 1.1 cm right middle lobe nodule. 2. There are few additional stable tiny benign-appearing lung nodules. 3. No new nodule or acute process. 3. Coronary artery calcifications. Fleischner Society pulmonary nodule recommendations: Not Applicable       Pathology:      Assessment & Plan:  Assessment & Plan  NSCLC of left lung (HCC)    Orders:    CBC WITH DIFFERENTIAL; Future    Comp Metabolic Panel; Future    CT-CHEST,ABDOMEN,PELVIS WITH; Future         NSCLC of left lung (HCC)  Staging form: Lung, AJCC 8th Edition  - Clinical stage from 6/29/2023: Stage IIIA (cT1c, cN2, cM0) - Signed by Nancy Campbell M.D. on 6/29/2023        Stage I (ypT1 ypN0) left upper lobe lung adenocarcinoma, PD-L1 1%, T p53, NF1 negative.  Status post 3 cycles of chemoimmunotherapy patient had significant colitis secondary to immunotherapy.  Status post lobectomy for resection.  Currently under surveillance imaging.  Recently had an abnormal region that has been biopsied by pulmonary and negative.  Reviewed recent imaging.  Will continue follow-up in 3 months with CT chest abdomen.  OTC analgesics for cancer pain.  Maintain nutrition by mouth    Any questions and concerns raised by the patient were answered to the best of my ability. Thank you for allowing me to participate in the care for this patient. Please feel free to contact me for any questions or concerns.

## 2024-12-03 NOTE — ASSESSMENT & PLAN NOTE
Orders:    CBC WITH DIFFERENTIAL; Future    Comp Metabolic Panel; Future    CT-CHEST,ABDOMEN,PELVIS WITH; Future

## 2024-12-03 NOTE — ADDENDUM NOTE
Encounter addended by: Melissa Mcmanus, Med Ass't on: 12/3/2024 2:33 PM   Actions taken: Charge Capture section accepted

## 2024-12-04 DIAGNOSIS — E06.4 DRUG-INDUCED THYROIDITIS: ICD-10-CM

## 2024-12-04 DIAGNOSIS — E03.9 HYPOTHYROIDISM (ACQUIRED): ICD-10-CM

## 2024-12-05 ENCOUNTER — OFFICE VISIT (OUTPATIENT)
Dept: NEPHROLOGY | Facility: MEDICAL CENTER | Age: 69
End: 2024-12-05
Payer: MEDICARE

## 2024-12-05 VITALS
DIASTOLIC BLOOD PRESSURE: 70 MMHG | BODY MASS INDEX: 21.68 KG/M2 | HEART RATE: 84 BPM | TEMPERATURE: 97.2 F | HEIGHT: 64 IN | SYSTOLIC BLOOD PRESSURE: 124 MMHG | WEIGHT: 127 LBS | OXYGEN SATURATION: 95 %

## 2024-12-05 DIAGNOSIS — C34.92 NSCLC OF LEFT LUNG (HCC): ICD-10-CM

## 2024-12-05 DIAGNOSIS — I10 ESSENTIAL HYPERTENSION: ICD-10-CM

## 2024-12-05 DIAGNOSIS — N18.31 STAGE 3A CHRONIC KIDNEY DISEASE: ICD-10-CM

## 2024-12-05 PROCEDURE — 99214 OFFICE O/P EST MOD 30 MIN: CPT | Performed by: INTERNAL MEDICINE

## 2024-12-05 PROCEDURE — 3074F SYST BP LT 130 MM HG: CPT | Performed by: INTERNAL MEDICINE

## 2024-12-05 PROCEDURE — G2211 COMPLEX E/M VISIT ADD ON: HCPCS | Performed by: INTERNAL MEDICINE

## 2024-12-05 PROCEDURE — 3078F DIAST BP <80 MM HG: CPT | Performed by: INTERNAL MEDICINE

## 2024-12-05 RX ORDER — LEVOTHYROXINE SODIUM 50 UG/1
TABLET ORAL
Qty: 90 TABLET | Refills: 0 | Status: SHIPPED | OUTPATIENT
Start: 2024-12-05

## 2024-12-05 ASSESSMENT — ENCOUNTER SYMPTOMS
FEVER: 0
CHILLS: 0
VOMITING: 0
SHORTNESS OF BREATH: 0
COUGH: 0
HYPERTENSION: 1
NAUSEA: 0

## 2024-12-05 ASSESSMENT — FIBROSIS 4 INDEX: FIB4 SCORE: 1.08

## 2024-12-05 NOTE — PROGRESS NOTES
"Subjective     Sylvie Andersen is a 69 y.o. female who presents with Chronic Kidney Disease and Hypertension            Chronic Kidney Disease  This is a chronic problem. The current episode started more than 1 year ago. The problem occurs constantly. The problem has been unchanged. Pertinent negatives include no chest pain, chills, coughing, fever, nausea, urinary symptoms or vomiting.   Hypertension  This is a chronic problem. The current episode started more than 1 year ago. The problem is unchanged. The problem is controlled. Pertinent negatives include no chest pain, malaise/fatigue, peripheral edema or shortness of breath. Risk factors for coronary artery disease include post-menopausal state. Past treatments include ACE inhibitors. The current treatment provides significant improvement. There are no compliance problems.  Hypertensive end-organ damage includes kidney disease. Identifiable causes of hypertension include chronic renal disease.       Review of Systems   Constitutional:  Negative for chills, fever and malaise/fatigue.   Respiratory:  Negative for cough and shortness of breath.    Cardiovascular:  Negative for chest pain and leg swelling.   Gastrointestinal:  Negative for nausea and vomiting.   Genitourinary:  Negative for dysuria, frequency and urgency.              Objective     /70 (BP Location: Right arm, Patient Position: Sitting, BP Cuff Size: Adult)   Pulse 84   Temp 36.2 °C (97.2 °F) (Temporal)   Ht 1.613 m (5' 3.5\")   Wt 57.6 kg (127 lb)   SpO2 95%   BMI 22.14 kg/m²      Physical Exam  Vitals and nursing note reviewed.   Constitutional:       General: She is not in acute distress.     Appearance: Normal appearance. She is well-developed. She is not diaphoretic.   HENT:      Head: Normocephalic and atraumatic.      Right Ear: External ear normal.      Left Ear: External ear normal.      Nose: Nose normal.   Eyes:      General: No scleral icterus.        Right eye: No " discharge.         Left eye: No discharge.      Conjunctiva/sclera: Conjunctivae normal.   Cardiovascular:      Rate and Rhythm: Normal rate and regular rhythm.      Heart sounds: No murmur heard.  Pulmonary:      Effort: Pulmonary effort is normal. No respiratory distress.      Breath sounds: Normal breath sounds.   Musculoskeletal:         General: No tenderness.      Right lower leg: No edema.      Left lower leg: No edema.   Skin:     General: Skin is warm and dry.      Findings: No erythema.   Neurological:      General: No focal deficit present.      Mental Status: She is alert and oriented to person, place, and time.      Cranial Nerves: No cranial nerve deficit.   Psychiatric:         Mood and Affect: Mood normal.         Behavior: Behavior normal.       Past Medical History:   Diagnosis Date    Allergy, unspecified not elsewhere classified     Anxiety disorder 04/29/2014    Bowel habit changes August 2023    Diarrhea bad    Breast cancer (HCC) 1995    breast cancer 1995     left mastectomy with lymph removal, chemo radiation    Cancer (HCC) June 2023    lung ca, partial left lobe removed chemo/immune tx last tx 9/23    Chickenpox     Dental disorder     upper partial    Disorder of thyroid     thyroid problems after chemotherapy per patient 10/3/24    Former smoker 05/11/2023    High cholesterol     History of breast cancer 02/06/2017    History of radiation therapy 05/11/2023    To left chest for breast cancer     Hyperlipidemia     Hypertension     Left upper lobe pulmonary nodule 05/11/2023    Renal disorder     CKD 3    Tonsillitis      Social History     Socioeconomic History    Marital status:      Spouse name: Not on file    Number of children: Not on file    Years of education: Not on file    Highest education level: Some college, no degree   Occupational History    Not on file   Tobacco Use    Smoking status: Former     Current packs/day: 0.00     Average packs/day: 0.3 packs/day for 45.0  years (11.3 ttl pk-yrs)     Types: Cigarettes     Start date: 1977     Quit date: 2022     Years since quittin.4     Passive exposure: Current (Spouse)    Smokeless tobacco: Former     Quit date: 2022    Tobacco comments:     5 daily    Vaping Use    Vaping status: Never Used   Substance and Sexual Activity    Alcohol use: No    Drug use: No    Sexual activity: Not Currently   Other Topics Concern    Not on file   Social History Narrative    Not on file     Social Drivers of Health     Financial Resource Strain: Low Risk  (2024)    Overall Financial Resource Strain (CARDIA)     Difficulty of Paying Living Expenses: Not hard at all   Food Insecurity: No Food Insecurity (2024)    Hunger Vital Sign     Worried About Running Out of Food in the Last Year: Never true     Ran Out of Food in the Last Year: Never true   Transportation Needs: No Transportation Needs (2024)    PRAPARE - Transportation     Lack of Transportation (Medical): No     Lack of Transportation (Non-Medical): No   Physical Activity: Unknown (2024)    Exercise Vital Sign     Days of Exercise per Week: 5 days     Minutes of Exercise per Session: Patient declined   Stress: Stress Concern Present (2024)    [employer] [employer] - Occupational Stress Questionnaire     Feeling of Stress : To some extent   Social Connections: Moderately Integrated (2024)    Social Connection and Isolation Panel [NHANES]     Frequency of Communication with Friends and Family: More than three times a week     Frequency of Social Gatherings with Friends and Family: Once a week     Attends Denominational Services: 1 to 4 times per year     Active Member of Clubs or Organizations: No     Attends Club or Organization Meetings: Never     Marital Status:    Intimate Partner Violence: Not on file   Housing Stability: Low Risk  (2024)    Housing Stability Vital Sign     Unable to Pay for Housing in the  Last Year: No     Number of Times Moved in the Last Year: 0     Homeless in the Last Year: No     Family History   Problem Relation Age of Onset    Cancer Mother         breast cancer/Breast    Breast Cancer Mother     Hypertension Maternal Uncle     Cancer Maternal Uncle     Hypertension Maternal Grandmother     Hyperlipidemia Maternal Grandmother     Heart Disease Maternal Grandmother     Cancer Paternal Grandfather         Lung ca, smoker    Lung Cancer Maternal Grandfather     Alzheimer's Disease Maternal Aunt     Kidney Disease Maternal Aunt     Diabetes Neg Hx      Recent Labs     04/09/24  1359 04/29/24  1543 09/11/24  1314 12/02/24  1341 12/02/24  1344   ALBUMIN  --   --  3.7 4.1  --    HDL 50  --   --   --   --    TRIGLYCERIDE 128  --   --   --   --    SODIUM  --    < > 143 140 139   POTASSIUM  --    < > 4.2 4.3 4.4   CHLORIDE  --    < > 109 106 106   CO2  --    < > 22 23 23   BUN  --    < > 36* 21 21   CREATININE  --    < > 1.22 1.34 1.28    < > = values in this interval not displayed.                             Assessment & Plan        Assessment & Plan  Essential hypertension  Controlled  Continue same medication regimen  Continue low-sodium diet      Stage 3a chronic kidney disease  Stable  No uremic symptoms  Renal dose of medication  Avoid nephrotoxins  Continue same medication regimen  Patient was advised to call us if symptoms worsen      NSCLC of left lung (HCC)  in remission  Patient is to follow-up with oncology

## 2024-12-09 ENCOUNTER — OFFICE VISIT (OUTPATIENT)
Dept: DERMATOLOGY | Facility: IMAGING CENTER | Age: 69
End: 2024-12-09
Payer: MEDICARE

## 2024-12-09 DIAGNOSIS — L65.9 HAIR LOSS: ICD-10-CM

## 2024-12-09 DIAGNOSIS — L73.9 FOLLICULITIS: ICD-10-CM

## 2024-12-09 PROCEDURE — 99213 OFFICE O/P EST LOW 20 MIN: CPT | Performed by: NURSE PRACTITIONER

## 2024-12-09 RX ORDER — CLINDAMYCIN PHOSPHATE 11.9 MG/ML
SOLUTION TOPICAL
Qty: 60 ML | Refills: 3 | Status: SHIPPED | OUTPATIENT
Start: 2024-12-09

## 2024-12-09 NOTE — PROGRESS NOTES
DERMATOLOGY NOTE  FOLLOW UP VISIT       Chief complaint:  Hair loss      Hair loss x 1 month and has small bumps . States has thin hair     Hx of chemotherapy x 1 year ago , Dx lung cancer   History of skin cancer: No  History of precancers/actinic keratoses: Yes, Details: face  History of biopsies:No  History of blistering/severe sunburns:Yes, Details: Teenager   Family history of skin cancer: Yes. Maternal uncle   Family history of atypical moles:No      Allergies   Allergen Reactions    Codeine Unspecified     Memory issues        MEDICATIONS:  Medications relevant to specialty reviewed.     REVIEW OF SYSTEMS:   Positive for skin (see HPI)  Negative for fevers and chills       EXAM:  There were no vitals taken for this visit.  Constitutional: Well-developed, well-nourished, and in no distress.     A focused skin exam was performed including the affected areas of the scalp. Notable findings on exam today listed below and/or in assessment/plan.     Few scattered erythematous papules in varying stages of healing throughout scalp  Notable hair thinning throughout scalp, no evidence of patchy AA, infection, or irritation other than the above    IMPRESSION / PLAN:    1. Folliculitis, favored dx  Discussed course/nature of lesion  Will trial Rx below  Follow up for no improvement  - clindamycin (CLEOCIN) 1 % Solution; AAA, scalp, twice a day for 1-2 weeks, then can use as needed  Dispense: 60 mL; Refill: 3    2. Hair loss--likely TE  Advised of good hair health  Will trial Rx below  Advised can take up to 3 months to see improvement  - NON SPECIFIED; Compound: minoxidil 10% with finasteride 0.1% with latanoprost 0.01% with biotin 0.2%. AAA, scalp 1-2 times per day  Dispense: 30 Each; Refill: 3      Patient verbalized understanding and agrees with plan regarding the above          Please note that this dictation was created using voice recognition software. I have made every reasonable attempt to correct obvious errors,  but I expect that there are errors of grammar and possibly content that I did not discover before finalizing the note.      Return to clinic in: Return for PRN. and as needed for any new or changing skin lesions.

## 2024-12-24 ENCOUNTER — TELEPHONE (OUTPATIENT)
Dept: ENDOCRINOLOGY | Facility: MEDICAL CENTER | Age: 69
End: 2024-12-24
Payer: MEDICARE

## 2024-12-25 NOTE — TELEPHONE ENCOUNTER
Called Pt and reminded them of their appt on 01/08/25 and let them know they have labs due prior to their appt.    They said they would get their labs done prior to their appt.

## 2025-01-01 DIAGNOSIS — E78.5 DYSLIPIDEMIA: ICD-10-CM

## 2025-01-03 ENCOUNTER — HOSPITAL ENCOUNTER (OUTPATIENT)
Dept: LAB | Facility: MEDICAL CENTER | Age: 70
End: 2025-01-03
Attending: STUDENT IN AN ORGANIZED HEALTH CARE EDUCATION/TRAINING PROGRAM
Payer: MEDICARE

## 2025-01-03 LAB
T4 FREE SERPL-MCNC: 1.3 NG/DL (ref 0.93–1.7)
TSH SERPL-ACNC: 2.03 UIU/ML (ref 0.35–5.5)

## 2025-01-03 PROCEDURE — 36415 COLL VENOUS BLD VENIPUNCTURE: CPT

## 2025-01-03 PROCEDURE — 84439 ASSAY OF FREE THYROXINE: CPT

## 2025-01-03 PROCEDURE — 84443 ASSAY THYROID STIM HORMONE: CPT

## 2025-01-03 RX ORDER — FENOFIBRATE 134 MG/1
134 CAPSULE ORAL DAILY
Qty: 100 CAPSULE | Refills: 1 | Status: SHIPPED | OUTPATIENT
Start: 2025-01-03

## 2025-01-03 NOTE — TELEPHONE ENCOUNTER
Received request via: Pharmacy    Was the patient seen in the last year in this department? Yes    Does the patient have an active prescription (recently filled or refills available) for medication(s) requested? No    Pharmacy Name: [facility]    Does the patient have shelter Plus and need 100-day supply? (This applies to ALL medications) Yes, quantity updated to 100 days

## 2025-01-05 ENCOUNTER — PATIENT MESSAGE (OUTPATIENT)
Dept: ENDOCRINOLOGY | Facility: MEDICAL CENTER | Age: 70
End: 2025-01-05
Payer: MEDICARE

## 2025-01-05 DIAGNOSIS — E03.9 HYPOTHYROIDISM (ACQUIRED): ICD-10-CM

## 2025-01-14 ENCOUNTER — PATIENT MESSAGE (OUTPATIENT)
Dept: MEDICAL GROUP | Facility: PHYSICIAN GROUP | Age: 70
End: 2025-01-14
Payer: MEDICARE

## 2025-01-14 RX ORDER — METOPROLOL SUCCINATE 25 MG/1
25 TABLET, EXTENDED RELEASE ORAL DAILY
Qty: 30 TABLET | Refills: 0 | Status: SHIPPED | OUTPATIENT
Start: 2025-01-14

## 2025-02-06 ENCOUNTER — APPOINTMENT (OUTPATIENT)
Dept: MEDICAL GROUP | Facility: PHYSICIAN GROUP | Age: 70
End: 2025-02-06
Payer: MEDICARE

## 2025-02-06 VITALS
BODY MASS INDEX: 22.5 KG/M2 | WEIGHT: 127 LBS | DIASTOLIC BLOOD PRESSURE: 70 MMHG | HEART RATE: 87 BPM | TEMPERATURE: 97.7 F | HEIGHT: 63 IN | SYSTOLIC BLOOD PRESSURE: 120 MMHG | OXYGEN SATURATION: 98 %

## 2025-02-06 DIAGNOSIS — Z85.118 HISTORY OF PRIMARY NON-SMALL CELL CARCINOMA OF LEFT LUNG: ICD-10-CM

## 2025-02-06 DIAGNOSIS — E03.2 HYPOTHYROIDISM DUE TO MEDICATION: ICD-10-CM

## 2025-02-06 DIAGNOSIS — I10 ESSENTIAL HYPERTENSION: ICD-10-CM

## 2025-02-06 DIAGNOSIS — N18.31 STAGE 3A CHRONIC KIDNEY DISEASE: ICD-10-CM

## 2025-02-06 DIAGNOSIS — E78.5 DYSLIPIDEMIA: ICD-10-CM

## 2025-02-06 DIAGNOSIS — M25.511 ACUTE PAIN OF BOTH SHOULDERS: ICD-10-CM

## 2025-02-06 DIAGNOSIS — I70.0 ATHEROSCLEROSIS OF AORTA (HCC): ICD-10-CM

## 2025-02-06 DIAGNOSIS — M25.512 ACUTE PAIN OF BOTH SHOULDERS: ICD-10-CM

## 2025-02-06 DIAGNOSIS — Z00.00 ENCOUNTER FOR ANNUAL WELLNESS VISIT (AWV) IN MEDICARE PATIENT: ICD-10-CM

## 2025-02-06 DIAGNOSIS — Z23 NEED FOR VACCINATION: ICD-10-CM

## 2025-02-06 PROBLEM — Z87.19 HISTORY OF COLITIS: Status: ACTIVE | Noted: 2025-02-06

## 2025-02-06 PROBLEM — R53.83 OTHER FATIGUE: Status: RESOLVED | Noted: 2022-07-05 | Resolved: 2025-02-06

## 2025-02-06 PROCEDURE — G0438 PPPS, INITIAL VISIT: HCPCS | Mod: 25 | Performed by: STUDENT IN AN ORGANIZED HEALTH CARE EDUCATION/TRAINING PROGRAM

## 2025-02-06 PROCEDURE — 90662 IIV NO PRSV INCREASED AG IM: CPT | Performed by: STUDENT IN AN ORGANIZED HEALTH CARE EDUCATION/TRAINING PROGRAM

## 2025-02-06 PROCEDURE — 3078F DIAST BP <80 MM HG: CPT | Performed by: STUDENT IN AN ORGANIZED HEALTH CARE EDUCATION/TRAINING PROGRAM

## 2025-02-06 PROCEDURE — 3074F SYST BP LT 130 MM HG: CPT | Performed by: STUDENT IN AN ORGANIZED HEALTH CARE EDUCATION/TRAINING PROGRAM

## 2025-02-06 PROCEDURE — G0008 ADMIN INFLUENZA VIRUS VAC: HCPCS | Performed by: STUDENT IN AN ORGANIZED HEALTH CARE EDUCATION/TRAINING PROGRAM

## 2025-02-06 ASSESSMENT — ACTIVITIES OF DAILY LIVING (ADL): BATHING_REQUIRES_ASSISTANCE: 0

## 2025-02-06 ASSESSMENT — PATIENT HEALTH QUESTIONNAIRE - PHQ9: CLINICAL INTERPRETATION OF PHQ2 SCORE: 0

## 2025-02-06 ASSESSMENT — ENCOUNTER SYMPTOMS: GENERAL WELL-BEING: GOOD

## 2025-02-06 ASSESSMENT — FIBROSIS 4 INDEX: FIB4 SCORE: 1.08

## 2025-02-06 NOTE — PROGRESS NOTES
Chief Complaint   Patient presents with    Medicare Annual Wellness       HPI:  Latrice Andersen is a 69 y.o. here for Medicare Annual Wellness Visit     Patient Active Problem List    Diagnosis Date Noted    History of colitis 02/06/2025    Hypothyroidism due to medication 08/29/2024    Atherosclerosis of aorta (HCC) 01/09/2024    Polyp of sigmoid colon 12/15/2023    Drug-induced colitis 09/13/2023    Renal mass 09/11/2023    Metabolic acidosis 09/11/2023    History of primary non-small cell carcinoma of left lung 06/29/2023    History of radiation therapy 05/11/2023    Former smoker 05/11/2023    Other specified anemias 08/05/2022    Dysuria 07/05/2022    Stage 3a chronic kidney disease 07/02/2021    History of breast cancer 02/06/2017    Essential hypertension 09/02/2015    Status post mastectomy 07/01/2014    Anxiety disorder 04/29/2014    Dyslipidemia 06/07/2013       Current Outpatient Medications   Medication Sig Dispense Refill    metoprolol SR (TOPROL XL) 25 MG TABLET SR 24 HR Take 1 Tablet by mouth every day. 30 Tablet 0    fenofibrate micronized (LOFIBRA) 134 MG capsule TAKE ONE CAPSULE BY MOUTH ONE TIME DAILY 100 Capsule 1    clindamycin (CLEOCIN) 1 % Solution AAA, scalp, twice a day for 1-2 weeks, then can use as needed 60 mL 3    NON SPECIFIED Compound: minoxidil 10% with finasteride 0.1% with latanoprost 0.01% with biotin 0.2%. AAA, scalp 1-2 times per day 30 Each 3    levothyroxine (SYNTHROID) 50 MCG Tab Take 1 Tablet by mouth every morning on an empty stomach. Please make an appointment for any future refills. 90 Tablet 0    pravastatin (PRAVACHOL) 40 MG tablet Take 1 Tablet by mouth every day. 100 Tablet 2    venlafaxine (EFFEXOR) 75 MG Tab TAKE ONE TABLET BY MOUTH ONE TIME DAILY 90 Tablet 1    multivitamin Tab Take 1 Tablet by mouth every day. MEDICATION INSTRUCTIONS FOR SURGERY/PROCEDURE SCHEDULED FOR 9/9/24   DO NOT TAKE 7 DAYS PRIOR TO SURGERY      Multiple Vitamins-Minerals (HAIR SKIN &  NAILS ADVANCED PO) Take  by mouth every day. MEDICATION INSTRUCTIONS FOR SURGERY/PROCEDURE SCHEDULED FOR 9/9/24   DO NOT TAKE 7 DAYS PRIOR TO SURGERY      acetaminophen (TYLENOL) 325 MG Tab Take 650 mg by mouth every four hours as needed.      lisinopril (PRINIVIL) 10 MG Tab Take 1 Tablet by mouth every day. 100 Tablet 3    VITAMIN D PO Take 5,000 Units by mouth every day. DO NOT TAKE 7 DAYS PRIOR TO SURGERY       No current facility-administered medications for this visit.          Current supplements as per medication list.     Allergies: Codeine    Current social contact/activities: walking      She  reports that she quit smoking about 2 years ago. Her smoking use included cigarettes. She started smoking about 47 years ago. She has a 11.3 pack-year smoking history. She has been exposed to tobacco smoke. She quit smokeless tobacco use about 2 years ago. She reports that she does not drink alcohol and does not use drugs.  Counseling given: Not Answered  Tobacco comments: 5 daily       ROS:    Gait: Uses no assistive device  Ostomy: No  Other tubes: No  Amputations: No  Chronic oxygen use: No  Last eye exam: unsure   Wears hearing aids: No   : Denies any urinary leakage during the last 6 months    Screening:    Depression Screening  Little interest or pleasure in doing things?  0 - not at all  Feeling down, depressed , or hopeless? 0 - not at all  Patient Health Questionnaire Score: 0     If depressive symptoms identified deferred to follow up visit unless specifically addressed in assessment and plan.    Interpretation of PHQ-9 Total Score   Score Severity   1-4 No Depression   5-9 Mild Depression   10-14 Moderate Depression   15-19 Moderately Severe Depression   20-27 Severe Depression    Screening for Cognitive Impairment  Do you or any of your friends or family members have any concern about your memory? No  Three Minute Recall (Leader, Season, Table) 2/3    Hugo clock face with all 12 numbers and set the  hands to show 10 minutes after 11.  Yes    Cognitive concerns identified deferred for follow up unless specifically addressed in assessment and plan.    Fall Risk Assessment  Has the patient had two or more falls in the last year or any fall with injury in the last year?  No    Safety Assessment  Do you always wear your seatbelt?  Yes  Any changes to home needed to function safely? No  Difficulty hearing.  No  Patient counseled about all safety risks that were identified.    Functional Assessment ADLs  Are there any barriers preventing you from cooking for yourself or meeting nutritional needs?  No.    Are there any barriers preventing you from driving safely or obtaining transportation?  No.    Are there any barriers preventing you from using a telephone or calling for help?  No    Are there any barriers preventing you from shopping?  No.    Are there any barriers preventing you from taking care of your own finances?  No    Are there any barriers preventing you from managing your medications?  No    Are there any barriers preventing you from showering, bathing or dressing yourself? No    Are there any barriers preventing you from doing housework or laundry? No  Are there any barriers preventing you from using the toilet?No  Are you currently engaging in any exercise or physical activity?  Yes.      Self-Assessment of Health  What is your perception of your health? Good    Do you sleep more than six hours a night? Yes    In the past 7 days, how much did pain keep you from doing your normal work? None    Do you spend quality time with family or friends (virtually or in person)? Yes    Do you usually eat a heart healthy diet that constists of a variety of fruits, vegetables, whole grains and fiber? Yes    Do you eat foods high in fat and/or Fast Food more than three times per week? No    How concerned are you that your medical conditions are not being well managed? Not at all    Are you worried that in the next 2  months, you may not have stable housing that you own, rent, or stay in as part of a household? No      Advance Care Planning  Do you have an Advance Directive, Living Will, Durable Power of , or POLST? No                 Health Maintenance Summary            Postponed - COVID-19 Vaccine (6 - 2024-25 season) Postponed until 2/6/2027 12/06/2022  Imm Admin: PFIZER BIVALENT SARS-COV-2 VACCINE (12+)    06/03/2022  Imm Admin: PFIZER PURPLE CAP SARS-COV-2 VACCINATION (12+)    01/19/2022  Imm Admin: PFIZER VALENZUELA CAP SARS-COV-2 VACCINATION (12+)    07/28/2021  Imm Admin: PFIZER PURPLE CAP SARS-COV-2 VACCINATION (12+)    07/07/2021  Imm Admin: PFIZER PURPLE CAP SARS-COV-2 VACCINATION (12+)    Only the first 5 history entries have been loaded, but more history exists.              Mammogram (Yearly) Next due on 8/22/2025 08/22/2024  MA-SCREENING MAMMO RIGHT W/TOMOSYNTHESIS W/CAD    08/22/2023  MA-SCREENING MAMMO RIGHT W/TOMOSYNTHESIS W/CAD    04/27/2022  MA-SCREENING MAMMO BILAT W/TOMOSYNTHESIS W/CAD    03/05/2021  Done    03/05/2021  MA-SCREENING MAMMO RIGHT W/TOMOSYNTHESIS W/CAD    Only the first 5 history entries have been loaded, but more history exists.              Annual Wellness Visit (Yearly) Next due on 2/6/2026 02/06/2025  Level of Service: AR INITIAL ANNUAL WELLNESS VISIT-INCLUDES PPPS              Colorectal Cancer Screening (Colonoscopy - Every 3 Years) Tentatively due on 3/15/2026      03/15/2023  AMB EXTERNAL COLONOSCOPY RESULTS    08/12/2022  OCCULT BLOOD FECES IMMUNOASSAY    10/19/2020  REFERRAL TO GI FOR COLONOSCOPY    01/17/2020  REFERRAL TO GI FOR COLONOSCOPY    09/01/2006  Colonoscopy (Previously completed - every 10 years)    Only the first 5 history entries have been loaded, but more history exists.              Bone Density Scan (Every 5 Years) Tentatively due on 5/14/2026 05/14/2021  DS-BONE DENSITY STUDY (DEXA)              IMM DTaP/Tdap/Td Vaccine (3 - Td or Tdap) Next  due on 11/20/2029 11/20/2019  Imm Admin: Tdap Vaccine    04/29/2014  Imm Admin: Tdap Vaccine              Hepatitis C Screening  Tentatively Complete      11/20/2019  Hepatitis C Antibody component of HEP C VIRUS ANTIBODY              Zoster (Shingles) Vaccines (Series Information) Completed      02/19/2021  Imm Admin: Zoster Vaccine Recombinant (RZV) (SHINGRIX)    12/09/2020  Imm Admin: Zoster Vaccine Recombinant (RZV) (SHINGRIX)              Pneumococcal Vaccine: 65+ Years (Series Information) Completed      04/16/2024  Imm Admin: Pneumococcal Conjugate Vaccine (PCV20)    04/01/2024  Imm Admin: Pneumococcal polysaccharide vaccine (PPSV-23)    12/09/2020  Imm Admin: Pneumococcal polysaccharide vaccine (PPSV-23)              Influenza Vaccine (Series Information) Completed      02/06/2025  Imm Admin: Influenza high-dose trivalent (PF)    01/09/2024  Imm Admin: Influenza Vaccine Adult HD    11/08/2022  Imm Admin: Influenza Vaccine Adult HD    10/01/2022  Imm Admin: Influenza Seasonal Injectable - Historical Data    01/03/2022  Imm Admin: Influenza Vaccine Adult HD    Only the first 5 history entries have been loaded, but more history exists.              Hepatitis A Vaccine (Hep A) (Series Information) Aged Out      No completion history exists for this topic.              Hepatitis B Vaccine (Hep B) (Series Information) Aged Out      No completion history exists for this topic.              HPV Vaccines (Series Information) Aged Out      No completion history exists for this topic.              Polio Vaccine (Inactivated Polio) (Series Information) Aged Out      No completion history exists for this topic.              Meningococcal Immunization (Series Information) Aged Out      No completion history exists for this topic.              Discontinued - Cervical Cancer Screening  Discontinued        Frequency changed to Never automatically (Topic No Longer Applies)    09/13/2019  THINPREP PAP WITH HPV     2019  Pathology Gynecology Specimen    2014  Done                    Patient Care Team:  Beata White P.A.-C. as PCP - General (Physician Assistant)  IFEOMA [place] (Medical Oncology)  Jose Kay M.D. (Medical Oncology)        Social History     Tobacco Use    Smoking status: Former     Current packs/day: 0.00     Average packs/day: 0.3 packs/day for 45.0 years (11.3 ttl pk-yrs)     Types: Cigarettes     Start date: 1977     Quit date: 2022     Years since quittin.6     Passive exposure: Current (Spouse)    Smokeless tobacco: Former     Quit date: 2022    Tobacco comments:     5 daily    Vaping Use    Vaping status: Never Used   Substance Use Topics    Alcohol use: No    Drug use: No     Family History   Problem Relation Age of Onset    Cancer Mother         breast cancer/Breast    Breast Cancer Mother     Hypertension Maternal Uncle     Cancer Maternal Uncle     Hypertension Maternal Grandmother     Hyperlipidemia Maternal Grandmother     Heart Disease Maternal Grandmother     Cancer Paternal Grandfather         Lung ca, smoker    Lung Cancer Maternal Grandfather     Alzheimer's Disease Maternal Aunt     Kidney Disease Maternal Aunt     Diabetes Neg Hx      She  has a past medical history of Allergy, unspecified not elsewhere classified, Anxiety disorder (2014), Bowel habit changes (2023), Breast cancer ([facility]) (), breast cancer , Cancer ([facility]) (2023), Chickenpox, Dental disorder, Disorder of thyroid, Former smoker (2023), High cholesterol, History of breast cancer (2017), History of radiation therapy (2023), Hyperlipidemia, Hypertension, Left upper lobe pulmonary nodule (2023), Renal disorder, and Tonsillitis.    She has no past medical history of Acute nasopharyngitis, Anesthesia, Anginal syndrome ([facility]), Arrhythmia, Arthritis, Asthma, Blood clotting disorder ([facility]), Breath shortness, Bronchitis, Carcinoma in situ  of respiratory system, Cataract, Congestive heart failure (HCC), Continuous ambulatory peritoneal dialysis status (HCC), Coughing blood, Diabetes (HCC), Dialysis patient (HCC), Emphysema of lung (HCC), Glaucoma, Gynecological disorder, Heart burn, Heart murmur, Heart valve disease, Hemorrhagic disorder (HCC), Hepatitis A, Hepatitis B, Hepatitis C, Hiatus hernia syndrome, Indigestion, Jaundice, Myocardial infarct (HCC), Pacemaker, Pain, Pneumonia, Pregnant, Rheumatic fever, Seizure (HCC), Sleep apnea, Snoring, Stroke (HCC), Tuberculosis, Urinary bladder disorder, or Urinary incontinence.   Past Surgical History:   Procedure Laterality Date    KS BRONCHOSCOPY,DIAGNOSTIC N/A 9/10/2024    Procedure: BRONCHOSCOPY, ROBOT-ASSISTED - FIBER OPTIC BRONCHOSCOPY WITH  WASH, BRUSH, BRONCHOALVEOLAR LAVAGE, BIOPSY AND FINE NEEDLE ASPIRATION, ENDOBRONCHIAL ULTRASOUND AND NAVIGATION, ROBOTICS;  Surgeon: Marie Alonso M.D.;  Location: [facility];  Service: Pulmonary Robotic    ENDOBRONCHIAL US ADD-ON N/A 9/10/2024    Procedure: ENDOBRONCHIAL ULTRASOUND (EBUS);  Surgeon: Marie Alonso M.D.;  Location: [facility];  Service: Pulmonary Robotic    KS BRONCHOSCOPY,DIAGNOSTIC N/A 6/6/2024    Procedure: FIBER OPTIC BRONCHOSCOPY WITH BRONCHOALVEOLAR LAVAGE, BIOPSY AND FINE NEEDLE ASPIRATION AND NAVIGATION, ROBOTICS;  Surgeon: Nickolas Berrios M.D.;  Location: [facility];  Service: Pulmonary Robotic    KS THORACOSCOPY,DX NO BX Left 1/30/2024    Procedure: THORACOSCOPY;  Surgeon: John H Ganser, M.D.;  Location: [facility];  Service: General    MASS EXCISION GENERAL Left 1/30/2024    Procedure: EXCISION, MASS, INTERNAL MAMMARY;  Surgeon: John H Ganser, M.D.;  Location: [facility];  Service: General    LOBECTOMY Left 1/30/2024    Procedure: LOBECTOMY, UPPER;  Surgeon: John H Ganser, M.D.;  Location: [facility];  Service: General    NODE DISSECTION Left 1/30/2024     "Procedure: LYMPH NODE DISSECTION;  Surgeon: John H Ganser, M.D.;  Location: [facility];  Service: General    UT SIGMOIDOSCOPY,DIAGNOSTIC  12/15/2023    Procedure: FLEXIBLE SIGMOIDOSCOPY;  Surgeon: Francois Rollisn M.D.;  Location: [facility];  Service: Gastroenterology    UT BRONCHOSCOPY,DIAGNOSTIC N/A 06/12/2023    Procedure: FIBER OPTIC BRONCHOSCOPY WITH  WASH, BRUSH, BRONCHOALVEOLAR LAVAGE, BIOPSY AND FINE NEEDLE ASPIRATION, ENDOBRONCHIAL ULTRASOUND & NAVIGATION, ROBOTICS;  Surgeon: Brooke Perrin M.D.;  Location: [facility];  Service: Pulmonary Robotic    ENDOBRONCHIAL US ADD-ON N/A 06/12/2023    Procedure: ENDOBRONCHIAL ULTRASOUND (EBUS);  Surgeon: Brooke Perrin M.D.;  Location: [facility];  Service: Pulmonary Robotic    GYN SURGERY      Tubiligation    MASTECTOMY      Left    NO PERTINENT PAST SURGICAL HISTORY  1995    Breast Cancer (Left Mastectomy)    OTHER  1986    Tubaligation    UT CHEMOTHERAPY, UNSPECIFIED PROCEDURE      UT RADIATION THERAPY PLAN SIMPLE         Exam:   /70 (BP Location: Left arm, Patient Position: Sitting, BP Cuff Size: Adult)   Pulse 87   Temp 36.5 °C (97.7 °F) (Temporal)   Ht 1.6 m (5' 3\")   Wt 57.6 kg (127 lb)   SpO2 98%  Body mass index is 22.5 kg/m².    Hearing good.    Dentition fair  Alert, oriented in no acute distress.  Eye contact is good, speech goal directed, affect calm    Assessment and Plan. The following treatment and monitoring plan is recommended:      1. Encounter for annual wellness visit (AWV) in Medicare patient  Services suggested: No services needed at this time  Health Care Screening: Age-appropriate preventive services recommended by USPTF and ACIP covered by Medicare were discussed today. Services ordered if indicated and agreed upon by the patient.  Referrals offered: Community-based lifestyle interventions to reduce health risks and promote self-management and wellness, fall prevention, " nutrition, physical activity, tobacco-use cessation, weight loss, and mental health services as per orders if indicated.    Discussion today about general wellness and lifestyle habits:    Prevent falls and reduce trip hazards; Cautioned about securing or removing rugs.  Have a working fire alarm and carbon monoxide detector;   Engage in regular physical activity and social activities     2. Acute pain of both shoulders  Ongoing 2-3 months. No injury. Pain of both shoulders with overhead reaching. Recommend trial of PT. Continue Tylenol PRN.  - Referral to Physical Therapy    3. Atherosclerosis of aorta (HCC)  4. Dyslipidemia  Chronic, controlled. Continue pravastatin 40 mg daily and Lofibra 124 mg daily.  - Lipid Profile; Future    5. History of primary non-small cell carcinoma of left lung  Status post 3 cycles of chemoimmunotherapy patient had significant colitis secondary to immunotherapy. Status post lobectomy for resection. Currently under surveillance imaging with heme-onc.     6. Hypothyroidism due to medication  Chronic. 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 On levothyroxine 50 mcg daily. Last TSH within normal limits and the patient feels well. Follows with endocrinology for management.    7. Essential hypertension  Chronic, controlled. Continue lisinopril 10 mg daily, metoprolol 25 mg daily.    9. Need for vaccination  - INFLUENZA VACCINE, HIGH DOSE (65+ ONLY)      HCC Gap Form    Diagnosis: N18.31 - Stage 3a chronic kidney disease  Assessment and plan: Chronic, stable. Continue with current defined treatment plan: Avoid NSAIDs, stay well hydrated, continued monitoring. Follow-up at least annually.  Last edited 02/06/25 13:57 PST by Beata White P.A.-C.              Follow-up: Return in about 6 months (around 8/6/2025) for follow up.

## 2025-02-12 ENCOUNTER — HOSPITAL ENCOUNTER (OUTPATIENT)
Dept: LAB | Facility: MEDICAL CENTER | Age: 70
End: 2025-02-12
Attending: INTERNAL MEDICINE
Payer: MEDICARE

## 2025-02-12 ENCOUNTER — HOSPITAL ENCOUNTER (OUTPATIENT)
Dept: LAB | Facility: MEDICAL CENTER | Age: 70
End: 2025-02-12
Attending: STUDENT IN AN ORGANIZED HEALTH CARE EDUCATION/TRAINING PROGRAM
Payer: MEDICARE

## 2025-02-12 DIAGNOSIS — E03.9 HYPOTHYROIDISM (ACQUIRED): ICD-10-CM

## 2025-02-12 DIAGNOSIS — C34.92 NSCLC OF LEFT LUNG (HCC): ICD-10-CM

## 2025-02-12 LAB
BASOPHILS # BLD AUTO: 1.1 % (ref 0–1.8)
BASOPHILS # BLD: 0.08 K/UL (ref 0–0.12)
EOSINOPHIL # BLD AUTO: 0.2 K/UL (ref 0–0.51)
EOSINOPHIL NFR BLD: 2.6 % (ref 0–6.9)
ERYTHROCYTE [DISTWIDTH] IN BLOOD BY AUTOMATED COUNT: 50.2 FL (ref 35.9–50)
HCT VFR BLD AUTO: 40.5 % (ref 37–47)
HGB BLD-MCNC: 12.5 G/DL (ref 12–16)
IMM GRANULOCYTES # BLD AUTO: 0.02 K/UL (ref 0–0.11)
IMM GRANULOCYTES NFR BLD AUTO: 0.3 % (ref 0–0.9)
LYMPHOCYTES # BLD AUTO: 2.8 K/UL (ref 1–4.8)
LYMPHOCYTES NFR BLD: 37.1 % (ref 22–41)
MCH RBC QN AUTO: 28.2 PG (ref 27–33)
MCHC RBC AUTO-ENTMCNC: 30.9 G/DL (ref 32.2–35.5)
MCV RBC AUTO: 91.4 FL (ref 81.4–97.8)
MONOCYTES # BLD AUTO: 0.59 K/UL (ref 0–0.85)
MONOCYTES NFR BLD AUTO: 7.8 % (ref 0–13.4)
NEUTROPHILS # BLD AUTO: 3.86 K/UL (ref 1.82–7.42)
NEUTROPHILS NFR BLD: 51.1 % (ref 44–72)
NRBC # BLD AUTO: 0 K/UL
NRBC BLD-RTO: 0 /100 WBC (ref 0–0.2)
PLATELET # BLD AUTO: 424 K/UL (ref 164–446)
PMV BLD AUTO: 9.4 FL (ref 9–12.9)
RBC # BLD AUTO: 4.43 M/UL (ref 4.2–5.4)
WBC # BLD AUTO: 7.6 K/UL (ref 4.8–10.8)

## 2025-02-12 PROCEDURE — 85025 COMPLETE CBC W/AUTO DIFF WBC: CPT

## 2025-02-12 PROCEDURE — 36415 COLL VENOUS BLD VENIPUNCTURE: CPT

## 2025-02-12 PROCEDURE — 84439 ASSAY OF FREE THYROXINE: CPT

## 2025-02-12 PROCEDURE — 84443 ASSAY THYROID STIM HORMONE: CPT

## 2025-02-12 PROCEDURE — 80053 COMPREHEN METABOLIC PANEL: CPT

## 2025-02-12 NOTE — ADDENDUM NOTE
Encounter addended by: Naomie Figueroa on: 2/12/2025 12:32 PM   Actions taken: Visit diagnoses modified, Diagnosis association updated

## 2025-02-12 NOTE — ADDENDUM NOTE
Encounter addended by: Naomie Figueroa on: 2/12/2025 12:31 PM   Actions taken: Visit diagnoses modified

## 2025-02-13 LAB
ALBUMIN SERPL BCP-MCNC: 4 G/DL (ref 3.2–4.9)
ALBUMIN/GLOB SERPL: 1.3 G/DL
ALP SERPL-CCNC: 63 U/L (ref 30–99)
ALT SERPL-CCNC: 21 U/L (ref 2–50)
ANION GAP SERPL CALC-SCNC: 11 MMOL/L (ref 7–16)
AST SERPL-CCNC: 27 U/L (ref 12–45)
BILIRUB SERPL-MCNC: 0.2 MG/DL (ref 0.1–1.5)
BUN SERPL-MCNC: 29 MG/DL (ref 8–22)
CALCIUM ALBUM COR SERPL-MCNC: 10.1 MG/DL (ref 8.5–10.5)
CALCIUM SERPL-MCNC: 10.1 MG/DL (ref 8.5–10.5)
CHLORIDE SERPL-SCNC: 106 MMOL/L (ref 96–112)
CO2 SERPL-SCNC: 22 MMOL/L (ref 20–33)
CREAT SERPL-MCNC: 1.33 MG/DL (ref 0.5–1.4)
GFR SERPLBLD CREATININE-BSD FMLA CKD-EPI: 43 ML/MIN/1.73 M 2
GLOBULIN SER CALC-MCNC: 3 G/DL (ref 1.9–3.5)
GLUCOSE SERPL-MCNC: 89 MG/DL (ref 65–99)
POTASSIUM SERPL-SCNC: 4.2 MMOL/L (ref 3.6–5.5)
PROT SERPL-MCNC: 7 G/DL (ref 6–8.2)
SODIUM SERPL-SCNC: 139 MMOL/L (ref 135–145)
T4 FREE SERPL-MCNC: 1.14 NG/DL (ref 0.93–1.7)
TSH SERPL-ACNC: 2.16 UIU/ML (ref 0.35–5.5)

## 2025-02-18 RX ORDER — METOPROLOL SUCCINATE 25 MG/1
25 TABLET, EXTENDED RELEASE ORAL DAILY
Qty: 100 TABLET | Refills: 1 | Status: SHIPPED | OUTPATIENT
Start: 2025-02-18

## 2025-02-18 NOTE — TELEPHONE ENCOUNTER
Received request via: Patient    Was the patient seen in the last year in this department? Yes    Does the patient have an active prescription (recently filled or refills available) for medication(s) requested? No    Pharmacy Name: [facility] [place], [place] - [address]     Does the patient have [facility] and need 100-day supply? (This applies to ALL medications) Yes, quantity updated to 100 days

## 2025-02-20 ENCOUNTER — HOSPITAL ENCOUNTER (OUTPATIENT)
Dept: RADIOLOGY | Facility: MEDICAL CENTER | Age: 70
End: 2025-02-20
Attending: INTERNAL MEDICINE
Payer: MEDICARE

## 2025-02-20 DIAGNOSIS — C34.92 NSCLC OF LEFT LUNG (HCC): ICD-10-CM

## 2025-02-20 PROCEDURE — 700117 HCHG RX CONTRAST REV CODE 255: Performed by: INTERNAL MEDICINE

## 2025-02-20 PROCEDURE — 71260 CT THORAX DX C+: CPT

## 2025-02-20 RX ADMIN — IOHEXOL 100 ML: 350 INJECTION, SOLUTION INTRAVENOUS at 14:10

## 2025-02-20 RX ADMIN — IOHEXOL 50 ML: 240 INJECTION, SOLUTION INTRATHECAL; INTRAVASCULAR; INTRAVENOUS; ORAL at 14:10

## 2025-03-03 ENCOUNTER — HOSPITAL ENCOUNTER (OUTPATIENT)
Dept: HEMATOLOGY ONCOLOGY | Facility: MEDICAL CENTER | Age: 70
End: 2025-03-03
Attending: INTERNAL MEDICINE
Payer: MEDICARE

## 2025-03-03 VITALS
WEIGHT: 125.66 LBS | BODY MASS INDEX: 22.27 KG/M2 | OXYGEN SATURATION: 94 % | TEMPERATURE: 96.6 F | DIASTOLIC BLOOD PRESSURE: 86 MMHG | RESPIRATION RATE: 18 BRPM | SYSTOLIC BLOOD PRESSURE: 154 MMHG | HEART RATE: 99 BPM | HEIGHT: 63 IN

## 2025-03-03 DIAGNOSIS — Z85.118 HISTORY OF PRIMARY NON-SMALL CELL CARCINOMA OF LEFT LUNG: ICD-10-CM

## 2025-03-03 PROCEDURE — 99214 OFFICE O/P EST MOD 30 MIN: CPT | Performed by: INTERNAL MEDICINE

## 2025-03-03 PROCEDURE — 99212 OFFICE O/P EST SF 10 MIN: CPT | Performed by: INTERNAL MEDICINE

## 2025-03-03 ASSESSMENT — ENCOUNTER SYMPTOMS
DIZZINESS: 0
VOMITING: 0
MEMORY LOSS: 0
TINGLING: 0
BLURRED VISION: 0
FOCAL WEAKNESS: 0
SPUTUM PRODUCTION: 0
SORE THROAT: 0
BRUISES/BLEEDS EASILY: 0
PALPITATIONS: 0
ORTHOPNEA: 0
FEVER: 0
ABDOMINAL PAIN: 0
HEADACHES: 0
SENSORY CHANGE: 0
NAUSEA: 0
HEARTBURN: 0
SHORTNESS OF BREATH: 0
NECK PAIN: 0
WHEEZING: 0
COUGH: 0
TREMORS: 0
DEPRESSION: 0
CHILLS: 0
WEIGHT LOSS: 0

## 2025-03-03 ASSESSMENT — PAIN SCALES - GENERAL: PAINLEVEL_OUTOF10: NO PAIN

## 2025-03-03 ASSESSMENT — FIBROSIS 4 INDEX: FIB4 SCORE: 0.96

## 2025-03-03 NOTE — PROGRESS NOTES
Follow Up Note:  Hematology/Oncology      Primary Care:  Beata White P.A.-C.    Diagnosis:     Chief Complaint: [unfilled]    Interval History:  Patient is here for follow up visit.  Latrice Andersen is a 69 y.o. female who on June 6, 2023 had an abnormal PET scan revealed a left upper lobe mass consistent with malignancy.  June 12, 2023 patient had a left upper lobe biopsy positive for malignancy for pulmonary adenocarcinoma.  Patient then received neoadjuvant chemo immunotherapy.  Had difficulties with colitis secondary to immunotherapy.  Ultimately had a resection performed on 1/30/2024 and is now currently receiving surveillance imaging.  She had no further immunotherapy post resection as a result of her complications presurgery.  Recently she had an abnormal area on the right middle lobe and has had bronchoscopy with negative biopsies on 9/10/2024.  She had a follow-up CT scan of chest abdomen and pelvis on February 20, 2025 which showed continuous shrinkage of right middle of the mass to 2.3 x 1.0 cm.  The previous size was a 2.8 x 1.1 cm. She comes for routine follow-up today.     Treatment History:     Allergies as of 03/03/2025 - Reviewed 03/03/2025   Allergen Reaction Noted    Codeine Unspecified 01/27/2016         Current Outpatient Medications:     metoprolol SR (TOPROL XL) 25 MG TABLET SR 24 HR, Take 1 Tablet by mouth every day., Disp: 100 Tablet, Rfl: 1    fenofibrate micronized (LOFIBRA) 134 MG capsule, TAKE ONE CAPSULE BY MOUTH ONE TIME DAILY, Disp: 100 Capsule, Rfl: 1    clindamycin (CLEOCIN) 1 % Solution, AAA, scalp, twice a day for 1-2 weeks, then can use as needed, Disp: 60 mL, Rfl: 3    levothyroxine (SYNTHROID) 50 MCG Tab, Take 1 Tablet by mouth every morning on an empty stomach. Please make an appointment for any future refills., Disp: 90 Tablet, Rfl: 0    pravastatin (PRAVACHOL) 40 MG tablet, Take 1 Tablet by mouth every day., Disp: 100 Tablet, Rfl: 2    venlafaxine (EFFEXOR)  75 MG Tab, TAKE ONE TABLET BY MOUTH ONE TIME DAILY, Disp: 90 Tablet, Rfl: 1    multivitamin Tab, Take 1 Tablet by mouth every day. MEDICATION INSTRUCTIONS FOR SURGERY/PROCEDURE SCHEDULED FOR 9/9/24  DO NOT TAKE 7 DAYS PRIOR TO SURGERY, Disp: , Rfl:     Multiple Vitamins-Minerals (HAIR SKIN & NAILS ADVANCED PO), Take  by mouth every day. MEDICATION INSTRUCTIONS FOR SURGERY/PROCEDURE SCHEDULED FOR 9/9/24  DO NOT TAKE 7 DAYS PRIOR TO SURGERY, Disp: , Rfl:     acetaminophen (TYLENOL) 325 MG Tab, Take 650 mg by mouth every four hours as needed., Disp: , Rfl:     lisinopril (PRINIVIL) 10 MG Tab, Take 1 Tablet by mouth every day., Disp: 100 Tablet, Rfl: 3    VITAMIN D PO, Take 5,000 Units by mouth every day. DO NOT TAKE 7 DAYS PRIOR TO SURGERY, Disp: , Rfl:     NON SPECIFIED, Compound: minoxidil 10% with finasteride 0.1% with latanoprost 0.01% with biotin 0.2%. AAA, scalp 1-2 times per day, Disp: 30 Each, Rfl: 3      Review of Systems:  Review of Systems   Constitutional:  Negative for chills, fever, malaise/fatigue and weight loss.   HENT:  Negative for congestion, ear pain, nosebleeds and sore throat.    Eyes:  Negative for blurred vision.   Respiratory:  Negative for cough, sputum production, shortness of breath and wheezing.    Cardiovascular:  Negative for chest pain, palpitations, orthopnea and leg swelling.   Gastrointestinal:  Negative for abdominal pain, heartburn, nausea and vomiting.   Genitourinary:  Negative for dysuria, frequency and urgency.   Musculoskeletal:  Negative for neck pain.   Neurological:  Negative for dizziness, tingling, tremors, sensory change, focal weakness and headaches.   Endo/Heme/Allergies:  Does not bruise/bleed easily.   Psychiatric/Behavioral:  Negative for depression, memory loss and suicidal ideas.    All other systems reviewed and are negative.        Physical Exam:  Vitals:    03/03/25 1314   BP: (!) 154/86   BP Location: Right arm   Patient Position: Sitting   BP Cuff Size: Adult  "  Pulse: 99   Resp: 18   Temp: 35.9 °C (96.6 °F)   TempSrc: Temporal   SpO2: 94%   Weight: 57 kg (125 lb 10.6 oz)   Height: 1.6 m (5' 3\")               Physical Exam  Constitutional:       General: She is not in acute distress.  HENT:      Head: Normocephalic.   Eyes:      Conjunctiva/sclera: Conjunctivae normal.   Cardiovascular:      Rate and Rhythm: Normal rate and regular rhythm.      Heart sounds: Normal heart sounds.   Pulmonary:      Effort: Pulmonary effort is normal.      Breath sounds: Normal breath sounds.   Abdominal:      General: Bowel sounds are normal.      Palpations: Abdomen is soft.   Musculoskeletal:         General: Normal range of motion.   Lymphadenopathy:      Cervical: No cervical adenopathy.   Skin:     General: Skin is dry.   Neurological:      General: No focal deficit present.      Mental Status: She is oriented to person, place, and time.   Psychiatric:         Thought Content: Thought content normal.           Labs:       Imaging:     All listed images below have been independently reviewed by me. I agree with the findings as summarized below:    CT-CHEST (THORAX) W/O    Result Date: 12/3/2024  12/2/2024 1:04 PM HISTORY/REASON FOR EXAM:  followup lesion in right upper lobe- bronchoscopy x 2 nondiagnostic, PET avid. Smaller in size at time of CT guided biopsy; TECHNIQUE/EXAM DESCRIPTION: CT scan of the chest without contrast. Noncontrast helical scanning of the chest was obtained from the lung apices through the adrenal glands. Low dose optimization technique was utilized for this CT exam including automated exposure control and adjustment of the mA and/or kV according to patient size. Lack of intravenous contrast limits solid organ and vascular evaluation. COMPARISON: Chest CT 9/9/2024 and additional. PET CT 8/7/2024. FINDINGS: Lungs: Decreased, now 2.8 x 1.1 cm irregular nodule in the right middle lobe. As stable 3 mm subpleural nodule left lower lobe image 100. Stable 5 mm right " middle lobe nodule image 109. Stable tiny cluster of micronodules in the right lower lobe. Seen on  image 83 series 2. Mediastinum/Elsie: No adenopathy. Pleura: No pleural effusion. Cardiac: Heart normal in size There is coronary artery calcification. Vascular: Aorta is nonaneurysmal. Soft tissues: Previous left mastectomy. No axillary adenopathy. Upper abdomen: Limited visualized portion of the upper abdomen demonstrates no acute finding Bones: No acute process or concerning osseous lesion.     1. Decrease, now 2.8 x 1.1 cm right middle lobe nodule. 2. There are few additional stable tiny benign-appearing lung nodules. 3. No new nodule or acute process. 3. Coronary artery calcifications. Fleischner Society pulmonary nodule recommendations: Not Applicable       Pathology:      Assessment & Plan:  Assessment & Plan  History of primary non-small cell carcinoma of left lung    Orders:    CT-CHEST (THORAX) W/O; Future    CBC WITH DIFFERENTIAL; Future    CEA; Future    Comp Metabolic Panel; Future         NSCLC of left lung (HCC)  Staging form: Lung, AJCC 8th Edition  - Clinical stage from 6/29/2023: Stage IIIA (cT1c, cN2, cM0) - Signed by Nancy Campbell M.D. on 6/29/2023        #1.  Stage I (ypT1 ypN0) left upper lobe lung adenocarcinoma, PD-L1 1%, T p53, NF1 negative.    She had a 3 cycles of chemo immunotherapy. She had significant colitis secondary to immunotherapy.  She had a  left upper lobectomy on 1/30/2024. Currently under surveillance imaging.  Recently had an abnormal region in the right mile lobe that has been biopsied by pulmonary and negative.  She had a follow-up CT scan of chest abdomen and pelvis on February 20, 2025 which showed right middle lobe mass decreased to 2.3 x 1.0 cm from 2.8 x 1.1 cm.  I told her that there is no signs of progression. I will follow her in 3 months with a follow-up CT chest, cbc,cmp, CEA for continuous care.    #2.  Immunotherapy induced colitis.  She had a steroid  treatment at that time.  She is doing well and that there is no signs of diarrhea at this time.    Any questions and concerns raised by the patient were answered to the best of my ability. Thank you for allowing me to participate in the care for this patient. Please feel free to contact me for any questions or concerns.

## 2025-03-03 NOTE — ASSESSMENT & PLAN NOTE
Orders:    CT-CHEST (THORAX) W/O; Future    CBC WITH DIFFERENTIAL; Future    CEA; Future    Comp Metabolic Panel; Future

## 2025-03-09 DIAGNOSIS — E03.9 HYPOTHYROIDISM (ACQUIRED): ICD-10-CM

## 2025-03-09 DIAGNOSIS — E06.4 DRUG-INDUCED THYROIDITIS: ICD-10-CM

## 2025-03-10 RX ORDER — LEVOTHYROXINE SODIUM 50 UG/1
TABLET ORAL
Qty: 90 TABLET | Refills: 0 | Status: SHIPPED | OUTPATIENT
Start: 2025-03-10

## 2025-04-08 ENCOUNTER — APPOINTMENT (OUTPATIENT)
Dept: PHYSICAL THERAPY | Facility: REHABILITATION | Age: 70
End: 2025-04-08
Attending: STUDENT IN AN ORGANIZED HEALTH CARE EDUCATION/TRAINING PROGRAM
Payer: MEDICARE

## 2025-04-12 DIAGNOSIS — I10 ESSENTIAL HYPERTENSION: ICD-10-CM

## 2025-04-14 RX ORDER — LISINOPRIL 10 MG/1
10 TABLET ORAL DAILY
Qty: 100 TABLET | Refills: 0 | Status: SHIPPED | OUTPATIENT
Start: 2025-04-14

## 2025-04-15 ENCOUNTER — APPOINTMENT (OUTPATIENT)
Dept: PHYSICAL THERAPY | Facility: REHABILITATION | Age: 70
End: 2025-04-15
Attending: STUDENT IN AN ORGANIZED HEALTH CARE EDUCATION/TRAINING PROGRAM
Payer: MEDICARE

## 2025-04-15 NOTE — TELEPHONE ENCOUNTER
Received request via: Patient    Was the patient seen in the last year in this department? Yes    Does the patient have an active prescription (recently filled or refills available) for medication(s) requested? No    Pharmacy Name: [facility] [place], [place] - [address]     Does the patient have [facility] Plus and need 100-day supply? (This applies to ALL medications) Yes, quantity updated to 100 days

## 2025-04-16 ENCOUNTER — APPOINTMENT (OUTPATIENT)
Dept: SLEEP MEDICINE | Facility: MEDICAL CENTER | Age: 70
End: 2025-04-16
Attending: INTERNAL MEDICINE
Payer: MEDICARE

## 2025-04-22 ENCOUNTER — APPOINTMENT (OUTPATIENT)
Dept: PHYSICAL THERAPY | Facility: REHABILITATION | Age: 70
End: 2025-04-22
Attending: STUDENT IN AN ORGANIZED HEALTH CARE EDUCATION/TRAINING PROGRAM
Payer: MEDICARE

## 2025-04-29 ENCOUNTER — APPOINTMENT (OUTPATIENT)
Dept: PHYSICAL THERAPY | Facility: REHABILITATION | Age: 70
End: 2025-04-29
Attending: STUDENT IN AN ORGANIZED HEALTH CARE EDUCATION/TRAINING PROGRAM
Payer: MEDICARE

## 2025-05-06 ENCOUNTER — HOSPITAL ENCOUNTER (OUTPATIENT)
Dept: LAB | Facility: MEDICAL CENTER | Age: 70
End: 2025-05-06
Attending: INTERNAL MEDICINE
Payer: MEDICARE

## 2025-05-06 ENCOUNTER — APPOINTMENT (OUTPATIENT)
Dept: PHYSICAL THERAPY | Facility: REHABILITATION | Age: 70
End: 2025-05-06
Attending: STUDENT IN AN ORGANIZED HEALTH CARE EDUCATION/TRAINING PROGRAM
Payer: MEDICARE

## 2025-05-06 ENCOUNTER — HOSPITAL ENCOUNTER (OUTPATIENT)
Dept: LAB | Facility: MEDICAL CENTER | Age: 70
End: 2025-05-06
Attending: STUDENT IN AN ORGANIZED HEALTH CARE EDUCATION/TRAINING PROGRAM
Payer: MEDICARE

## 2025-05-06 DIAGNOSIS — E78.5 DYSLIPIDEMIA: ICD-10-CM

## 2025-05-06 DIAGNOSIS — Z85.118 HISTORY OF PRIMARY NON-SMALL CELL CARCINOMA OF LEFT LUNG: ICD-10-CM

## 2025-05-06 LAB
BASOPHILS # BLD AUTO: 1.2 % (ref 0–1.8)
BASOPHILS # BLD: 0.08 K/UL (ref 0–0.12)
CHOLEST SERPL-MCNC: 122 MG/DL (ref 100–199)
EOSINOPHIL # BLD AUTO: 0.27 K/UL (ref 0–0.51)
EOSINOPHIL NFR BLD: 4.1 % (ref 0–6.9)
ERYTHROCYTE [DISTWIDTH] IN BLOOD BY AUTOMATED COUNT: 46.7 FL (ref 35.9–50)
FASTING STATUS PATIENT QL REPORTED: NORMAL
HCT VFR BLD AUTO: 39 % (ref 37–47)
HDLC SERPL-MCNC: 43 MG/DL
HGB BLD-MCNC: 11.9 G/DL (ref 12–16)
IMM GRANULOCYTES # BLD AUTO: 0.01 K/UL (ref 0–0.11)
IMM GRANULOCYTES NFR BLD AUTO: 0.2 % (ref 0–0.9)
LDLC SERPL CALC-MCNC: 64 MG/DL
LYMPHOCYTES # BLD AUTO: 2.39 K/UL (ref 1–4.8)
LYMPHOCYTES NFR BLD: 35.9 % (ref 22–41)
MCH RBC QN AUTO: 26.8 PG (ref 27–33)
MCHC RBC AUTO-ENTMCNC: 30.5 G/DL (ref 32.2–35.5)
MCV RBC AUTO: 87.8 FL (ref 81.4–97.8)
MONOCYTES # BLD AUTO: 0.61 K/UL (ref 0–0.85)
MONOCYTES NFR BLD AUTO: 9.2 % (ref 0–13.4)
NEUTROPHILS # BLD AUTO: 3.3 K/UL (ref 1.82–7.42)
NEUTROPHILS NFR BLD: 49.4 % (ref 44–72)
NRBC # BLD AUTO: 0 K/UL
NRBC BLD-RTO: 0 /100 WBC (ref 0–0.2)
PLATELET # BLD AUTO: 473 K/UL (ref 164–446)
PMV BLD AUTO: 8.9 FL (ref 9–12.9)
RBC # BLD AUTO: 4.44 M/UL (ref 4.2–5.4)
TRIGL SERPL-MCNC: 75 MG/DL (ref 0–149)
WBC # BLD AUTO: 6.7 K/UL (ref 4.8–10.8)

## 2025-05-06 PROCEDURE — 80061 LIPID PANEL: CPT

## 2025-05-06 PROCEDURE — 36415 COLL VENOUS BLD VENIPUNCTURE: CPT

## 2025-05-06 PROCEDURE — 85025 COMPLETE CBC W/AUTO DIFF WBC: CPT

## 2025-05-06 PROCEDURE — 80053 COMPREHEN METABOLIC PANEL: CPT

## 2025-05-06 PROCEDURE — 82378 CARCINOEMBRYONIC ANTIGEN: CPT

## 2025-05-07 ENCOUNTER — RESULTS FOLLOW-UP (OUTPATIENT)
Dept: MEDICAL GROUP | Facility: PHYSICIAN GROUP | Age: 70
End: 2025-05-07

## 2025-05-07 LAB
ALBUMIN SERPL BCP-MCNC: 3.9 G/DL (ref 3.2–4.9)
ALBUMIN/GLOB SERPL: 1.1 G/DL
ALP SERPL-CCNC: 69 U/L (ref 30–99)
ALT SERPL-CCNC: 25 U/L (ref 2–50)
ANION GAP SERPL CALC-SCNC: 12 MMOL/L (ref 7–16)
AST SERPL-CCNC: 44 U/L (ref 12–45)
BILIRUB SERPL-MCNC: 0.3 MG/DL (ref 0.1–1.5)
BUN SERPL-MCNC: 22 MG/DL (ref 8–22)
CALCIUM ALBUM COR SERPL-MCNC: 10.6 MG/DL (ref 8.5–10.5)
CALCIUM SERPL-MCNC: 10.5 MG/DL (ref 8.5–10.5)
CEA SERPL-MCNC: 2.1 NG/ML (ref 0–3)
CHLORIDE SERPL-SCNC: 107 MMOL/L (ref 96–112)
CO2 SERPL-SCNC: 23 MMOL/L (ref 20–33)
CREAT SERPL-MCNC: 1.12 MG/DL (ref 0.5–1.4)
GFR SERPLBLD CREATININE-BSD FMLA CKD-EPI: 53 ML/MIN/1.73 M 2
GLOBULIN SER CALC-MCNC: 3.4 G/DL (ref 1.9–3.5)
GLUCOSE SERPL-MCNC: 68 MG/DL (ref 65–99)
POTASSIUM SERPL-SCNC: 4.2 MMOL/L (ref 3.6–5.5)
PROT SERPL-MCNC: 7.3 G/DL (ref 6–8.2)
SODIUM SERPL-SCNC: 142 MMOL/L (ref 135–145)

## 2025-05-09 DIAGNOSIS — F41.8 OTHER SPECIFIED ANXIETY DISORDERS: ICD-10-CM

## 2025-05-09 RX ORDER — VENLAFAXINE 75 MG/1
75 TABLET ORAL DAILY
Qty: 90 TABLET | Refills: 1 | Status: SHIPPED | OUTPATIENT
Start: 2025-05-09

## 2025-05-09 NOTE — TELEPHONE ENCOUNTER
Received request via: Pharmacy    Was the patient seen in the last year in this department? Yes    Does the patient have an active prescription (recently filled or refills available) for medication(s) requested? No    [facility]    Does the patient have care home Plus and need 100-day supply? (This applies to ALL medications) Yes, quantity updated to 100 days

## 2025-05-12 NOTE — Clinical Note
[facility]  [address] [place] [address]    [id number]    Sylvie Andersen  [address]  [place] [address]    May 12, 2025    Member Name: aLtrice Andersen   Member Number: [id number]   Reference Number: [id number]   Approved Services: MRI/CAT Scan   Approved Service Dates: 05/12/2025 - 09/09/2025   Requesting Provider: Miko Alvarez   Requested Provider: [facility]     Dear Latrice Andersen:    The following medical service(s) requested by Miko Alvarez have been approved:    Procedure Code Procedure Code Name Requested Quantity Approved Quantity Status   99538 (CPT®) CHG DIAGNOSTIC COMPUTED TOMOGRAPHY THORAX W/O CNTRST 1 1 Authorized       Approved Quantity means the number of visits approved for medication treatments and/or medical services.    The services should be provided by [facility] no later than 09/09/2025. Please contact the provider listed below with any questions.     Provider Information:  [facility]  [phone]    Your plan benefit may require a deductible, co-payment or coinsurance for these services. This authorization does not guarantee [facility] will pay the claim for services that you receive. Payment by [facility] for these services is subject to the terms of your Evidence of Coverage, your eligibility at the time of service, and confirmation of benefit coverage.    For any questions or additional information, please contact Customer Service:    [facility] Toll Free: [phone]  TTY users dial: [phone]   Call Center Hours:  Oct 1 - Mar 31, Mon - Fri 7 AM to 8 PM PST  Oct 1 - Mar 31, Sat - Sun 8 AM to 8 PM PST  Apr 1 - Sep 30, Mon - Fri 7 AM to 8 PM PST   Office Hours: Mon - Fri 8 AM to 5 PM PST   E-mail: [email or web address]   Website:  [email or web address]      This information is available for free in other languages. Please contact Customer  Service at the phone number above for more information. [facility] complies with applicable Federal civil rights laws and does not discriminate on the basis of race, color, national origin, age, disability or sex.    Sincerely,     Healthcare Utilization Management Department     Cc: [facility]   Miko Alvarez    Multi-Language Insert  Multi- Services  English: We have free  services to answer any questions you may have about our health or drug plan.  To get an , just call us at [phone].  Someone who speaks English/Language can help you.  This is a free service.  Slovak: Tenemos servicios de intérprete sin costo alguno  para responder cualquier pregunta que pueda tener sobre nuestro plan de betsy o medicamentos. Para hablar con un intérprete, por favor llame al [phone]. Alguien que hable español le podrá ayudar. Kimberly es un servicio gratuito.  Chinese Mandarin: ?????????????????????????????? ???????????????? [phone]????????????????? ?????????  Chinese Cantonese: ?????????????????????????????? ????????????? [phone]???????????????????? ????????  Tagalog:  Feliz bravo serbisyo sa donaldsonsacristal hollanda barndon beyerngvivien leon o panggamot.  evonne Rai  [phone]. Carol agta ng Tagalog.  Kb hernandez.  Cambodian:  Nous proposons shaylee services gratuits d'interprétation pour répondre à toutes prabhjot questions relatives à notre régime de santé ou d'assurance-médicaments. Pour accéder au service d'interprétation, il vous suffit de nous appeler au [phone]. Un interlocuteur parFroedtert Hospitalsonia [facility] pourra vous aider. Ce service est gratuit.  Korean:  Dionte sylvesteri có d?ch v? thông d?ch mi?n phí ð? tr? l?i các câu h?i v? chýõng s?c kh?e và chýõng trình thu?c men. N?u quí v? c?n  thông d?ch viên eleanor g?i [phone] s? có nhân viên nói ti?ng Vi?t giúp ð? quí v?. Ðây là d?ch v? mi?n phí .  North Korean:  Unser kostenloser Dolmetscherservice beantwortet Ihren Fragen zu unserem Gesundheits- und Arzneimittelplan. Unsere Dolmetscher erreichen Sie [phone]. Man wird Ihnen maria a auf [facility]. Dieser Service ist oliviaBlue Mountain Hospital, Inc..  Uzbek:  ??? ?? ?? ?? ?? ??? ?? ??? ?? ???? ?? ?? ???? ???? ????. ?? ???? ????? ?? [phone] ??? ??? ????.  ???? ?? ???? ?? ?? ????. ? ???? ??? ?????.   Panamanian: Åñëè ó âàñ âîçíèêíóò âîïðîñû îòíîñèòåëüíî ñòðàõîâîãî èëè ìåäèêàìåíòíîãî ïëàíà, âû ìîæåòå âîñïîëüçîâàòüñÿ íàøèìè áåñïëàòíûìè óñëóãàìè ïåðåâîä÷èêîâ. ×òîáû âîñïîëüçîâàòüñÿ óñëóãàìè ïåðåâîä÷èêà, ïîçâîíèòå íàì ïî òåëåôîíó [phone]. Âàì îêàæåò ïîìîùü ñîòðóäíèê, êîòîðûé ãîâîðèò ïî-póññêè. Äàííàÿ óñëóãà áåñïëàòíàÿ.  Kyrgyz: ÅääÇ äÞÏã ÎÏãÇÊ ÇáãÊÑÌã ÇáÝæÑí ÇáãÌÇäíÉ ááÅÌÇÈÉ Úä Ãí ÃÓÆáÉ ÊÊÚáÞ ÈÇáÕÍÉ Ãæ ÌÏæá ÇáÃÏæíÉ áÏíäÇ. ááÍÕæá Úáì ãÊÑÌã ÝæÑí¡ áíÓ Úáíß Óæì ÇáÇÊÕÇá ÈäÇ Úáì [phone] . ÓíÞæã ÔÎÕ ãÇ íÊÍÏË ÇáÚÑÈíÉ ÈãÓÇÚÏÊß. åÐå ÎÏãÉ ãÌÇäíÉ.  Lavonne: ????? ????????? ?? ??? ?? ????? ?? ???? ??? ???? ???? ?? ?????? ?? ???? ???? ?? ??? ????? ??? ????? ???????? ?????? ?????? ???. ?? ???????? ??????? ???? ?? ???, ?? ???? [phone] ?? ??? ????. ??? ??????? ?? ?????? ????? ?? ???? ??? ?? ???? ??. ?? ?? ????? ???? ??.   Nepali:  È disponibile un servizio di interpretariato gratuito per rispondere a eventuali domande sul nostro piano sanitario e farmaceutico. Per un interprete, contattare il candida [phone]. Un nostro incaricato praveen parla Italianovi fornirà l'assistenza necessaria. È un servizio gratuito.  Portugués:  Dispomos de serviços de interpretação gratuitos para responder a qualquer questão que tenha acerca do nosso plano de saúde ou de medicação. Para obter um intérprete, contacte-nos através do número [phone]. Irá encontrar alguém que fale o idioma  Português para o ajudar. Kimberly serviço é  gratuito.  Pashto Creole:  Nou genyen sèvis entèprèt gratis caitlin reponn tout kesyon ou ta genyen konsènan plan medikal oswa dwòg nou an.  Caitlin jwenn yon entèprèt, jis rele nou nan [phone]. Yon moun ki pale Kreyòl kapab isha w.  Sa a se yon sèvis ki gratis.  Polish:  Umo¿liwiamy bezp³atne skorzystanie z us³ug t³umacza ustnego, który pomo¿e w uzyskaniu odpowiedzi na temat planu zdrowotnego lub dawkowania jarodw. Janet skorzystaæ z pomocy t³umacza znaj¹cego kurt avila¿y zadzwoniæ pod numer [phone]. Ta us³uga jest bezp³atna.  Central African: ????? ??????? ????????????????????? ??????????????????????????????????[phone] ???????????????? ? ????????????????? ?????

## 2025-05-13 ENCOUNTER — APPOINTMENT (OUTPATIENT)
Dept: PHYSICAL THERAPY | Facility: REHABILITATION | Age: 70
End: 2025-05-13
Attending: STUDENT IN AN ORGANIZED HEALTH CARE EDUCATION/TRAINING PROGRAM
Payer: MEDICARE

## 2025-05-20 ENCOUNTER — APPOINTMENT (OUTPATIENT)
Dept: PHYSICAL THERAPY | Facility: REHABILITATION | Age: 70
End: 2025-05-20
Attending: STUDENT IN AN ORGANIZED HEALTH CARE EDUCATION/TRAINING PROGRAM
Payer: MEDICARE

## 2025-05-27 ENCOUNTER — APPOINTMENT (OUTPATIENT)
Dept: PHYSICAL THERAPY | Facility: REHABILITATION | Age: 70
End: 2025-05-27
Attending: STUDENT IN AN ORGANIZED HEALTH CARE EDUCATION/TRAINING PROGRAM
Payer: MEDICARE

## 2025-05-27 ENCOUNTER — HOSPITAL ENCOUNTER (OUTPATIENT)
Dept: RADIOLOGY | Facility: MEDICAL CENTER | Age: 70
End: 2025-05-27
Attending: INTERNAL MEDICINE
Payer: MEDICARE

## 2025-05-27 ENCOUNTER — HOSPITAL ENCOUNTER (OUTPATIENT)
Dept: LAB | Facility: MEDICAL CENTER | Age: 70
End: 2025-05-27
Attending: INTERNAL MEDICINE
Payer: MEDICARE

## 2025-05-27 DIAGNOSIS — Z85.118 HISTORY OF PRIMARY NON-SMALL CELL CARCINOMA OF LEFT LUNG: ICD-10-CM

## 2025-05-27 DIAGNOSIS — I10 ESSENTIAL HYPERTENSION: ICD-10-CM

## 2025-05-27 DIAGNOSIS — N18.31 STAGE 3A CHRONIC KIDNEY DISEASE: ICD-10-CM

## 2025-05-27 LAB
ANION GAP SERPL CALC-SCNC: 10 MMOL/L (ref 7–16)
BUN SERPL-MCNC: 18 MG/DL (ref 8–22)
CALCIUM SERPL-MCNC: 9.7 MG/DL (ref 8.5–10.5)
CHLORIDE SERPL-SCNC: 109 MMOL/L (ref 96–112)
CO2 SERPL-SCNC: 22 MMOL/L (ref 20–33)
CREAT SERPL-MCNC: 1.2 MG/DL (ref 0.5–1.4)
ERYTHROCYTE [DISTWIDTH] IN BLOOD BY AUTOMATED COUNT: 51.2 FL (ref 35.9–50)
GFR SERPLBLD CREATININE-BSD FMLA CKD-EPI: 49 ML/MIN/1.73 M 2
GLUCOSE SERPL-MCNC: 88 MG/DL (ref 65–99)
HCT VFR BLD AUTO: 37.4 % (ref 37–47)
HGB BLD-MCNC: 11.2 G/DL (ref 12–16)
MCH RBC QN AUTO: 26.4 PG (ref 27–33)
MCHC RBC AUTO-ENTMCNC: 29.9 G/DL (ref 32.2–35.5)
MCV RBC AUTO: 88.2 FL (ref 81.4–97.8)
PLATELET # BLD AUTO: 484 K/UL (ref 164–446)
PMV BLD AUTO: 9.1 FL (ref 9–12.9)
POTASSIUM SERPL-SCNC: 4.3 MMOL/L (ref 3.6–5.5)
RBC # BLD AUTO: 4.24 M/UL (ref 4.2–5.4)
SODIUM SERPL-SCNC: 141 MMOL/L (ref 135–145)
WBC # BLD AUTO: 7.2 K/UL (ref 4.8–10.8)

## 2025-05-27 PROCEDURE — 71250 CT THORAX DX C-: CPT

## 2025-05-27 PROCEDURE — 85027 COMPLETE CBC AUTOMATED: CPT

## 2025-05-27 PROCEDURE — 80048 BASIC METABOLIC PNL TOTAL CA: CPT

## 2025-05-27 PROCEDURE — 82043 UR ALBUMIN QUANTITATIVE: CPT

## 2025-05-27 PROCEDURE — 36415 COLL VENOUS BLD VENIPUNCTURE: CPT

## 2025-05-27 PROCEDURE — 82570 ASSAY OF URINE CREATININE: CPT

## 2025-05-28 LAB
CREAT UR-MCNC: 56.5 MG/DL
MICROALBUMIN UR-MCNC: 2 MG/DL
MICROALBUMIN/CREAT UR: 35 MG/G (ref 0–30)

## 2025-06-03 ENCOUNTER — APPOINTMENT (OUTPATIENT)
Dept: PHYSICAL THERAPY | Facility: REHABILITATION | Age: 70
End: 2025-06-03
Attending: STUDENT IN AN ORGANIZED HEALTH CARE EDUCATION/TRAINING PROGRAM
Payer: MEDICARE

## 2025-06-03 ENCOUNTER — APPOINTMENT (OUTPATIENT)
Facility: MEDICAL CENTER | Age: 70
End: 2025-06-03
Attending: STUDENT IN AN ORGANIZED HEALTH CARE EDUCATION/TRAINING PROGRAM
Payer: MEDICARE

## 2025-06-04 ENCOUNTER — APPOINTMENT (OUTPATIENT)
Dept: NEPHROLOGY | Facility: MEDICAL CENTER | Age: 70
End: 2025-06-04
Payer: MEDICARE

## 2025-06-04 VITALS
OXYGEN SATURATION: 97 % | DIASTOLIC BLOOD PRESSURE: 62 MMHG | RESPIRATION RATE: 12 BRPM | TEMPERATURE: 98.1 F | HEART RATE: 91 BPM | HEIGHT: 64 IN | SYSTOLIC BLOOD PRESSURE: 120 MMHG | WEIGHT: 126.1 LBS | BODY MASS INDEX: 21.53 KG/M2

## 2025-06-04 DIAGNOSIS — N18.31 STAGE 3A CHRONIC KIDNEY DISEASE: ICD-10-CM

## 2025-06-04 DIAGNOSIS — C34.92 NSCLC OF LEFT LUNG (HCC): ICD-10-CM

## 2025-06-04 DIAGNOSIS — I10 ESSENTIAL HYPERTENSION: Primary | ICD-10-CM

## 2025-06-04 PROCEDURE — 3078F DIAST BP <80 MM HG: CPT | Performed by: INTERNAL MEDICINE

## 2025-06-04 PROCEDURE — G2211 COMPLEX E/M VISIT ADD ON: HCPCS | Performed by: INTERNAL MEDICINE

## 2025-06-04 PROCEDURE — 99214 OFFICE O/P EST MOD 30 MIN: CPT | Performed by: INTERNAL MEDICINE

## 2025-06-04 PROCEDURE — 3074F SYST BP LT 130 MM HG: CPT | Performed by: INTERNAL MEDICINE

## 2025-06-04 ASSESSMENT — ENCOUNTER SYMPTOMS
COUGH: 0
VOMITING: 0
NAUSEA: 0
SHORTNESS OF BREATH: 0
FEVER: 0
HYPERTENSION: 1
CHILLS: 0

## 2025-06-04 ASSESSMENT — FIBROSIS 4 INDEX: FIB4 SCORE: 1.25

## 2025-06-04 NOTE — ASSESSMENT & PLAN NOTE
According to the patient ,her recent lung CT scan was good  Orders:    Basic Metabolic Panel; Future    CBC WITHOUT DIFFERENTIAL; Future    MICROALB/CREAT RATIO RAND. UR

## 2025-06-04 NOTE — PROGRESS NOTES
"Subjective     Sylvie Andersen is a 69 y.o. female who presents with Hypertension and Chronic Kidney Disease            Hypertension  This is a chronic problem. The current episode started more than 1 year ago. The problem is unchanged. The problem is controlled. Pertinent negatives include no chest pain, malaise/fatigue, peripheral edema or shortness of breath. Risk factors for coronary artery disease include post-menopausal state and dyslipidemia. Past treatments include ACE inhibitors. The current treatment provides significant improvement. There are no compliance problems.  Hypertensive end-organ damage includes kidney disease. Identifiable causes of hypertension include chronic renal disease.   Chronic Kidney Disease  This is a chronic problem. The current episode started more than 1 year ago. The problem occurs constantly. The problem has been unchanged. Pertinent negatives include no chest pain, chills, coughing, fever, nausea, urinary symptoms or vomiting.       Review of Systems   Constitutional:  Negative for chills, fever and malaise/fatigue.   Respiratory:  Negative for cough and shortness of breath.    Cardiovascular:  Negative for chest pain and leg swelling.   Gastrointestinal:  Negative for nausea and vomiting.   Genitourinary:  Negative for dysuria, frequency and urgency.              Objective     /62 (BP Location: Right arm, Patient Position: Sitting, BP Cuff Size: Adult)   Pulse 91   Temp 36.7 °C (98.1 °F) (Temporal)   Resp 12   Ht 1.626 m (5' 4\")   Wt 57.2 kg (126 lb 1.6 oz)   SpO2 97%   BMI 21.65 kg/m²      Physical Exam  Vitals and nursing note reviewed.   Constitutional:       General: She is not in acute distress.     Appearance: Normal appearance. She is well-developed. She is not diaphoretic.   HENT:      Head: Normocephalic and atraumatic.      Right Ear: External ear normal.      Left Ear: External ear normal.      Nose: Nose normal.   Eyes:      General: No scleral " icterus.        Right eye: No discharge.         Left eye: No discharge.      Conjunctiva/sclera: Conjunctivae normal.   Cardiovascular:      Rate and Rhythm: Normal rate and regular rhythm.      Heart sounds: No murmur heard.  Pulmonary:      Effort: Pulmonary effort is normal. No respiratory distress.      Breath sounds: Normal breath sounds.   Musculoskeletal:         General: No tenderness.      Right lower leg: No edema.      Left lower leg: No edema.   Skin:     General: Skin is warm and dry.      Findings: No erythema.   Neurological:      General: No focal deficit present.      Mental Status: She is alert and oriented to person, place, and time.      Cranial Nerves: No cranial nerve deficit.   Psychiatric:         Mood and Affect: Mood normal.         Behavior: Behavior normal.     Past Medical History[1]  Social History     Socioeconomic History    Marital status:      Spouse name: Not on file    Number of children: Not on file    Years of education: Not on file    Highest education level: Some college, no degree   Occupational History    Not on file   Tobacco Use    Smoking status: Former     Current packs/day: 0.00     Average packs/day: 0.3 packs/day for 45.0 years (11.3 ttl pk-yrs)     Types: Cigarettes     Start date: 1977     Quit date: 2022     Years since quittin.9     Passive exposure: Current (Spouse)    Smokeless tobacco: Former     Quit date: 2022    Tobacco comments:     5 daily    Vaping Use    Vaping status: Never Used   Substance and Sexual Activity    Alcohol use: No    Drug use: No    Sexual activity: Not Currently   Other Topics Concern    Not on file   Social History Narrative    Not on file     Social Drivers of Health     Financial Resource Strain: Low Risk  (2024)    Overall Financial Resource Strain (CARDIA)     Difficulty of Paying Living Expenses: Not hard at all   Food Insecurity: No Food Insecurity (2024)    Hunger Vital Sign     Worried  About Running Out of Food in the Last Year: Never true     Ran Out of Food in the Last Year: Never true   Transportation Needs: No Transportation Needs (11/19/2024)    PRAPARE - Transportation     Lack of Transportation (Medical): No     Lack of Transportation (Non-Medical): No   Physical Activity: Unknown (11/19/2024)    Exercise Vital Sign     Days of Exercise per Week: 5 days     Minutes of Exercise per Session: Patient declined   Stress: Stress Concern Present (11/19/2024)    [place] [employer] - Occupational Stress Questionnaire     Feeling of Stress : To some extent   Social Connections: Moderately Integrated (11/19/2024)    Social Connection and Isolation Panel [NHANES]     Frequency of Communication with Friends and Family: More than three times a week     Frequency of Social Gatherings with Friends and Family: Once a week     Attends Church Services: 1 to 4 times per year     Active Member of Clubs or Organizations: No     Attends Club or Organization Meetings: Never     Marital Status:    Intimate Partner Violence: Not on file   Housing Stability: Low Risk  (11/19/2024)    Housing Stability Vital Sign     Unable to Pay for Housing in the Last Year: No     Number of Times Moved in the Last Year: 0     Homeless in the Last Year: No     Family History   Problem Relation Age of Onset    Cancer Mother         breast cancer/Breast    Breast Cancer Mother     Hypertension Maternal Uncle     Cancer Maternal Uncle     Hypertension Maternal Grandmother     Hyperlipidemia Maternal Grandmother     Heart Disease Maternal Grandmother     Cancer Paternal Grandfather         Lung ca, smoker    Lung Cancer Maternal Grandfather     Alzheimer's Disease Maternal Aunt     Kidney Disease Maternal Aunt     Diabetes Neg Hx      Recent Labs     12/02/24  1341 12/02/24  1344 02/12/25  1404 05/06/25  1245 05/27/25  1450   ALBUMIN 4.1  --  4.0 3.9  --    HDL  --   --   --  43  --    TRIGLYCERIDE  --    --   --  75  --    SODIUM 140   < > 139 142 141   POTASSIUM 4.3   < > 4.2 4.2 4.3   CHLORIDE 106   < > 106 107 109   CO2 23   < > 22 23 22   BUN 21   < > 29* 22 18   CREATININE 1.34   < > 1.33 1.12 1.20    < > = values in this interval not displayed.                                  Assessment & Plan  Essential hypertension  Controlled  Continue same medication regimen  Continue low-sodium diet    Orders:    Basic Metabolic Panel; Future    CBC WITHOUT DIFFERENTIAL; Future    MICROALB/CREAT RATIO RAND. UR    Stage 3a chronic kidney disease  Stable  No uremic symptoms  Renal dose of medication  Avoid nephrotoxins  Continue same medication regimen  Patient was advised to call us if symptoms worsen    Orders:    Basic Metabolic Panel; Future    CBC WITHOUT DIFFERENTIAL; Future    MICROALB/CREAT RATIO RAND. UR    NSCLC of left lung (HCC)  According to the patient ,her recent lung CT scan was good  Orders:    Basic Metabolic Panel; Future    CBC WITHOUT DIFFERENTIAL; Future    MICROALB/CREAT RATIO RAND. UR                      [1]   Past Medical History:  Diagnosis Date    Allergy, unspecified not elsewhere classified     Anxiety disorder 04/29/2014    Bowel habit changes August 2023    Diarrhea bad    Breast cancer (HCC) 1995    breast cancer 1995     left mastectomy with lymph removal, chemo radiation    Cancer (HCC) June 2023    lung ca, partial left lobe removed chemo/immune tx last tx 9/23    Chickenpox     Dental disorder     upper partial    Disorder of thyroid     thyroid problems after chemotherapy per patient 10/3/24    Former smoker 05/11/2023    High cholesterol     History of breast cancer 02/06/2017    History of radiation therapy 05/11/2023    To left chest for breast cancer     Hyperlipidemia     Hypertension     Left upper lobe pulmonary nodule 05/11/2023    Renal disorder     CKD 3    Tonsillitis

## 2025-06-10 ENCOUNTER — APPOINTMENT (OUTPATIENT)
Dept: PHYSICAL THERAPY | Facility: REHABILITATION | Age: 70
End: 2025-06-10
Attending: STUDENT IN AN ORGANIZED HEALTH CARE EDUCATION/TRAINING PROGRAM
Payer: MEDICARE

## 2025-06-10 DIAGNOSIS — E03.9 HYPOTHYROIDISM (ACQUIRED): ICD-10-CM

## 2025-06-10 DIAGNOSIS — E06.4 DRUG-INDUCED THYROIDITIS: ICD-10-CM

## 2025-06-10 RX ORDER — LEVOTHYROXINE SODIUM 50 UG/1
TABLET ORAL
Qty: 90 TABLET | Refills: 0 | Status: SHIPPED | OUTPATIENT
Start: 2025-06-10

## 2025-06-11 ENCOUNTER — OFFICE VISIT (OUTPATIENT)
Dept: DERMATOLOGY | Facility: IMAGING CENTER | Age: 70
End: 2025-06-11
Payer: MEDICARE

## 2025-06-11 DIAGNOSIS — Z12.83 SKIN CANCER SCREENING: ICD-10-CM

## 2025-06-11 DIAGNOSIS — L81.4 LENTIGINES: Primary | ICD-10-CM

## 2025-06-11 DIAGNOSIS — D18.01 CHERRY ANGIOMA: ICD-10-CM

## 2025-06-11 DIAGNOSIS — L82.1 STUCCO KERATOSES: ICD-10-CM

## 2025-06-11 DIAGNOSIS — D22.9 MULTIPLE NEVI: ICD-10-CM

## 2025-06-11 DIAGNOSIS — D69.2 SOLAR PURPURA (HCC): ICD-10-CM

## 2025-06-11 DIAGNOSIS — L82.1 SK (SEBORRHEIC KERATOSIS): ICD-10-CM

## 2025-06-11 PROCEDURE — 99212 OFFICE O/P EST SF 10 MIN: CPT | Performed by: NURSE PRACTITIONER

## 2025-06-11 NOTE — PROGRESS NOTES
DERMATOLOGY NOTE  FOLLOW UP VISIT       Chief complaint: Follow-Up (ALISON)     Denies new, growing, changing, itching or bleeding skin lesions today.    History of skin cancer: No  History of precancers/actinic keratoses: Yes, Details: face  History of biopsies:No  History of blistering/severe sunburns:Yes, Details: Teenager   Family history of skin cancer: Yes. Maternal uncle   Family history of atypical moles:No    Allergies   Allergen Reactions    Codeine Unspecified     Memory issues     MEDICATIONS:  Medications relevant to specialty reviewed.     REVIEW OF SYSTEMS:   Positive for none  Negative for none    EXAM:  There were no vitals taken for this visit.  Constitutional: Well-developed, well-nourished, and in no distress.     A total body skin exam was performed excluding the genitals per patient preference and including the following areas: head (including face), neck, chest, abdomen, groin/buttocks, back, bilateral upper extremities, and bilateral lower extremities with the following pertinent findings listed below and/or in assessment/plan.       -sun exposed skin of trunk and b/l upper, lower extremities and face with scattered clinically benign light brown reticulated macules all of which were morphologically similar and none of which were suspicious for skin cancer today on exam    -Several scattered 1-3mm bright red macules and thin papules on the trunk and extremities    -Multiple tan medium brown skin-colored macules papules scattered over the trunk >> extremities--All with benign-appearing pigment network patterns on dermoscopy    -Several tan medium brown skin-colored stuck-on waxy papules scattered on the trunk and extremities    -Stucco Keratosis Bilateral Lower Ext.     -solar purpura upper bilateral extremities       IMPRESSION / PLAN:    1. Cherry angioma  - Benign-appearing nature of lesions discussed during exam.   - Advised to continue to monitor for any return to clinic for new or concerning  changes.      2. Lentigines  - Benign-appearing nature of lesions discussed during exam.   - Advised to continue to monitor for any return to clinic for new or concerning changes.      3. Multiple nevi  - Benign-appearing nature of lesions discussed during exam.   - Advised to continue to monitor for any return to clinic for new or concerning changes.      4. Stucco keratoses  - Benign-appearing nature of lesions discussed during exam.   - Advised to continue to monitor for any return to clinic for new or concerning changes.      5. SK (seborrheic keratosis)  - Benign-appearing nature of lesions discussed during exam.   - Advised to continue to monitor for any return to clinic for new or concerning changes.      6. Solar purpura (HCC)  - Benign-appearing nature of lesions discussed during exam.   - discussed various possible  causes  - Advised to continue to monitor for any return to clinic for new or concerning changes.      7. Skin cancer screening  Skin cancer education  discussed importance of sun protective clothing, eyewear in addition to the use of broad spectrum sunscreen with SPF 30 or greater, as well as need for reapplication ~every 2 hours when exposed to UVR/handout previously  given  discussed importance following up for any new or changing lesions as noted in handout given, but every 12 months exams in clinic in the setting of dermatologic history  ABCDE's of melanoma discussed/handout previously given          I have performed a physical exam and reviewed and updated ROS and Plan today (6/11/2025). In review of dermatology visit (5/15/2024), there are no changes except as documented above.         Please note that this dictation was created using voice recognition software. I have made every reasonable attempt to correct obvious errors, but I expect that there are errors of grammar and possibly content that I did not discover before finalizing the note.      Return to clinic in: Return in about 1  year (around 6/11/2026) for ALISON. and as needed for any new or changing skin lesions.

## 2025-07-01 ENCOUNTER — HOSPITAL ENCOUNTER (OUTPATIENT)
Facility: MEDICAL CENTER | Age: 70
End: 2025-07-01
Attending: STUDENT IN AN ORGANIZED HEALTH CARE EDUCATION/TRAINING PROGRAM
Payer: MEDICARE

## 2025-07-01 VITALS
RESPIRATION RATE: 15 BRPM | HEART RATE: 83 BPM | BODY MASS INDEX: 21.08 KG/M2 | HEIGHT: 64 IN | DIASTOLIC BLOOD PRESSURE: 74 MMHG | SYSTOLIC BLOOD PRESSURE: 122 MMHG | TEMPERATURE: 96 F | WEIGHT: 123.46 LBS | OXYGEN SATURATION: 96 %

## 2025-07-01 DIAGNOSIS — Z85.3 HX: BREAST CANCER: ICD-10-CM

## 2025-07-01 DIAGNOSIS — Z12.31 ENCOUNTER FOR SCREENING MAMMOGRAM FOR MALIGNANT NEOPLASM OF BREAST: ICD-10-CM

## 2025-07-01 DIAGNOSIS — C34.92 NSCLC OF LEFT LUNG (HCC): Primary | ICD-10-CM

## 2025-07-01 DIAGNOSIS — R19.7 DIARRHEA, UNSPECIFIED TYPE: ICD-10-CM

## 2025-07-01 PROCEDURE — 99214 OFFICE O/P EST MOD 30 MIN: CPT | Performed by: STUDENT IN AN ORGANIZED HEALTH CARE EDUCATION/TRAINING PROGRAM

## 2025-07-01 PROCEDURE — 99212 OFFICE O/P EST SF 10 MIN: CPT | Performed by: STUDENT IN AN ORGANIZED HEALTH CARE EDUCATION/TRAINING PROGRAM

## 2025-07-01 RX ORDER — LOPERAMIDE HYDROCHLORIDE 2 MG/1
2 CAPSULE ORAL 4 TIMES DAILY PRN
Qty: 30 CAPSULE | Refills: 0 | Status: SHIPPED | OUTPATIENT
Start: 2025-07-01

## 2025-07-01 ASSESSMENT — ENCOUNTER SYMPTOMS
SORE THROAT: 0
SPUTUM PRODUCTION: 0
WEIGHT LOSS: 0
DIZZINESS: 0
PALPITATIONS: 0
DEPRESSION: 0
HEARTBURN: 0
VOMITING: 0
ABDOMINAL PAIN: 0
ORTHOPNEA: 0
FEVER: 0
NECK PAIN: 0
MEMORY LOSS: 0
COUGH: 0
SENSORY CHANGE: 0
TINGLING: 0
NAUSEA: 0
CHILLS: 0
TREMORS: 0
SHORTNESS OF BREATH: 0
HEADACHES: 0
BLURRED VISION: 0
BRUISES/BLEEDS EASILY: 0
FOCAL WEAKNESS: 0
WHEEZING: 0

## 2025-07-01 ASSESSMENT — FIBROSIS 4 INDEX: FIB4 SCORE: 1.25

## 2025-07-01 NOTE — ASSESSMENT & PLAN NOTE
Orders:    CBC WITH DIFFERENTIAL; Future    Comp Metabolic Panel; Future    Referral to Genetics

## 2025-07-01 NOTE — PROGRESS NOTES
Follow Up Note:  Hematology/Oncology    Primary Care:  Beata White P.A.-C.    Diagnosis:   1) HR+ Breast Cancer (1995)   -L total mastectomy   -adjuvant Chemo (likely with doxorubicin)    -adjuvant RT   -Offered endocrine therapy but declined 2/2 cost  2) Stage IIIA  [id number] ypT1N0 Lung Adenocarcinoma   -7/19/23-9/5/23 neoadjuvant carbo/pem/nivo    -1/30/25 L lobectomy     Chief Complaint: surveillance, lung cancer    HPI:  Patient is here for follow up visit.  Latrice Andersen is a 69 y.o. female who on June 6, 2023 had an abnormal PET scan revealed a left upper lobe mass consistent with malignancy.  June 12, 2023 patient had a left upper lobe biopsy positive for malignancy for pulmonary adenocarcinoma.  Patient then received neoadjuvant chemo immunotherapy.  Had difficulties with colitis secondary to immunotherapy with cycle 3.  Ultimately had a left lobectomy performed on 1/30/2024 and is now currently receiving surveillance imaging.  She had no further immunotherapy post resection as a result of her complications presurgery.  Recently she had an abnormal area on the right middle lobe and has had bronchoscopy with negative biopsies on 9/10/2024.  Repeat CT scans show stability of the lung nodule.    Interval History:  She reports she has been doing well.  Reports she continues to intermittently have colitis - up to 7 small volume episodes a day.  Has not tried taking anything for this.  Denies N/V.  Tolerating PO     Allergies as of 07/01/2025 - Reviewed 07/01/2025   Allergen Reaction Noted    Codeine Unspecified 01/27/2016       Current Outpatient Medications:     levothyroxine (SYNTHROID) 50 MCG Tab, Take 1 Tablet by mouth every morning on an empty stomach. Please make an appointment for any future refills., Disp: 90 Tablet, Rfl: 0    venlafaxine (EFFEXOR) 75 MG Tab, TAKE ONE TABLET BY MOUTH ONE TIME DAILY, Disp: 90 Tablet, Rfl: 1    lisinopril (PRINIVIL) 10 MG Tab, TAKE ONE TABLET BY MOUTH ONE TIME  "DAILY, Disp: 100 Tablet, Rfl: 0    metoprolol SR (TOPROL XL) 25 MG TABLET SR 24 HR, Take 1 Tablet by mouth every day., Disp: 100 Tablet, Rfl: 1    fenofibrate micronized (LOFIBRA) 134 MG capsule, TAKE ONE CAPSULE BY MOUTH ONE TIME DAILY, Disp: 100 Capsule, Rfl: 1    pravastatin (PRAVACHOL) 40 MG tablet, Take 1 Tablet by mouth every day., Disp: 100 Tablet, Rfl: 2    acetaminophen (TYLENOL) 325 MG Tab, Take 650 mg by mouth every four hours as needed., Disp: , Rfl:     VITAMIN D PO, Take 5,000 Units by mouth every day. DO NOT TAKE 7 DAYS PRIOR TO SURGERY, Disp: , Rfl:     Review of Systems:  Review of Systems   Constitutional:  Negative for chills, fever, malaise/fatigue and weight loss.   HENT:  Negative for congestion, ear pain, nosebleeds and sore throat.    Eyes:  Negative for blurred vision.   Respiratory:  Negative for cough, sputum production, shortness of breath and wheezing.    Cardiovascular:  Negative for chest pain, palpitations, orthopnea and leg swelling.   Gastrointestinal:  Negative for abdominal pain, heartburn, nausea and vomiting.   Genitourinary:  Negative for dysuria, frequency and urgency.   Musculoskeletal:  Negative for neck pain.   Neurological:  Negative for dizziness, tingling, tremors, sensory change, focal weakness and headaches.   Endo/Heme/Allergies:  Does not bruise/bleed easily.   Psychiatric/Behavioral:  Negative for depression, memory loss and suicidal ideas.    All other systems reviewed and are negative.    Physical Exam:  Vitals:    07/01/25 0909   BP: 122/74   BP Location: Left arm   Patient Position: Sitting   BP Cuff Size: Small adult   Pulse: 83   Resp: 15   Temp: (!) 35.6 °C (96 °F)   TempSrc: Temporal   SpO2: 96%   Weight: 56 kg (123 lb 7.3 oz)   Height: 1.626 m (5' 4\")     Physical Exam  Constitutional:       General: She is not in acute distress.  HENT:      Head: Normocephalic.   Eyes:      Conjunctiva/sclera: Conjunctivae normal.   Cardiovascular:      Rate and Rhythm: " Normal rate and regular rhythm.      Heart sounds: Normal heart sounds.   Pulmonary:      Effort: Pulmonary effort is normal.      Breath sounds: Normal breath sounds.   Abdominal:      General: Bowel sounds are normal.      Palpations: Abdomen is soft.   Musculoskeletal:         General: Normal range of motion.   Lymphadenopathy:      Cervical: No cervical adenopathy.   Skin:     General: Skin is dry.   Neurological:      General: No focal deficit present.      Mental Status: She is oriented to person, place, and time.   Psychiatric:         Thought Content: Thought content normal.     Labs:       Imaging:     All listed images below have been independently reviewed by me. I agree with the findings as summarized below:    CT-CHEST (THORAX) W/O    Result Date: 12/3/2024  12/2/2024 1:04 PM HISTORY/REASON FOR EXAM:  followup lesion in right upper lobe- bronchoscopy x 2 nondiagnostic, PET avid. Smaller in size at time of CT guided biopsy; TECHNIQUE/EXAM DESCRIPTION: CT scan of the chest without contrast. Noncontrast helical scanning of the chest was obtained from the lung apices through the adrenal glands. Low dose optimization technique was utilized for this CT exam including automated exposure control and adjustment of the mA and/or kV according to patient size. Lack of intravenous contrast limits solid organ and vascular evaluation. COMPARISON: Chest CT 9/9/2024 and additional. PET CT 8/7/2024. FINDINGS: Lungs: Decreased, now 2.8 x 1.1 cm irregular nodule in the right middle lobe. As stable 3 mm subpleural nodule left lower lobe image 100. Stable 5 mm right middle lobe nodule image 109. Stable tiny cluster of micronodules in the right lower lobe. Seen on  image 83 series 2. Mediastinum/Elsie: No adenopathy. Pleura: No pleural effusion. Cardiac: Heart normal in size There is coronary artery calcification. Vascular: Aorta is nonaneurysmal. Soft tissues: Previous left mastectomy. No axillary adenopathy. Upper abdomen:  Limited visualized portion of the upper abdomen demonstrates no acute finding Bones: No acute process or concerning osseous lesion.     1. Decrease, now 2.8 x 1.1 cm right middle lobe nodule. 2. There are few additional stable tiny benign-appearing lung nodules. 3. No new nodule or acute process. 3. Coronary artery calcifications. Fleischner Society pulmonary nodule recommendations: Not Applicable       Pathology:      Assessment & Plan:  Assessment & Plan  NSCLC of left lung (HCC)    Orders:    CBC WITH DIFFERENTIAL; Future    Comp Metabolic Panel; Future    Referral to Genetics    HX: breast cancer    Orders:    Referral to Genetics         NSCLC of left lung (HCC)  Staging form: Lung, AJCC 8th Edition  - Clinical stage from 6/29/2023: Stage IIIA (cT1c, cN2, cM0) - Signed by Nancy Campbell M.D. on 6/29/2023        #1.  Stage I (ypT1 ypN0) left upper lobe lung adenocarcinoma, PD-L1 1%, TP 53, NF1 negative.    She had 3 cycles of chemo immunotherapy neoadjuvantly complicated by immunotherapy related colitis.  She had a  left upper lobectomy on 1/30/2024 and deferred further immunotherapy 2/2 reaction. Currently under surveillance imaging.  In 2024 she had an abnormal region in the right middle lobe that has been biopsied by pulmonary with no evidence of malignancy.  Repeat surveillance scans with stable lung nodule and liver nodule. RTC in 3 months with a follow-up CT chest, cbc, cmp     #2.  Immunotherapy induced colitis.  Treated with steroid taper.  Reports that it initially improved but is having 7 small volume BM a day.  Prescribed imodium today     Any questions and concerns raised by the patient were answered to the best of my ability. Thank you for allowing me to participate in the care for this patient. Please feel free to contact me for any questions or concerns.

## 2025-07-02 ENCOUNTER — HOSPITAL ENCOUNTER (OUTPATIENT)
Dept: LAB | Facility: MEDICAL CENTER | Age: 70
End: 2025-07-02
Attending: STUDENT IN AN ORGANIZED HEALTH CARE EDUCATION/TRAINING PROGRAM
Payer: MEDICARE

## 2025-07-02 DIAGNOSIS — C34.92 NSCLC OF LEFT LUNG (HCC): ICD-10-CM

## 2025-07-02 LAB
ALBUMIN SERPL BCP-MCNC: 3.7 G/DL (ref 3.2–4.9)
ALBUMIN/GLOB SERPL: 1.2 G/DL
ALP SERPL-CCNC: 84 U/L (ref 30–99)
ALT SERPL-CCNC: 47 U/L (ref 2–50)
ANION GAP SERPL CALC-SCNC: 11 MMOL/L (ref 7–16)
AST SERPL-CCNC: 61 U/L (ref 12–45)
BASOPHILS # BLD AUTO: 0.9 % (ref 0–1.8)
BASOPHILS # BLD: 0.07 K/UL (ref 0–0.12)
BILIRUB SERPL-MCNC: <0.2 MG/DL (ref 0.1–1.5)
BUN SERPL-MCNC: 22 MG/DL (ref 8–22)
CALCIUM ALBUM COR SERPL-MCNC: 10.2 MG/DL (ref 8.5–10.5)
CALCIUM SERPL-MCNC: 10 MG/DL (ref 8.5–10.5)
CHLORIDE SERPL-SCNC: 110 MMOL/L (ref 96–112)
CO2 SERPL-SCNC: 21 MMOL/L (ref 20–33)
CREAT SERPL-MCNC: 0.97 MG/DL (ref 0.5–1.4)
EOSINOPHIL # BLD AUTO: 0.24 K/UL (ref 0–0.51)
EOSINOPHIL NFR BLD: 3.3 % (ref 0–6.9)
ERYTHROCYTE [DISTWIDTH] IN BLOOD BY AUTOMATED COUNT: 54.9 FL (ref 35.9–50)
GFR SERPLBLD CREATININE-BSD FMLA CKD-EPI: 63 ML/MIN/1.73 M 2
GLOBULIN SER CALC-MCNC: 3 G/DL (ref 1.9–3.5)
GLUCOSE SERPL-MCNC: 95 MG/DL (ref 65–99)
HCT VFR BLD AUTO: 34.6 % (ref 37–47)
HGB BLD-MCNC: 10.7 G/DL (ref 12–16)
IMM GRANULOCYTES # BLD AUTO: 0.02 K/UL (ref 0–0.11)
IMM GRANULOCYTES NFR BLD AUTO: 0.3 % (ref 0–0.9)
LYMPHOCYTES # BLD AUTO: 2.45 K/UL (ref 1–4.8)
LYMPHOCYTES NFR BLD: 33.2 % (ref 22–41)
MCH RBC QN AUTO: 26.8 PG (ref 27–33)
MCHC RBC AUTO-ENTMCNC: 30.9 G/DL (ref 32.2–35.5)
MCV RBC AUTO: 86.5 FL (ref 81.4–97.8)
MONOCYTES # BLD AUTO: 0.78 K/UL (ref 0–0.85)
MONOCYTES NFR BLD AUTO: 10.6 % (ref 0–13.4)
NEUTROPHILS # BLD AUTO: 3.82 K/UL (ref 1.82–7.42)
NEUTROPHILS NFR BLD: 51.7 % (ref 44–72)
NRBC # BLD AUTO: 0 K/UL
NRBC BLD-RTO: 0 /100 WBC (ref 0–0.2)
PLATELET # BLD AUTO: 420 K/UL (ref 164–446)
PMV BLD AUTO: 9.2 FL (ref 9–12.9)
POTASSIUM SERPL-SCNC: 4.1 MMOL/L (ref 3.6–5.5)
PROT SERPL-MCNC: 6.7 G/DL (ref 6–8.2)
RBC # BLD AUTO: 4 M/UL (ref 4.2–5.4)
SODIUM SERPL-SCNC: 142 MMOL/L (ref 135–145)
WBC # BLD AUTO: 7.4 K/UL (ref 4.8–10.8)

## 2025-07-02 PROCEDURE — 85025 COMPLETE CBC W/AUTO DIFF WBC: CPT

## 2025-07-02 PROCEDURE — 80053 COMPREHEN METABOLIC PANEL: CPT

## 2025-07-02 PROCEDURE — 36415 COLL VENOUS BLD VENIPUNCTURE: CPT

## 2025-07-20 DIAGNOSIS — I10 ESSENTIAL HYPERTENSION: ICD-10-CM

## 2025-07-20 DIAGNOSIS — E78.5 DYSLIPIDEMIA: ICD-10-CM

## 2025-07-21 NOTE — TELEPHONE ENCOUNTER
Received request via: Pharmacy    Was the patient seen in the last year in this department? Yes    Does the patient have an active prescription (recently filled or refills available) for medication(s) requested? No    Pharmacy Name: [facility].     Does the patient have [facility] Plus and need 100-day supply? (This applies to ALL medications) Yes, quantity updated to 100 days

## 2025-07-21 NOTE — TELEPHONE ENCOUNTER
Received request via: Pharmacy    Was the patient seen in the last year in this department? Yes    Does the patient have an active prescription (recently filled or refills available) for medication(s) requested? No    Pharmacy Name: [facility]    Does the patient have [facility] Plus and need 100-day supply? (This applies to ALL medications) Yes, quantity updated to 100 days

## 2025-07-22 RX ORDER — FENOFIBRATE 134 MG/1
134 CAPSULE ORAL DAILY
Qty: 100 CAPSULE | Refills: 1 | Status: SHIPPED | OUTPATIENT
Start: 2025-07-22

## 2025-07-22 RX ORDER — LISINOPRIL 10 MG/1
10 TABLET ORAL DAILY
Qty: 100 TABLET | Refills: 1 | Status: SHIPPED | OUTPATIENT
Start: 2025-07-22

## 2025-08-01 ENCOUNTER — TELEPHONE (OUTPATIENT)
Dept: ENDOCRINOLOGY | Facility: MEDICAL CENTER | Age: 70
End: 2025-08-01
Payer: MEDICARE

## 2025-08-27 ENCOUNTER — HOSPITAL ENCOUNTER (OUTPATIENT)
Dept: RADIOLOGY | Facility: MEDICAL CENTER | Age: 70
End: 2025-08-27
Attending: STUDENT IN AN ORGANIZED HEALTH CARE EDUCATION/TRAINING PROGRAM
Payer: MEDICARE

## 2025-08-27 DIAGNOSIS — C34.92 NSCLC OF LEFT LUNG (HCC): ICD-10-CM

## 2025-08-27 PROCEDURE — 700117 HCHG RX CONTRAST REV CODE 255: Performed by: STUDENT IN AN ORGANIZED HEALTH CARE EDUCATION/TRAINING PROGRAM

## 2025-08-27 PROCEDURE — 71260 CT THORAX DX C+: CPT

## 2025-08-27 RX ADMIN — IOHEXOL 75 ML: 350 INJECTION, SOLUTION INTRAVENOUS at 14:09

## (undated) DEVICE — TUBING CLEARLINK DUO-VENT - C-FLO (48EA/CA)

## (undated) DEVICE — SUTURE 4-0 VICRYL PLUS FS-2 - 27 INCH (36/BX)

## (undated) DEVICE — SUTURE 2-0 VICRYL PLUS CT-2 - 27 INCH (36/BX)

## (undated) DEVICE — CATHETER IV SAFETY 20 GA X 1-1/4 (50/BX)

## (undated) DEVICE — CANISTER SUCTION 3000ML MECHANICAL FILTER AUTO SHUTOFF MEDI-VAC NONSTERILE LF DISP (40EA/CA)

## (undated) DEVICE — SET EXTENSION WITH 2 PORTS (48EA/CA) ***PART #2C8610 IS A SUBSTITUTE*****

## (undated) DEVICE — SENSOR OXIMETER ADULT SPO2 RD SET (20EA/BX)

## (undated) DEVICE — SYRINGE SAFETY 3 ML 18 GA X 1 1/2 BLUNT LL (100/BX 8BX/CA)

## (undated) DEVICE — SYRINGE SAFETY 5 ML 18 GA X 1-1/2 BLUNT LL (100/BX 4BX/CA)

## (undated) DEVICE — GLOVE SIZE 7.0 SURGEON ACCELERATOR FREE GREEN (50PR/BX 4BX/CA)

## (undated) DEVICE — CANNULA O2 COMFORT SOFT EAR ADULT 7 FT TUBING (50/CA)

## (undated) DEVICE — SUCTION INSTRUMENT YANKAUER BULBOUS TIP W/O VENT (50EA/CA)

## (undated) DEVICE — CATHETER GUIDING ION (1/EA)

## (undated) DEVICE — BAG IV VISION ION IF1000 (10EA/BX)

## (undated) DEVICE — SUTURE 0 SILK CT-1 (36PK/BX)

## (undated) DEVICE — CANISTER SUCTION 3000ML MECHANICAL FILTER AUTO SHUTOFF MEDI-VAC NONSTERILE LF DISP  (40EA/CA)

## (undated) DEVICE — TUBE CONNECTING SUCTION - CLEAR PLASTIC STERILE 72 IN (50EA/CA)

## (undated) DEVICE — STAPLER ECHELON 3000 45MM STANDARD (3EA/BX)

## (undated) DEVICE — Device

## (undated) DEVICE — ROBOTIC SURGERY SERVICES

## (undated) DEVICE — FORCEP RADIAL JAW 4 STANDARD CAPACITY W/NEEDLE 240CM (40EA/BX)

## (undated) DEVICE — COVER LIGHT HANDLE ALC PLUS DISP (18EA/BX)

## (undated) DEVICE — CAPTIVATOR COLD 10MM

## (undated) DEVICE — WATER IRRIGATION STERILE 1000ML (12EA/CA)

## (undated) DEVICE — SET I.V. EXTENSION DIAL-A- - FLOW REGULATOR (48EA/CA)

## (undated) DEVICE — SEALANT TISSUE CLOSURE KIT FIBIRIN VISTASEAL 10ML (1EA/BX)

## (undated) DEVICE — SUTURE GENERAL

## (undated) DEVICE — CATHETER IV SAFETY 22 GA X 1 (50EA/BX)

## (undated) DEVICE — DRAPE CHEST/BREAST - (12EA/CA)

## (undated) DEVICE — KIT  I.V. START (100EA/CA)

## (undated) DEVICE — SPONGE GAUZE NON-STERILE 4X4 - (2000/CA 10PK/CA)

## (undated) DEVICE — MASK WITH FACE SHIELD (25/BX 4BX/CA)

## (undated) DEVICE — BAG RETRIEVAL 10ML (10EA/BX)

## (undated) DEVICE — SYRINGE DISP. 60 CC LL - (30/BX, 12BX/CA)**WHEN THESE ARE GONE ORDER #500206**

## (undated) DEVICE — SLEEVE, VASO, THIGH, MED

## (undated) DEVICE — SET SUCTION/IRRIGATION WITH DISPOSABLE TIP (6/CA )PART #0250-070-520 IS A SUB

## (undated) DEVICE — TROCAR 12MM THORACOPORT (6EA/BX)

## (undated) DEVICE — GLOVE BIOGEL M SZ 8 SURGICAL PF LTX - (50/BX 4BX/CA)

## (undated) DEVICE — TOWEL STOP TIMEOUT SAFETY FLAG (40EA/CA)

## (undated) DEVICE — PROBE ENDOSCOPIC PERIPHERAL VISION ION 1.5 IF1000 (1/EA)

## (undated) DEVICE — GLOVE BIOGEL INDICATOR SZ 8 SURGICAL PF LTX - (50/BX 4BX/CA)

## (undated) DEVICE — TUBE E-T HI-LO CUFF 7.0MM (10EA/PK)

## (undated) DEVICE — GOWN SURGEONS LARGE - (32/CA)

## (undated) DEVICE — STAPLE 45MM GRAY 2.0MM (12EA/BX)

## (undated) DEVICE — CONNECTOR Y TBG CRTY 5 IN 1 STERILE (50EA/CA)

## (undated) DEVICE — ELECTRODE DUAL RETURN W/ CORD - (50/PK)

## (undated) DEVICE — DRESSING TRANSPARENT FILM TEGADERM 2.375 X 2.75"  (100EA/BX)"

## (undated) DEVICE — FORCEPS BRONCH DISPOSABLE ALLIGATOR JAW (20EA/BX)

## (undated) DEVICE — STAPLE 35MM WHITE ECHELON FLEX (12/BX)

## (undated) DEVICE — STAPLE 45MM VASCULAR WHITE 2.5MM (12EA/BX)

## (undated) DEVICE — CHLORAPREP 26 ML APPLICATOR - ORANGE TINT(25/CA)

## (undated) DEVICE — NEEDLE BIOPSY FLEXISION OD21 GA IF1000 (5EA/BX)

## (undated) DEVICE — LACTATED RINGERS INJ 1000 ML - (14EA/CA 60CA/PF)

## (undated) DEVICE — TUBE E-T HI-LO CUFF 7.5MM (10EA/PK)

## (undated) DEVICE — COVER LIGHT HANDLE FLEXIBLE - SOFT (2EA/PK 80PK/CA)

## (undated) DEVICE — SET LEADWIRE 5 LEAD BEDSIDE DISPOSABLE ECG (1SET OF 5/EA)

## (undated) DEVICE — GLOVE BIOGEL SZ 7.5 SURGICAL PF LTX - (50PR/BX 4BX/CA)

## (undated) DEVICE — BRONCHOSCOPE EXALT MODEL B REGULAR (10EA/BX)

## (undated) DEVICE — SPONGE GAUZESTER. 2X2 4-PL - (2/PK 50PK/BX 30BX/CS)

## (undated) DEVICE — SYRINGE SAFETY 10 ML 18 GA X 1 1/2 BLUNT LL (100/BX 4BX/CA)

## (undated) DEVICE — GOWN SURGEONS X-LARGE - DISP. (30/CA)

## (undated) DEVICE — APPLICATOR 35CM RIGID VISTASEAL LAPAROSCOPIC (3EA/BX)

## (undated) DEVICE — GLOVESZ 8.5 BIOGEL PI MICRO - PF LF (50PR/BX)

## (undated) DEVICE — BRONCHOSCOPE EXALT MODEL B REGULAR

## (undated) DEVICE — CANISTER SUCTION RIGID RED 1500CC (40EA/CA)

## (undated) DEVICE — PAD PREP 24 X 48 CUFFED - (100/CA)

## (undated) DEVICE — SODIUM CHL IRRIGATION 0.9% 1000ML (12EA/CA)

## (undated) DEVICE — GOWN WARMING STANDARD FLEX - (30/CA)

## (undated) DEVICE — TUBE SUCTION YANKAUER 1/4 X 6FT (50EA/CA)"

## (undated) DEVICE — TUBE E-T HI-LO CUFF 8.0MM (10EA/PK)

## (undated) DEVICE — CATHETER IV SAFETY 24 GA X 3/4 (50EA/BX)

## (undated) DEVICE — SOD. CHL. INJ. 0.9% 1000 ML - (14EA/CA 60CA/PF)

## (undated) DEVICE — TUBE E-T HI-LO CUFF 6.5MM (10EA/BX)

## (undated) DEVICE — DRAPESURG STERI-DRAPE LONG - (10/BX 4BX/CA)

## (undated) DEVICE — GLOVE SZ 7 BIOGEL PI MICRO - PF LF (50PR/BX 4BX/CA)

## (undated) DEVICE — SYSTEM CHEST DRAIN ADULT/PEDS W/AUTO TRANSFUSION CAPABILITY SAHARA (6EA/CA)

## (undated) DEVICE — PACK LAP CHOLE OR - (2EA/CA)

## (undated) DEVICE — STAPLE 45MM BLUE 3.5MM ECHELON (12EA/BX)

## (undated) DEVICE — TUBE E-T HI-LO CUFF 8.5MM (10EA/PK)

## (undated) DEVICE — CONNECTOR HUBLESS DRAINAGE - ONE WAY (20/BX)

## (undated) DEVICE — DRESSING NON-ADHERING 8 X 3 - (50/BX)

## (undated) DEVICE — DRAPE IOBAN II INCISE 23X17 - (10EA/BX 4BX/CA)

## (undated) DEVICE — TUBE SUCTION YANKAUER  1/4 X 6FT (20EA/CA)"

## (undated) DEVICE — STAPLE 45MM WHTE 2.5MM - (12/BX)

## (undated) DEVICE — BAG RETRIEVAL 12/15 MM INZII (5EA/CA) THIS WILL REPLACE ITEM 75018